# Patient Record
Sex: MALE | Race: WHITE | Employment: OTHER | URBAN - METROPOLITAN AREA
[De-identification: names, ages, dates, MRNs, and addresses within clinical notes are randomized per-mention and may not be internally consistent; named-entity substitution may affect disease eponyms.]

---

## 2017-01-09 ENCOUNTER — OFFICE VISIT (OUTPATIENT)
Dept: DERMATOLOGY | Age: 82
End: 2017-01-09

## 2017-01-09 DIAGNOSIS — D48.5 NEOPLASM OF UNCERTAIN BEHAVIOR OF SKIN: ICD-10-CM

## 2017-01-09 DIAGNOSIS — Z85.820 HISTORY OF MELANOMA: ICD-10-CM

## 2017-01-09 DIAGNOSIS — L24.9 IRRITANT DERMATITIS: ICD-10-CM

## 2017-01-09 DIAGNOSIS — Z85.828 HISTORY OF NONMELANOMA SKIN CANCER: ICD-10-CM

## 2017-01-09 DIAGNOSIS — R20.9 DISTURBANCE OF SKIN SENSATION: ICD-10-CM

## 2017-01-09 DIAGNOSIS — L82.1 SK (SEBORRHEIC KERATOSIS): ICD-10-CM

## 2017-01-09 DIAGNOSIS — L29.9 SCALP PRURITUS: Primary | ICD-10-CM

## 2017-01-09 PROCEDURE — 11101 PR BIOPSY, EACH ADDED LESION: CPT | Performed by: DERMATOLOGY

## 2017-01-09 PROCEDURE — 11100 PR BIOPSY OF SKIN LESION: CPT | Performed by: DERMATOLOGY

## 2017-01-09 PROCEDURE — 99214 OFFICE O/P EST MOD 30 MIN: CPT | Performed by: DERMATOLOGY

## 2017-01-09 RX ORDER — KETOCONAZOLE 20 MG/ML
SHAMPOO TOPICAL
Qty: 1 BOTTLE | Refills: 5 | Status: ON HOLD | OUTPATIENT
Start: 2017-01-09 | End: 2020-04-18 | Stop reason: CLARIF

## 2017-01-09 RX ORDER — TRIAMCINOLONE ACETONIDE 1 MG/G
CREAM TOPICAL
Qty: 30 G | Refills: 2 | Status: SHIPPED | OUTPATIENT
Start: 2017-01-09 | End: 2018-03-14 | Stop reason: SDUPTHER

## 2017-01-11 ENCOUNTER — TELEPHONE (OUTPATIENT)
Dept: DERMATOLOGY | Age: 82
End: 2017-01-11

## 2017-01-20 ENCOUNTER — PROCEDURE VISIT (OUTPATIENT)
Dept: DERMATOLOGY | Age: 82
End: 2017-01-20

## 2017-01-20 VITALS — DIASTOLIC BLOOD PRESSURE: 67 MMHG | SYSTOLIC BLOOD PRESSURE: 141 MMHG | HEART RATE: 58 BPM

## 2017-01-20 DIAGNOSIS — C44.529 SCC (SQUAMOUS CELL CARCINOMA), TRUNK: ICD-10-CM

## 2017-01-20 DIAGNOSIS — D04.4: Primary | ICD-10-CM

## 2017-01-20 PROCEDURE — 12032 INTMD RPR S/A/T/EXT 2.6-7.5: CPT | Performed by: DERMATOLOGY

## 2017-01-20 PROCEDURE — 17271 DSTR MAL LES S/N/H/F/G 0.6-1: CPT | Performed by: DERMATOLOGY

## 2017-01-20 PROCEDURE — 11603 EXC TR-EXT MAL+MARG 2.1-3 CM: CPT | Performed by: DERMATOLOGY

## 2017-01-25 ENCOUNTER — OFFICE VISIT (OUTPATIENT)
Dept: CARDIOLOGY CLINIC | Age: 82
End: 2017-01-25

## 2017-01-25 ENCOUNTER — TELEPHONE (OUTPATIENT)
Dept: DERMATOLOGY | Age: 82
End: 2017-01-25

## 2017-01-25 VITALS
OXYGEN SATURATION: 94 % | HEIGHT: 73 IN | WEIGHT: 205.12 LBS | HEART RATE: 62 BPM | BODY MASS INDEX: 27.18 KG/M2 | DIASTOLIC BLOOD PRESSURE: 72 MMHG | SYSTOLIC BLOOD PRESSURE: 126 MMHG

## 2017-01-25 DIAGNOSIS — R31.0 GROSS HEMATURIA: ICD-10-CM

## 2017-01-25 DIAGNOSIS — I25.10 ATHEROSCLEROSIS OF NATIVE CORONARY ARTERY OF NATIVE HEART WITHOUT ANGINA PECTORIS: ICD-10-CM

## 2017-01-25 DIAGNOSIS — I48.0 PAROXYSMAL ATRIAL FIBRILLATION (HCC): Primary | Chronic | ICD-10-CM

## 2017-01-25 DIAGNOSIS — I47.1 ATRIAL TACHYCARDIA (HCC): ICD-10-CM

## 2017-01-25 PROCEDURE — 93000 ELECTROCARDIOGRAM COMPLETE: CPT | Performed by: INTERNAL MEDICINE

## 2017-01-25 PROCEDURE — 99214 OFFICE O/P EST MOD 30 MIN: CPT | Performed by: INTERNAL MEDICINE

## 2017-02-03 ENCOUNTER — NURSE ONLY (OUTPATIENT)
Dept: DERMATOLOGY | Age: 82
End: 2017-02-03

## 2017-02-03 DIAGNOSIS — Z48.02 VISIT FOR SUTURE REMOVAL: Primary | ICD-10-CM

## 2017-05-10 ENCOUNTER — OFFICE VISIT (OUTPATIENT)
Dept: DERMATOLOGY | Age: 82
End: 2017-05-10

## 2017-05-10 DIAGNOSIS — Z85.820 HISTORY OF MELANOMA: ICD-10-CM

## 2017-05-10 DIAGNOSIS — L57.0 AK (ACTINIC KERATOSIS): Primary | ICD-10-CM

## 2017-05-10 DIAGNOSIS — C44.622 SCC (SQUAMOUS CELL CARCINOMA), SHOULDER, RIGHT: ICD-10-CM

## 2017-05-10 DIAGNOSIS — L82.1 SK (SEBORRHEIC KERATOSIS): ICD-10-CM

## 2017-05-10 PROCEDURE — 17003 DESTRUCT PREMALG LES 2-14: CPT | Performed by: DERMATOLOGY

## 2017-05-10 PROCEDURE — 17000 DESTRUCT PREMALG LESION: CPT | Performed by: DERMATOLOGY

## 2017-05-10 PROCEDURE — 99213 OFFICE O/P EST LOW 20 MIN: CPT | Performed by: DERMATOLOGY

## 2017-05-10 PROCEDURE — 17261 DSTRJ MAL LES T/A/L .6-1.0CM: CPT | Performed by: DERMATOLOGY

## 2017-05-16 ENCOUNTER — TELEPHONE (OUTPATIENT)
Dept: DERMATOLOGY | Age: 82
End: 2017-05-16

## 2017-08-16 ENCOUNTER — TELEPHONE (OUTPATIENT)
Dept: CARDIOLOGY CLINIC | Age: 82
End: 2017-08-16

## 2017-08-23 ENCOUNTER — OFFICE VISIT (OUTPATIENT)
Dept: CARDIOLOGY CLINIC | Age: 82
End: 2017-08-23

## 2017-08-23 VITALS
HEART RATE: 52 BPM | BODY MASS INDEX: 27.67 KG/M2 | WEIGHT: 209.75 LBS | SYSTOLIC BLOOD PRESSURE: 128 MMHG | DIASTOLIC BLOOD PRESSURE: 64 MMHG

## 2017-08-23 DIAGNOSIS — R55 SYNCOPE AND COLLAPSE: ICD-10-CM

## 2017-08-23 DIAGNOSIS — Z01.810 PREOPERATIVE CARDIOVASCULAR EXAMINATION: ICD-10-CM

## 2017-08-23 DIAGNOSIS — R31.9 HEMATURIA: ICD-10-CM

## 2017-08-23 DIAGNOSIS — I10 ESSENTIAL HYPERTENSION: ICD-10-CM

## 2017-08-23 DIAGNOSIS — I47.1 ATRIAL TACHYCARDIA (HCC): ICD-10-CM

## 2017-08-23 DIAGNOSIS — I48.0 PAROXYSMAL ATRIAL FIBRILLATION (HCC): Primary | Chronic | ICD-10-CM

## 2017-08-23 DIAGNOSIS — I25.10 ATHEROSCLEROSIS OF NATIVE CORONARY ARTERY OF NATIVE HEART WITHOUT ANGINA PECTORIS: ICD-10-CM

## 2017-08-23 PROCEDURE — 93000 ELECTROCARDIOGRAM COMPLETE: CPT | Performed by: INTERNAL MEDICINE

## 2017-08-23 PROCEDURE — 99214 OFFICE O/P EST MOD 30 MIN: CPT | Performed by: INTERNAL MEDICINE

## 2017-08-31 ENCOUNTER — HOSPITAL ENCOUNTER (OUTPATIENT)
Dept: NON INVASIVE DIAGNOSTICS | Age: 82
Discharge: HOME | End: 2017-08-31
Attending: INTERNAL MEDICINE | Admitting: INTERNAL MEDICINE

## 2017-08-31 DIAGNOSIS — I25.10 ATHEROSCLEROTIC HEART DISEASE OF NATIVE CORONARY ARTERY WITHOUT ANGINA PECTORIS: ICD-10-CM

## 2017-08-31 LAB
LV EF: 63 %
LVEF MODALITY: NORMAL

## 2017-08-31 RX ORDER — AMINOPHYLLINE DIHYDRATE 25 MG/ML
100 INJECTION, SOLUTION INTRAVENOUS ONCE
Status: COMPLETED | OUTPATIENT
Start: 2017-08-31 | End: 2017-08-31

## 2017-08-31 RX ADMIN — AMINOPHYLLINE DIHYDRATE 100 MG: 25 INJECTION, SOLUTION INTRAVENOUS at 10:30

## 2017-09-13 ENCOUNTER — OFFICE VISIT (OUTPATIENT)
Dept: DERMATOLOGY | Age: 82
End: 2017-09-13

## 2017-09-13 DIAGNOSIS — Z85.828 HISTORY OF SCC (SQUAMOUS CELL CARCINOMA) OF SKIN: ICD-10-CM

## 2017-09-13 DIAGNOSIS — L82.0 INFLAMED SEBORRHEIC KERATOSIS: Primary | ICD-10-CM

## 2017-09-13 DIAGNOSIS — Z85.820 HISTORY OF MELANOMA: ICD-10-CM

## 2017-09-13 DIAGNOSIS — L82.1 SK (SEBORRHEIC KERATOSIS): ICD-10-CM

## 2017-09-13 PROCEDURE — 99213 OFFICE O/P EST LOW 20 MIN: CPT | Performed by: DERMATOLOGY

## 2017-09-13 PROCEDURE — 17110 DESTRUCTION B9 LES UP TO 14: CPT | Performed by: DERMATOLOGY

## 2017-10-11 RX ORDER — VALACYCLOVIR HYDROCHLORIDE 500 MG/1
TABLET, FILM COATED ORAL
Qty: 24 TABLET | Refills: 0 | Status: SHIPPED | OUTPATIENT
Start: 2017-10-11 | End: 2018-03-26

## 2017-12-27 RX ORDER — SOTALOL HYDROCHLORIDE 80 MG/1
80 TABLET ORAL 2 TIMES DAILY
Qty: 180 TABLET | Refills: 3 | Status: SHIPPED | OUTPATIENT
Start: 2017-12-27 | End: 2019-01-16 | Stop reason: SDUPTHER

## 2018-03-14 ENCOUNTER — OFFICE VISIT (OUTPATIENT)
Dept: DERMATOLOGY | Age: 83
End: 2018-03-14

## 2018-03-14 DIAGNOSIS — L57.0 AK (ACTINIC KERATOSIS): Primary | ICD-10-CM

## 2018-03-14 DIAGNOSIS — L24.9 IRRITANT DERMATITIS: ICD-10-CM

## 2018-03-14 DIAGNOSIS — Z85.828 HISTORY OF NONMELANOMA SKIN CANCER: ICD-10-CM

## 2018-03-14 DIAGNOSIS — Z85.820 HISTORY OF MELANOMA: ICD-10-CM

## 2018-03-14 PROCEDURE — 17000 DESTRUCT PREMALG LESION: CPT | Performed by: DERMATOLOGY

## 2018-03-14 PROCEDURE — 17003 DESTRUCT PREMALG LES 2-14: CPT | Performed by: DERMATOLOGY

## 2018-03-14 PROCEDURE — 99213 OFFICE O/P EST LOW 20 MIN: CPT | Performed by: DERMATOLOGY

## 2018-03-14 RX ORDER — TRIAMCINOLONE ACETONIDE 1 MG/G
CREAM TOPICAL
Qty: 30 G | Refills: 2 | Status: SHIPPED | OUTPATIENT
Start: 2018-03-14 | End: 2019-03-13 | Stop reason: SDUPTHER

## 2018-03-14 NOTE — PROGRESS NOTES
[facility]  Princess Adi Mayorga  1935    [de-identified] y.o. male     Date of Visit: 3/14/2018    Chief Complaint: follow up for skin cancers    History of Present Illness:    1. He presents today for persistent scaly lesions on the chest and back. 2.  Follow-up for a history of irritant dermatitis of the lower extremitiesstill comes and goes. He reports improvement with use of triamcinolone 0.1% cream.    3.  Follow-up for history of 2 melanomasdenies any signs of recurrence. 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 Jayden Shultz (stage IB) on 12/10/2014. 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 Rayna Reynaga on 2/25/14 (stage IA). 4.  f/u for a hx of NMSC - denies any signs of recurrence. 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 SCC in situ of the left lateral neckED with curettage on 1/20/2017. SCC on the left upper backexcised on 1/20/2017. SCC in situ of the right forearmtreated with curettage on 3/4/2016. SCC in situ of the left upper backtreated with curettage on 8/27/2015. SCC of the central chest - excised 3/27/15. BCC of the left forearm - excised on 2/25/14. He had back surgery since our last visit - back pain markedly reduced. Review of Systems:  Skin: No new or changing moles. Past Medical History, Family History, Surgical History, Medications and Allergies reviewed.     Past Medical History:   Diagnosis Date    Arrhythmia     Arthritis     hands shoulders neck    Atrial fibrillation (HCC)     coumadin    Atrial tachycardia (HCC)     CAD (coronary artery disease)     Cancer (HCC)     skin    Diabetes mellitus ([address]     Diverticulitis     Enlarged prostate     concerning lesions. f/u in 6 months. 4. History of nonmelanoma skin cancers - clear today    Sun protective behaviors and self skin examinations were encouraged. Call for any new or concerning lesions. Return in about 6 months (around 9/14/2018).

## 2018-03-26 RX ORDER — VALACYCLOVIR HYDROCHLORIDE 500 MG/1
TABLET, FILM COATED ORAL
Qty: 24 TABLET | Refills: 0 | Status: SHIPPED | OUTPATIENT
Start: 2018-03-26 | End: 2018-09-11 | Stop reason: SDUPTHER

## 2018-09-11 RX ORDER — VALACYCLOVIR HYDROCHLORIDE 500 MG/1
TABLET, FILM COATED ORAL
Qty: 24 TABLET | Refills: 0 | Status: SHIPPED | OUTPATIENT
Start: 2018-09-11 | End: 2020-01-30

## 2018-09-19 ENCOUNTER — OFFICE VISIT (OUTPATIENT)
Dept: DERMATOLOGY | Age: 83
End: 2018-09-19

## 2018-09-19 DIAGNOSIS — Z85.820 HISTORY OF MELANOMA: ICD-10-CM

## 2018-09-19 DIAGNOSIS — L57.0 AK (ACTINIC KERATOSIS): ICD-10-CM

## 2018-09-19 DIAGNOSIS — L82.1 SK (SEBORRHEIC KERATOSIS): ICD-10-CM

## 2018-09-19 DIAGNOSIS — D48.5 NEOPLASM OF UNCERTAIN BEHAVIOR OF SKIN: Primary | ICD-10-CM

## 2018-09-19 PROCEDURE — 17003 DESTRUCT PREMALG LES 2-14: CPT | Performed by: DERMATOLOGY

## 2018-09-19 PROCEDURE — 11100 PR BIOPSY OF SKIN LESION: CPT | Performed by: DERMATOLOGY

## 2018-09-19 PROCEDURE — 17000 DESTRUCT PREMALG LESION: CPT | Performed by: DERMATOLOGY

## 2018-09-19 PROCEDURE — 99213 OFFICE O/P EST LOW 20 MIN: CPT | Performed by: DERMATOLOGY

## 2018-09-19 NOTE — PROGRESS NOTES
Date    ABLATION OF DYSRHYTHMIC FOCUS      AVNRT and Atrial tachycardia    CARPAL TUNNEL RELEASE      CATARACT REMOVAL Bilateral     CORONARY ANGIOPLASTY WITH STENT PLACEMENT  2005    CYSTOSCOPY  9/26/12    coagulation prostatic urethra    CYSTOSCOPY  10/8/15    TURP    FINGER SURGERY      X7    FINGER TRIGGER RELEASE      OTHER SURGICAL HISTORY Left [id number]    WIDE LOCAL EXCISION LEFT ARM MELANOMA, WIDE LOCAL EXCISION OF    SHOULDER SURGERY Bilateral     TURP  3-7-13       No Known Allergies  Outpatient Prescriptions Marked as Taking for the 9/19/18 encounter (Office Visit) with Clare Mix MD   Medication Sig Dispense Refill    valACYclovir (VALTREX) 500 MG tablet TAKE 1 TABLET BY MOUTH TWICE DAILY FOR 3 DAYS AT FIRST SYMPTOMS OF RECURRENCE ON THE BUTTOCK 24 tablet 0    triamcinolone (KENALOG) 0.1 % cream Apply to itchy areas on the arms and legs twice daily for up to 2 weeks or until improved. 30 g 2    sotalol (BETAPACE) 80 MG tablet Take 1 tablet by mouth 2 times daily 180 tablet 3    FA-Pyridoxine-Cyanocobalamin (FOLTX PO) Take by mouth      ferrous sulfate ([employer] IRON) 325 (65 FE) MG tablet Take 325 mg by mouth daily (with breakfast)      Ascorbic Acid (VITAMIN C) 500 MG CAPS Take by mouth daily      polyethylene glycol (GLYCOLAX) powder Take 17 g by mouth daily      insulin detemir (LEVEMIR FLEXTOUCH) 100 UNIT/ML injection pen Inject 12-14 Units into the skin nightly (Patient taking differently: Inject 24 Units into the skin nightly ) 5 Pen 3    amLODIPine (NORVASC) 5 MG tablet Take 1 tablet by mouth daily 90 tablet 3    acetaminophen 650 MG TABS Take 650 mg by mouth every 4 hours as needed 120 tablet 3    B-D UF III MINI PEN NEEDLES 31G X 5 MM MISC       pregabalin (LYRICA) 75 MG capsule Take 75 mg by mouth 2 times daily       linagliptin (TRADJENTA) 5 MG tablet Take 1 tablet by mouth daily.  90 tablet 1    omega-3 acid ethyl esters (LOVAZA) 1 G capsule Take 2 capsules by mouth 2 times daily. Take 1 tablet twice daily. 360 capsule 1    nateglinide (STARLIX) 120 MG tablet Take 120 mg by mouth 2 times daily (before meals).  Multiple Vitamins-Minerals (CENTRUM SILVER) TABS Take  by mouth 2 times daily.  vitamin D (ERGOCALCIFEROL) 29959 UNIT CAPS capsule Take 50,000 Units by mouth once a week Indications: Takes on Monday       Pantoprazole Sodium (PROTONIX PO) Take 40 mg by mouth every other day.  ALPHA LIPOIC ACID PO Take 600 mg by mouth daily.  simvastatin (ZOCOR) 40 MG tablet Take 40 mg by mouth nightly. Physical Examination       The following were examined and determined to be normal: Psych/Neuro, Scalp/hair, Head/face, Conjunctivae/eyelids, Gums/teeth/lips, Neck, Abdomen, Back, LUE, RLE, LLE and Nails/digits. The following were examined and determined to be abnormal: Breast/axilla/chest and RUE. Well-appearing. 1.  Right supraclavicular region with a 6 mm keratotic pink papule. 2.  Right forearm4, chest3: Few keratotic erythematous macules. 3.  Right thigh and left lower back with stuck on appearing verrucous gray-brown papules. 4.  Left forearm with a linear surgical scar. Left earlobe absent. Assessment and Plan     1. Possible SCC of the right shoulder -     Discussed likely diagnosis; patient agreeable to biopsy and curettage (verbal consent obtained). The area(s) to be biopsied were marked with a surgical pen. Alcohol was used to cleanse the site. Local anesthesia was acheived with 1% lidocaine with epinephrine. Shave biopsy was performed using a razor blade followed by sharp curettage in multiple directions. Hemostasis was achieved with aluminum chloride. The wound(s) were dressed with petrolatum and covered with a bandage. Wound care instructions were reviewed. 1 Specimen (s) sent to pathology. The specimen bottles were appropriately labeled. We also reviewed the risks of bleeding, scar, and infection. 2. AK (actinic keratosis)     2 cycles of liquid nitrogen applied to 7 AKs: Right forearm4, chest3. Patient was educated regarding the potential risks of blister formation, discomfort, hypopigmentation, and scar. Wound care was discussed. 3. SK (seborrheic keratosis)     Reassurance. 4. History of melanoma  X 2 (left earlobe and left forerarm) - no signs of recurrence. He was encouraged to perform monthly self skin exams and to call with any new or concerning lesions. Sun protective behaviors were encouraged. Follow up for full skin exam in 6 mos. Return in about 6 months (around 3/19/2019).

## 2018-09-24 LAB — DERMATOLOGY PATHOLOGY REPORT: NORMAL

## 2018-09-26 PROBLEM — Z01.810 PREOPERATIVE CARDIOVASCULAR EXAMINATION: Status: RESOLVED | Noted: 2017-08-23 | Resolved: 2018-09-26

## 2018-10-15 ENCOUNTER — TELEPHONE (OUTPATIENT)
Dept: CARDIOLOGY CLINIC | Age: 83
End: 2018-10-15

## 2018-11-07 ENCOUNTER — OFFICE VISIT (OUTPATIENT)
Dept: CARDIOLOGY CLINIC | Age: 83
End: 2018-11-07
Payer: OTHER GOVERNMENT

## 2018-11-07 VITALS
HEART RATE: 60 BPM | SYSTOLIC BLOOD PRESSURE: 120 MMHG | WEIGHT: 210.4 LBS | DIASTOLIC BLOOD PRESSURE: 60 MMHG | BODY MASS INDEX: 27.76 KG/M2

## 2018-11-07 DIAGNOSIS — I47.1 ATRIAL TACHYCARDIA (HCC): ICD-10-CM

## 2018-11-07 DIAGNOSIS — R55 VASODEPRESSOR SYNCOPE: ICD-10-CM

## 2018-11-07 DIAGNOSIS — I48.0 PAROXYSMAL ATRIAL FIBRILLATION (HCC): Primary | ICD-10-CM

## 2018-11-07 DIAGNOSIS — R31.9 HEMATURIA, UNSPECIFIED TYPE: ICD-10-CM

## 2018-11-07 DIAGNOSIS — I25.10 ATHEROSCLEROSIS OF NATIVE CORONARY ARTERY OF NATIVE HEART WITHOUT ANGINA PECTORIS: ICD-10-CM

## 2018-11-07 DIAGNOSIS — I10 ESSENTIAL HYPERTENSION: ICD-10-CM

## 2018-11-07 PROCEDURE — 99214 OFFICE O/P EST MOD 30 MIN: CPT | Performed by: INTERNAL MEDICINE

## 2018-11-07 NOTE — PROGRESS NOTES
thirst, fluid intake, or urination. No tremor. · Hematologic/Lymphatic: No abnormal bruising or bleeding, blood clots or swollen lymph nodes. · Allergic/Immunologic: No nasal congestion or hives. Physical Examination:    VITALS:    /60   Pulse 60   Wt 210 lb 6.4 oz (95.4 kg)   BMI 27.76 kg/m²   CONSTITUTIONAL: Cooperative, no apparent distress, patient is Overweight  NEUROLOGIC:  Awake and orientated to person, place and time. PSYCH: Calm affect. SKIN: Warm and dry. HEENT: Sclera non-icteric, atraumatic   NECK:  neck supple, no elevation of JVP, normal carotid pulses with no bruits and thyroid normal size  LUNGS:  No increased work of breathing and clear to auscultation, no crackles or wheezing  CARDIOVASCULAR:  Regular rate and rhythm regular with ectopic beats; no murmurs, gallops or rubs. Normal PMI  ABDOMEN:  Normal bowel sounds, non-distended and non-tender to palpation  EXT: No edema, no cyanosis. Risk Factors: The patient does not smoke. The patient does have known diagnosis of CAD. Patient is  on beta blocker and is  on statin. Patient is on antiplatelet therapy   The patient does not have known diagnosis of CHF. Patient is  on beta blocker and is  on AceI/ARB. The patient does have known atrial arrhythmias. Patient is not on anticoagulation due to hx of bleeding after two attempts with anticoagulation. Assessment:   1. Paroxysmal atrial fibrillation:                - Ambulatory monitoring . 16% AF burden per event monitor in Sept- Oct 2016.      - ECG today shows SR   - Now taking Sotalol 80 mg twice a day since OV in 2/2017. Good AF suppression on this dose. - He has had multiple episodes of bleeding and urinary retention and severe anemia requiring transfusion.               - Other options including left atrial appendage closure were discussed with patient by Dr. Kathy Cuba previously. - No AC at this time secondary to hematuria, anemia.   Has been of coumadin since 2015. 2.  Hematuria w/ AC   - No hematuria currently (off AC)    3. S/P RFCA of AVNRT and PAT in 2000   - No recurrent AT/PAT    4. CAD: s/p PCI and stent to PDA in 2005              - Stable. No symptoms.               - No asa or Plavix. - On statin      5. Vasodepressor syncope   - No recurrent syncope    6. HTN: Blood pressure 120/60, pulse 60, weight 210 lb 6.4 oz (95.4 kg). 7.  Preoperative cardiovascular exam   - will be having back surgery to remove mass from index finger   - Lexiscan from 8/2017 negative for ischemia      Plan:  1. He is at low cardiovascular risk for his non-cardiac surgery. 2.  Once surgery is complete, plan for a 30 day MCOT to assess AF burden  3. If no AF is seen, will keep off anticoagulation  4. Follow up in 6 months    QUALITY MEASURES  1. Tobacco Cessation Counseling: NA  2. Retake of BP if >140/90:   NA  3. Documentation to PCP/referring for new patient:  Sent to PCP at close of office visit  4. CAD patient on anti-platelet: No, history of hematuria  5. CAD patient on STATIN therapy:  Yes  6. Patient with aFib on anticoagulation:  NA - high risk for bleeding    I, Mendoza Egan RN, am scribing for and in the presence of Dr. Kleber Downs.  11/07/18 4:44 PM  MORRIS Wick MD

## 2018-11-27 ENCOUNTER — TELEPHONE (OUTPATIENT)
Dept: CARDIOLOGY CLINIC | Age: 83
End: 2018-11-27

## 2018-11-29 ENCOUNTER — NURSE ONLY (OUTPATIENT)
Dept: CARDIOLOGY CLINIC | Age: 83
End: 2018-11-29

## 2019-01-14 ENCOUNTER — TELEPHONE (OUTPATIENT)
Dept: CARDIOLOGY CLINIC | Age: 84
End: 2019-01-14

## 2019-01-15 RX ORDER — SOTALOL HYDROCHLORIDE 80 MG/1
80 TABLET ORAL 2 TIMES DAILY
Qty: 28 TABLET | Refills: 0 | Status: CANCELLED | OUTPATIENT
Start: 2019-01-15

## 2019-01-15 RX ORDER — SOTALOL HYDROCHLORIDE 80 MG/1
80 TABLET ORAL 2 TIMES DAILY
Qty: 180 TABLET | Refills: 2 | Status: CANCELLED | OUTPATIENT
Start: 2019-01-15

## 2019-01-17 PROCEDURE — 93228 REMOTE 30 DAY ECG REV/REPORT: CPT | Performed by: INTERNAL MEDICINE

## 2019-01-17 RX ORDER — SOTALOL HYDROCHLORIDE 80 MG/1
80 TABLET ORAL 2 TIMES DAILY
Qty: 28 TABLET | Refills: 0 | Status: SHIPPED | OUTPATIENT
Start: 2019-01-17 | End: 2019-01-29 | Stop reason: SDUPTHER

## 2019-01-18 DIAGNOSIS — I48.0 PAROXYSMAL ATRIAL FIBRILLATION (HCC): Chronic | ICD-10-CM

## 2019-01-29 RX ORDER — SOTALOL HYDROCHLORIDE 80 MG/1
80 TABLET ORAL 2 TIMES DAILY
Qty: 28 TABLET | Refills: 0 | Status: SHIPPED | OUTPATIENT
Start: 2019-01-29 | End: 2019-02-11 | Stop reason: SDUPTHER

## 2019-02-01 ENCOUNTER — TELEPHONE (OUTPATIENT)
Dept: CARDIOLOGY CLINIC | Age: 84
End: 2019-02-01

## 2019-02-11 ENCOUNTER — TELEPHONE (OUTPATIENT)
Dept: CARDIOLOGY CLINIC | Age: 84
End: 2019-02-11

## 2019-02-11 RX ORDER — SOTALOL HYDROCHLORIDE 80 MG/1
80 TABLET ORAL 2 TIMES DAILY
Qty: 180 TABLET | Refills: 3 | Status: SHIPPED | OUTPATIENT
Start: 2019-02-11 | End: 2020-01-21

## 2019-03-13 ENCOUNTER — OFFICE VISIT (OUTPATIENT)
Dept: DERMATOLOGY | Age: 84
End: 2019-03-13
Payer: OTHER GOVERNMENT

## 2019-03-13 DIAGNOSIS — L82.0 INFLAMED SEBORRHEIC KERATOSIS: Primary | ICD-10-CM

## 2019-03-13 DIAGNOSIS — L57.0 ACTINIC KERATOSIS: ICD-10-CM

## 2019-03-13 DIAGNOSIS — L82.1 SEBORRHEIC KERATOSIS: ICD-10-CM

## 2019-03-13 DIAGNOSIS — L24.9 IRRITANT DERMATITIS: ICD-10-CM

## 2019-03-13 DIAGNOSIS — Z85.820 HISTORY OF MELANOMA: ICD-10-CM

## 2019-03-13 PROCEDURE — 17000 DESTRUCT PREMALG LESION: CPT | Performed by: DERMATOLOGY

## 2019-03-13 PROCEDURE — 99213 OFFICE O/P EST LOW 20 MIN: CPT | Performed by: DERMATOLOGY

## 2019-03-13 PROCEDURE — 17110 DESTRUCTION B9 LES UP TO 14: CPT | Performed by: DERMATOLOGY

## 2019-03-13 PROCEDURE — 17003 DESTRUCT PREMALG LES 2-14: CPT | Performed by: DERMATOLOGY

## 2019-03-13 RX ORDER — TRIAMCINOLONE ACETONIDE 1 MG/G
CREAM TOPICAL
Qty: 80 G | Refills: 2 | Status: ON HOLD | OUTPATIENT
Start: 2019-03-13 | End: 2020-04-18 | Stop reason: CLARIF

## 2019-05-13 ENCOUNTER — HOSPITAL ENCOUNTER (OUTPATIENT)
Age: 84
Discharge: HOME | End: 2019-05-13
Payer: OTHER GOVERNMENT

## 2019-05-13 LAB
A/G RATIO: 1.4 (ref 1.1–2.2)
ALBUMIN SERPL-MCNC: 4.1 G/DL (ref 3.4–5)
ALP BLD-CCNC: 53 U/L (ref 40–129)
ALT SERPL-CCNC: 17 U/L (ref 10–40)
ANION GAP SERPL CALCULATED.3IONS-SCNC: 12 MMOL/L (ref 3–16)
AST SERPL-CCNC: 23 U/L (ref 15–37)
BILIRUB SERPL-MCNC: 0.5 MG/DL (ref 0–1)
BUN BLDV-MCNC: 20 MG/DL (ref 7–20)
CALCIUM SERPL-MCNC: 10.4 MG/DL (ref 8.3–10.6)
CHLORIDE BLD-SCNC: 107 MMOL/L (ref 99–110)
CHOLESTEROL, TOTAL: 152 MG/DL (ref 0–199)
CO2: 22 MMOL/L (ref 21–32)
CREAT SERPL-MCNC: 1.5 MG/DL (ref 0.8–1.3)
CREATININE URINE: 158.7 MG/DL (ref 39–259)
ESTIMATED AVERAGE GLUCOSE: 128.4 MG/DL
GFR AFRICAN AMERICAN: 54
GFR NON-AFRICAN AMERICAN: 45
GLOBULIN: 3 G/DL
GLUCOSE BLD-MCNC: 149 MG/DL (ref 70–99)
HBA1C MFR BLD: 6.1 %
HCT VFR BLD CALC: 43.5 % (ref 40.5–52.5)
HDLC SERPL-MCNC: 47 MG/DL (ref 40–60)
HEMOGLOBIN: 15.1 G/DL (ref 13.5–17.5)
LDL CHOLESTEROL CALCULATED: 71 MG/DL
MCH RBC QN AUTO: 32.1 PG (ref 26–34)
MCHC RBC AUTO-ENTMCNC: 34.7 G/DL (ref 31–36)
MCV RBC AUTO: 92.7 FL (ref 80–100)
MICROALBUMIN UR-MCNC: 1.9 MG/DL
MICROALBUMIN/CREAT UR-RTO: 12 MG/G (ref 0–30)
PDW BLD-RTO: 13.4 % (ref 12.4–15.4)
PLATELET # BLD: 205 K/UL (ref 135–450)
PMV BLD AUTO: 8.6 FL (ref 5–10.5)
POTASSIUM SERPL-SCNC: 4.4 MMOL/L (ref 3.5–5.1)
RBC # BLD: 4.69 M/UL (ref 4.2–5.9)
SODIUM BLD-SCNC: 141 MMOL/L (ref 136–145)
T4 FREE: 0.9 NG/DL (ref 0.9–1.8)
TOTAL PROTEIN: 7.1 G/DL (ref 6.4–8.2)
TRIGL SERPL-MCNC: 168 MG/DL (ref 0–150)
TSH SERPL DL<=0.05 MIU/L-ACNC: 2.48 UIU/ML (ref 0.27–4.2)
VITAMIN D 25-HYDROXY: 58 NG/ML
VLDLC SERPL CALC-MCNC: 34 MG/DL
WBC # BLD: 7.7 K/UL (ref 4–11)

## 2019-05-13 PROCEDURE — 82306 VITAMIN D 25 HYDROXY: CPT

## 2019-05-13 PROCEDURE — 36415 COLL VENOUS BLD VENIPUNCTURE: CPT

## 2019-05-13 PROCEDURE — 84439 ASSAY OF FREE THYROXINE: CPT

## 2019-05-13 PROCEDURE — 82043 UR ALBUMIN QUANTITATIVE: CPT

## 2019-05-13 PROCEDURE — 82570 ASSAY OF URINE CREATININE: CPT

## 2019-05-13 PROCEDURE — 84403 ASSAY OF TOTAL TESTOSTERONE: CPT

## 2019-05-13 PROCEDURE — 80053 COMPREHEN METABOLIC PANEL: CPT

## 2019-05-13 PROCEDURE — 85027 COMPLETE CBC AUTOMATED: CPT

## 2019-05-13 PROCEDURE — 80061 LIPID PANEL: CPT

## 2019-05-13 PROCEDURE — 84443 ASSAY THYROID STIM HORMONE: CPT

## 2019-05-13 PROCEDURE — 83036 HEMOGLOBIN GLYCOSYLATED A1C: CPT

## 2019-05-15 LAB — TESTOSTERONE TOTAL: 537 NG/DL (ref 220–1000)

## 2019-05-28 NOTE — PROGRESS NOTES
[facility], [place], operated by [facility]   Cardiac Followup    Referring Provider:  Audelia Senior MD     Chief Complaint   Patient presents with    Atrial Fibrillation         HPI:Troy Willson is 82 y.o. male who presents today for follow up of PAF. PMH significant for CAD s/p PCI, paroxysmal AF on sotalol, history of  AVNRT and atrial tachycardia ablation 2000, vasodepressor syncope (+Tilt table test), DM, HTN, CKD III, and HLD. He presented with recurrent hematuria and prostate tissue/w outlet obstruction and urinary retention and severe anemia requiring transfusion. He has undergone Cystoscopy and resection of prostate. Patient used to be on plavix and coumadin which is on hold due to bleeding. PCI to the PDA of dominant RCA in 2005 by Dr. Slime Haq. He hasn't had documented AF for a long time. 10/2015 he had a TURP for prostate problems and was unable to urinate with hematuria and had a repeat (2nd) TURP was done. His coumadin was stopped at that time and has remained off of it since. 30 day event monitor (9/28-10/28/16) showed 0.16% AF burden, longest episode 68 minutes. He had occasional conversion pauses, longest 5 seconds. Most recent monitor (12/13-1/11/19) showed SR with occasional pauses (longest 3.1 sec) and average HR of 61 (). No AF and no symptoms reported. He remains on sotalol 80 mg BID. Denies symptomatic recurrence of AF, in SB today. Denies complaints of palpitations, dizziness, CP, SOB, orthopnea, presyncope, or syncope. He denies any hematuria for quite some time. He is active walking, up to 11,000 steps per day. His only limitation is with his joints on his right side. Past Medical History:   has a past medical history of Arrhythmia, Arthritis, Atrial fibrillation ([address] Atrial tachycardia ([address] CAD (coronary artery disease), Cancer ([address] Diabetes mellitus ([address] Diverticulitis, Enlarged prostate, Hyperlipidemia, Hypertension, Pneumonia, and Vasodepressor syncope.     Surgical Parminder Gregory MD   amLODIPine (NORVASC) 5 MG tablet Take 1 tablet by mouth daily 11/17/15  Yes Maite Martin APRN - CNP   acetaminophen 650 MG TABS Take 650 mg by mouth every 4 hours as needed 10/15/15  Yes MD RAYNA Herzog UF III MINI PEN NEEDLES 31G X 5 MM MISC  11/8/13  Yes Historical Provider, MD   pregabalin (LYRICA) 75 MG capsule Take 75 mg by mouth 2 times daily    Yes Historical Provider, MD   omega-3 acid ethyl esters (LOVAZA) 1 G capsule Take 2 capsules by mouth 2 times daily. Take 1 tablet twice daily. 2/27/13  Yes Parminder Gregory MD   nateglinide (STARLIX) 120 MG tablet Take 120 mg by mouth 2 times daily (before meals). Yes Historical Provider, MD   Multiple Vitamins-Minerals (CENTRUM SILVER) TABS Take  by mouth 2 times daily. Yes Historical Provider, MD   vitamin D (ERGOCALCIFEROL) 61239 UNIT CAPS capsule Take 50,000 Units by mouth once a week Indications: Takes on Monday    Yes Historical Provider, MD   Pantoprazole Sodium (PROTONIX PO) Take 40 mg by mouth every other day. Yes Historical Provider, MD   ALPHA LIPOIC ACID PO Take 600 mg by mouth daily. 2/20/10  Yes Historical Provider, MD   simvastatin (ZOCOR) 40 MG tablet Take 40 mg by mouth nightly. Yes Historical Provider, MD      Allergies:  Patient has no known allergies.      DATA:  Labs reviewed  Lab Results   Component Value Date    ALT 17 05/13/2019    AST 23 05/13/2019    ALKPHOS 53 05/13/2019    BILITOT 0.5 05/13/2019     Lab Results   Component Value Date    CREATININE 1.5 (H) 05/13/2019    BUN 20 05/13/2019     05/13/2019    K 4.4 05/13/2019     05/13/2019    CO2 22 05/13/2019     Lab Results   Component Value Date    TSH 2.48 05/13/2019    T0FGHGL 5.1 05/17/2012     Lab Results   Component Value Date    WBC 7.7 05/13/2019    HGB 15.1 05/13/2019    HCT 43.5 05/13/2019    MCV 92.7 05/13/2019     05/13/2019     No components found for: CHLPL  Lab Results   Component Value Date    TRIG 168 (H) 05/13/2019    TRIG 130 11/10/2016    TRIG 253 (H) 12/22/2014     Lab Results   Component Value Date    HDL 47 05/13/2019    HDL 48 11/10/2016    HDL 53 12/22/2014     Lab Results   Component Value Date    LDLCALC 71 05/13/2019    LDLCALC 69 11/10/2016    LDLCALC 86 12/22/2014     Lab Results   Component Value Date    LABVLDL 34 05/13/2019    LABVLDL 26 11/10/2016    LABVLDL 51 12/22/2014     /62   Pulse 57   Wt 211 lb 3.2 oz (95.8 kg)   BMI 27.86 kg/m²     ECG 5/29/19 personally reviewed. SB, rate 57    Lexiscan 8/31/17  Summary    Normal myocardial perfusion study.    Normal LV function with ejection fraction of 63 %. Echo 9/11/12  CONCLUSIONS-   1.    Hyperdynamic left ventricular systolic function with an  estimated ejection fraction of 65%.   2.    Sclerosis of the aortic valve without evidence of aortic  stenosis or regurgitation.   3.    Mitral annular calcification with mildly elevated mitral valve  velocity consistent with very mild mitral stenosis.  Left atrial  size is normal.    Review of Systems:   · Constitutional: there has been no unanticipated weight loss. There's been no change in energy level, sleep pattern, or activity level. · Eyes: No visual changes or diplopia. No scleral icterus. · ENT: No Headaches, hearing loss or vertigo. No mouth sores or sore throat. · Cardiovascular: Reviewed in HPI  · Respiratory: No cough or wheezing, no sputum production. No hemoptysis. · Gastrointestinal: No abdominal pain, appetite loss, blood in stools. No change in bowel or bladder habits. No hematemesis. · Genitourinary: No dysuria, trouble voiding. · Musculoskeletal:  No gait disturbance, weakness or joint complaints. Back pain  · Integumentary: No rash or pruritis. · Neurological: No headache, diplopia, change in muscle strength, numbness or tingling. No change in gait, balance, coordination, mood, affect, memory, mentation, behavior.   · Psychiatric: No anxiety, no depression. · Endocrine: No malaise, fatigue or temperature intolerance. No excessive thirst, fluid intake, or urination. No tremor. · Hematologic/Lymphatic: No abnormal bruising or bleeding, blood clots or swollen lymph nodes. · Allergic/Immunologic: No nasal congestion or hives. Physical Examination:    VITALS:    /62   Pulse 57   Wt 211 lb 3.2 oz (95.8 kg)   BMI 27.86 kg/m²   CONSTITUTIONAL: Cooperative, no apparent distress, patient is Overweight  NEUROLOGIC:  Awake and orientated to person, place and time. PSYCH: Calm affect. SKIN: Warm and dry. HEENT: Sclera non-icteric, atraumatic   NECK:  neck supple, no elevation of JVP, normal carotid pulses with no bruits and thyroid normal size  LUNGS:  No increased work of breathing and clear to auscultation, no crackles or wheezing  CARDIOVASCULAR:  Regular rate and rhythm regular with ectopic beats; no murmurs, gallops or rubs. Normal PMI  ABDOMEN:  Normal bowel sounds, non-distended and non-tender to palpation  EXT: No edema, no cyanosis. Risk Factors: The patient does not smoke. The patient does have known diagnosis of CAD. Patient is  on beta blocker and is  on statin. Patient is on antiplatelet therapy   The patient does not have known diagnosis of CHF. Patient is  on beta blocker and is  on AceI/ARB. The patient does have known atrial arrhythmias. Patient is not on anticoagulation due to hx of bleeding after two attempts with anticoagulation. Assessment:   1. Paroxysmal atrial fibrillation:                - Ambulatory monitoring . 16% AF burden per event monitor in Sept- Oct 2016.      - ECG today shows SB   - taking Sotalol 80 mg BID since OV in 2/2017. Good AF suppression on this dose. - He has had multiple episodes of bleeding and urinary retention and severe anemia requiring transfusion.               - Other options including left atrial appendage closure were discussed with patient by Dr. Miller Yun previously. - No AC at this time secondary to hematuria, anemia. Has been of coumadin since 2015.   - No AF seen on monitor in 1/2019    2. Hematuria w/ AC   - No hematuria currently (off AC)    3. S/P RFCA of AVNRT and PAT in 2000   - No recurrent AT/PAT    4. CAD: s/p PCI and stent to PDA in 2005              - Stable. No symptoms.               - No asa or Plavix. - On statin. LDL 71 (5/2019)     5. Vasodepressor syncope   - No recurrent syncope    6. HTN: Blood pressure 130/62, pulse 57, weight 211 lb 3.2 oz (95.8 kg). Plan:  1. Remain off AC as no AF was seen on monitor and no symptomatic recurrence. 2.  Continue current medication regimen, including sotalol 80 mg BID  4. Follow up in 6 months    QUALITY MEASURES  1. Tobacco Cessation Counseling: NA  2. Retake of BP if >140/90:   NA  3. Documentation to PCP/referring for new patient:  Sent to PCP at close of office visit  4. CAD patient on anti-platelet: No, history of hematuria  5. CAD patient on STATIN therapy:  Yes  6. Patient with aFib on anticoagulation:  NA - high risk for bleeding    ICarlotta RN, am scribing for and in the presence of Dr. Lj Rose. 05/29/19 3:57 PM  Carlotta Sheth RN    I, Dr. Lj Rose, personally performed the services described in this documentation as scribed by Carlotta Sheth RN in my presence, and it is both accurate and complete.         Lj Rose MD

## 2019-05-29 ENCOUNTER — OFFICE VISIT (OUTPATIENT)
Dept: CARDIOLOGY CLINIC | Age: 84
End: 2019-05-29
Payer: OTHER GOVERNMENT

## 2019-05-29 VITALS
SYSTOLIC BLOOD PRESSURE: 130 MMHG | HEART RATE: 57 BPM | WEIGHT: 211.2 LBS | BODY MASS INDEX: 27.86 KG/M2 | DIASTOLIC BLOOD PRESSURE: 62 MMHG

## 2019-05-29 DIAGNOSIS — R55 VASODEPRESSOR SYNCOPE: ICD-10-CM

## 2019-05-29 DIAGNOSIS — I10 ESSENTIAL HYPERTENSION: ICD-10-CM

## 2019-05-29 DIAGNOSIS — I48.0 PAROXYSMAL ATRIAL FIBRILLATION (HCC): Primary | ICD-10-CM

## 2019-05-29 DIAGNOSIS — I25.10 ATHEROSCLEROSIS OF NATIVE CORONARY ARTERY OF NATIVE HEART WITHOUT ANGINA PECTORIS: ICD-10-CM

## 2019-05-29 PROCEDURE — 93000 ELECTROCARDIOGRAM COMPLETE: CPT | Performed by: INTERNAL MEDICINE

## 2019-05-29 PROCEDURE — 99214 OFFICE O/P EST MOD 30 MIN: CPT | Performed by: INTERNAL MEDICINE

## 2019-06-25 ENCOUNTER — HOSPITAL ENCOUNTER (OUTPATIENT)
Age: 84
Discharge: HOME | End: 2019-06-25
Payer: OTHER GOVERNMENT

## 2019-06-25 LAB
CREAT SERPL-MCNC: 1.5 MG/DL (ref 0.8–1.3)
GFR AFRICAN AMERICAN: 54
GFR NON-AFRICAN AMERICAN: 45

## 2019-06-25 PROCEDURE — 82565 ASSAY OF CREATININE: CPT

## 2019-06-25 PROCEDURE — 36415 COLL VENOUS BLD VENIPUNCTURE: CPT

## 2019-08-19 ENCOUNTER — TELEPHONE (OUTPATIENT)
Dept: CARDIOLOGY CLINIC | Age: 84
End: 2019-08-19

## 2019-09-25 ENCOUNTER — OFFICE VISIT (OUTPATIENT)
Dept: DERMATOLOGY | Age: 84
End: 2019-09-25
Payer: OTHER GOVERNMENT

## 2019-09-25 DIAGNOSIS — L57.0 AK (ACTINIC KERATOSIS): ICD-10-CM

## 2019-09-25 DIAGNOSIS — Z85.820 HISTORY OF MELANOMA: ICD-10-CM

## 2019-09-25 DIAGNOSIS — L82.1 SK (SEBORRHEIC KERATOSIS): Primary | ICD-10-CM

## 2019-09-25 DIAGNOSIS — D48.5 NEOPLASM OF UNCERTAIN BEHAVIOR OF SKIN: ICD-10-CM

## 2019-09-25 PROCEDURE — 11102 TANGNTL BX SKIN SINGLE LES: CPT | Performed by: DERMATOLOGY

## 2019-09-25 PROCEDURE — 17000 DESTRUCT PREMALG LESION: CPT | Performed by: DERMATOLOGY

## 2019-09-25 PROCEDURE — 17003 DESTRUCT PREMALG LES 2-14: CPT | Performed by: DERMATOLOGY

## 2019-09-25 PROCEDURE — 99213 OFFICE O/P EST LOW 20 MIN: CPT | Performed by: DERMATOLOGY

## 2019-09-25 NOTE — PROGRESS NOTES
[facility]  Kali Roche MD  [phone]      Link Elizabeth  1935    82 y.o. male     Date of Visit: 9/25/2019    Chief Complaint: Skin lesions    History of Present Illness:    1. He presents today for several rough growths on the upper portions of the chest and shoulders. 2.  He complains of persistent scaly lesions on the left cheek. 3.  He also complains of a persistent sometimes tender lesion on the left fourth finger. 4.  Has a history of 2 melanomas-denies any signs of recurrence. 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 Nia Mckeon (stage IB) on 12/10/2014.     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 Lucretia Leonard on 2/25/14 (stage IA). 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 SCC on the left upper back-excised on 1/20/2017. SCC in situ of the right forearm-treated with curettage on 3/4/2016. SCC in situ of the left upper back-treated with curettage on 8/27/2015. SCC of the central chest - excised 3/27/15. BCC of the left forearm - excised on 2/25/14.       Review of Systems:  Skin: No new or changing moles. Past Medical History, Family History, Surgical History, Medications and Allergies reviewed.     Past Medical History:   Diagnosis Date    Arrhythmia     Arthritis     hands shoulders neck    Atrial fibrillation (HCC)     coumadin    Atrial tachycardia (HCC)     CAD (coronary artery disease)     Cancer (HCC)     skin    Diabetes mellitus ([address]     Diverticulitis     Enlarged prostate     Hyperlipidemia     Hypertension     Pneumonia     ABOUT 5 YEARS AGO    Vasodepressor syncope      Past Surgical History: Procedure Laterality Date    ABLATION OF DYSRHYTHMIC FOCUS      AVNRT and Atrial tachycardia    CARPAL TUNNEL RELEASE      CATARACT REMOVAL Bilateral     CORONARY ANGIOPLASTY WITH STENT PLACEMENT  2005    CYSTOSCOPY  9/26/12    coagulation prostatic urethra    CYSTOSCOPY  10/8/15    TURP    FINGER SURGERY      X7    FINGER TRIGGER RELEASE      OTHER SURGICAL HISTORY Left [id number]    WIDE LOCAL EXCISION LEFT ARM MELANOMA, WIDE LOCAL EXCISION OF    SHOULDER SURGERY Bilateral     TURP  3-7-13       No Known Allergies  Outpatient Medications Marked as Taking for the 9/25/19 encounter (Office Visit) with Jeanie Lancaster MD   Medication Sig Dispense Refill    SITagliptin (JANUVIA) 25 MG tablet Take 25 mg by mouth daily      triamcinolone (KENALOG) 0.1 % cream Apply to itchy areas on the arms and legs twice daily for up to 2 weeks or until improved. 80 g 2    sotalol (BETAPACE) 80 MG tablet Take 1 tablet by mouth 2 times daily 180 tablet 3    valACYclovir (VALTREX) 500 MG tablet TAKE 1 TABLET BY MOUTH TWICE DAILY FOR 3 DAYS AT FIRST SYMPTOMS OF RECURRENCE ON THE BUTTOCK 24 tablet 0    ketoconazole (NIZORAL) 2 % shampoo Wash scalp daily 3 times per week.  1 Bottle 5    FA-Pyridoxine-Cyanocobalamin (FOLTX PO) Take by mouth      ferrous sulfate ([employer] IRON) 325 (65 FE) MG tablet Take 325 mg by mouth daily (with breakfast)      Ascorbic Acid (VITAMIN C) 500 MG CAPS Take by mouth daily      polyethylene glycol (GLYCOLAX) powder Take 17 g by mouth daily      insulin detemir (LEVEMIR FLEXTOUCH) 100 UNIT/ML injection pen Inject 12-14 Units into the skin nightly (Patient taking differently: Inject 24 Units into the skin nightly ) 5 Pen 3    amLODIPine (NORVASC) 5 MG tablet Take 1 tablet by mouth daily 90 tablet 3    acetaminophen 650 MG TABS Take 650 mg by mouth every 4 hours as needed 120 tablet 3    B-D UF III MINI PEN NEEDLES 31G X 5 MM MISC       pregabalin (LYRICA) 75 MG capsule Take 75 mg by mouth 2

## 2019-09-27 LAB — DERMATOLOGY PATHOLOGY REPORT: ABNORMAL

## 2019-10-25 ENCOUNTER — PROCEDURE VISIT (OUTPATIENT)
Dept: DERMATOLOGY | Age: 84
End: 2019-10-25
Payer: OTHER GOVERNMENT

## 2019-10-25 DIAGNOSIS — D04.62 SQUAMOUS CELL CARCINOMA IN SITU (SCCIS) OF SKIN OF FINGER OF LEFT HAND: Primary | ICD-10-CM

## 2019-10-25 PROCEDURE — 17270 DSTR MAL LES S/N/H/F/G .5 /<: CPT | Performed by: DERMATOLOGY

## 2019-11-27 ENCOUNTER — OFFICE VISIT (OUTPATIENT)
Dept: CARDIOLOGY CLINIC | Age: 84
End: 2019-11-27
Payer: OTHER GOVERNMENT

## 2019-11-27 VITALS
SYSTOLIC BLOOD PRESSURE: 122 MMHG | HEIGHT: 72 IN | BODY MASS INDEX: 27.22 KG/M2 | WEIGHT: 201 LBS | HEART RATE: 54 BPM | DIASTOLIC BLOOD PRESSURE: 64 MMHG

## 2019-11-27 DIAGNOSIS — R55 VASODEPRESSOR SYNCOPE: ICD-10-CM

## 2019-11-27 DIAGNOSIS — I47.1 ATRIAL TACHYCARDIA (HCC): ICD-10-CM

## 2019-11-27 DIAGNOSIS — I48.0 PAROXYSMAL ATRIAL FIBRILLATION (HCC): Primary | ICD-10-CM

## 2019-11-27 DIAGNOSIS — I25.10 CORONARY ARTERY DISEASE INVOLVING NATIVE CORONARY ARTERY OF NATIVE HEART WITHOUT ANGINA PECTORIS: ICD-10-CM

## 2019-11-27 DIAGNOSIS — I10 ESSENTIAL HYPERTENSION: ICD-10-CM

## 2019-11-27 PROCEDURE — 99214 OFFICE O/P EST MOD 30 MIN: CPT | Performed by: INTERNAL MEDICINE

## 2019-11-27 PROCEDURE — 93000 ELECTROCARDIOGRAM COMPLETE: CPT | Performed by: INTERNAL MEDICINE

## 2019-11-27 RX ORDER — MULTIVIT-MIN/IRON/FOLIC ACID/K 18-600-40
CAPSULE ORAL DAILY
COMMUNITY
End: 2020-04-18 | Stop reason: CLARIF

## 2019-12-04 DIAGNOSIS — I48.0 PAROXYSMAL ATRIAL FIBRILLATION (HCC): Primary | ICD-10-CM

## 2019-12-11 ENCOUNTER — TELEPHONE (OUTPATIENT)
Dept: CARDIOLOGY CLINIC | Age: 84
End: 2019-12-11

## 2020-01-21 RX ORDER — SOTALOL HYDROCHLORIDE 80 MG/1
TABLET ORAL
Qty: 180 TABLET | Refills: 3 | Status: SHIPPED | OUTPATIENT
Start: 2020-01-21 | End: 2021-02-03 | Stop reason: SDUPTHER

## 2020-01-21 NOTE — TELEPHONE ENCOUNTER
11/27/2019    Plan:  1. Remain off AC as no AF was seen on monitor and no symptomatic recurrence. 2.  Continue current medication regimen, including sotalol 80 mg BID  4.   Follow up in 6 months

## 2020-01-30 RX ORDER — VALACYCLOVIR HYDROCHLORIDE 500 MG/1
TABLET, FILM COATED ORAL
Qty: 24 TABLET | Refills: 0 | Status: ON HOLD | OUTPATIENT
Start: 2020-01-30 | End: 2020-04-18 | Stop reason: CLARIF

## 2020-03-04 ENCOUNTER — OFFICE VISIT (OUTPATIENT)
Dept: DERMATOLOGY | Age: 85
End: 2020-03-04
Payer: OTHER GOVERNMENT

## 2020-03-04 PROCEDURE — 17000 DESTRUCT PREMALG LESION: CPT | Performed by: DERMATOLOGY

## 2020-03-04 PROCEDURE — 11103 TANGNTL BX SKIN EA SEP/ADDL: CPT | Performed by: DERMATOLOGY

## 2020-03-04 PROCEDURE — 11102 TANGNTL BX SKIN SINGLE LES: CPT | Performed by: DERMATOLOGY

## 2020-03-04 PROCEDURE — 99213 OFFICE O/P EST LOW 20 MIN: CPT | Performed by: DERMATOLOGY

## 2020-03-04 PROCEDURE — 17003 DESTRUCT PREMALG LES 2-14: CPT | Performed by: DERMATOLOGY

## 2020-03-04 NOTE — PATIENT INSTRUCTIONS
Biopsy Wound Care Instructions    · Keep the bandage in place for 24 hours. · Cleanse the wound with mild soapy water daily   Gently dry the area.  Apply Vaseline or petroleum jelly to the wound using a cotton tipped applicator.  Cover with a clean bandage.  Repeat this process until the biopsy site is healed.  You may shower and bathe as usual.       ** Biopsy results generally take around 7 business days to come back. If you have not heard from us by then, please call the office at [phone] between 8AM and 4PM Monday through Friday.

## 2020-03-06 LAB — DERMATOLOGY PATHOLOGY REPORT: ABNORMAL

## 2020-04-06 ENCOUNTER — TELEPHONE (OUTPATIENT)
Dept: CARDIOLOGY CLINIC | Age: 85
End: 2020-04-06

## 2020-04-06 NOTE — TELEPHONE ENCOUNTER
Spoke with patient and let him know carotid doppler is scheduled for 4/8/20 at 9:00. Patient also wants labs ordered \"a full body work up. \"

## 2020-04-08 ENCOUNTER — HOSPITAL ENCOUNTER (OUTPATIENT)
Dept: VASCULAR LAB | Age: 85
Discharge: HOME | End: 2020-04-08
Payer: OTHER GOVERNMENT

## 2020-04-08 PROCEDURE — 93880 EXTRACRANIAL BILAT STUDY: CPT

## 2020-04-10 ENCOUNTER — TELEPHONE (OUTPATIENT)
Dept: CARDIOLOGY CLINIC | Age: 85
End: 2020-04-10

## 2020-04-10 NOTE — TELEPHONE ENCOUNTER
----- Message from ELISHA Fuentes CNP sent at 4/8/2020  2:55 PM EDT -----  Please mail the patient a 30-day [facility] links monitor. Diagnosis is lightheadedness and near syncope, bradycardia, sinus pauses, paroxysmal atrial fibrillation. He has an appointment to follow-up with Dr. Balbir Russ in June. Successfully enrolled patient for a 30 day medilynx to be sent to his house for syncope,afib per FW. Spoke with patient and he is aware.   Peyton Carter

## 2020-04-17 ENCOUNTER — TELEPHONE (OUTPATIENT)
Dept: CARDIOLOGY CLINIC | Age: 85
End: 2020-04-17

## 2020-04-18 ENCOUNTER — HOSPITAL ENCOUNTER (INPATIENT)
Age: 85
LOS: 3 days | Discharge: HOME | DRG: 244 | End: 2020-04-21
Attending: EMERGENCY MEDICINE | Admitting: INTERNAL MEDICINE
Payer: OTHER GOVERNMENT

## 2020-04-18 ENCOUNTER — TELEPHONE (OUTPATIENT)
Dept: NON INVASIVE DIAGNOSTICS | Age: 85
End: 2020-04-18

## 2020-04-18 PROBLEM — R00.1 BRADYCARDIA: Status: ACTIVE | Noted: 2020-04-18

## 2020-04-18 LAB
ABO/RH: NORMAL
ANION GAP SERPL CALCULATED.3IONS-SCNC: 13 MMOL/L (ref 3–16)
ANTIBODY SCREEN: NORMAL
APTT: 33.3 SEC (ref 24.2–36.2)
BASOPHILS ABSOLUTE: 0.1 K/UL (ref 0–0.2)
BASOPHILS RELATIVE PERCENT: 0.8 %
BUN BLDV-MCNC: 17 MG/DL (ref 7–20)
CALCIUM SERPL-MCNC: 10.4 MG/DL (ref 8.3–10.6)
CHLORIDE BLD-SCNC: 100 MMOL/L (ref 99–110)
CO2: 24 MMOL/L (ref 21–32)
CREAT SERPL-MCNC: 1.4 MG/DL (ref 0.8–1.3)
EOSINOPHILS ABSOLUTE: 0.2 K/UL (ref 0–0.6)
EOSINOPHILS RELATIVE PERCENT: 2 %
GFR AFRICAN AMERICAN: 59
GFR NON-AFRICAN AMERICAN: 48
GLUCOSE BLD-MCNC: 106 MG/DL (ref 70–99)
GLUCOSE BLD-MCNC: 124 MG/DL (ref 70–99)
GLUCOSE BLD-MCNC: 133 MG/DL (ref 70–99)
HCT VFR BLD CALC: 44.9 % (ref 40.5–52.5)
HEMOGLOBIN: 15.4 G/DL (ref 13.5–17.5)
INR BLD: 0.97 (ref 0.86–1.14)
LYMPHOCYTES ABSOLUTE: 1.2 K/UL (ref 1–5.1)
LYMPHOCYTES RELATIVE PERCENT: 16.4 %
MCH RBC QN AUTO: 32.1 PG (ref 26–34)
MCHC RBC AUTO-ENTMCNC: 34.3 G/DL (ref 31–36)
MCV RBC AUTO: 93.7 FL (ref 80–100)
MONOCYTES ABSOLUTE: 0.7 K/UL (ref 0–1.3)
MONOCYTES RELATIVE PERCENT: 9.4 %
NEUTROPHILS ABSOLUTE: 5.4 K/UL (ref 1.7–7.7)
NEUTROPHILS RELATIVE PERCENT: 71.4 %
PDW BLD-RTO: 13.3 % (ref 12.4–15.4)
PERFORMED ON: ABNORMAL
PERFORMED ON: ABNORMAL
PLATELET # BLD: 209 K/UL (ref 135–450)
PMV BLD AUTO: 8.5 FL (ref 5–10.5)
POTASSIUM REFLEX MAGNESIUM: 4.6 MMOL/L (ref 3.5–5.1)
PROTHROMBIN TIME: 11.3 SEC (ref 10–13.2)
RBC # BLD: 4.79 M/UL (ref 4.2–5.9)
SODIUM BLD-SCNC: 137 MMOL/L (ref 136–145)
TROPONIN: <0.01 NG/ML
WBC # BLD: 7.6 K/UL (ref 4–11)

## 2020-04-18 PROCEDURE — 86850 RBC ANTIBODY SCREEN: CPT

## 2020-04-18 PROCEDURE — 80048 BASIC METABOLIC PNL TOTAL CA: CPT

## 2020-04-18 PROCEDURE — 86901 BLOOD TYPING SEROLOGIC RH(D): CPT

## 2020-04-18 PROCEDURE — 86900 BLOOD TYPING SEROLOGIC ABO: CPT

## 2020-04-18 PROCEDURE — 93005 ELECTROCARDIOGRAM TRACING: CPT | Performed by: EMERGENCY MEDICINE

## 2020-04-18 PROCEDURE — 85610 PROTHROMBIN TIME: CPT

## 2020-04-18 PROCEDURE — 6370000000 HC RX 637 (ALT 250 FOR IP): Performed by: INTERNAL MEDICINE

## 2020-04-18 PROCEDURE — 85025 COMPLETE CBC W/AUTO DIFF WBC: CPT

## 2020-04-18 PROCEDURE — 85730 THROMBOPLASTIN TIME PARTIAL: CPT

## 2020-04-18 PROCEDURE — 6360000002 HC RX W HCPCS: Performed by: INTERNAL MEDICINE

## 2020-04-18 PROCEDURE — 2580000003 HC RX 258: Performed by: INTERNAL MEDICINE

## 2020-04-18 PROCEDURE — 1200000000 HC SEMI PRIVATE

## 2020-04-18 PROCEDURE — 84484 ASSAY OF TROPONIN QUANT: CPT

## 2020-04-18 PROCEDURE — 99284 EMERGENCY DEPT VISIT MOD MDM: CPT

## 2020-04-18 RX ORDER — INSULIN LISPRO 100 [IU]/ML
0-3 INJECTION, SOLUTION INTRAVENOUS; SUBCUTANEOUS NIGHTLY
Status: DISCONTINUED | OUTPATIENT
Start: 2020-04-18 | End: 2020-04-21 | Stop reason: HOSPADM

## 2020-04-18 RX ORDER — PREGABALIN 75 MG/1
75 CAPSULE ORAL 2 TIMES DAILY
Status: DISCONTINUED | OUTPATIENT
Start: 2020-04-18 | End: 2020-04-21 | Stop reason: HOSPADM

## 2020-04-18 RX ORDER — ACETAMINOPHEN 325 MG/1
650 TABLET ORAL EVERY 6 HOURS PRN
Status: DISCONTINUED | OUTPATIENT
Start: 2020-04-18 | End: 2020-04-21 | Stop reason: HOSPADM

## 2020-04-18 RX ORDER — ACETAMINOPHEN 650 MG/1
650 SUPPOSITORY RECTAL EVERY 6 HOURS PRN
Status: DISCONTINUED | OUTPATIENT
Start: 2020-04-18 | End: 2020-04-21 | Stop reason: HOSPADM

## 2020-04-18 RX ORDER — INSULIN LISPRO 100 [IU]/ML
0-6 INJECTION, SOLUTION INTRAVENOUS; SUBCUTANEOUS
Status: DISCONTINUED | OUTPATIENT
Start: 2020-04-18 | End: 2020-04-21 | Stop reason: HOSPADM

## 2020-04-18 RX ORDER — ERGOCALCIFEROL 1.25 MG/1
50000 CAPSULE ORAL WEEKLY
COMMUNITY
Start: 2020-04-13

## 2020-04-18 RX ORDER — INSULIN DETEMIR 100 [IU]/ML
24 INJECTION, SOLUTION SUBCUTANEOUS NIGHTLY
COMMUNITY
End: 2020-06-03 | Stop reason: SDUPTHER

## 2020-04-18 RX ORDER — DEXTROSE MONOHYDRATE 25 G/50ML
12.5 INJECTION, SOLUTION INTRAVENOUS PRN
Status: DISCONTINUED | OUTPATIENT
Start: 2020-04-18 | End: 2020-04-21 | Stop reason: HOSPADM

## 2020-04-18 RX ORDER — ATORVASTATIN CALCIUM 10 MG/1
10 TABLET, FILM COATED ORAL DAILY
Status: DISCONTINUED | OUTPATIENT
Start: 2020-04-19 | End: 2020-04-21 | Stop reason: HOSPADM

## 2020-04-18 RX ORDER — OMEGA-3-ACID ETHYL ESTERS 1 G/1
1 CAPSULE, LIQUID FILLED ORAL 2 TIMES DAILY
COMMUNITY
End: 2020-06-03 | Stop reason: SDUPTHER

## 2020-04-18 RX ORDER — SODIUM CHLORIDE 0.9 % (FLUSH) 0.9 %
10 SYRINGE (ML) INJECTION EVERY 12 HOURS SCHEDULED
Status: DISCONTINUED | OUTPATIENT
Start: 2020-04-18 | End: 2020-04-21 | Stop reason: HOSPADM

## 2020-04-18 RX ORDER — POLYETHYLENE GLYCOL 3350 17 G/17G
17 POWDER, FOR SOLUTION ORAL DAILY PRN
Status: DISCONTINUED | OUTPATIENT
Start: 2020-04-18 | End: 2020-04-21 | Stop reason: HOSPADM

## 2020-04-18 RX ORDER — SODIUM CHLORIDE 0.9 % (FLUSH) 0.9 %
10 SYRINGE (ML) INJECTION PRN
Status: DISCONTINUED | OUTPATIENT
Start: 2020-04-18 | End: 2020-04-21 | Stop reason: HOSPADM

## 2020-04-18 RX ORDER — DEXTROSE MONOHYDRATE 50 MG/ML
100 INJECTION, SOLUTION INTRAVENOUS PRN
Status: DISCONTINUED | OUTPATIENT
Start: 2020-04-18 | End: 2020-04-21 | Stop reason: HOSPADM

## 2020-04-18 RX ORDER — NICOTINE POLACRILEX 4 MG
15 LOZENGE BUCCAL PRN
Status: DISCONTINUED | OUTPATIENT
Start: 2020-04-18 | End: 2020-04-21 | Stop reason: HOSPADM

## 2020-04-18 RX ORDER — PROMETHAZINE HYDROCHLORIDE 12.5 MG/1
12.5 TABLET ORAL EVERY 6 HOURS PRN
Status: DISCONTINUED | OUTPATIENT
Start: 2020-04-18 | End: 2020-04-21 | Stop reason: HOSPADM

## 2020-04-18 RX ORDER — ONDANSETRON 2 MG/ML
4 INJECTION INTRAMUSCULAR; INTRAVENOUS EVERY 6 HOURS PRN
Status: DISCONTINUED | OUTPATIENT
Start: 2020-04-18 | End: 2020-04-20

## 2020-04-18 RX ORDER — AMLODIPINE BESYLATE 5 MG/1
5 TABLET ORAL DAILY
Status: DISCONTINUED | OUTPATIENT
Start: 2020-04-19 | End: 2020-04-21 | Stop reason: HOSPADM

## 2020-04-18 RX ORDER — SOTALOL HYDROCHLORIDE 80 MG/1
80 TABLET ORAL 2 TIMES DAILY
Status: DISCONTINUED | OUTPATIENT
Start: 2020-04-18 | End: 2020-04-19

## 2020-04-18 RX ADMIN — ENOXAPARIN SODIUM 40 MG: 40 INJECTION SUBCUTANEOUS at 20:29

## 2020-04-18 RX ADMIN — POLYETHYLENE GLYCOL 3350 17 G: 17 POWDER, FOR SOLUTION ORAL at 23:55

## 2020-04-18 RX ADMIN — Medication 10 ML: at 20:30

## 2020-04-18 RX ADMIN — PREGABALIN 75 MG: 75 CAPSULE ORAL at 20:29

## 2020-04-18 RX ADMIN — SOTALOL HYDROCHLORIDE 80 MG: 80 TABLET ORAL at 20:29

## 2020-04-18 RX ADMIN — INSULIN GLARGINE 24 UNITS: 100 INJECTION, SOLUTION SUBCUTANEOUS at 21:40

## 2020-04-18 ASSESSMENT — ENCOUNTER SYMPTOMS
GASTROINTESTINAL NEGATIVE: 1
ALLERGIC/IMMUNOLOGIC NEGATIVE: 1
SHORTNESS OF BREATH: 0
EYES NEGATIVE: 1

## 2020-04-18 ASSESSMENT — PAIN SCALES - GENERAL
PAINLEVEL_OUTOF10: 0
PAINLEVEL_OUTOF10: 0

## 2020-04-18 NOTE — ED PROVIDER NOTES
ED Attending Attestation Note     Date of evaluation: 4/18/2020    This patient was seen by the resident. I have seen and examined the patient, agree with the workup, evaluation, management and diagnosis. The care plan has been discussed. I have reviewed the ECG and concur with the resident's interpretation. My assessment reveals an overall well appearing elderly gentleman, pleasantly conversational, and in no acute distress. He was sent to the emergency department by his electrophysiologist for admission for placement of a pacemaker, as he was noted to have prolonged sinus pauses on a Holter monitor. These have been associated with feelings of chest heaviness and lightheadedness. His heart rate has been mildly bradycardic in the low 60s to high 50s in the emergency department, and he is asymptomatic at the moment.        Lindy Busby MD  04/18/20 [id number]

## 2020-04-18 NOTE — H&P
Hospital Medicine History & Physical      PCP: Charito Cisse MD    Date of Admission: 4/18/2020    Date of Service: Pt seen/examined on 04/18/20 and Admitted to Inpatient with expected LOS greater than two midnights due to medical therapy. Chief Complaint:  9.1 sec pause on cardiac monitor      History Of Present Illness:     82 y.o. 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 He had occasional conversion pauses, longest 5 seconds. Repeat monitor (12/13-1/11/19) showed SR with occasional pauses (longest 3.1 sec) and average HR of 61 ( bpm). No AF and no symptoms reported. He remains on sotalol 80 mg BID. --Presented to ED on recommendation of electrophysiologist for 9.1-second pause on cardiac monitoring device seen early morning 5 AM.    As per patient he was sleeping during this time did not feel any symptoms. Patient has had these \"spells\" he feels a sensation of warmth that progressed from his chest to his face associated with lightheadedness, started about a year ago initially once or twice a month but have progressively been worsening. 10 days back he was having as often as 20 times a day, he called the cardiology office and a venous duplex which showed resistive flow suggestive of distal obstruction of the left internal carotid artery. As 30 day medilynx monitor was sent to the patient on 04/10 which he has been using for last 3 days. Patient had a 9.1-second sinus arrest detected on the monitor, and recommended to come to the ED for evaluation and monitoring for PM placement on Monday. He has been exercising until 6 months back, which he stopped due to these presyncopal episodes, he was running 1 mile a day until 10 days back.   No longer smokes, quit 45 yrs ago, denies alcohol use. Denies fever chills cough congestion. No sick contacts. No contacts with anyone with COVID infection. Past Medical History:          Diagnosis Date    Arrhythmia     Arthritis     hands shoulders neck    Atrial fibrillation (HCC)     coumadin    Atrial tachycardia (HCC)     CAD (coronary artery disease)     Cancer (HCC)     skin    Diabetes mellitus ([facility] [address]     Diverticulitis     Enlarged prostate     Hyperlipidemia     Hypertension     Pneumonia     ABOUT 5 YEARS AGO    Vasodepressor syncope        Past Surgical History:          Procedure Laterality Date    ABLATION OF DYSRHYTHMIC FOCUS      AVNRT and Atrial tachycardia    CARPAL TUNNEL RELEASE      CATARACT REMOVAL Bilateral     CORONARY ANGIOPLASTY WITH STENT PLACEMENT  2005    CYSTOSCOPY  9/26/12    coagulation prostatic urethra    CYSTOSCOPY  10/8/15    TURP    FINGER SURGERY      X7    FINGER TRIGGER RELEASE      OTHER SURGICAL HISTORY Left [id number]    WIDE LOCAL EXCISION LEFT ARM MELANOMA, WIDE LOCAL EXCISION OF    SHOULDER SURGERY Bilateral     TURP  3-7-13       Medications Prior to Admission:      Prior to Admission medications    Medication Sig Start Date End Date Taking?  Authorizing Provider   vitamin D (ERGOCALCIFEROL) 1.25 MG (25141 UT) CAPS capsule Take 50,000 Units by mouth once a week Mondays 4/13/20  Yes Historical Provider, MD   insulin detemir (LEVEMIR FLEXTOUCH) 100 UNIT/ML injection pen Inject 24 Units into the skin nightly   Yes Historical Provider, MD   omega-3 acid ethyl esters (LOVAZA) 1 g capsule Take 1 g by mouth 2 times daily    Yes Historical Provider, MD   SITagliptin (JANUVIA) 50 MG tablet Take 50 mg by mouth daily   Yes Historical Provider, MD   sotalol (BETAPACE) 80 MG tablet TAKE 1 TABLET BY MOUTH TWICE A DAY 1/21/20  Yes Val Beckett MD   ferrous sulfate ([employer]S IRON) 325 (65 FE) MG tablet Take 325 mg by mouth daily (with breakfast)   Yes Historical round, and reactive to light. Extra ocular muscles intact. Conjunctivae/corneas clear. Neck: Supple, with full range of motion. No jugular venous distention. Trachea midline. Respiratory:  Normal respiratory effort. Clear to auscultation, bilaterally without Rales/Wheezes/Rhonchi. Cardiovascular:  Regular rate and rhythm with normal S1/S2 without murmurs, rubs or gallops. Abdomen: Soft, non-tender, non-distended with normal bowel sounds. Musculoskeletal:  No clubbing, cyanosis or edema bilaterally. Full range of motion without deformity. Skin: Skin color, texture, turgor normal.  No rashes or lesions. Neurologic:  Neurovascularly intact without any focal sensory/motor deficits. Cranial nerves: II-XII intact, grossly non-focal.  Psychiatric:  Alert and oriented, thought content appropriate, normal insight  Capillary Refill: Brisk,< 3 seconds   Peripheral Pulses: +2 palpable, equal bilaterally       Labs:     Recent Labs     04/18/20  1638   WBC 7.6   HGB 15.4   HCT 44.9        Recent Labs     04/18/20  1638      K 4.6      CO2 24   BUN 17   CREATININE 1.4*   CALCIUM 10.4     No results for input(s): AST, ALT, BILIDIR, BILITOT, ALKPHOS in the last 72 hours.   Recent Labs     04/18/20  1638   INR 0.97     Recent Labs     04/18/20  1638   TROPONINI <0.01       Urinalysis:      Lab Results   Component Value Date    NITRU Negative 11/10/2016    WBCUA 2 03/21/2016    BACTERIA Rare 10/04/2015    RBCUA 1 03/21/2016    BLOODU Negative 11/10/2016    SPECGRAV 1.014 11/10/2016    GLUCOSEU Negative 11/10/2016       Radiology:     CXR: I have reviewed the CXR with the following interpretation: N/A  EKG:  I have reviewed the EKG with the following interpretation: ordered    No orders to display       ASSESSMENT:    RICHARD/[address]rend[address] Problems    Diagnosis Date Noted    Bradycardia [R00.1] 04/18/2020     Bradycardia with sinus arrest 9.1 second noted on cardiac monitoring device  Presyncopal episodes  -Monitor on telemetry closely  -Electrophysiology has been consulted, planning on pacemaker placement on Monday  -Check INR  -Type and screen    Coronary artery disease s/p PCI and stent to PDA in 2005  -Not on aspirin or Plavix due to bleeding issues  - on Low intensity statin 40 mg pravastatin (will give 10 mg Lipitor while inpatient)    p-atrial fibrillation; not on anticoagulation due to history of multiple bleeding episodes leading to severe anemia requiring blood transfusions. He has been off Coumadin since 2015. History of AVNRT and p-atrial tachycardia s/p RFCA 2000  -Continue sotalol 80 mg twice daily    Type 2 diabetes insulin-dependent  Home medications include Levemir 25 units at night, nateglinide, sitagliptan  -Continue 24 units of long-acting insulin  -Low-dose sliding cell insulin  -Carb controlled diet  -Pregabalin 75 mg twice daily for neuropathy  -Hypoglycemia protocol      DVT Prophylaxis: Lovenox -- 30 mg dose due to hx of bleeding, place SCDs as well  Diet: DIET CARB CONTROL; Carb Control: 4 carb choices (60 gms)/meal  Code Status: Full Code    PT/OT Eval Status: N/A    Dispo - Admit as inpatient. I anticipate hospitalization spanning more than two midnights for investigation and treatment of the above medically necessary diagnoses. Bina Henderson MD   Hospitalist    Thank you Charito Cisse MD for the opportunity to be involved in this patient's care. If you have any questions or concerns please feel free to contact me at [phone].

## 2020-04-18 NOTE — ED PROVIDER NOTES
[id number]          EM RESIDENT NOTE       Date of evaluation: 4/18/2020    Chief Complaint     Bradycardia (pt has been wearing halter monitor, called today by monitoring doctor and told to come to ED for pacemaker)      History of Present Illness     Urszula Oliver is a 82 y.o. male with the past medical history as listed below who presents with bradycardia. Per patient and chart review, the patient has a [employer] ([facility]) monitoring device in which the patient was noted to have a 9.1 second pause prompting [address] EP contact patient to come to the ED for admission for pacemaker implant. The patient states over the past few weeks he has been having \"spells,\" in which he feels lightheaded and dizzy while walking and standing up but denies any episodes of syncope. He denies any chest pain or shortness of breath at this time. With the exception of above, the patient does not endorse any alleviating or aggravating factors, and does not note any further complaints. Review of Systems     Review of Systems   Constitutional: Negative. HENT: Negative. Eyes: Negative. Respiratory: Negative for shortness of breath. Cardiovascular: Negative for chest pain and leg swelling. Gastrointestinal: Negative. Endocrine: Negative. Genitourinary: Negative. Musculoskeletal: Negative. Skin: Negative. Allergic/Immunologic: Negative. Neurological: Positive for dizziness and light-headedness. Negative for tremors, syncope, weakness and headaches. Hematological: Negative. Psychiatric/Behavioral: Negative. Past Medical, Surgical, Family, and Social History     He has a past medical history of Arrhythmia, Arthritis, Atrial fibrillation ([address] Atrial tachycardia ([address] CAD (coronary artery disease), Cancer ([address] Diabetes mellitus ([address] Diverticulitis, Enlarged prostate, Hyperlipidemia, Hypertension, Pneumonia, and Vasodepressor syncope.   He has

## 2020-04-19 LAB
ANION GAP SERPL CALCULATED.3IONS-SCNC: 11 MMOL/L (ref 3–16)
BASOPHILS ABSOLUTE: 0.1 K/UL (ref 0–0.2)
BASOPHILS RELATIVE PERCENT: 0.7 %
BUN BLDV-MCNC: 19 MG/DL (ref 7–20)
CALCIUM SERPL-MCNC: 9.9 MG/DL (ref 8.3–10.6)
CHLORIDE BLD-SCNC: 104 MMOL/L (ref 99–110)
CO2: 25 MMOL/L (ref 21–32)
CREAT SERPL-MCNC: 1.3 MG/DL (ref 0.8–1.3)
EKG ATRIAL RATE: 59 BPM
EKG DIAGNOSIS: NORMAL
EKG P AXIS: 57 DEGREES
EKG P-R INTERVAL: 218 MS
EKG Q-T INTERVAL: 432 MS
EKG QRS DURATION: 82 MS
EKG QTC CALCULATION (BAZETT): 427 MS
EKG R AXIS: -10 DEGREES
EKG T AXIS: 14 DEGREES
EKG VENTRICULAR RATE: 59 BPM
EOSINOPHILS ABSOLUTE: 0.2 K/UL (ref 0–0.6)
EOSINOPHILS RELATIVE PERCENT: 2 %
GFR AFRICAN AMERICAN: >60
GFR NON-AFRICAN AMERICAN: 53
GLUCOSE BLD-MCNC: 130 MG/DL (ref 70–99)
GLUCOSE BLD-MCNC: 146 MG/DL (ref 70–99)
GLUCOSE BLD-MCNC: 160 MG/DL (ref 70–99)
GLUCOSE BLD-MCNC: 175 MG/DL (ref 70–99)
GLUCOSE BLD-MCNC: 87 MG/DL (ref 70–99)
GLUCOSE BLD-MCNC: 96 MG/DL (ref 70–99)
HCT VFR BLD CALC: 41.8 % (ref 40.5–52.5)
HEMOGLOBIN: 14.4 G/DL (ref 13.5–17.5)
LYMPHOCYTES ABSOLUTE: 1.1 K/UL (ref 1–5.1)
LYMPHOCYTES RELATIVE PERCENT: 12.5 %
MCH RBC QN AUTO: 31.7 PG (ref 26–34)
MCHC RBC AUTO-ENTMCNC: 34.5 G/DL (ref 31–36)
MCV RBC AUTO: 92 FL (ref 80–100)
MONOCYTES ABSOLUTE: 0.7 K/UL (ref 0–1.3)
MONOCYTES RELATIVE PERCENT: 7.7 %
NEUTROPHILS ABSOLUTE: 6.8 K/UL (ref 1.7–7.7)
NEUTROPHILS RELATIVE PERCENT: 77.1 %
PDW BLD-RTO: 13 % (ref 12.4–15.4)
PERFORMED ON: ABNORMAL
PERFORMED ON: NORMAL
PLATELET # BLD: 173 K/UL (ref 135–450)
PMV BLD AUTO: 8.1 FL (ref 5–10.5)
POTASSIUM REFLEX MAGNESIUM: 4 MMOL/L (ref 3.5–5.1)
RBC # BLD: 4.55 M/UL (ref 4.2–5.9)
SODIUM BLD-SCNC: 140 MMOL/L (ref 136–145)
WBC # BLD: 8.8 K/UL (ref 4–11)

## 2020-04-19 PROCEDURE — 99223 1ST HOSP IP/OBS HIGH 75: CPT | Performed by: INTERNAL MEDICINE

## 2020-04-19 PROCEDURE — 36415 COLL VENOUS BLD VENIPUNCTURE: CPT

## 2020-04-19 PROCEDURE — 2580000003 HC RX 258: Performed by: INTERNAL MEDICINE

## 2020-04-19 PROCEDURE — 6370000000 HC RX 637 (ALT 250 FOR IP): Performed by: INTERNAL MEDICINE

## 2020-04-19 PROCEDURE — 85025 COMPLETE CBC W/AUTO DIFF WBC: CPT

## 2020-04-19 PROCEDURE — 6360000002 HC RX W HCPCS: Performed by: INTERNAL MEDICINE

## 2020-04-19 PROCEDURE — 93005 ELECTROCARDIOGRAM TRACING: CPT | Performed by: INTERNAL MEDICINE

## 2020-04-19 PROCEDURE — 2060000000 HC ICU INTERMEDIATE R&B

## 2020-04-19 PROCEDURE — 80048 BASIC METABOLIC PNL TOTAL CA: CPT

## 2020-04-19 RX ORDER — UREA 10 %
3 LOTION (ML) TOPICAL ONCE
Status: COMPLETED | OUTPATIENT
Start: 2020-04-19 | End: 2020-04-19

## 2020-04-19 RX ADMIN — ATORVASTATIN CALCIUM 10 MG: 10 TABLET, FILM COATED ORAL at 09:51

## 2020-04-19 RX ADMIN — Medication 10 ML: at 09:52

## 2020-04-19 RX ADMIN — Medication 3 MG: at 22:40

## 2020-04-19 RX ADMIN — PREGABALIN 75 MG: 75 CAPSULE ORAL at 20:13

## 2020-04-19 RX ADMIN — INSULIN GLARGINE 24 UNITS: 100 INJECTION, SOLUTION SUBCUTANEOUS at 21:31

## 2020-04-19 RX ADMIN — AMLODIPINE BESYLATE 5 MG: 5 TABLET ORAL at 09:51

## 2020-04-19 RX ADMIN — INSULIN LISPRO 1 UNITS: 100 INJECTION, SOLUTION INTRAVENOUS; SUBCUTANEOUS at 21:32

## 2020-04-19 RX ADMIN — PREGABALIN 75 MG: 75 CAPSULE ORAL at 09:51

## 2020-04-19 RX ADMIN — ENOXAPARIN SODIUM 30 MG: 30 INJECTION SUBCUTANEOUS at 09:53

## 2020-04-19 RX ADMIN — Medication 10 ML: at 20:13

## 2020-04-19 ASSESSMENT — PAIN SCALES - GENERAL
PAINLEVEL_OUTOF10: 0

## 2020-04-19 NOTE — PLAN OF CARE
Problem: Falls - Risk of:  Goal: Will remain free from falls  Note: See kasper score. Patient ambulates with steady gait and is aware of limitations. Education patient on calling for additional assistance. Patient verbalized understanding. Bed in low position with brakes on. Call and personal belongings in reach. Will continue to monitor safety during rounds.

## 2020-04-19 NOTE — PLAN OF CARE
Problem: Falls - Risk of:  Goal: Will remain free from falls  Description: Will remain free from falls  Outcome: Ongoing  Note: Pt is aware of abilities and uses call light for needs. Bedside table, call light and needs within reach will continue to round on pt for safety/needs.

## 2020-04-19 NOTE — CONSULTS
prostatic urethra    CYSTOSCOPY  10/8/15    TURP    FINGER SURGERY      X7    FINGER TRIGGER RELEASE      OTHER SURGICAL HISTORY Left [id number]    WIDE LOCAL EXCISION LEFT ARM MELANOMA, WIDE LOCAL EXCISION OF    SHOULDER SURGERY Bilateral     TURP  3-7-13       No Known Allergies    Social History:  Reviewed. reports that he quit smoking about 46 years ago. He has never used smokeless tobacco. He reports current alcohol use of about 2.0 standard drinks of alcohol per week. He reports that he does not use drugs. Family History:  Reviewed. family history is not on file. No premature CAD. Review of System:  All other systems reviewed except for that noted above. Pertinent negatives and positives are:     Objective      · General: negative for fever, chills   · Ophthalmic ROS: negative for - eye pain or loss of vision  · ENT ROS: negative for - headaches, sore throat   · Respiratory: negative for - cough, sputum  · Cardiovascular: Reviewed in HPI  · Gastrointestinal: negative for - abdominal pain, diarrhea, N/V  · Hematology: negative for - bleeding, blood clots, bruising or jaundice  · Genito-Urinary:  negative for - Dysuria or incontinence  · Musculoskeletal: negative for - Joint swelling, muscle pain  · Neurological: negative for - confusion, dizziness, headaches   · Psychiatric: No anxiety, no depression.   · Dermatological: negative for - rash    Physical Examination:  Vitals:    20 0932   BP: 123/65   Pulse: 56   Resp: 18   Temp: 97.5 °F (36.4 °C)   SpO2: 98%        Intake/Output Summary (Last 24 hours) at 2020 1129  Last data filed at 2020 0933  Gross per 24 hour   Intake 480 ml   Output --   Net 480 ml     In: 480 [P.O.:480]  Out: -    Wt Readings from Last 3 Encounters:   20 202 lb (91.6 kg)   19 201 lb (91.2 kg)   19 211 lb 3.2 oz (95.8 kg)     Temp  Av.5 °F (36.4 °C)  Min: 96.8 °F (36 °C)  Max: 98.3 °F (36.8 °C)  Pulse  Av.1  Min: 52  Max: 65  BP 05/13/2019       Diagnostic and imaging results reviewed. ECG: Normal sinus rhythm  Echo: Preserved LV ejection fraction 2017    I independently reviewed the ECG and telemetry. Scheduled Meds:   amLODIPine  5 mg Oral Daily    insulin glargine  24 Units Subcutaneous Nightly    pregabalin  75 mg Oral BID    atorvastatin  10 mg Oral Daily    sotalol  80 mg Oral BID    sodium chloride flush  10 mL Intravenous 2 times per day    insulin lispro  0-6 Units Subcutaneous TID WC    insulin lispro  0-3 Units Subcutaneous Nightly    enoxaparin  30 mg Subcutaneous Daily     Continuous Infusions:   dextrose       PRN Meds:.sodium chloride flush, acetaminophen **OR** acetaminophen, polyethylene glycol, promethazine **OR** ondansetron, glucose, dextrose, glucagon (rDNA), dextrose     Assessment:   Patient Active Problem List    Diagnosis Date Noted    Bradycardia 04/18/2020    Essential hypertension 07/13/2016    Gross hematuria 10/09/2015    Urinary retention due to benign prostatic hyperplasia 10/09/2015    Hematuria 10/05/2015    Urinary retention 10/05/2015    Acute cystitis 10/05/2015    Anemia due to acute blood loss 10/05/2015    Vasodepressor syncope 11/06/2014    Malignant melanoma ([facility] [address] 03/30/2014    Basal cell carcinoma 03/30/2014    Headache 10/07/2013    Diabetes mellitus ([facility] [address]     DM (diabetes mellitus) ([address] 09/11/2012    BPH (benign prostatic hyperplasia) 09/11/2012    Hematuria 09/11/2012    CKD (chronic kidney disease), stage III ([address] 09/11/2012    Atrial tachycardia ([address] 06/29/2012    Coronary atherosclerosis of native coronary artery 06/27/2011    Paroxysmal atrial fibrillation ([facility] [address] 06/27/2011    Syncope and collapse 06/27/2011    Palpitations 06/27/2011      Active Hospital Problems    Diagnosis Date Noted    Bradycardia [R00.1] 04/18/2020         Recommendation (s):    1. Paroxysmal atrial fibrillation  2. Sick sinus syndrome  3. Presyncope  4.   Type 2 diabetes to contact us.       Fadia Pepe MD   Cardiac Electrophysiology  [facility]

## 2020-04-20 PROBLEM — Z95.0 PACEMAKER: Status: ACTIVE | Noted: 2020-04-20

## 2020-04-20 LAB
ANION GAP SERPL CALCULATED.3IONS-SCNC: 10 MMOL/L (ref 3–16)
BUN BLDV-MCNC: 19 MG/DL (ref 7–20)
CALCIUM SERPL-MCNC: 10.3 MG/DL (ref 8.3–10.6)
CHLORIDE BLD-SCNC: 102 MMOL/L (ref 99–110)
CO2: 25 MMOL/L (ref 21–32)
CREAT SERPL-MCNC: 1.4 MG/DL (ref 0.8–1.3)
EKG ATRIAL RATE: 375 BPM
EKG ATRIAL RATE: 68 BPM
EKG DIAGNOSIS: NORMAL
EKG DIAGNOSIS: NORMAL
EKG P AXIS: 57 DEGREES
EKG P-R INTERVAL: 242 MS
EKG Q-T INTERVAL: 414 MS
EKG Q-T INTERVAL: 420 MS
EKG QRS DURATION: 78 MS
EKG QRS DURATION: 86 MS
EKG QTC CALCULATION (BAZETT): 430 MS
EKG QTC CALCULATION (BAZETT): 446 MS
EKG R AXIS: -10 DEGREES
EKG R AXIS: -10 DEGREES
EKG T AXIS: 12 DEGREES
EKG T AXIS: 34 DEGREES
EKG VENTRICULAR RATE: 65 BPM
EKG VENTRICULAR RATE: 68 BPM
GFR AFRICAN AMERICAN: 59
GFR NON-AFRICAN AMERICAN: 48
GLUCOSE BLD-MCNC: 149 MG/DL (ref 70–99)
GLUCOSE BLD-MCNC: 160 MG/DL (ref 70–99)
GLUCOSE BLD-MCNC: 204 MG/DL (ref 70–99)
GLUCOSE BLD-MCNC: 80 MG/DL (ref 70–99)
GLUCOSE BLD-MCNC: 82 MG/DL (ref 70–99)
PERFORMED ON: ABNORMAL
PERFORMED ON: ABNORMAL
PERFORMED ON: NORMAL
PERFORMED ON: NORMAL
POTASSIUM REFLEX MAGNESIUM: 4.3 MMOL/L (ref 3.5–5.1)
SODIUM BLD-SCNC: 137 MMOL/L (ref 136–145)
TROPONIN: <0.01 NG/ML

## 2020-04-20 PROCEDURE — 99153 MOD SED SAME PHYS/QHP EA: CPT

## 2020-04-20 PROCEDURE — 99152 MOD SED SAME PHYS/QHP 5/>YRS: CPT

## 2020-04-20 PROCEDURE — 99024 POSTOP FOLLOW-UP VISIT: CPT | Performed by: INTERNAL MEDICINE

## 2020-04-20 PROCEDURE — 93010 ELECTROCARDIOGRAM REPORT: CPT | Performed by: INTERNAL MEDICINE

## 2020-04-20 PROCEDURE — 6360000002 HC RX W HCPCS: Performed by: INTERNAL MEDICINE

## 2020-04-20 PROCEDURE — 2580000003 HC RX 258: Performed by: INTERNAL MEDICINE

## 2020-04-20 PROCEDURE — 84484 ASSAY OF TROPONIN QUANT: CPT

## 2020-04-20 PROCEDURE — 93005 ELECTROCARDIOGRAM TRACING: CPT | Performed by: INTERNAL MEDICINE

## 2020-04-20 PROCEDURE — 36415 COLL VENOUS BLD VENIPUNCTURE: CPT

## 2020-04-20 PROCEDURE — 80048 BASIC METABOLIC PNL TOTAL CA: CPT

## 2020-04-20 PROCEDURE — C1785 PMKR, DUAL, RATE-RESP: HCPCS

## 2020-04-20 PROCEDURE — 99152 MOD SED SAME PHYS/QHP 5/>YRS: CPT | Performed by: INTERNAL MEDICINE

## 2020-04-20 PROCEDURE — 02H63JZ INSERTION OF PACEMAKER LEAD INTO RIGHT ATRIUM, PERCUTANEOUS APPROACH: ICD-10-PCS | Performed by: INTERNAL MEDICINE

## 2020-04-20 PROCEDURE — C1894 INTRO/SHEATH, NON-LASER: HCPCS

## 2020-04-20 PROCEDURE — C1898 LEAD, PMKR, OTHER THAN TRANS: HCPCS

## 2020-04-20 PROCEDURE — 6370000000 HC RX 637 (ALT 250 FOR IP): Performed by: INTERNAL MEDICINE

## 2020-04-20 PROCEDURE — 0JH606Z INSERTION OF PACEMAKER, DUAL CHAMBER INTO CHEST SUBCUTANEOUS TISSUE AND FASCIA, OPEN APPROACH: ICD-10-PCS | Performed by: INTERNAL MEDICINE

## 2020-04-20 PROCEDURE — 2060000000 HC ICU INTERMEDIATE R&B

## 2020-04-20 PROCEDURE — 33208 INSRT HEART PM ATRIAL & VENT: CPT

## 2020-04-20 PROCEDURE — 02HK3JZ INSERTION OF PACEMAKER LEAD INTO RIGHT VENTRICLE, PERCUTANEOUS APPROACH: ICD-10-PCS | Performed by: INTERNAL MEDICINE

## 2020-04-20 PROCEDURE — 33208 INSRT HEART PM ATRIAL & VENT: CPT | Performed by: INTERNAL MEDICINE

## 2020-04-20 RX ORDER — SODIUM CHLORIDE 0.9 % (FLUSH) 0.9 %
10 SYRINGE (ML) INJECTION PRN
Status: DISCONTINUED | OUTPATIENT
Start: 2020-04-20 | End: 2020-04-21 | Stop reason: HOSPADM

## 2020-04-20 RX ORDER — NITROGLYCERIN 0.4 MG/1
0.4 TABLET SUBLINGUAL EVERY 5 MIN PRN
Status: DISCONTINUED | OUTPATIENT
Start: 2020-04-20 | End: 2020-04-21 | Stop reason: HOSPADM

## 2020-04-20 RX ORDER — SOTALOL HYDROCHLORIDE 80 MG/1
80 TABLET ORAL 2 TIMES DAILY
Status: DISCONTINUED | OUTPATIENT
Start: 2020-04-20 | End: 2020-04-21 | Stop reason: HOSPADM

## 2020-04-20 RX ORDER — 0.9 % SODIUM CHLORIDE 0.9 %
500 INTRAVENOUS SOLUTION INTRAVENOUS ONCE
Status: COMPLETED | OUTPATIENT
Start: 2020-04-20 | End: 2020-04-20

## 2020-04-20 RX ORDER — ACETAMINOPHEN 325 MG/1
650 TABLET ORAL EVERY 4 HOURS PRN
Status: DISCONTINUED | OUTPATIENT
Start: 2020-04-20 | End: 2020-04-20 | Stop reason: SDUPTHER

## 2020-04-20 RX ORDER — SODIUM CHLORIDE 0.9 % (FLUSH) 0.9 %
10 SYRINGE (ML) INJECTION EVERY 12 HOURS SCHEDULED
Status: DISCONTINUED | OUTPATIENT
Start: 2020-04-20 | End: 2020-04-21 | Stop reason: HOSPADM

## 2020-04-20 RX ADMIN — SOTALOL HYDROCHLORIDE 80 MG: 80 TABLET ORAL at 20:25

## 2020-04-20 RX ADMIN — PREGABALIN 75 MG: 75 CAPSULE ORAL at 13:46

## 2020-04-20 RX ADMIN — ENOXAPARIN SODIUM 30 MG: 30 INJECTION SUBCUTANEOUS at 13:46

## 2020-04-20 RX ADMIN — INSULIN GLARGINE 24 UNITS: 100 INJECTION, SOLUTION SUBCUTANEOUS at 21:12

## 2020-04-20 RX ADMIN — ACETAMINOPHEN 650 MG: 325 TABLET ORAL at 23:25

## 2020-04-20 RX ADMIN — ATORVASTATIN CALCIUM 10 MG: 10 TABLET, FILM COATED ORAL at 13:46

## 2020-04-20 RX ADMIN — SODIUM CHLORIDE 500 ML: 9 INJECTION, SOLUTION INTRAVENOUS at 00:50

## 2020-04-20 RX ADMIN — INSULIN LISPRO 1 UNITS: 100 INJECTION, SOLUTION INTRAVENOUS; SUBCUTANEOUS at 21:12

## 2020-04-20 RX ADMIN — Medication 10 ML: at 20:22

## 2020-04-20 RX ADMIN — CEFAZOLIN 2 G: 1 INJECTION, POWDER, FOR SOLUTION INTRAMUSCULAR; INTRAVENOUS at 10:36

## 2020-04-20 RX ADMIN — PREGABALIN 75 MG: 75 CAPSULE ORAL at 20:25

## 2020-04-20 RX ADMIN — Medication 10 ML: at 13:47

## 2020-04-20 RX ADMIN — AMLODIPINE BESYLATE 5 MG: 5 TABLET ORAL at 13:46

## 2020-04-20 RX ADMIN — CEFAZOLIN SODIUM 2 G: 10 INJECTION, POWDER, FOR SOLUTION INTRAVENOUS at 18:42

## 2020-04-20 RX ADMIN — INSULIN LISPRO 1 UNITS: 100 INJECTION, SOLUTION INTRAVENOUS; SUBCUTANEOUS at 18:25

## 2020-04-20 ASSESSMENT — PAIN DESCRIPTION - PAIN TYPE: TYPE: ACUTE PAIN

## 2020-04-20 ASSESSMENT — PAIN DESCRIPTION - ONSET: ONSET: GRADUAL

## 2020-04-20 ASSESSMENT — PAIN DESCRIPTION - LOCATION: LOCATION: SHOULDER

## 2020-04-20 ASSESSMENT — PAIN SCALES - GENERAL
PAINLEVEL_OUTOF10: 0
PAINLEVEL_OUTOF10: 0
PAINLEVEL_OUTOF10: 3
PAINLEVEL_OUTOF10: 0
PAINLEVEL_OUTOF10: 0

## 2020-04-20 ASSESSMENT — PAIN DESCRIPTION - PROGRESSION: CLINICAL_PROGRESSION: GRADUALLY WORSENING

## 2020-04-20 ASSESSMENT — PAIN DESCRIPTION - DESCRIPTORS: DESCRIPTORS: THROBBING

## 2020-04-20 ASSESSMENT — PAIN DESCRIPTION - FREQUENCY: FREQUENCY: INTERMITTENT

## 2020-04-20 ASSESSMENT — PAIN - FUNCTIONAL ASSESSMENT: PAIN_FUNCTIONAL_ASSESSMENT: ACTIVITIES ARE NOT PREVENTED

## 2020-04-20 ASSESSMENT — PAIN DESCRIPTION - ORIENTATION: ORIENTATION: LEFT

## 2020-04-20 NOTE — PLAN OF CARE
Problem: Falls - Risk of:  Goal: Will remain free from falls  Description: Will remain free from falls  Note: He is a low fall risk. Gait steady when up. Advised to call for assistance post surgery for pacemaker. Call light in reach. Will monitor, and bed alarm on. Problem: Cardiac:  Goal: Ability to maintain an adequate cardiac output will improve  Description: Ability to maintain an adequate cardiac output will improve  Note: Pacemaker placed in cath lab today. Left arm in sling and swathe. He has been advised of post op instructions and verbalizes understanding. Incision well approximated, steri strips in place. Paced rhythm on the monitor. No pain. Will monitor.

## 2020-04-20 NOTE — PROCEDURES
[facility], [place], operated by [facility]     Electrophysiology Procedure Note       Date of Procedure: 4/20/2020  Patient's Name: Jaydon Goldsmith  YOB: 1935   Medical Record Number: [id number]  Procedure Performed by: Pravin Patel MD    Procedures performed:  · Selective venography of left upper extremity. · Insertion of MRI compatible right ventricular pacing lead under fluoroscopy. · Insertion of MRI compatible right atrial lead under fluoroscopy  · Insertion of a MRI compatible dual chamber Pacemaker. · Electronic analysis of lead and device. · Anesthesia: Local and Monitored Anesthesia Care  · An independent trained observer pushed medications at my direction. We monitored the patient's level of consciousness and vital signs/physiologic status throughout the procedure duration (see start and stop times below). · Level of sedation plan: Moderate sedation (conscious sedation) with intravenous Midazolam 4 mg and Fentanyl 150 Mcg   · Start time: 1138  · Stop time: 1230  · Mallampati airway assessment class: I  · ASA class: 1  · Estimated blood loss less than 20 cc    Indication of the procedure: Jaydon Goldsmith is a 82 y.o. male with symptomatic bradycardia. The patient is referred for pacemaker implantation due to non-reversible bradycardia secondary to sick sinus syndrome with symptoms of presyncope. Details of procedure: The patient was brought to the electrophysiology laboratory in stable condition. The patient was in a fasting and non-sedated state. The risks, benefits and alternatives of the procedure were discussed with the patient. The risks including, but not limited to, the risks of vascular injury, bleeding, infection, device malfunction, lead dislodgement, radiation exposure, injury to cardiac and surrounding structures (including pneumothorax), stroke, myocardial infarction and death were discussed in detail. The patient opted to proceed with the device implantation.  Written

## 2020-04-20 NOTE — PRE SEDATION
Admission medications    Medication Sig Start Date End Date Taking? Authorizing Provider   vitamin D (ERGOCALCIFEROL) 1.25 MG (56954 UT) CAPS capsule Take 50,000 Units by mouth once a week Mondays 4/13/20  Yes Historical Provider, MD   insulin detemir (LEVEMIR FLEXTOUCH) 100 UNIT/ML injection pen Inject 24 Units into the skin nightly   Yes Historical Provider, MD   omega-3 acid ethyl esters (LOVAZA) 1 g capsule Take 1 g by mouth 2 times daily    Yes Historical Provider, MD   SITagliptin (JANUVIA) 50 MG tablet Take 50 mg by mouth daily   Yes Historical Provider, MD   sotalol (BETAPACE) 80 MG tablet TAKE 1 TABLET BY MOUTH TWICE A DAY 1/21/20  Yes Albino Hood MD   ferrous sulfate (CVS IRON) 325 (65 FE) MG tablet Take 325 mg by mouth daily (with breakfast)   Yes Historical Provider, MD   Ascorbic Acid (VITAMIN C) 500 MG CAPS Take by mouth nightly    Yes Historical Provider, MD   amLODIPine (NORVASC) 5 MG tablet Take 1 tablet by mouth daily 11/17/15  Yes ELISHA Baum - CNP   acetaminophen 650 MG TABS Take 650 mg by mouth every 4 hours as needed 10/15/15  Yes Sanjuana Lockwood MD   pregabalin (LYRICA) 75 MG capsule Take 75 mg by mouth 2 times daily    Yes Historical Provider, MD   nateglinide (STARLIX) 120 MG tablet Take 120 mg by mouth 2 times daily (before meals).      Yes Historical Provider, MD   Multiple Vitamins-Minerals (CENTRUM SILVER) TABS Take 1 tablet by mouth daily    Yes Historical Provider, MD   Pantoprazole Sodium (PROTONIX PO) Take 40 mg by mouth every 3 days    Yes Historical Provider, MD   ALPHA LIPOIC ACID PO Take 100 mg by mouth daily  2/20/10  Yes Historical Provider, MD   simvastatin (ZOCOR) 40 MG tablet Take 40 mg by mouth daily    Yes Historical Provider, MD WAY UF III MINI PEN NEEDLES 31G X 5 MM MISC  11/8/13   Historical Provider, MD     Coumadin Use Last 7 Days:  no  Antiplatelet drug therapy use last 7 days: no  Other anticoagulant use last 7 days: no  Additional Medication Information: Sotalol, currently on hold      Pre-Sedation Documentation and Exam:   I have personally completed a history, physical exam & review of systems for this patient (see notes). Vital signs have been reviewed (see flow sheet for vitals).     Mallampati Airway Assessment:  Mallampati Class I - (soft palate, fauces, uvula & anterior/posterior tonsillar pillars are visible)    Prior History of Anesthesia Complications:   none    ASA Classification:  Class 2 - A normal healthy patient with mild systemic disease    Sedation/ Anesthesia Plan:   intravenous sedation    Medications Planned:   midazolam (Versed) intravenously and fentanyl intravenously    Patient is an appropriate candidate for plan of sedation: yes    Electronically signed by Nathen Kaye MD on 4/20/2020 at 11:51 AM

## 2020-04-20 NOTE — PLAN OF CARE
Problem: Falls - Risk of:  Goal: Will remain free from falls  Note: Patient ambulates with steady gait and is aware of limitations. Patient currently is having some ekg changes. Education patient that bed alarm have been activated and patient must call prior to getting up. . Patient verbalized understanding. Bed in low position with brakes on. Call light and personal belongings in reach. Will continue to monitor safety during rounds.

## 2020-04-21 ENCOUNTER — APPOINTMENT (OUTPATIENT)
Dept: GENERAL RADIOLOGY | Age: 85
DRG: 244 | End: 2020-04-21
Payer: OTHER GOVERNMENT

## 2020-04-21 VITALS
WEIGHT: 202 LBS | RESPIRATION RATE: 16 BRPM | SYSTOLIC BLOOD PRESSURE: 146 MMHG | BODY MASS INDEX: 27.36 KG/M2 | HEIGHT: 72 IN | DIASTOLIC BLOOD PRESSURE: 77 MMHG | HEART RATE: 60 BPM | TEMPERATURE: 97.5 F | OXYGEN SATURATION: 93 %

## 2020-04-21 LAB
ANION GAP SERPL CALCULATED.3IONS-SCNC: 9 MMOL/L (ref 3–16)
BUN BLDV-MCNC: 19 MG/DL (ref 7–20)
CALCIUM SERPL-MCNC: 10.1 MG/DL (ref 8.3–10.6)
CHLORIDE BLD-SCNC: 104 MMOL/L (ref 99–110)
CO2: 26 MMOL/L (ref 21–32)
CREAT SERPL-MCNC: 1.4 MG/DL (ref 0.8–1.3)
GFR AFRICAN AMERICAN: 59
GFR NON-AFRICAN AMERICAN: 48
GLUCOSE BLD-MCNC: 106 MG/DL (ref 70–99)
GLUCOSE BLD-MCNC: 127 MG/DL (ref 70–99)
GLUCOSE BLD-MCNC: 78 MG/DL (ref 70–99)
PERFORMED ON: ABNORMAL
PERFORMED ON: NORMAL
POTASSIUM SERPL-SCNC: 4.8 MMOL/L (ref 3.5–5.1)
SODIUM BLD-SCNC: 139 MMOL/L (ref 136–145)

## 2020-04-21 PROCEDURE — 6370000000 HC RX 637 (ALT 250 FOR IP): Performed by: INTERNAL MEDICINE

## 2020-04-21 PROCEDURE — 99232 SBSQ HOSP IP/OBS MODERATE 35: CPT | Performed by: NURSE PRACTITIONER

## 2020-04-21 PROCEDURE — 6360000002 HC RX W HCPCS: Performed by: INTERNAL MEDICINE

## 2020-04-21 PROCEDURE — 2580000003 HC RX 258: Performed by: INTERNAL MEDICINE

## 2020-04-21 PROCEDURE — 80048 BASIC METABOLIC PNL TOTAL CA: CPT

## 2020-04-21 PROCEDURE — 71046 X-RAY EXAM CHEST 2 VIEWS: CPT

## 2020-04-21 PROCEDURE — 36415 COLL VENOUS BLD VENIPUNCTURE: CPT

## 2020-04-21 RX ORDER — UREA 10 %
3 LOTION (ML) TOPICAL NIGHTLY PRN
Status: DISCONTINUED | OUTPATIENT
Start: 2020-04-21 | End: 2020-04-21 | Stop reason: HOSPADM

## 2020-04-21 RX ADMIN — ENOXAPARIN SODIUM 30 MG: 30 INJECTION SUBCUTANEOUS at 09:32

## 2020-04-21 RX ADMIN — PREGABALIN 75 MG: 75 CAPSULE ORAL at 09:32

## 2020-04-21 RX ADMIN — Medication 3 MG: at 00:40

## 2020-04-21 RX ADMIN — CEFAZOLIN SODIUM 2 G: 10 INJECTION, POWDER, FOR SOLUTION INTRAVENOUS at 03:24

## 2020-04-21 RX ADMIN — Medication 10 ML: at 03:24

## 2020-04-21 RX ADMIN — ATORVASTATIN CALCIUM 10 MG: 10 TABLET, FILM COATED ORAL at 09:32

## 2020-04-21 RX ADMIN — Medication 10 ML: at 09:33

## 2020-04-21 RX ADMIN — SOTALOL HYDROCHLORIDE 80 MG: 80 TABLET ORAL at 09:32

## 2020-04-21 RX ADMIN — AMLODIPINE BESYLATE 5 MG: 5 TABLET ORAL at 09:32

## 2020-04-21 ASSESSMENT — PAIN SCALES - GENERAL
PAINLEVEL_OUTOF10: 0
PAINLEVEL_OUTOF10: 0

## 2020-04-21 NOTE — PLAN OF CARE
Problem: Falls - Risk of:  Goal: Will remain free from falls  Description: Will remain free from falls  4/21/2020 0457 by Marino Shabazz RN  Outcome: Ongoing  Note: Pt up with standby and tolerated well. To BR x2 tolerated well. Instructed to call for needs. Sling and swath on L arm. Will continue to monitor safety.

## 2020-04-21 NOTE — DISCHARGE SUMMARY
!62   !    ! +---------------+----+----+-----+----+ ! Vertebral      !131 ! !52   !    ! +---------------+----+----+-----+----+ ! Prox Subclavian! 119 ! !58   !    ! +---------------+----+----+-----+----+   - Additional Measurements:ICAPSV/CCAPSV 1.04. ICAEDV/CCAEDV 1.36. Last Labs on Discharge:     Recent Results (from the past 24 hour(s))   POCT Glucose    Collection Time: 04/20/20  4:54 PM   Result Value Ref Range    POC Glucose 160 (H) 70 - 99 mg/dl    Performed on ACCU-CHEK    POCT Glucose    Collection Time: 04/20/20  9:00 PM   Result Value Ref Range    POC Glucose 204 (H) 70 - 99 mg/dl    Performed on ACCU-CHEK    POCT Glucose    Collection Time: 04/21/20  7:06 AM   Result Value Ref Range    POC Glucose 78 70 - 99 mg/dl    Performed on ACCU-CHEK    Basic metabolic panel    Collection Time: 04/21/20 10:45 AM   Result Value Ref Range    Sodium 139 136 - 145 mmol/L    Potassium 4.8 3.5 - 5.1 mmol/L    Chloride 104 99 - 110 mmol/L    CO2 26 21 - 32 mmol/L    Anion Gap 9 3 - 16    Glucose 127 (H) 70 - 99 mg/dL    BUN 19 7 - 20 mg/dL    CREATININE 1.4 (H) 0.8 - 1.3 mg/dL    GFR Non- 48 (A) >60    GFR  59 (A) >60    Calcium 10.1 8.3 - 10.6 mg/dL   POCT Glucose    Collection Time: 04/21/20 11:13 AM   Result Value Ref Range    POC Glucose 106 (H) 70 - 99 mg/dl    Performed on ACCU-CHEK          Follow up: with Lashawn Matias MD    Note that over 30 minutes was spent in preparing discharge papers, discussing discharge with patient, medication review, etc.    Thank you Lashawn Matias MD for the opportunity to be involved in this patient's care. If you have any questions or concerns please feel free to contact me at [phone].     Electronically signed by Rajat Mcgregor MD on 4/21/2020 at 1:47 PM

## 2020-04-21 NOTE — PROGRESS NOTES
Patient had a 6.1 pause on telemetry. Patient stated he felt a \" little light headed\". VSS. [facility]. Stat EKG ordered and labs. . Will continue to monitor.
Patient had a 7.0 pause on telemetry and was c/o jaw discomfort. Dr Connie Sullivan notified and came up to evaluate patient . Fluid bolus ordered and infusing now. Secure message  Dr Connie Sullivan with stat labs results. Patient in bed with call light and bed alarm on. Will continue to monitor.
misalignment, asymmetry, or crepitation  Skin: Normal skin color, texture, turgor. No rashes or lesions noted. Psychiatric: Alert and oriented x4, good insight and judgment    Labs:   Recent Labs     04/18/20  1638 04/19/20  0511   WBC 7.6 8.8   HGB 15.4 14.4   HCT 44.9 41.8    173     Recent Labs     04/18/20  1638 04/19/20  0511    140   K 4.6 4.0    104   CO2 24 25   BUN 17 19   CREATININE 1.4* 1.3   CALCIUM 10.4 9.9     No results for input(s): AST, ALT, BILIDIR, BILITOT, ALKPHOS in the last 72 hours.   Recent Labs     04/18/20  1638   INR 0.97     Recent Labs     04/18/20  1638   TROPONINI <0.01       Urinalysis:      Lab Results   Component Value Date    NITRU Negative 11/10/2016    WBCUA 2 03/21/2016    BACTERIA Rare 10/04/2015    RBCUA 1 03/21/2016    BLOODU Negative 11/10/2016    SPECGRAV 1.014 11/10/2016    GLUCOSEU Negative 11/10/2016       Radiology:  No orders to display         Assessment/Plan:    Active Hospital Problems    Diagnosis Date Noted    Bradycardia [R00.1] 04/18/2020       Plan:    # Bradycardia, 9.1s pause  -Follow tele  -EP consulted, for PM placement tomorrow  -INR, T/s for pre-op    # CAD s/p PCI, stent to PDA 2005  -Not on ASA or plavix due to prior bleed  -On low intensity statin    # pAF, not on anticoagulation due to bleeding issues  -continue sotalol 80mg BID    # T2DM  -Continue lantus 24u QHS  -Low dose mealtime and correctional    DVT Prophylaxis: Lovenox  Diet: DIET CARB CONTROL; Carb Control: 4 carb choices (60 gms)/meal  Code Status: Full Code    PT/OT Eval Status: n/a    Dispo - Elise Miller MD      # Medical Decision Making Complexity: high    Problem   Established problem, stable (1): cardiac pause   Established problem, stable (1): pAF   Established problem, stable (1): CAD   Established problem, stable (1): T2DM    Risk:  High:   Life-threatening Illness/Injury: 9.1s cardiac pause
collapse     Palpitations     Atrial tachycardia (HCC)     DM (diabetes mellitus) (HCC)     BPH (benign prostatic hyperplasia)     Hematuria     CKD (chronic kidney disease), stage III (HCC)     Diabetes mellitus ([address]     Headache     Malignant melanoma (HCC)     Basal cell carcinoma     Vasodepressor syncope     Hematuria     Urinary retention     Acute cystitis     Anemia due to acute blood loss     Gross hematuria     Urinary retention due to benign prostatic hyperplasia     Essential hypertension     Bradycardia     Pacemaker     Sick sinus syndrome ([facility] [address]        Assessment & Plan:      1. Sinus pauses  2. Bradycardia  3. Near syncope  4. Paroxysmal atrial fibrillation    83 y/o man with a h/o DM, HLD, HTN, CAD, vasodepressor syncope, pAF on sotalol who had been c/o syncope and near syncope outpatient, carotids showed a distal occlusion, 30-day monitor showed evidence of symptomatic sinus pauses up to 9.1 seconds in length. S/p dual-chamber MDT PPM (Dr. Flo Ayala, 4/20/2020)). [id number] 5. TSH 2.48 (5/13/2019). SSS  - S/p dual-chamber MDT PPM  -Pacer check shows 53% AP, 0.5% , no arrhythmias, other parameters stable.   -Reviewed wound care and activity instructions with patient  -Chest x-ray showed stable lead placement  -Follow-up in the office in 1 week for a wound and device check  -Appointments made  -Okay for patient to be DC'd per EP    pAF  -In NSR  - [address] [place] [address] has been on hold due to his chronic hematuria  - On sotalol 80 mg twice daily - - will continue  - If PPM reveals high AF burden may need to revisit [address] [place] [address] outpatient    Carmina Ramirez [address]

## 2020-04-22 ENCOUNTER — NURSE ONLY (OUTPATIENT)
Dept: CARDIOLOGY CLINIC | Age: 85
End: 2020-04-22

## 2020-04-22 ENCOUNTER — TELEPHONE (OUTPATIENT)
Dept: DERMATOLOGY | Age: 85
End: 2020-04-22

## 2020-04-22 NOTE — TELEPHONE ENCOUNTER
Pt calling wanting to know if he can still be able to have Curettage  of  the chest  done with  pls return patient call back @ [phone] he is scheduled in September

## 2020-04-27 ENCOUNTER — NURSE ONLY (OUTPATIENT)
Dept: CARDIOLOGY CLINIC | Age: 85
End: 2020-04-27
Payer: OTHER GOVERNMENT

## 2020-04-27 PROCEDURE — 93280 PM DEVICE PROGR EVAL DUAL: CPT | Performed by: INTERNAL MEDICINE

## 2020-04-29 ENCOUNTER — TELEPHONE (OUTPATIENT)
Dept: CARDIOLOGY CLINIC | Age: 85
End: 2020-04-29

## 2020-05-15 PROCEDURE — 93228 REMOTE 30 DAY ECG REV/REPORT: CPT | Performed by: INTERNAL MEDICINE

## 2020-05-18 NOTE — PROGRESS NOTES
[facility]   Electrophysiology  Office Visit  Date: 5/27/2020    Chief Complaint   Patient presents with    Atrial Fibrillation    Bradycardia    Loss of Consciousness       Cardiac HX: Claudette Hull is a 82 y.o. man with a h/o DM, HLD, HTN, CAD, vasodepressor syncope, pAF on sotalol who had been c/o syncope and near syncope outpatient, carotids showed a distal occlusion, 30-day monitor showed evidence of symptomatic sinus pauses up to 9.1 seconds in length. S/p dual-chamber MDT PPM (Dr. Suresh Chandra, 4/20/2020)). Interval History/HPI: Patient is here for f/u after dual-chamber permanent pacemaker was placed for symptomatic sinus pauses up to 9.1 seconds in length. Overall patient feels well since the pacemaker was placed. He has not had any episodes of dizziness or lightheadedness. Left chest incision is healed well. Device check today shows 10 episodes of atrial fibrillation with the longest lasting 5 hours and 34 minutes in length for a total burden of 1.7%. Patient has not been on oral anticoagulation since his prostate surgery several years ago which time he had hematuria requiring blood transfusions. He has not been interested in being on oral anticoagulation since then. In his work-up for syncope, carotid vascular study showed a left distal occlusion and he is due to see a vascular surgeon in the next week or so. He denies any chest pain, shortness of breath, PND, orthopnea or lower extremity edema.     Home medications:   Current Outpatient Medications on File Prior to Visit   Medication Sig Dispense Refill    Loratadine (CLARITIN PO) Take by mouth Indications: Couple times a week      vitamin D (ERGOCALCIFEROL) 1.25 MG (98465 UT) CAPS capsule Take 50,000 Units by mouth once a week Mondays      insulin detemir (LEVEMIR FLEXTOUCH) 100 UNIT/ML injection pen Inject 24 Units into the skin nightly      omega-3 acid ethyl esters (LOVAZA) 1 g capsule Take 1 g by mouth 2 times daily       estimated at 60%, successful percutaneous transluminal stent angioplasty of 2 lesions to the posterior descending of the RCA proximal lesion and mid lesion are both reduced to 0 post stent PCI. Problem List:   Patient Active Problem List    Diagnosis Date Noted    Pacemaker 04/20/2020    Sick sinus syndrome [facility] [address]     Bradycardia 04/18/2020    Essential hypertension 07/13/2016    Gross hematuria 10/09/2015    Urinary retention due to benign prostatic hyperplasia 10/09/2015    Hematuria 10/05/2015    Urinary retention 10/05/2015    Acute cystitis 10/05/2015    Anemia due to acute blood loss 10/05/2015    Vasodepressor syncope 11/06/2014    Malignant melanoma ([facility] [facility] 03/30/2014    Basal cell carcinoma 03/30/2014    Headache 10/07/2013    Diabetes mellitus ([facility]     DM (diabetes mellitus) ([facility] [address] 09/11/2012    BPH (benign prostatic hyperplasia) 09/11/2012    Hematuria 09/11/2012    CKD (chronic kidney disease), stage III (HCC) 09/11/2012    Atrial tachycardia ([address] 06/29/2012    Coronary atherosclerosis of native coronary artery 06/27/2011    Paroxysmal atrial fibrillation ([facility] 06/27/2011    Syncope and collapse 06/27/2011    Palpitations 06/27/2011        Assessment:   1. Paroxysmal atrial fibrillation (HCC)    2. Sinus pause    3. Syncope, unspecified syncope type    4. Pacemaker         83 y/o man with a h/o DM, HLD, HTN, CAD, vasodepressor syncope, pAF on sotalol who had been c/o syncope and near syncope outpatient, carotids showed a distal occlusion, 30-day monitor showed evidence of symptomatic sinus pauses up to 9.1 seconds in length. S/p dual-chamber MDT PPM (Dr. Leslye Tovar, 4/20/2020)). [id number] 5. TSH 2.48 (5/13/2019).      SSS/syncope  - S/p dual-chamber MDT PPM  - Pacer check shows 79.6% AP, 1.3% , 10 AT/AF episodes with the longest lasting 5-1/2 hours in length, total AF burden 1.7%, other parameters stable.   - Reviewed wound care and activity instructions with patient     pAF  - In NSR  - 1.7% AF burden,   - No OAC d/t patients request as he bled frequently on warfarin along with hematuria after prostate surgery where he required a blood transfusion  - On sotalol 80 mg twice daily -  - will continue  - Discussed [facility] today in detail, patient remains hesitant to start, agreed to discuss with insurance and his wife and will revisit with Dr. Sotero Duffy at next appmt. - ECG ordered and results personally reviewed     EF of 14%  No systolic HF  Statin for CAD  Anticoagulation for AF and heart failure  No Tobacco use. All questions and concerns were addressed to the patient/family. Alternatives to my treatment were discussed. The note was completed using EMR. Every effort was made to ensure accuracy; however, inadvertent computerized transcription errors may be present. Patient received education regarding their diagnosis, treatment and medications while in the office today.       Deo Salguero [address] [facility]

## 2020-05-27 ENCOUNTER — OFFICE VISIT (OUTPATIENT)
Dept: CARDIOLOGY CLINIC | Age: 85
End: 2020-05-27
Payer: OTHER GOVERNMENT

## 2020-05-27 ENCOUNTER — NURSE ONLY (OUTPATIENT)
Dept: CARDIOLOGY CLINIC | Age: 85
End: 2020-05-27
Payer: OTHER GOVERNMENT

## 2020-05-27 VITALS
BODY MASS INDEX: 26.95 KG/M2 | HEART RATE: 63 BPM | HEIGHT: 72 IN | TEMPERATURE: 98.2 F | WEIGHT: 199 LBS | DIASTOLIC BLOOD PRESSURE: 70 MMHG | SYSTOLIC BLOOD PRESSURE: 126 MMHG

## 2020-05-27 PROCEDURE — 93280 PM DEVICE PROGR EVAL DUAL: CPT | Performed by: INTERNAL MEDICINE

## 2020-05-27 PROCEDURE — 99214 OFFICE O/P EST MOD 30 MIN: CPT | Performed by: NURSE PRACTITIONER

## 2020-05-28 ENCOUNTER — TELEPHONE (OUTPATIENT)
Dept: CARDIOLOGY CLINIC | Age: 85
End: 2020-05-28

## 2020-05-28 NOTE — PROGRESS NOTES
Device interrogation by company representative. See interrogation for further details. Pt to see Micaela Cevallos NP, today. Pacer check shows 79.6% AP, 1.3% , 10 AT/AF episodes with the longest lasting 5-1/2 hours in length, total AF burden 1.7%, other parameters stable.  -No OAC d/t patients request as he bled frequently on warfarin along with hematuria after prostate surgery where he required a blood transfusion.  - On sotalol 80 mg twice daily -  - will continue.  - Discussed [address] today in detail, patient remains hesitant to start, agreed to discuss with insurance and his wife and will revisit with Dr. Holder Friday at next appt. Follow up as scheduled in office/carelink. See PACEART report under Cardiology tab.

## 2020-05-28 NOTE — TELEPHONE ENCOUNTER
Patient called to speak to [facility] about Xarelto and Eliquis or other options. He can be reached at [phone].

## 2020-05-28 NOTE — TELEPHONE ENCOUNTER
Patient needs to come in next Wednesday to have his blood pressure checked on each arm and compared with the manual done on each arm. Can he be called? He would like to do this on Wednesday as he will be over this way.

## 2020-06-03 ENCOUNTER — NURSE ONLY (OUTPATIENT)
Dept: CARDIOLOGY CLINIC | Age: 85
End: 2020-06-03

## 2020-06-03 ENCOUNTER — TELEPHONE (OUTPATIENT)
Dept: CARDIOLOGY | Age: 85
End: 2020-06-03

## 2020-06-03 ENCOUNTER — OFFICE VISIT (OUTPATIENT)
Dept: INTERNAL MEDICINE CLINIC | Age: 85
End: 2020-06-03
Payer: OTHER GOVERNMENT

## 2020-06-03 VITALS
HEIGHT: 72 IN | BODY MASS INDEX: 27.09 KG/M2 | TEMPERATURE: 98.2 F | WEIGHT: 200 LBS | DIASTOLIC BLOOD PRESSURE: 70 MMHG | SYSTOLIC BLOOD PRESSURE: 132 MMHG

## 2020-06-03 VITALS — DIASTOLIC BLOOD PRESSURE: 60 MMHG | SYSTOLIC BLOOD PRESSURE: 118 MMHG | HEART RATE: 64 BPM

## 2020-06-03 PROCEDURE — 99203 OFFICE O/P NEW LOW 30 MIN: CPT | Performed by: INTERNAL MEDICINE

## 2020-06-03 RX ORDER — INSULIN DETEMIR 100 [IU]/ML
24 INJECTION, SOLUTION SUBCUTANEOUS NIGHTLY
Qty: 10 PEN | Refills: 3 | Status: SHIPPED | OUTPATIENT
Start: 2020-06-03 | End: 2021-06-02 | Stop reason: SDUPTHER

## 2020-06-03 RX ORDER — NATEGLINIDE 120 MG/1
120 TABLET ORAL
Qty: 180 TABLET | Refills: 3 | Status: SHIPPED | OUTPATIENT
Start: 2020-06-03 | End: 2021-06-02 | Stop reason: SDUPTHER

## 2020-06-03 RX ORDER — SIMVASTATIN 40 MG
40 TABLET ORAL DAILY
Qty: 180 TABLET | Refills: 3 | Status: SHIPPED | OUTPATIENT
Start: 2020-06-03 | End: 2021-06-02 | Stop reason: SDUPTHER

## 2020-06-03 RX ORDER — PREGABALIN 75 MG/1
75 CAPSULE ORAL 2 TIMES DAILY
Qty: 180 CAPSULE | Refills: 0 | Status: SHIPPED | OUTPATIENT
Start: 2020-07-17 | End: 2020-09-28

## 2020-06-03 RX ORDER — OMEGA-3-ACID ETHYL ESTERS 1 G/1
1 CAPSULE, LIQUID FILLED ORAL 2 TIMES DAILY
Qty: 180 CAPSULE | Refills: 3 | Status: SHIPPED | OUTPATIENT
Start: 2020-06-03 | End: 2021-06-02 | Stop reason: SDUPTHER

## 2020-06-03 RX ORDER — PEN NEEDLE, DIABETIC 31 GX5/16"
NEEDLE, DISPOSABLE MISCELLANEOUS
Qty: 200 EACH | Refills: 2 | Status: SHIPPED | OUTPATIENT
Start: 2020-06-03 | End: 2021-06-02 | Stop reason: SDUPTHER

## 2020-06-03 RX ORDER — AMLODIPINE BESYLATE 5 MG/1
5 TABLET ORAL DAILY
Qty: 90 TABLET | Refills: 3 | Status: SHIPPED | OUTPATIENT
Start: 2020-06-03 | End: 2021-06-02 | Stop reason: SDUPTHER

## 2020-06-03 ASSESSMENT — PATIENT HEALTH QUESTIONNAIRE - PHQ9
1. LITTLE INTEREST OR PLEASURE IN DOING THINGS: 0
SUM OF ALL RESPONSES TO PHQ QUESTIONS 1-9: 0
SUM OF ALL RESPONSES TO PHQ9 QUESTIONS 1 & 2: 0
SUM OF ALL RESPONSES TO PHQ QUESTIONS 1-9: 0
2. FEELING DOWN, DEPRESSED OR HOPELESS: 0

## 2020-06-03 NOTE — PROGRESS NOTES
6/3/2020     Zana Black (:  1935) is a 82 y.o. male, here for evaluation of the following medical concerns:    Chief Complaint   Patient presents with    Established New Doctor        HPI    Type 2 diabetes -has been well controlled. He takes Januvia, Starlix, and 24 units of insulin nightly. He denies hypoglycemia episodes. He has diabetic neuropathy in both feet for which he takes Lyrica. A fib - had a recent PPM placement due to long sinus pauses. Has been doing well since then. Site has been healing well. HTN - stable. Adherent to medication. CKD - sees a nephrologist. Has been stable. Iron deficiency anemia - taking iron supplement    Review of Systems   Cardiovascular: Negative for palpitations and leg swelling. Prior to Visit Medications    Medication Sig Taking? Authorizing Provider   nateglinide (STARLIX) 120 MG tablet Take 1 tablet by mouth 2 times daily (before meals) Yes Marilu Cassidy MD   simvastatin (ZOCOR) 40 MG tablet Take 1 tablet by mouth daily Yes Marilu Cassidy MD   omega-3 acid ethyl esters (LOVAZA) 1 g capsule Take 1 capsule by mouth 2 times daily Yes Marilu Cassidy MD   SITagliptin (JANUVIA) 50 MG tablet Take 1 tablet by mouth daily Yes Marilu Cassidy MD   insulin detemir (LEVEMIR FLEXTOUCH) 100 UNIT/ML injection pen Inject 24 Units into the skin nightly Yes Marilu Cassidy MD   amLODIPine (NORVASC) 5 MG tablet Take 1 tablet by mouth daily Yes Marilu Cassidy MD   pregabalin (LYRICA) 75 MG capsule Take 1 capsule by mouth 2 times daily for 90 days.  Yes Marilu Cassidy MD   apixaban (ELIQUIS) 5 MG TABS tablet Take 1 tablet by mouth 2 times daily Yes ELISHA Richmond - CNP   Loratadine (CLARITIN PO) Take by mouth Indications: Couple times a week Yes Historical Provider, MD   vitamin D (ERGOCALCIFEROL) 1.25 MG (38770 UT) CAPS capsule Take 50,000 Units by mouth once a week  Yes Historical Provider, MD   sotalol (BETAPACE) 80 MG tablet TAKE 1 TABLET BY MOUTH TWICE A Lucia Biggs MD   ferrous sulfate ([employer] IRON) 325 (65 FE) MG tablet Take 325 mg by mouth daily (with breakfast) Indications: twice a week, Tuesday and Thursday  Yes Historical Provider, MD   Ascorbic Acid (VITAMIN C) 500 MG CAPS Take by mouth nightly Indications: Three times a week  Monday, Wednesday, Friday  Yes Historical Provider, MD   Multiple Vitamins-Minerals (CENTRUM SILVER) TABS Take 1 tablet by mouth daily  Yes Historical Provider, MD   ALPHA LIPOIC ACID PO Take 100 mg by mouth daily  Yes Historical Provider, MD   Insulin Pen Needle (B-D UF III MINI PEN NEEDLES) 31G X 5 MM MISC Use as directed  Klarissa Villarreal MD        Past Medical History:   Diagnosis Date    Arrhythmia     Arthritis     hands shoulders neck    Atrial fibrillation (HCC)     coumadin    Atrial tachycardia ([address]     CAD (coronary artery disease)     Cancer ([address]     skin    Diabetes mellitus ([facility] [facility]     Diverticulitis     Enlarged prostate     Hyperlipidemia     Hypertension     Pacemaker 2020    Pneumonia     ABOUT 5 YEARS AGO    Vasodepressor syncope        Past Surgical History:   Procedure Laterality Date    ABLATION OF DYSRHYTHMIC FOCUS      AVNRT and Atrial tachycardia    CARPAL TUNNEL RELEASE      CATARACT REMOVAL Bilateral     CORONARY ANGIOPLASTY WITH STENT PLACEMENT      CYSTOSCOPY  12    coagulation prostatic urethra    CYSTOSCOPY  10/8/15    TURP    FINGER SURGERY      X7    FINGER TRIGGER RELEASE      OTHER SURGICAL HISTORY Left [id number]    WIDE LOCAL EXCISION LEFT ARM MELANOMA, WIDE LOCAL EXCISION OF    SHOULDER SURGERY Bilateral     SPINAL FUSION      TURP  3-7-13       Social History     Tobacco Use    Smoking status: Former Smoker     Last attempt to quit: 1974     Years since quittin.4    Smokeless tobacco: Never Used    Tobacco comment: QUIT SMOKING    Substance Use Topics    Alcohol use:  Yes     Alcohol/week: 2.0 standard drinks     Types: 2 Glasses of wine per week     Comment: rare         Family History   Problem Relation Age of Onset    Heart Disease Neg Hx     High Blood Pressure Neg Hx     High Cholesterol Neg Hx        Vitals:    06/03/20 1012   BP: 132/70   Site: Left Upper Arm   Temp: 98.2 °F (36.8 °C)   Weight: 200 lb (90.7 kg)   Height: 6' (1.829 m)     Estimated body mass index is 27.12 kg/m² as calculated from the following:    Height as of this encounter: 6' (1.829 m). Weight as of this encounter: 200 lb (90.7 kg). Physical Exam  Vitals signs reviewed. Constitutional:       General: He is not in acute distress. Appearance: He is well-developed. HENT:      Head: Normocephalic and atraumatic. Cardiovascular:      Rate and Rhythm: Normal rate and regular rhythm. Heart sounds: Normal heart sounds. Pulmonary:      Effort: Pulmonary effort is normal. No respiratory distress. Breath sounds: Normal breath sounds. Musculoskeletal:      Right lower leg: No edema. Left lower leg: No edema. Skin:     General: Skin is warm and dry. Neurological:      Mental Status: He is alert. Psychiatric:         Behavior: Behavior normal.         Thought Content: Thought content normal.         Judgment: Judgment normal.         ASSESSMENT/PLAN:  1. Type 2 diabetes mellitus with diabetic polyneuropathy, with long-term current use of insulin (HCC)  - controlled  - continue current medication  - Comprehensive Metabolic Panel; Future  - CBC Auto Differential; Future  - Lipid Panel; Future  - Hemoglobin A1C; Future  - pregabalin (LYRICA) 75 MG capsule; Take 1 capsule by mouth 2 times daily for 90 days. Dispense: 180 capsule; Refill: 0    2. CKD (chronic kidney disease), stage III ([address]  - stable  - sees nephrologist  - avoid nephrotoxins  - Comprehensive Metabolic Panel; Future  - CBC Auto Differential; Future  - Lipid Panel; Future  - PHOSPHORUS; Future  - Urinalysis with Microscopic; Future    3.  Anemia due to acute blood loss  -

## 2020-06-05 ENCOUNTER — OFFICE VISIT (OUTPATIENT)
Dept: DERMATOLOGY | Age: 85
End: 2020-06-05
Payer: OTHER GOVERNMENT

## 2020-06-05 PROCEDURE — 17003 DESTRUCT PREMALG LES 2-14: CPT | Performed by: DERMATOLOGY

## 2020-06-05 PROCEDURE — 17000 DESTRUCT PREMALG LESION: CPT | Performed by: DERMATOLOGY

## 2020-06-05 PROCEDURE — 17261 DSTRJ MAL LES T/A/L .6-1.0CM: CPT | Performed by: DERMATOLOGY

## 2020-06-05 NOTE — PROGRESS NOTES
[facility]  Jose Rodriguez  1935    82 y.o. 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    CENTRAL CHEST-   Bowen's Disease (intraepidermal squamous cell carcinoma)   There are atypical keratinocytes extending throughout the   entire thickness of the epidermis.  There is underlying   chronic inflammation. RESULTS SIGNATURE   Sandrita Schulz MD   Electronic Signature: 06 MAR 2020 11:08 AM       Review of Systems:  Gen: Feels well, good sense of health. Past Medical History, Family History, Surgical History, Medications and Allergies reviewed.     Past Medical History:   Diagnosis Date    Arrhythmia     Arthritis     hands shoulders neck    Atrial fibrillation (HCC)     coumadin    Atrial tachycardia (HCC)     CAD (coronary artery disease)     Cancer (HCC)     skin    Diabetes mellitus ([address]     Diverticulitis     Enlarged prostate     Hyperlipidemia     Hypertension     Pacemaker 04/20/2020    Pneumonia     ABOUT 5 YEARS AGO    Vasodepressor syncope      Past Surgical History:   Procedure Laterality Date    ABLATION OF DYSRHYTHMIC FOCUS      AVNRT and Atrial tachycardia    CARPAL TUNNEL RELEASE      CATARACT REMOVAL Bilateral     CORONARY ANGIOPLASTY WITH STENT PLACEMENT  2005    CYSTOSCOPY  9/26/12    coagulation prostatic urethra    CYSTOSCOPY  10/8/15    TURP    FINGER SURGERY      X7    FINGER TRIGGER RELEASE      OTHER SURGICAL HISTORY Left [id number]    WIDE LOCAL EXCISION LEFT ARM MELANOMA, WIDE LOCAL EXCISION OF    SHOULDER SURGERY Bilateral     SPINAL FUSION      TURP  3-7-13       No Known Allergies  Outpatient Medications Marked as Taking for the 6/5/20 encounter (Office Visit) with [place] MD Carole   Medication Sig Dispense Refill    nateglinide (STARLIX) 120 MG tablet Take 1 tablet by mouth curettage was performed in 3 different directions. Hemostasis was obtained with aluminum chloride. The wound was dressed with petrolatum and a bandage. Patient was educated regarding risks including bleeding, discomfort, and risk of recurrence. 2. Actinic keratosis     2 cycles of liquid nitrogen applied to 1 AK on the left ring finger and 1 on the left upper arm. Patient was educated regarding the potential risks of blister formation, discomfort, hypopigmentation, and scar. Wound care was discussed. Return in about 4 months (around 10/5/2020).

## 2020-06-09 ENCOUNTER — OFFICE VISIT (OUTPATIENT)
Dept: VASCULAR SURGERY | Age: 85
End: 2020-06-09
Payer: OTHER GOVERNMENT

## 2020-06-09 VITALS
HEIGHT: 72 IN | DIASTOLIC BLOOD PRESSURE: 72 MMHG | SYSTOLIC BLOOD PRESSURE: 138 MMHG | WEIGHT: 204 LBS | BODY MASS INDEX: 27.63 KG/M2 | TEMPERATURE: 97.5 F

## 2020-06-09 PROCEDURE — 99203 OFFICE O/P NEW LOW 30 MIN: CPT | Performed by: SURGERY

## 2020-06-09 NOTE — PROGRESS NOTES
Alcohol/week: 2.0 standard drinks     Types: 2 Glasses of wine per week     Comment: rare     Drug use: No    Sexual activity: Yes     Partners: Female     Comment:     Lifestyle    Physical activity     Days per week: None     Minutes per session: None    Stress: None   Relationships    Social connections     Talks on phone: None     Gets together: None     Attends [employer] service: None     Active member of club or organization: None     Attends meetings of clubs or organizations: None     Relationship status: None    Intimate partner violence     Fear of current or ex partner: None     Emotionally abused: None     Physically abused: None     Forced sexual activity: None   Other Topics Concern    None   Social History Narrative    None       Family Histroy:      Family History   Problem Relation Age of Onset    Heart Disease Neg Hx     High Blood Pressure Neg Hx     High Cholesterol Neg Hx        Review of Systems:  A 14 point review of systems was completed. Pertinent positives identified in the HPI, all other review of symptoms negative. Physical Exam   Vital Signs: Temp 97.5 °F (36.4 °C)   Ht 6' (1.829 m)   Wt 204 lb (92.5 kg)   BMI 27.67 kg/m²        Admission Weight: 204 lb (92.5 kg)     General appearance: alert, appears stated age, cooperative and no distress  Head: Normocephalic, without obvious abnormality, atraumatic  Neck: no adenopathy, no carotid bruit, no JVD, supple, symmetrical, trachea midline and thyroid: not enlarged, symmetric, no tenderness/mass/nodules  Lungs: clear to auscultation bilaterally  Heart: regular rate and rhythm  Abdomen: soft, non-tender.  Bowel sounds normal. No masses,  no organomegaly  Extremities: extremities normal, atraumatic, no cyanosis or edema    Pulses:   L brachial 2 R brachial 2   L radial 2 R radial 2   L femoral 2 R femoral 2   L popliteal 2 R popliteal 22   L posterior tibial 2 R posterior tibial 2   L dorsalis pedis 2 R dorsalis pedis 2     Neurologic: Grossly normal     MEDICAL DECISION MAKING/TESTING       Right Impression   The right internal carotid artery appears to have a <50% diameter reducing   stenosis based on velocity criteria. The right vertebral artery demonstrates normal antegrade flow. The right subclavian artery is visualized and demonstrates multiphasic flow. .   Left Impression   The left internal carotid artery appears to have a <50% diameter reducing   stenosis based on velocity criteria, resistive flow seen in left internal   carotid suggests a distal obstruction. The left vertebral artery demonstrates normal antegrade flow. The left subclavian artery is visualized and demonstrates multiphasic flow. The left internal carotid artery appears to have a <50% diameter reducing   stenosis based on velocity criteria,resistive flow seen in left internal   carotid suggests a distal obstruction. The left vertebral artery demonstrates normal antegrade flow. The left subclavian artery is visualized and demonstrates multiphasic flow.          Labs:    BMP:   Lab Results   Component Value Date     04/21/2020    K 4.8 04/21/2020    K 4.3 04/20/2020     04/21/2020    CO2 26 04/21/2020    PHOS 3.3 11/10/2016    BUN 19 04/21/2020    CREATININE 1.4 04/21/2020      No components found for: GLU  Lab Results   Component Value Date    MG 2.40 03/21/2016      CBC:   Lab Results   Component Value Date    WBC 8.8 04/19/2020    HGB 14.4 04/19/2020    HCT 41.8 04/19/2020    MCV 92.0 04/19/2020     04/19/2020      Coagulation:   Lab Results   Component Value Date    INR 0.97 04/18/2020    APTT 33.3 04/18/2020     Cardiac markers:   Lab Results   Component Value Date    CKMB 1.13 02/20/2010    CKTOTAL 386 02/20/2010    TROPONINI <0.01 04/20/2020     Lab Results   Component Value Date    LABA1C 6.1 05/13/2019    No results found for: ALB    Lab Results   Component Value Date    BILITOT 0.5 05/13/2019    BILIDIR <0.2

## 2020-06-10 ENCOUNTER — HOSPITAL ENCOUNTER (OUTPATIENT)
Age: 85
Discharge: HOME | End: 2020-06-10
Payer: OTHER GOVERNMENT

## 2020-06-10 LAB
A/G RATIO: 1.7 (ref 1.1–2.2)
ALBUMIN SERPL-MCNC: 4.3 G/DL (ref 3.4–5)
ALP BLD-CCNC: 54 U/L (ref 40–129)
ALT SERPL-CCNC: 20 U/L (ref 10–40)
ANION GAP SERPL CALCULATED.3IONS-SCNC: 12 MMOL/L (ref 3–16)
AST SERPL-CCNC: 24 U/L (ref 15–37)
BASOPHILS ABSOLUTE: 0.1 K/UL (ref 0–0.2)
BASOPHILS RELATIVE PERCENT: 0.8 %
BILIRUB SERPL-MCNC: 0.5 MG/DL (ref 0–1)
BILIRUBIN URINE: NEGATIVE
BLOOD, URINE: NEGATIVE
BUN BLDV-MCNC: 11 MG/DL (ref 7–20)
CALCIUM SERPL-MCNC: 10.3 MG/DL (ref 8.3–10.6)
CHLORIDE BLD-SCNC: 104 MMOL/L (ref 99–110)
CHOLESTEROL, TOTAL: 147 MG/DL (ref 0–199)
CLARITY: CLEAR
CO2: 25 MMOL/L (ref 21–32)
COLOR: YELLOW
CREAT SERPL-MCNC: 1.4 MG/DL (ref 0.8–1.3)
EOSINOPHILS ABSOLUTE: 0.2 K/UL (ref 0–0.6)
EOSINOPHILS RELATIVE PERCENT: 2.3 %
EPITHELIAL CELLS, UA: 1 /HPF (ref 0–5)
ESTIMATED AVERAGE GLUCOSE: 131.2 MG/DL
FERRITIN: 124.4 NG/ML (ref 30–400)
GFR AFRICAN AMERICAN: 59
GFR NON-AFRICAN AMERICAN: 48
GLOBULIN: 2.6 G/DL
GLUCOSE BLD-MCNC: 134 MG/DL (ref 70–99)
GLUCOSE URINE: NEGATIVE MG/DL
HBA1C MFR BLD: 6.2 %
HCT VFR BLD CALC: 46.1 % (ref 40.5–52.5)
HDLC SERPL-MCNC: 46 MG/DL (ref 40–60)
HEMOGLOBIN: 15.2 G/DL (ref 13.5–17.5)
HYALINE CASTS: 1 /LPF (ref 0–8)
IRON SATURATION: 24 % (ref 20–50)
IRON: 74 UG/DL (ref 59–158)
KETONES, URINE: NEGATIVE MG/DL
LDL CHOLESTEROL CALCULATED: 69 MG/DL
LEUKOCYTE ESTERASE, URINE: NEGATIVE
LYMPHOCYTES ABSOLUTE: 1.2 K/UL (ref 1–5.1)
LYMPHOCYTES RELATIVE PERCENT: 14.7 %
MCH RBC QN AUTO: 30.8 PG (ref 26–34)
MCHC RBC AUTO-ENTMCNC: 33 G/DL (ref 31–36)
MCV RBC AUTO: 93.3 FL (ref 80–100)
MICROSCOPIC EXAMINATION: NORMAL
MONOCYTES ABSOLUTE: 0.7 K/UL (ref 0–1.3)
MONOCYTES RELATIVE PERCENT: 8.5 %
NEUTROPHILS ABSOLUTE: 5.9 K/UL (ref 1.7–7.7)
NEUTROPHILS RELATIVE PERCENT: 73.7 %
NITRITE, URINE: NEGATIVE
PDW BLD-RTO: 13 % (ref 12.4–15.4)
PH UA: 6 (ref 5–8)
PHOSPHORUS: 3.4 MG/DL (ref 2.5–4.9)
PLATELET # BLD: 195 K/UL (ref 135–450)
PMV BLD AUTO: 8.7 FL (ref 5–10.5)
POTASSIUM SERPL-SCNC: 4.3 MMOL/L (ref 3.5–5.1)
PROTEIN UA: NEGATIVE MG/DL
RBC # BLD: 4.94 M/UL (ref 4.2–5.9)
RBC UA: 1 /HPF (ref 0–4)
SODIUM BLD-SCNC: 141 MMOL/L (ref 136–145)
SPECIFIC GRAVITY UA: 1.01 (ref 1–1.03)
TOTAL IRON BINDING CAPACITY: 307 UG/DL (ref 260–445)
TOTAL PROTEIN: 6.9 G/DL (ref 6.4–8.2)
TRIGL SERPL-MCNC: 159 MG/DL (ref 0–150)
URINE TYPE: NORMAL
UROBILINOGEN, URINE: 0.2 E.U./DL
VLDLC SERPL CALC-MCNC: 32 MG/DL
WBC # BLD: 7.9 K/UL (ref 4–11)
WBC UA: 2 /HPF (ref 0–5)

## 2020-06-10 PROCEDURE — 80061 LIPID PANEL: CPT

## 2020-06-10 PROCEDURE — 80053 COMPREHEN METABOLIC PANEL: CPT

## 2020-06-10 PROCEDURE — 82728 ASSAY OF FERRITIN: CPT

## 2020-06-10 PROCEDURE — 81001 URINALYSIS AUTO W/SCOPE: CPT

## 2020-06-10 PROCEDURE — 85025 COMPLETE CBC W/AUTO DIFF WBC: CPT

## 2020-06-10 PROCEDURE — 84100 ASSAY OF PHOSPHORUS: CPT

## 2020-06-10 PROCEDURE — 83550 IRON BINDING TEST: CPT

## 2020-06-10 PROCEDURE — 83036 HEMOGLOBIN GLYCOSYLATED A1C: CPT

## 2020-06-10 PROCEDURE — 83540 ASSAY OF IRON: CPT

## 2020-06-24 ENCOUNTER — HOSPITAL ENCOUNTER (OUTPATIENT)
Dept: CT IMAGING | Age: 85
Discharge: HOME | End: 2020-06-24
Payer: OTHER GOVERNMENT

## 2020-06-24 PROCEDURE — 70498 CT ANGIOGRAPHY NECK: CPT

## 2020-06-24 PROCEDURE — 70496 CT ANGIOGRAPHY HEAD: CPT

## 2020-06-24 PROCEDURE — 6360000004 HC RX CONTRAST MEDICATION: Performed by: SURGERY

## 2020-06-24 RX ADMIN — IOPAMIDOL 75 ML: 755 INJECTION, SOLUTION INTRAVENOUS at 14:13

## 2020-07-06 ENCOUNTER — TELEPHONE (OUTPATIENT)
Dept: VASCULAR SURGERY | Age: 85
End: 2020-07-06

## 2020-07-06 NOTE — TELEPHONE ENCOUNTER
Discussed results of CTA head and neck with patient which was unremarkable with left ICA stenosis of 30%. Will plan to continue surveillance with repeat carotid duplex in 1 year.       Electronically signed by ELISHA Davis CNP on 7/6/2020 at 2:27 PM

## 2020-07-27 ENCOUNTER — OFFICE VISIT (OUTPATIENT)
Dept: DERMATOLOGY | Age: 85
End: 2020-07-27
Payer: OTHER GOVERNMENT

## 2020-07-27 ENCOUNTER — TELEPHONE (OUTPATIENT)
Dept: DERMATOLOGY | Age: 85
End: 2020-07-27

## 2020-07-27 VITALS — TEMPERATURE: 98.1 F

## 2020-07-27 PROCEDURE — 99213 OFFICE O/P EST LOW 20 MIN: CPT | Performed by: DERMATOLOGY

## 2020-07-27 PROCEDURE — 17110 DESTRUCTION B9 LES UP TO 14: CPT | Performed by: DERMATOLOGY

## 2020-07-27 PROCEDURE — 17000 DESTRUCT PREMALG LESION: CPT | Performed by: DERMATOLOGY

## 2020-07-27 PROCEDURE — 17003 DESTRUCT PREMALG LES 2-14: CPT | Performed by: DERMATOLOGY

## 2020-07-27 NOTE — PROGRESS NOTES
[facility] Dermatology  Juan A Rai MD  [phone]      Rajat Mcguire  1935    82 y.o. male     Date of Visit: 7/27/2020    Chief Complaint: skin lesions    History of Present Illness:    1. He complains of a tender lesion on the right ear. He also has few asymptomatic lesions on the face. 2.  He complains of recurrently irritated and tender lesions on the left shoulder. 3.  He has a history of 2 melanomas-denies any signs of recurrence. 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 Rimma Ramos (stage IB) on 12/10/2014.     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 Milena Richard on 2/25/14 (stage IA). 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 Small squamous cell carcinoma the right superior shoulder-treated with curettage on 5/10/2017. 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 SCC on the left upper back-excised on 1/20/2017. SCC in situ of the right forearm-treated with curettage on 3/4/2016. SCC in situ of the left upper back-treated with curettage on 8/27/2015. SCC of the central chest - excised 3/27/15. BCC of the left forearm - excised on 2/25/14.     Review of Systems:  Gen: Feels well, good sense of health. Skin: No new or changing moles. Past Medical History, Family History, Surgical History, Medications and Allergies reviewed.     Past Medical History:   Diagnosis Date    Arrhythmia     Arthritis     hands shoulders neck    Atrial fibrillation (HCC)     coumadin    Atrial tachycardia (HCC)     CAD (coronary artery disease)     Cancer (HCC)     skin    Diabetes mellitus ([address]     Diverticulitis     Enlarged prostate     Hyperlipidemia     Hypertension     Pacemaker 04/20/2020    Pneumonia     ABOUT 5 YEARS AGO    Vasodepressor syncope      Past Surgical History:   Procedure Laterality Date    ABLATION OF DYSRHYTHMIC FOCUS      AVNRT and Atrial tachycardia    CARPAL TUNNEL RELEASE      CATARACT REMOVAL Bilateral     CORONARY ANGIOPLASTY WITH STENT PLACEMENT  2005    CYSTOSCOPY  9/26/12    coagulation prostatic urethra    CYSTOSCOPY  10/8/15    TURP    FINGER SURGERY      X7    FINGER TRIGGER RELEASE      OTHER SURGICAL HISTORY Left [id number]    WIDE LOCAL EXCISION LEFT ARM MELANOMA, WIDE LOCAL EXCISION OF    SHOULDER SURGERY Bilateral     SPINAL FUSION      TURP  3-7-13       No Known Allergies  Outpatient Medications Marked as Taking for the 7/27/20 encounter (Office Visit) with Tonya Ruiz MD   Medication Sig Dispense Refill    nateglinide (STARLIX) 120 MG tablet Take 1 tablet by mouth 2 times daily (before meals) 180 tablet 3    simvastatin (ZOCOR) 40 MG tablet Take 1 tablet by mouth daily 180 tablet 3    omega-3 acid ethyl esters (LOVAZA) 1 g capsule Take 1 capsule by mouth 2 times daily 180 capsule 3    SITagliptin (JANUVIA) 50 MG tablet Take 1 tablet by mouth daily 90 tablet 3    insulin detemir (LEVEMIR FLEXTOUCH) 100 UNIT/ML injection pen Inject 24 Units into the skin nightly 10 pen 3    amLODIPine (NORVASC) 5 MG tablet Take 1 tablet by mouth daily 90 tablet 3    pregabalin (LYRICA) 75 MG capsule Take 1 capsule by mouth 2 times daily for 90 days.  180 capsule 0    Insulin Pen Needle (B-D UF III MINI PEN NEEDLES) 31G X 5 MM MISC Use as directed 200 each 2    apixaban (ELIQUIS) 5 MG TABS tablet Take 1 tablet by mouth 2 times daily 60 tablet 5    Loratadine (CLARITIN PO) Take by mouth Indications: Couple times a week      vitamin D (ERGOCALCIFEROL) 1.25 MG (69182 UT) CAPS capsule Take 50,000 Units by mouth once a week Mondays      sotalol (BETAPACE) 80 MG tablet TAKE 1 TABLET BY MOUTH TWICE A  tablet 3    ferrous sulfate (CVS IRON) 325 (65 FE) MG tablet Take 325 mg by mouth daily (with breakfast) Indications: twice a week, Tuesday and Thursday       Ascorbic Acid (VITAMIN C) 500 MG CAPS Take by mouth nightly Indications: Three times a week  Monday, Wednesday, Friday       Multiple Vitamins-Minerals (CENTRUM SILVER) TABS Take 1 tablet by mouth daily       ALPHA LIPOIC ACID PO Take 100 mg by mouth daily            Physical Examination       The following were examined and determined to be normal: Psych/Neuro, Scalp/hair, Conjunctivae/eyelids, Gums/teeth/lips, Neck, Breast/axilla/chest, Abdomen, Back, RUE, RLE and LLE. The following were examined and determined to be abnormal: Head/face and LUE. Well appearing. 1.  R superior helix - 1, right lateral cheek - 1, left lateral cheek - 1: Few keratotic pink papules and patches. 2.  Left shoulder with 3 stuck on appearing verrucous and crusted erythematous brown papules. 3.  Left forearm - linear surgical scar; left earlobe partially gone and skin below left ear with linear surgical scar. No discoloration or nodularity appreciated. Assessment and Plan     1. Actinic keratoses - few    2 cycles of liquid nitrogen applied to 3 AKs; R superior helix - 1, right lateral cheek - 1, left lateral cheek - 1. Patient was educated regarding the potential risks of blister formation, discomfort, hypopigmentation, and scar. Wound care was discussed. 2. Inflamed seborrheic keratosis - 3    2 cycles of liquid nitrogen applied to 3 ISKs on the left shoulder. Patient was educated regarding the potential risks of blister formation, discomfort, hypopigmentation, and scar. Wound care was discussed. 3.  History of melanoma x 2 - no signs of local recurrence. He was encouraged to perform monthly self skin exams and to call with any new or concerning lesions.       Sun protective behaviors were

## 2020-08-04 NOTE — PROGRESS NOTES
Diverticulitis, Enlarged prostate, Hyperlipidemia, Hypertension, Pacemaker, Pneumonia, and Vasodepressor syncope. Surgical History:   has a past surgical history that includes shoulder surgery (Bilateral); Finger surgery; Coronary angioplasty with stent (2005); Cataract removal (Bilateral); ablation of dysrhythmic focus; Cystoscopy (9/26/12); TURP (3-7-13); other surgical history (Left, [id number]); Carpal tunnel release; Finger trigger release; Cystocopy (10/8/15); and Spinal fusion. Social History:   reports that he quit smoking about 46 years ago. He has never used smokeless tobacco. He reports current alcohol use of about 2.0 standard drinks of alcohol per week. He reports that he does not use drugs. Family History:  family history is not on file. Home Medications:  Prior to Admission medications    Medication Sig Start Date End Date Taking? Authorizing Provider   nateglinide (STARLIX) 120 MG tablet Take 1 tablet by mouth 2 times daily (before meals) 6/3/20  Yes Mell Diop MD   simvastatin (ZOCOR) 40 MG tablet Take 1 tablet by mouth daily 6/3/20  Yes Mell Diop MD   omega-3 acid ethyl esters (LOVAZA) 1 g capsule Take 1 capsule by mouth 2 times daily 6/3/20  Yes Mell Diop MD   SITagliptin (JANUVIA) 50 MG tablet Take 1 tablet by mouth daily 6/3/20  Yes Mell Diop MD   insulin detemir (LEVEMIR FLEXTOUCH) 100 UNIT/ML injection pen Inject 24 Units into the skin nightly 6/3/20  Yes Mell Diop MD   amLODIPine (NORVASC) 5 MG tablet Take 1 tablet by mouth daily 6/3/20  Yes Mell Diop MD   pregabalin (LYRICA) 75 MG capsule Take 1 capsule by mouth 2 times daily for 90 days.  7/17/20 10/15/20 Yes Mell Diop MD   Insulin Pen Needle (B-D UF III MINI PEN NEEDLES) 31G X 5 MM MISC Use as directed 6/3/20  Yes Mell Diop MD   apixaban (ELIQUIS) 5 MG TABS tablet Take 1 tablet by mouth 2 times daily 5/28/20  Yes Sallie Marcos, APRN - CNP   Loratadine (CLARITIN PO) Take by mouth Indications: Couple times a week   Yes Historical Provider, MD   vitamin D (ERGOCALCIFEROL) 1.25 MG (07813 UT) CAPS capsule Take 50,000 Units by mouth once a week Mondays 4/13/20  Yes Historical Provider, MD   sotalol (BETAPACE) 80 MG tablet TAKE 1 TABLET BY MOUTH TWICE A DAY 1/21/20  Yes Magno Bass MD   ferrous sulfate (CVS IRON) 325 (65 FE) MG tablet Take 325 mg by mouth daily (with breakfast) Indications: twice a week, Tuesday and Thursday    Yes Historical Provider, MD   Ascorbic Acid (VITAMIN C) 500 MG CAPS Take by mouth nightly Indications: Three times a week  Monday, Wednesday, Friday    Yes Historical Provider, MD   Multiple Vitamins-Minerals (CENTRUM SILVER) TABS Take 1 tablet by mouth daily    Yes Historical Provider, MD   ALPHA LIPOIC ACID PO Take 100 mg by mouth daily  2/20/10  Yes Historical Provider, MD      Allergies:  Patient has no known allergies.      DATA:  Labs reviewed  Lab Results   Component Value Date    ALT 20 06/10/2020    AST 24 06/10/2020    ALKPHOS 54 06/10/2020    BILITOT 0.5 06/10/2020     Lab Results   Component Value Date    CREATININE 1.4 (H) 06/10/2020    BUN 11 06/10/2020     06/10/2020    K 4.3 06/10/2020     06/10/2020    CO2 25 06/10/2020     Lab Results   Component Value Date    TSH 2.48 05/13/2019    I2MLHVR 5.1 05/17/2012     Lab Results   Component Value Date    WBC 7.9 06/10/2020    HGB 15.2 06/10/2020    HCT 46.1 06/10/2020    MCV 93.3 06/10/2020     06/10/2020     No components found for: CHLPL  Lab Results   Component Value Date    TRIG 159 (H) 06/10/2020    TRIG 168 (H) 05/13/2019    TRIG 130 11/10/2016     Lab Results   Component Value Date    HDL 46 06/10/2020    HDL 47 05/13/2019    HDL 48 11/10/2016     Lab Results   Component Value Date    LDLCALC 69 06/10/2020    LDLCALC 71 05/13/2019    LDLCALC 69 11/10/2016     Lab Results   Component Value Date    LABVLDL 32 06/10/2020    LABVLDL 34 05/13/2019    LABVLDL 26 11/10/2016     /60   Pulse 69   Temp 97.5 27.42 kg/m²   CONSTITUTIONAL: Cooperative, no apparent distress, patient is Overweight  NEUROLOGIC:  Awake and orientated to person, place and time. PSYCH: Calm affect. SKIN: Warm and dry. HEENT: Sclera non-icteric, atraumatic   NECK:  neck supple, no elevation of JVP, normal carotid pulses with no bruits and thyroid normal size  LUNGS:  No increased work of breathing and clear to auscultation, no crackles or wheezing  CARDIOVASCULAR:  Regular rate and rhythm regular with ectopic beats; no murmurs, gallops or rubs. Normal PMI  ABDOMEN:  Normal bowel sounds, non-distended and non-tender to palpation  EXT: No edema, no cyanosis. Risk Factors: The patient does not smoke. The patient does have known diagnosis of CAD. Patient is  on beta blocker and is  on statin. Patient is on antiplatelet therapy   The patient does not have known diagnosis of CHF. Patient is  on beta blocker and is  on AceI/ARB. The patient does have known atrial arrhythmias. Patient is not on anticoagulation due to hx of bleeding after two attempts with anticoagulation. Assessment:   1. Paroxysmal atrial fibrillation:                - Ambulatory monitoring 14% AF burden per event monitor 4/16/20      - taking Sotalol 80 mg BID since OV in 2/2017. Good AF suppression on this dose. - He has had multiple episodes of bleeding and urinary retention and severe anemia requiring transfusion.     -  0.1% AFB on interrogation today. - Eliquis 5 mg BID. Notify office of any bleeding. 2.  Hematuria w/ AC   - No hematuria currently   3. S/P RFCA of AVNRT and PAT in 2000  4. CAD: s/p PCI and stent to PDA in 2005              - Stable. No symptoms.               - No asa or Plavix. - On statin. LDL 71 (5/2019)  5. S/P DDD       S/P Medtronic MRI compatible dual chamber PPM (4/20/20, [place]). Device functioning as programmed today. 6.  HTN: 110/60 today    Plan:  1.   Recommend melatonin 3 mg for sleep aid. Avoid diphenhydramine OTC. 2.  Continue current medication regimen, including sotalol 80 mg BID  3 . Continue activity as tolerated. 4.  Follow up in 6 months  5. Continue every 3 month remote device checks. Elroy Karimi RN, am scribing for and in the presence of Dr. Ricky Schwartz. 08/05/20   4:35 PM  Yakelin Garcia RN    I, Dr. Ricky Schwartz, personally performed the services described in this documentation as scribed by Yakelin Garcia RN in my presence, and it is both accurate and complete.       Ricky Schwartz MD

## 2020-08-05 ENCOUNTER — OFFICE VISIT (OUTPATIENT)
Dept: CARDIOLOGY CLINIC | Age: 85
End: 2020-08-05
Payer: OTHER GOVERNMENT

## 2020-08-05 ENCOUNTER — NURSE ONLY (OUTPATIENT)
Dept: CARDIOLOGY CLINIC | Age: 85
End: 2020-08-05
Payer: OTHER GOVERNMENT

## 2020-08-05 VITALS
BODY MASS INDEX: 27.42 KG/M2 | TEMPERATURE: 97.5 F | WEIGHT: 202.2 LBS | HEART RATE: 69 BPM | SYSTOLIC BLOOD PRESSURE: 110 MMHG | DIASTOLIC BLOOD PRESSURE: 60 MMHG

## 2020-08-05 PROCEDURE — 93280 PM DEVICE PROGR EVAL DUAL: CPT | Performed by: INTERNAL MEDICINE

## 2020-08-05 PROCEDURE — 99214 OFFICE O/P EST MOD 30 MIN: CPT | Performed by: INTERNAL MEDICINE

## 2020-08-05 NOTE — PROGRESS NOTES
Device interrogation by company representative. Patient to see Dr. Nidia Meadows today. The patient is here today for a follow up. Device check on 8/5/20 shows 75% AP, 0.4% , 2 episodes of AF (0.1%) noted with the longest lasting 2.5 hours, other parameters stable with 12 years of battery life left. Follow up in 3 months via Beta Dash. See PACEART report under Cardiology tab.

## 2020-08-14 ENCOUNTER — TELEPHONE (OUTPATIENT)
Dept: CARDIOLOGY CLINIC | Age: 85
End: 2020-08-14

## 2020-08-14 NOTE — TELEPHONE ENCOUNTER
Radha Pepper, pt said his device is hooked up \"through his cell phone. \" Are you able to get any readings from his device? Pt states he has felt \"on edge\" since starting his Eliquis.   Nohemy Reid

## 2020-08-14 NOTE — TELEPHONE ENCOUNTER
Patient called stating his pulse has been in the 80's for the past few weeks in the morning and today it was 87.  Patient has not been sleeping very well and would like a call on home or cell

## 2020-08-17 NOTE — TELEPHONE ENCOUNTER
We received remote transmission from patient's monitor at home. Transmission shows normal sensing and pacing function. EP physician will review. See interrogation under cardiology tab in the [address] [place] [address] [address] field for more details. Last interrogation 8/5. No new arrhythmias. Dual pacer functioning as programmed. Pacing (% of Time Since 05-Aug-2020)   0.7% (MVP On)  AP 72.9%. Cardiac compass stable. See PACEART report under Cardiology tab.

## 2020-09-14 ENCOUNTER — TELEPHONE (OUTPATIENT)
Dept: CARDIOLOGY CLINIC | Age: 85
End: 2020-09-14

## 2020-09-14 NOTE — TELEPHONE ENCOUNTER
Lauren Helms calling she faxed over a Pre Op checklist to [facility] at [phone] on 09/09/2020 and it hasn't been sent back yet. They also need to know about the Hold on the Eliquis?  Pls call to advise Thank you

## 2020-09-15 NOTE — TELEPHONE ENCOUNTER
We did not receive this either. Spoke with Fredi Omalleys from PAT at [facility]. Pt is planned to have an eyebrow lift and lower eyelid repositioning per Dr. Darlene Johnston on 9/23. They do not need cardiac clearance, but they do need written permission for the pt to be off Eliquis for 2 days. They are also faxing over a pacemaker checklist to be filled out. Aimee Cavazos, could you please address holding the Eliquis as Jason Justice is out of the office until 9/21?

## 2020-09-16 ENCOUNTER — OFFICE VISIT (OUTPATIENT)
Dept: DERMATOLOGY | Age: 85
End: 2020-09-16
Payer: OTHER GOVERNMENT

## 2020-09-16 VITALS — TEMPERATURE: 98 F

## 2020-09-16 PROCEDURE — 11102 TANGNTL BX SKIN SINGLE LES: CPT | Performed by: DERMATOLOGY

## 2020-09-16 PROCEDURE — 17000 DESTRUCT PREMALG LESION: CPT | Performed by: DERMATOLOGY

## 2020-09-16 PROCEDURE — 17003 DESTRUCT PREMALG LES 2-14: CPT | Performed by: DERMATOLOGY

## 2020-09-16 PROCEDURE — 99213 OFFICE O/P EST LOW 20 MIN: CPT | Performed by: DERMATOLOGY

## 2020-09-16 NOTE — PROGRESS NOTES
[facility] Dermatology  Jose Alejandro Joel MD  [phone]      Hermelindo Venegas  1935    82 y.o. male     Date of Visit: 9/16/2020    Chief Complaint: skin lesions    History of Present Illness:    1. He presents today to follow-up for history of actinic keratoses-complains of a couple of new lesions on the right forearm. 2.  He also complains of an asymptomatic lesion on the right lower back. 3.  Unknown duration of a persistent pink lesion on the left upper arm. 4.  He has a history of 2 melanomas-denies any signs of recurrence. 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 Kade Leavitt (stage IB) on 12/10/2014.     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 Mendel Pica on 2/25/14 (stage IA). 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 Small squamous cell carcinoma the right superior shoulder-treated with curettage on 5/10/2017. 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 SCC on the left upper back-excised on 1/20/2017. SCC in situ of the right forearm-treated with curettage on 3/4/2016. SCC in situ of the left upper back-treated with curettage on 8/27/2015. SCC of the central chest - excised 3/27/15. BCC of the left forearm - excised on 2/25/14.     Has a pacemaker. Review of Systems:  Skin: No new or changing moles. Past Medical History, Family History, Surgical History, Medications and Allergies reviewed.     Past Medical History:   Diagnosis Date    Arrhythmia     Arthritis     hands shoulders neck    Atrial fibrillation (HCC)     coumadin    Atrial tachycardia (HCC)     CAD (coronary artery 50,000 Units by mouth once a week Mondays      sotalol (BETAPACE) 80 MG tablet TAKE 1 TABLET BY MOUTH TWICE A  tablet 3    ferrous sulfate (CVS IRON) 325 (65 FE) MG tablet Take 325 mg by mouth daily (with breakfast) Indications: twice a week, Tuesday and Thursday       Ascorbic Acid (VITAMIN C) 500 MG CAPS Take by mouth nightly Indications: Three times a week  Monday, Wednesday, Friday       Multiple Vitamins-Minerals (CENTRUM SILVER) TABS Take 1 tablet by mouth daily       ALPHA LIPOIC ACID PO Take 100 mg by mouth daily          Physical Examination       The following were examined and determined to be normal: Psych/Neuro, Scalp/hair, Conjunctivae/eyelids, Gums/teeth/lips, Neck, Breast/axilla/chest, Abdomen, Back, RLE, LLE and Nails/digits. The following were examined and determined to be abnormal: Head/face, RUE and LUE. Well-appearing. 1.  Left nasal tip and right mid extensor forearm with 2 keratotic erythematous macules. 2.  Back with several scattered stuck on appearing verrucous brown papules and plaques. 3.  Left lateral upper arm with a 6 mm telangiectatic pink papule. 4.  Left forearm and left ear - clear. Assessment and Plan     1. AK (actinic keratosis) - 2    2 cycles of liquid nitrogen applied to 2 AKs: 1 on the left nasal tip and 1 on the right forearm. Patient was educated regarding the potential risks of blister formation, discomfort, hypopigmentation, and scar. Wound care was discussed. 2. SK (seborrheic keratosis)     Reassurance. 3. Neoplasm of uncertain behavior of skin, left upper arm - r/o BCC    Discussed possible diagnosis; patient agreeable to biopsy (verbal consent obtained). The area(s) to be biopsied were marked with a surgical pen. Alcohol was used to cleanse the site. Local anesthesia was acheived with 1% lidocaine with epinephrine. Shave biopsy was performed using a razor blade.   Hemostasis was achieved with aluminum chloride. The wound(s) were dressed with petrolatum and covered with a bandage. Wound care instructions were reviewed. 1 Specimen (s) sent to pathology. The specimen bottles were appropriately labeled. We also reviewed the risks of bleeding, scar, and infection. 4. History of melanoma x2 - no signs of recurrence. Sun protective behaviors and self skin examinations were encouraged. Call for any new or concerning lesions. Return in about 6 months (around 3/16/2021).

## 2020-09-18 LAB — DERMATOLOGY PATHOLOGY REPORT: ABNORMAL

## 2020-09-29 ENCOUNTER — TELEPHONE (OUTPATIENT)
Dept: CARDIOLOGY CLINIC | Age: 85
End: 2020-09-29

## 2020-09-29 RX ORDER — PREGABALIN 75 MG/1
CAPSULE ORAL
Qty: 180 CAPSULE | Refills: 0 | Status: SHIPPED | OUTPATIENT
Start: 2020-09-29 | End: 2021-01-20

## 2020-09-29 NOTE — TELEPHONE ENCOUNTER
Dual pacer functioning as normal.   No  arrhythmias recorded since last interrogation 9/23/20. Pacing (% of Time Since 23-Sep-2020)   0.2% (MVP On)  AP 73.5%. Last AF on 9/15 x 5.5 hrs. (oac, betapace). See PACEART report under Cardiology tab.

## 2020-09-29 NOTE — TELEPHONE ENCOUNTER
Spoke with pt. He had facial surgery (eyebrow lift) on 9/23 and has been in considerable amount of pain, taking hydrocodone. He took a dose before bed on 9/24. Around 0600 on 9/25, he woke up, went to the bathroom, and after voiding felt that he was \"dimming out\" and then fell to the ground, hitting his head and elbow. Denies obvious injury. Denies any other symptoms prior to fall such as dizziness, palpitations, SOB, or CP. He thinks he was \"out\" for about 1.5 hrs. Once he woke up, he guesses it took him about 1/2 hr to get up off the floor. By the time he got back to bed, it was around 0800. He felt fine after the event and denies any further issues/symptoms.   He will be sending a remote transmission from his PPM.

## 2020-10-01 ENCOUNTER — NURSE ONLY (OUTPATIENT)
Dept: CARDIOLOGY CLINIC | Age: 85
End: 2020-10-01
Payer: OTHER GOVERNMENT

## 2020-10-01 PROCEDURE — 93294 REM INTERROG EVL PM/LDLS PM: CPT | Performed by: INTERNAL MEDICINE

## 2020-10-01 PROCEDURE — 93296 REM INTERROG EVL PM/IDS: CPT | Performed by: INTERNAL MEDICINE

## 2020-10-01 NOTE — PROGRESS NOTES
We received remote transmission from patient's monitor at home. Transmission shows normal sensing and pacing function. EP physician will review. See interrogation under cardiology tab in the [address] [facility] [address] field for more details. Hx pAF. No new arrhythmias since last carelink 9/29. Last AF on 9/15/20 x 5.5 hrs. (oac, betapace). Follow up in 3 months via carelink.

## 2020-10-02 ENCOUNTER — PROCEDURE VISIT (OUTPATIENT)
Dept: DERMATOLOGY | Age: 85
End: 2020-10-02
Payer: OTHER GOVERNMENT

## 2020-10-02 VITALS — TEMPERATURE: 98.1 F

## 2020-10-02 PROCEDURE — 17261 DSTRJ MAL LES T/A/L .6-1.0CM: CPT | Performed by: DERMATOLOGY

## 2020-10-02 NOTE — PROGRESS NOTES
[facility]  Laly Moore MD  [phone]      Ronit Trent  1935    82 y.o. male     Date of Visit: 10/2/2020     Chief Complaint: SCC    History of Present Illness:    Here today for treatment of an SCC in situ on the left upper arm. RESULTS   DIAGNOSIS   DIAGNOSIS:   Left upper arm-   Bowen's Disease (intraepidermal squamous cell carcinoma)   There are atypical keratinocytes extending throughout the   entire thickness of the epidermis.  There is underlying   chronic inflammation. Jese Leonard MD   Electronic Signature: 18 SEP 2020 09:12 AM     Review of Systems:  Gen: Feels well, good sense of health. Skin: No new or changing moles, no history of keloids or hypertrophic scars. Heme: No abnormal bruising or bleeding. Past Medical History, Family History, Surgical History, Medications and Allergies reviewed.     Past Medical History:   Diagnosis Date    Arrhythmia     Arthritis     hands shoulders neck    Atrial fibrillation (HCC)     coumadin    Atrial tachycardia (HCC)     CAD (coronary artery disease)     Cancer (HCC)     skin    Diabetes mellitus ([address]     Diverticulitis     Enlarged prostate     Hyperlipidemia     Hypertension     Pacemaker 04/20/2020    Pneumonia     ABOUT 5 YEARS AGO    Vasodepressor syncope      Past Surgical History:   Procedure Laterality Date    ABLATION OF DYSRHYTHMIC FOCUS      AVNRT and Atrial tachycardia    CARPAL TUNNEL RELEASE      CATARACT REMOVAL Bilateral     CORONARY ANGIOPLASTY WITH STENT PLACEMENT  2005    CYSTOSCOPY  9/26/12    coagulation prostatic urethra    CYSTOSCOPY  10/8/15    TURP    FINGER SURGERY      X7    FINGER TRIGGER RELEASE      OTHER SURGICAL HISTORY Left [id number]    WIDE LOCAL EXCISION LEFT ARM MELANOMA, WIDE LOCAL EXCISION OF    SHOULDER SURGERY Bilateral     SPINAL FUSION      TURP  3-7-13       No Known Allergies  Outpatient Medications Marked as Taking for the 10/2/20 encounter (Procedure visit) with Swetha Rao MD   Medication Sig Dispense Refill    pregabalin (LYRICA) 75 MG capsule TAKE 1 CAPSULE TWICE DAILY 180 capsule 0    nateglinide (STARLIX) 120 MG tablet Take 1 tablet by mouth 2 times daily (before meals) 180 tablet 3    simvastatin (ZOCOR) 40 MG tablet Take 1 tablet by mouth daily 180 tablet 3    omega-3 acid ethyl esters (LOVAZA) 1 g capsule Take 1 capsule by mouth 2 times daily 180 capsule 3    SITagliptin (JANUVIA) 50 MG tablet Take 1 tablet by mouth daily 90 tablet 3    insulin detemir (LEVEMIR FLEXTOUCH) 100 UNIT/ML injection pen Inject 24 Units into the skin nightly 10 pen 3    amLODIPine (NORVASC) 5 MG tablet Take 1 tablet by mouth daily 90 tablet 3    Insulin Pen Needle (B-D UF III MINI PEN NEEDLES) 31G X 5 MM MISC Use as directed 200 each 2    apixaban (ELIQUIS) 5 MG TABS tablet Take 1 tablet by mouth 2 times daily 60 tablet 5    Loratadine (CLARITIN PO) Take by mouth Indications: Couple times a week      vitamin D (ERGOCALCIFEROL) 1.25 MG (29493 UT) CAPS capsule Take 50,000 Units by mouth once a week Mondays      sotalol (BETAPACE) 80 MG tablet TAKE 1 TABLET BY MOUTH TWICE A  tablet 3    ferrous sulfate ([employer]S IRON) 325 (65 FE) MG tablet Take 325 mg by mouth daily (with breakfast) Indications: twice a week, Tuesday and Thursday       Ascorbic Acid (VITAMIN C) 500 MG CAPS Take by mouth nightly Indications: Three times a week  Monday, Wednesday, Friday       Multiple Vitamins-Minerals (CENTRUM SILVER) TABS Take 1 tablet by mouth daily       ALPHA LIPOIC ACID PO Take 100 mg by mouth daily            Physical Examination       Well appearing. 1.  Left upper arm - 8 mm oval shaped pink macule. Assessment and Plan     1. 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  Local anesthesia was acheived with 1%

## 2020-11-06 ENCOUNTER — TELEPHONE (OUTPATIENT)
Dept: DERMATOLOGY | Age: 85
End: 2020-11-06

## 2020-11-06 NOTE — TELEPHONE ENCOUNTER
Dr Maximo Greco patient  Pt c/b #[phone]  Pt stated  He has a spot on his face that is sore and bleeding. Pt wanted to send pictures in for dr Maximo Greco to take a look to see if he needs an appt or what other options he suggest. I offered pt the email he can send pics to but pt says it's usually a nuber he's given to send pics through text. Pt will like to speak with nurse.   Please c/b to discuss

## 2020-11-09 NOTE — TELEPHONE ENCOUNTER
Called and left message for patient to call back office. Informed patient that there is no number that we have patient's send photos to. He can send a photo through [address] [place] [address] [address] or can just be scheduled for an upcoming cancellation.

## 2020-11-09 NOTE — TELEPHONE ENCOUNTER
Pt lm  Pt c/b [phone]  Pt states:   - received message to call office  Please call to discuss thanks

## 2020-11-16 ENCOUNTER — OFFICE VISIT (OUTPATIENT)
Dept: DERMATOLOGY | Age: 85
End: 2020-11-16
Payer: OTHER GOVERNMENT

## 2020-11-16 VITALS — TEMPERATURE: 97.3 F

## 2020-11-16 PROCEDURE — 11102 TANGNTL BX SKIN SINGLE LES: CPT | Performed by: DERMATOLOGY

## 2020-11-16 PROCEDURE — 17000 DESTRUCT PREMALG LESION: CPT | Performed by: DERMATOLOGY

## 2020-11-16 NOTE — PATIENT INSTRUCTIONS

## 2020-11-16 NOTE — PROGRESS NOTES
[facility]  Marley Pascal MD  [phone]      Danielle Vargas  1935    82 y.o. male     Date of Visit: 11/16/2020    Chief Complaint: skin lesions    History of Present Illness:    1. He presents today for a recurrently crusted and bleeding lesion on the right lateral cheek. Prior biopsy in March 5739 revealed a lichenoid actinic keratosis. 2.  He also has a persistent scaly lesion on the left lateral cheek. 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 Wesley Silva (stage IB) on 12/10/2014.     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 Gayle Rueda on 2/25/14 (stage IA).    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 Small squamous cell carcinoma the right superior shoulder-treated with curettage on 5/10/2017. 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 SCC on the left upper back-excised on 1/20/2017. SCC in situ of the right forearm-treated with curettage on 3/4/2016. SCC in situ of the left upper back-treated with curettage on 8/27/2015. SCC of the central chest - excised 3/27/15. BCC of the left forearm - excised on 2/25/14.       Has a pacemaker. Review of Systems:  Skin: No new or changing moles. Past Medical History, Family History, Surgical History, Medications and Allergies reviewed.     Past Medical History:   Diagnosis Date    Arrhythmia     Arthritis     hands shoulders neck    Atrial fibrillation (HCC)     coumadin    Atrial tachycardia (HCC)     CAD (coronary artery disease)     Cancer ([facility]     skin    Diabetes mellitus ([facility]     Diverticulitis     Enlarged prostate     Hyperlipidemia     Hypertension     Pacemaker 04/20/2020    Pneumonia     ABOUT 5 YEARS AGO    Vasodepressor syncope      Past Surgical History:   Procedure Laterality Date    ABLATION OF DYSRHYTHMIC FOCUS      AVNRT and Atrial tachycardia    CARPAL TUNNEL RELEASE      CATARACT REMOVAL Bilateral     CORONARY ANGIOPLASTY WITH STENT PLACEMENT  2005    CYSTOSCOPY  9/26/12    coagulation prostatic urethra    CYSTOSCOPY  10/8/15    TURP    FINGER SURGERY      X7    FINGER TRIGGER RELEASE      OTHER SURGICAL HISTORY Left [id number]    WIDE LOCAL EXCISION LEFT ARM MELANOMA, WIDE LOCAL EXCISION OF    SHOULDER SURGERY Bilateral     SPINAL FUSION      TURP  3-7-13       No Known Allergies  Outpatient Medications Marked as Taking for the 11/16/20 encounter (Office Visit) with Thom Geller MD   Medication Sig Dispense Refill    nateglinide (STARLIX) 120 MG tablet Take 1 tablet by mouth 2 times daily (before meals) 180 tablet 3    simvastatin (ZOCOR) 40 MG tablet Take 1 tablet by mouth daily 180 tablet 3    omega-3 acid ethyl esters (LOVAZA) 1 g capsule Take 1 capsule by mouth 2 times daily 180 capsule 3    SITagliptin (JANUVIA) 50 MG tablet Take 1 tablet by mouth daily 90 tablet 3    insulin detemir (LEVEMIR FLEXTOUCH) 100 UNIT/ML injection pen Inject 24 Units into the skin nightly 10 pen 3    amLODIPine (NORVASC) 5 MG tablet Take 1 tablet by mouth daily 90 tablet 3    Insulin Pen Needle (B-D UF III MINI PEN NEEDLES) 31G X 5 MM MISC Use as directed 200 each 2    apixaban (ELIQUIS) 5 MG TABS tablet Take 1 tablet by mouth 2 times daily 60 tablet 5    Loratadine (CLARITIN PO) Take by mouth Indications: Couple times a week      vitamin D (ERGOCALCIFEROL) 1.25 MG (05114 UT) CAPS capsule Take 50,000 Units by mouth once a week Mondays      sotalol (BETAPACE) 80 MG tablet TAKE 1 TABLET BY MOUTH TWICE A  tablet 3    ferrous sulfate (CVS IRON) 325 (65 FE) MG tablet Take 325 mg by mouth daily (with breakfast) Indications: twice a week, Tuesday and Thursday       Ascorbic Acid (VITAMIN C) 500 MG CAPS Take by mouth nightly Indications: Three times a week  Monday, Wednesday, Friday       Multiple Vitamins-Minerals (CENTRUM SILVER) TABS Take 1 tablet by mouth daily       ALPHA LIPOIC ACID PO Take 100 mg by mouth daily          Physical Examination       Well-appearing. 1.  Right lateral cheek with an ill-defined crusted erythematous patch. 2.  Left lateral cheek with a hyperkeratotic pink papule. Assessment and Plan     1. Neoplasm of uncertain behavior of skin, right lateral cheek-rule out [address]    Discussed possible diagnosis; patient agreeable to biopsy (verbal consent obtained). The area(s) to be biopsied were marked with a surgical pen. Alcohol was used to cleanse the site. Local anesthesia was acheived with 1% lidocaine with epinephrine. Shave biopsy was performed using a razor blade. Hemostasis was achieved with aluminum chloride. The wound(s) were dressed with petrolatum and covered with a bandage. Wound care instructions were reviewed. 1 Specimen (s) sent to pathology. The specimen bottles were appropriately labeled. We also reviewed the risks of bleeding, scar, and infection. 2. Actinic keratosis -     2 cycles of liquid nitrogen applied to 1 AK on the left lateral cheek. Patient was educated regarding the potential risks of blister formation, discomfort, hypopigmentation, and scar. Wound care was discussed.           --Sarah Wall MD

## 2020-11-18 LAB — DERMATOLOGY PATHOLOGY REPORT: NORMAL

## 2020-11-19 ENCOUNTER — TELEPHONE (OUTPATIENT)
Dept: CARDIOLOGY CLINIC | Age: 85
End: 2020-11-19

## 2020-11-19 RX ORDER — FLUOROURACIL 50 MG/G
CREAM TOPICAL
Qty: 40 G | Refills: 0 | Status: SHIPPED | OUTPATIENT
Start: 2020-11-19 | End: 2020-11-19 | Stop reason: SDUPTHER

## 2020-11-19 RX ORDER — FLUOROURACIL 50 MG/G
CREAM TOPICAL
Qty: 40 G | Refills: 0 | Status: SHIPPED | OUTPATIENT
Start: 2020-11-19

## 2020-11-19 NOTE — TELEPHONE ENCOUNTER
Patient needs refill, 30-day supply:     apixaban (ELIQUIS) 5 MG TABS tablet  Take 1 tablet by mouth 2 times daily, Disp-60 tablet, R-5  Normal Last Dose: Not Recorded  Refills:5 ordered     Pharmacy:[facility]/pharmacy [address] [facility], [address] [facility] Noa Perry [phone]    Last visit: 8/5/20  Next visit: 2/2/20  Last labs: 6/10/20  Last filled: 5/28/20

## 2020-12-02 ENCOUNTER — OFFICE VISIT (OUTPATIENT)
Dept: INTERNAL MEDICINE CLINIC | Age: 85
End: 2020-12-02
Payer: OTHER GOVERNMENT

## 2020-12-02 VITALS
HEIGHT: 72 IN | BODY MASS INDEX: 27.9 KG/M2 | WEIGHT: 206 LBS | TEMPERATURE: 97.1 F | SYSTOLIC BLOOD PRESSURE: 124 MMHG | DIASTOLIC BLOOD PRESSURE: 68 MMHG

## 2020-12-02 PROCEDURE — 90471 IMMUNIZATION ADMIN: CPT | Performed by: INTERNAL MEDICINE

## 2020-12-02 PROCEDURE — 90732 PPSV23 VACC 2 YRS+ SUBQ/IM: CPT | Performed by: INTERNAL MEDICINE

## 2020-12-02 PROCEDURE — 99214 OFFICE O/P EST MOD 30 MIN: CPT | Performed by: INTERNAL MEDICINE

## 2020-12-02 NOTE — PROGRESS NOTES
2020     Juan Reyes (:  1935) is a 82 y.o. male, here for evaluation of the following medical concerns:    Chief Complaint   Patient presents with    Diabetes        HPI    Diabetes - stable. Adherent to medications. No hypoglycemia. Hypertension - No chest pain or shortness of breath. Adherent to medication    A fib, bradycardia s/p PPM - has done well since pacemaker placement. Following up with cardiologist. No lightheadedness, palpitations. Review of Systems    Prior to Visit Medications    Medication Sig Taking? Authorizing Provider   fluorouracil (EFUDEX) 5 % cream Apply twice daily to affected area on the right cheek for 2 weeks.  Yes Amadeo Bell MD   apixaban (ELIQUIS) 5 MG TABS tablet Take 1 tablet by mouth 2 times daily Yes ELISHA Klein - CNP   pregabalin (LYRICA) 75 MG capsule TAKE 1 CAPSULE TWICE DAILY Yes Annelise Lyle MD   nateglinide (STARLIX) 120 MG tablet Take 1 tablet by mouth 2 times daily (before meals) Yes Annelise Lyle MD   simvastatin (ZOCOR) 40 MG tablet Take 1 tablet by mouth daily Yes Annelise Lyle MD   omega-3 acid ethyl esters (LOVAZA) 1 g capsule Take 1 capsule by mouth 2 times daily Yes Annelise Lyle MD   SITagliptin (JANUVIA) 50 MG tablet Take 1 tablet by mouth daily Yes Annelise Lyle MD   insulin detemir (LEVEMIR FLEXTOUCH) 100 UNIT/ML injection pen Inject 24 Units into the skin nightly Yes Annelise yLle MD   amLODIPine (NORVASC) 5 MG tablet Take 1 tablet by mouth daily Yes Annelise Lyle MD   Insulin Pen Needle (B-D UF III MINI PEN NEEDLES) 31G X 5 MM MISC Use as directed Yes Annelise Lyle MD   Loratadine (CLARITIN PO) Take by mouth Indications: Couple times a week Yes Historical Provider, MD   vitamin D (ERGOCALCIFEROL) 1.25 MG (02805 UT) CAPS capsule Take 50,000 Units by mouth once a week  Yes Historical Provider, MD   sotalol (BETAPACE) 80 MG tablet TAKE 1 TABLET BY MOUTH TWICE A DAY Yes Jonathon Combs MD   ferrous sulfate (CVS IRON) Cholesterol Neg Hx        Vitals:    12/02/20 1042   BP: 124/68   Site: Right Upper Arm   Temp: 97.1 °F (36.2 °C)   Weight: 206 lb (93.4 kg)   Height: 6' (1.829 m)     Estimated body mass index is 27.94 kg/m² as calculated from the following:    Height as of this encounter: 6' (1.829 m). Weight as of this encounter: 206 lb (93.4 kg). Physical Exam  Vitals signs reviewed. Constitutional:       General: He is not in acute distress. Appearance: Normal appearance. He is well-developed. HENT:      Head: Normocephalic and atraumatic. Cardiovascular:      Rate and Rhythm: Normal rate and regular rhythm. Heart sounds: Normal heart sounds. Pulmonary:      Effort: Pulmonary effort is normal. No respiratory distress. Breath sounds: Normal breath sounds. Musculoskeletal:      Right lower leg: No edema. Left lower leg: No edema. Skin:     General: Skin is warm and dry. Neurological:      General: No focal deficit present. Mental Status: He is alert and oriented to person, place, and time. Psychiatric:         Behavior: Behavior normal.         Thought Content: Thought content normal.         Judgment: Judgment normal.         ASSESSMENT/PLAN:  1. Type 2 diabetes mellitus with diabetic polyneuropathy, with long-term current use of insulin (HCC)  - stable  - continue current medications  - reassess A1C  - Comprehensive Metabolic Panel; Future  - Hemoglobin A1C; Future  - Lipid Panel; Future  - CBC Auto Differential; Future    2. Essential hypertension  - stable, at goal  - continue current medications    3. Stage 3 chronic kidney disease, unspecified whether stage 3a or 3b CKD  - stable  - reassess renal function  - avoid nephrotoxins    4. Paroxysmal atrial fibrillation (HCC)  - s/p PPM for bradycardia and sinus pauses  - followed by cardiologist, continue current management    5.  Need for pneumococcal vaccination  - PNEUMOVAX 23 subcutaneous/IM (Pneumococcal polysaccharide vaccine 23-valent >= 3yo)      Return in about 6 months (around 6/2/2021).

## 2020-12-10 PROBLEM — R00.1 BRADYCARDIA: Status: RESOLVED | Noted: 2020-04-18 | Resolved: 2020-12-10

## 2021-01-05 ENCOUNTER — NURSE ONLY (OUTPATIENT)
Dept: CARDIOLOGY CLINIC | Age: 86
End: 2021-01-05
Payer: OTHER GOVERNMENT

## 2021-01-05 DIAGNOSIS — Z95.0 PACEMAKER: ICD-10-CM

## 2021-01-05 DIAGNOSIS — I49.5 SICK SINUS SYNDROME (HCC): ICD-10-CM

## 2021-01-05 PROCEDURE — 93294 REM INTERROG EVL PM/LDLS PM: CPT | Performed by: INTERNAL MEDICINE

## 2021-01-05 PROCEDURE — 93296 REM INTERROG EVL PM/IDS: CPT | Performed by: INTERNAL MEDICINE

## 2021-01-05 NOTE — PROGRESS NOTES
We received remote transmission from patient's monitor at home. Transmission shows normal sensing and pacing function. EP physician will review. See interrogation under cardiology tab in the [address] [facility] [address] field for more details. Hx pAF (oac, betapace). Time in AT/AF 0.2 hr/day (0.8%). last recorded AF 12/18/20. Follow up in 3 months via carelink.

## 2021-01-14 RX ORDER — TRIAMCINOLONE ACETONIDE 1 MG/G
CREAM TOPICAL
Qty: 80 G | Refills: 2 | Status: SHIPPED | OUTPATIENT
Start: 2021-01-14

## 2021-01-18 RX ORDER — VALACYCLOVIR HYDROCHLORIDE 500 MG/1
TABLET, FILM COATED ORAL
Qty: 24 TABLET | Refills: 0 | Status: SHIPPED | OUTPATIENT
Start: 2021-01-18 | End: 2021-03-17 | Stop reason: ALTCHOICE

## 2021-01-20 ENCOUNTER — IMMUNIZATION (OUTPATIENT)
Dept: PRIMARY CARE CLINIC | Age: 86
End: 2021-01-20
Payer: OTHER GOVERNMENT

## 2021-01-20 DIAGNOSIS — E11.42 TYPE 2 DIABETES MELLITUS WITH DIABETIC POLYNEUROPATHY, WITH LONG-TERM CURRENT USE OF INSULIN (HCC): ICD-10-CM

## 2021-01-20 DIAGNOSIS — Z79.4 TYPE 2 DIABETES MELLITUS WITH DIABETIC POLYNEUROPATHY, WITH LONG-TERM CURRENT USE OF INSULIN (HCC): ICD-10-CM

## 2021-01-20 PROCEDURE — 91300 COVID-19, PFIZER VACCINE 30MCG/0.3ML DOSE: CPT | Performed by: FAMILY MEDICINE

## 2021-01-20 PROCEDURE — 0001A PR IMM ADMN SARSCOV2 30MCG/0.3ML DIL RECON 1ST DOSE: CPT | Performed by: FAMILY MEDICINE

## 2021-01-20 RX ORDER — PREGABALIN 75 MG/1
CAPSULE ORAL
Qty: 180 CAPSULE | Refills: 0 | Status: SHIPPED | OUTPATIENT
Start: 2021-01-20 | End: 2021-04-26 | Stop reason: SDUPTHER

## 2021-02-03 RX ORDER — SOTALOL HYDROCHLORIDE 80 MG/1
TABLET ORAL
Qty: 180 TABLET | Refills: 3 | Status: SHIPPED | OUTPATIENT
Start: 2021-02-03 | End: 2022-01-27 | Stop reason: SDUPTHER

## 2021-02-03 NOTE — TELEPHONE ENCOUNTER
[facility] Medication Refills:            sotalol (BETAPACE) 80 MG tablet   TAKE 1 TABLET BY MOUTH TWICE A DAY, Disp-180 tablet, R-3    Pharmacy:[facility]/pharmacy [address] [place] [address] [phone] Mar Bhakta [phone]    Last Office Visit: 08/05/20    Next Office Visit: 03/17/21    Last Refill: 01/21/20    Last Labs: 12/02/20

## 2021-02-10 ENCOUNTER — IMMUNIZATION (OUTPATIENT)
Dept: PRIMARY CARE CLINIC | Age: 86
End: 2021-02-10
Payer: OTHER GOVERNMENT

## 2021-02-10 PROCEDURE — 91300 COVID-19, PFIZER VACCINE 30MCG/0.3ML DOSE: CPT | Performed by: FAMILY MEDICINE

## 2021-02-10 PROCEDURE — 0002A COVID-19, PFIZER VACCINE 30MCG/0.3ML DOSE: CPT | Performed by: FAMILY MEDICINE

## 2021-03-03 ENCOUNTER — TELEPHONE (OUTPATIENT)
Dept: DERMATOLOGY | Age: 86
End: 2021-03-03

## 2021-03-03 NOTE — TELEPHONE ENCOUNTER
Pt calling states he has a place on his ear that is swollen and sore looks like something is growing in it have some concern pls return call back @ [phone] to discuss he has a appt on 3/31 was wanting something sooner I placed him on the cancellation wait list

## 2021-03-05 ENCOUNTER — OFFICE VISIT (OUTPATIENT)
Dept: DERMATOLOGY | Age: 86
End: 2021-03-05
Payer: OTHER GOVERNMENT

## 2021-03-05 VITALS — TEMPERATURE: 98.1 F

## 2021-03-05 DIAGNOSIS — L82.1 SK (SEBORRHEIC KERATOSIS): ICD-10-CM

## 2021-03-05 DIAGNOSIS — Z85.820 HISTORY OF MELANOMA: ICD-10-CM

## 2021-03-05 DIAGNOSIS — Z85.828 HISTORY OF NONMELANOMA SKIN CANCER: ICD-10-CM

## 2021-03-05 DIAGNOSIS — L57.0 AK (ACTINIC KERATOSIS): Primary | ICD-10-CM

## 2021-03-05 PROCEDURE — 17003 DESTRUCT PREMALG LES 2-14: CPT | Performed by: DERMATOLOGY

## 2021-03-05 PROCEDURE — 17000 DESTRUCT PREMALG LESION: CPT | Performed by: DERMATOLOGY

## 2021-03-05 PROCEDURE — 99213 OFFICE O/P EST LOW 20 MIN: CPT | Performed by: DERMATOLOGY

## 2021-03-11 NOTE — PROGRESS NOTES
[address]   Electrophysiology  Office Visit  Date: 3/17/2021    Chief Complaint   Patient presents with    Bradycardia    Atrial Fibrillation    Loss of Consciousness       Cardiac HX: Charlotte Richards is a 82 y.o. 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 Jimmy Ha, 4/20/2020)). Interval History/HPI: Is here for follow-up for paroxysmal atrial fibrillation, symptomatic sinus pauses and pacemaker management. Patient with paroxysmal atrial fibrillation seen on his device. He had been on oral anticoagulation in the past however had hematuria requiring a blood transfusion and was taken off of his warfarin. He of his device after implantation is shown episodes of paroxysmal atrial fibrillation with the longest one lasting 10 hours and 25 minutes on February 1. Since his last remote session on February 1 he has had one episode this lasted 5 hours and 14 minutes. He has been asymptomatic when he is out of rhythm. We had discussed oral anticoagulation post procedure and patient wanted to review with Dr. Minda Adams and currently now is on Eliquis 5 mg twice a day with one brief episode of hematuria. Otherwise device is functioning normally. He denies chest pain, shortness of breath, PND, orthopnea or lower extremity edema.     Home medications:   Current Outpatient Medications on File Prior to Visit   Medication Sig Dispense Refill    carboxymethylcellulose (REFRESH PLUS) 0.5 % SOLN ophthalmic solution Apply 2 drops to eye 3 times daily      sotalol (BETAPACE) 80 MG tablet TAKE 1 TABLET BY MOUTH TWICE A  tablet 3    pregabalin (LYRICA) 75 MG capsule TAKE 1 CAPSULE TWICE DAILY 180 capsule 0    triamcinolone (KENALOG) 0.1 % cream Apply to itchy areas on the arms and legs twice daily for up to 2 weeks or until improved. 80 g 2    fluorouracil (EFUDEX) 5 % cream Apply twice daily to affected area on the right cheek for 2 weeks. 40 g 0    apixaban (ELIQUIS) 5 MG TABS tablet Take 1 tablet by mouth 2 times daily 60 tablet 5    nateglinide (STARLIX) 120 MG tablet Take 1 tablet by mouth 2 times daily (before meals) 180 tablet 3    simvastatin (ZOCOR) 40 MG tablet Take 1 tablet by mouth daily 180 tablet 3    omega-3 acid ethyl esters (LOVAZA) 1 g capsule Take 1 capsule by mouth 2 times daily 180 capsule 3    SITagliptin (JANUVIA) 50 MG tablet Take 1 tablet by mouth daily 90 tablet 3    insulin detemir (LEVEMIR FLEXTOUCH) 100 UNIT/ML injection pen Inject 24 Units into the skin nightly 10 pen 3    amLODIPine (NORVASC) 5 MG tablet Take 1 tablet by mouth daily 90 tablet 3    Insulin Pen Needle (B-D UF III MINI PEN NEEDLES) 31G X 5 MM MISC Use as directed 200 each 2    Loratadine (CLARITIN PO) Take by mouth Indications: Couple times a week      vitamin D (ERGOCALCIFEROL) 1.25 MG (42056 UT) CAPS capsule Take 50,000 Units by mouth once a week Mondays      Ascorbic Acid (VITAMIN C) 500 MG CAPS Take by mouth nightly Indications: Three times a week  Monday, Wednesday, Friday       Multiple Vitamins-Minerals (CENTRUM SILVER) TABS Take 1 tablet by mouth daily       ALPHA LIPOIC ACID PO Take 100 mg by mouth daily        No current facility-administered medications on file prior to visit.         Past Medical History:   Diagnosis Date    Arrhythmia     Arthritis     hands shoulders neck    Atrial fibrillation (HCC)     coumadin    Atrial tachycardia (HCC)     CAD (coronary artery disease)     Cancer (HCC)     skin    Diabetes mellitus ([facility] [address]     Diverticulitis     Enlarged prostate     Hyperlipidemia     Hypertension     Pacemaker 04/20/2020    Pneumonia     ABOUT 5 YEARS AGO    Vasodepressor syncope Past Surgical History:   Procedure Laterality Date    ABLATION OF DYSRHYTHMIC FOCUS      AVNRT and Atrial tachycardia    CARPAL TUNNEL RELEASE      CATARACT REMOVAL Bilateral     CORONARY ANGIOPLASTY WITH STENT PLACEMENT  2005    CYSTOSCOPY  9/26/12    coagulation prostatic urethra    CYSTOSCOPY  10/8/15    TURP    FINGER SURGERY      X7    FINGER TRIGGER RELEASE      OTHER SURGICAL HISTORY Left [id number]    WIDE LOCAL EXCISION LEFT ARM MELANOMA, WIDE LOCAL EXCISION OF    SHOULDER SURGERY Bilateral     SPINAL FUSION      TURP  3-7-13       No Known Allergies    Social History:  Reviewed. reports that he quit smoking about 47 years ago. He has never used smokeless tobacco. He reports current alcohol use of about 2.0 standard drinks of alcohol per week. He reports that he does not use drugs. Family History:  Reviewed. family history is not on file. Review of System:    · Constitutional: No fevers, chills. · Eyes: No visual changes or diplopia. No scleral icterus. · ENT: No Headaches. No mouth sores or sore throat. · Cardiovascular: No for chest pain, No for dyspnea on exertion, no for palpitations or No for loss of consciousness. No cough, hemoptysis, No for pleuritic pain, or phlebitis. · Respiratory: No for cough or wheezing. No hematemesis. · Gastrointestinal: No abdominal pain, blood in stools. · Genitourinary: No dysuria, or hematuria. · Musculoskeletal: No gait disturbance,    · Integumentary: No rash or pruritis. · Neurological: No headache, change in muscle strength, numbness or tingling. · Psychiatric: No anxiety, or depression. · Endocrine: No temperature intolerance. No excessive thirst, fluid intake, or urination. · Hem/Lymph: No abnormal bruising or bleeding, blood clots or swollen lymph nodes. · Allergic/Immunologic: No nasal congestion or hives.     Physical Examination:  Vitals:    03/17/21 1509   BP: 110/66   Pulse: 63   Temp: 97.1 °F (36.2 °C)         Wt Readings from Last 3 Encounters:   03/17/21 210 lb 6.4 oz (95.4 kg)   12/02/20 206 lb (93.4 kg)   08/05/20 202 lb 3.2 oz (91.7 kg)       · Constitutional: Oriented. No distress. · Head: Normocephalic and atraumatic. · Mouth/Throat: Oropharynx is clear and moist.   · Eyes: Conjunctivae clear without jaunduice. PERRL. · Neck: Neck supple. No rigidity. No JVD present. · Cardiovascular: Normal rate, regular rhythm, S1&S2. · Pulmonary/Chest: Bilateral respiratory sounds. No wheezes, No rhonchi. · Abdominal: Soft. Bowel sounds present. No distension, No tenderness. · Musculoskeletal: No tenderness. No edema    · Lymphadenopathy: Has no cervical adenopathy. · Neurological: Alert and oriented. Cranial nerve appears intact, No Gross deficit   · Skin: Skin is warm and dry. No rash noted. · Psychiatric: Has a normal mood, affect and behavior     Labs:  Reviewed. No results for input(s): NA, K, CL, CO2, PHOS, BUN, CREATININE in the last 72 hours. Invalid input(s): CA,  TSH  No results for input(s): WBC, HGB, HCT, MCV, PLT in the last 72 hours.   Lab Results   Component Value Date    CKTOTAL 386 02/20/2010    CKMB 1.13 02/20/2010    TROPONINI <0.01 04/20/2020     Lab Results   Component Value Date    BNP 20 09/11/2012    BNP 22 02/19/2010     Lab Results   Component Value Date    PROTIME 11.3 04/18/2020    PROTIME 12.4 10/12/2015    PROTIME 13.1 10/11/2015    PROTIME 23.7 06/23/2015    PROTIME 40.5 04/21/2015    PROTIME 35.2 02/23/2015    INR 0.97 04/18/2020    INR 1.14 10/12/2015    INR 1.20 10/11/2015     Lab Results   Component Value Date    CHOL 147 06/10/2020    HDL 46 06/10/2020    HDL 42 05/17/2012    TRIG 159 06/10/2020       ECG: Personally reviewed: A paced, V sensed, HR 63, , QRS 86, QTc 421    ECHO: 9/11/2012  CONCLUSIONS-   1.    Hyperdynamic left ventricular systolic function with an  estimated ejection fraction of 65%.   2.    Sclerosis of the aortic valve without evidence of aortic  stenosis or regurgitation.   3.    Mitral annular calcification with mildly elevated mitral valve  velocity consistent with very mild mitral stenosis.  Left atrial  size is normal    Stress Test: 8/31/2017  Summary    Normal myocardial perfusion study.    Normal LV function with ejection fraction of 63 %. Cardiac Angiography: 2005 Dr. Radu Chahal  Severe single-vessel coronary artery disease to posterior descending branch of dominant RCA. Preserved LV function with normal EF estimated at 60%, successful percutaneous transluminal stent angioplasty of 2 lesions to the posterior descending of the RCA proximal lesion and mid lesion are both reduced to 0 post stent PCI. Problem List:   Patient Active Problem List    Diagnosis Date Noted    Pacemaker 04/20/2020    Sick sinus syndrome [facility])     Essential hypertension 07/13/2016    Gross hematuria 10/09/2015    Urinary retention due to benign prostatic hyperplasia 10/09/2015    Hematuria 10/05/2015    Vasodepressor syncope 11/06/2014    Malignant melanoma ([facility] [address] 03/30/2014    Basal cell carcinoma 03/30/2014    Headache 10/07/2013    DM (diabetes mellitus) ([facility] [address] 09/11/2012    BPH (benign prostatic hyperplasia) 09/11/2012    CKD (chronic kidney disease), stage III 09/11/2012    Atrial tachycardia ([address] 06/29/2012    Coronary atherosclerosis of native coronary artery 06/27/2011    Paroxysmal atrial fibrillation ([address] 06/27/2011    Palpitations 06/27/2011        Assessment:   1. SSS (sick sinus syndrome) ([address]    2. Sinus pause    3. Pacemaker    4.  Paroxysmal atrial fibrillation [facility])         Cardiac HX: Enzo Moy is a 82 y.o. 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 Thomas Bryan, 4/20/2020). [id number] 5. TSH 2.48 (5/13/2019).      SSS/syncope  - No further episodes of syncope  - S/p dual-chamber MDT PPM  - Pacer check shows 71.4% AP, 1.4% , 13 AT/AF episodes with the longest lasting 10 hours, 25 minutes in length, total AF burden 0.8%, other parameters stable. - Follow-up in 6 months     pAF  - In NSR - 0.8 percent AF burden per device  - On Eliquis 5 mg twice daily  -one brief episode of hematuria -continue, patient is to call if he develops ongoing hematuria  - On sotalol 80 mg twice daily -  - will continue  - ECG ordered and results personally reviewed     EF of 21%  No systolic HF  Statin for CAD  Anticoagulation for AF and heart failure  No Tobacco use. All questions and concerns were addressed to the patient/family. Alternatives to my treatment were discussed. The note was completed using EMR. Every effort was made to ensure accuracy; however, inadvertent computerized transcription errors may be present. Patient received education regarding their diagnosis, treatment and medications while in the office today.       Олег Valadez [address]

## 2021-03-17 ENCOUNTER — OFFICE VISIT (OUTPATIENT)
Dept: CARDIOLOGY CLINIC | Age: 86
End: 2021-03-17
Payer: OTHER GOVERNMENT

## 2021-03-17 ENCOUNTER — NURSE ONLY (OUTPATIENT)
Dept: CARDIOLOGY CLINIC | Age: 86
End: 2021-03-17
Payer: OTHER GOVERNMENT

## 2021-03-17 VITALS
WEIGHT: 210.4 LBS | BODY MASS INDEX: 28.5 KG/M2 | HEIGHT: 72 IN | HEART RATE: 63 BPM | DIASTOLIC BLOOD PRESSURE: 66 MMHG | SYSTOLIC BLOOD PRESSURE: 110 MMHG | TEMPERATURE: 97.1 F

## 2021-03-17 DIAGNOSIS — I49.5 SSS (SICK SINUS SYNDROME) (HCC): Primary | ICD-10-CM

## 2021-03-17 DIAGNOSIS — Z95.0 PACEMAKER: ICD-10-CM

## 2021-03-17 DIAGNOSIS — I48.0 PAROXYSMAL ATRIAL FIBRILLATION (HCC): ICD-10-CM

## 2021-03-17 DIAGNOSIS — I45.5 SINUS PAUSE: ICD-10-CM

## 2021-03-17 DIAGNOSIS — I49.5 SICK SINUS SYNDROME (HCC): ICD-10-CM

## 2021-03-17 DIAGNOSIS — R00.2 PALPITATIONS: ICD-10-CM

## 2021-03-17 DIAGNOSIS — I48.0 PAROXYSMAL ATRIAL FIBRILLATION (HCC): Chronic | ICD-10-CM

## 2021-03-17 DIAGNOSIS — I47.1 ATRIAL TACHYCARDIA (HCC): ICD-10-CM

## 2021-03-17 PROCEDURE — 93280 PM DEVICE PROGR EVAL DUAL: CPT | Performed by: INTERNAL MEDICINE

## 2021-03-17 PROCEDURE — 99214 OFFICE O/P EST MOD 30 MIN: CPT | Performed by: NURSE PRACTITIONER

## 2021-03-17 RX ORDER — CARBOXYMETHYLCELLULOSE SODIUM 5 MG/ML
2 SOLUTION/ DROPS OPHTHALMIC 3 TIMES DAILY
COMMUNITY
Start: 2020-10-12

## 2021-03-17 NOTE — PROGRESS NOTES
Device check and ov w/NPFW. Normal device function. All testing appear wnl.  12.7 years to JACOB. Presenting ApVs @ 75 bpm  Underlying AsVs @ 40 bpm  Pacing (% of Time Since 23-Sep-2020)   1.4 % (MVP On)  AP 71.4 %  Time in AT/AF 0.2 hr/day (0.8%)  Since last remote on 1/5/21:  2 AT/AF episode longest x 10 hrs 25 mins (oac, sotalol)  No changes  Follow up in 3 months via carelink.

## 2021-03-24 ENCOUNTER — HOSPITAL ENCOUNTER (OUTPATIENT)
Age: 86
Discharge: HOME | End: 2021-03-24

## 2021-03-24 DIAGNOSIS — Z79.4 TYPE 2 DIABETES MELLITUS WITH DIABETIC POLYNEUROPATHY, WITH LONG-TERM CURRENT USE OF INSULIN (HCC): ICD-10-CM

## 2021-03-24 DIAGNOSIS — E11.42 TYPE 2 DIABETES MELLITUS WITH DIABETIC POLYNEUROPATHY, WITH LONG-TERM CURRENT USE OF INSULIN (HCC): ICD-10-CM

## 2021-03-24 LAB
A/G RATIO: 1.7 (ref 1.1–2.2)
ALBUMIN SERPL-MCNC: 4.3 G/DL (ref 3.4–5)
ALP BLD-CCNC: 54 U/L (ref 40–129)
ALT SERPL-CCNC: 19 U/L (ref 10–40)
ANION GAP SERPL CALCULATED.3IONS-SCNC: 10 MMOL/L (ref 3–16)
AST SERPL-CCNC: 26 U/L (ref 15–37)
BASOPHILS ABSOLUTE: 0.1 K/UL (ref 0–0.2)
BASOPHILS RELATIVE PERCENT: 0.6 %
BILIRUB SERPL-MCNC: 0.5 MG/DL (ref 0–1)
BUN BLDV-MCNC: 19 MG/DL (ref 7–20)
CALCIUM SERPL-MCNC: 10 MG/DL (ref 8.3–10.6)
CHLORIDE BLD-SCNC: 106 MMOL/L (ref 99–110)
CHOLESTEROL, TOTAL: 141 MG/DL (ref 0–199)
CO2: 24 MMOL/L (ref 21–32)
CREAT SERPL-MCNC: 1.4 MG/DL (ref 0.8–1.3)
EOSINOPHILS ABSOLUTE: 0.2 K/UL (ref 0–0.6)
EOSINOPHILS RELATIVE PERCENT: 2.1 %
GFR AFRICAN AMERICAN: 59
GFR NON-AFRICAN AMERICAN: 48
GLOBULIN: 2.5 G/DL
GLUCOSE BLD-MCNC: 123 MG/DL (ref 70–99)
HCT VFR BLD CALC: 44.9 % (ref 40.5–52.5)
HDLC SERPL-MCNC: 49 MG/DL (ref 40–60)
HEMOGLOBIN: 15.4 G/DL (ref 13.5–17.5)
LDL CHOLESTEROL CALCULATED: 64 MG/DL
LYMPHOCYTES ABSOLUTE: 1.1 K/UL (ref 1–5.1)
LYMPHOCYTES RELATIVE PERCENT: 13.2 %
MCH RBC QN AUTO: 31.7 PG (ref 26–34)
MCHC RBC AUTO-ENTMCNC: 34.3 G/DL (ref 31–36)
MCV RBC AUTO: 92.4 FL (ref 80–100)
MONOCYTES ABSOLUTE: 0.7 K/UL (ref 0–1.3)
MONOCYTES RELATIVE PERCENT: 8.6 %
NEUTROPHILS ABSOLUTE: 6.3 K/UL (ref 1.7–7.7)
NEUTROPHILS RELATIVE PERCENT: 75.5 %
PDW BLD-RTO: 13 % (ref 12.4–15.4)
PLATELET # BLD: 183 K/UL (ref 135–450)
PMV BLD AUTO: 8.4 FL (ref 5–10.5)
POTASSIUM SERPL-SCNC: 4.4 MMOL/L (ref 3.5–5.1)
RBC # BLD: 4.86 M/UL (ref 4.2–5.9)
SODIUM BLD-SCNC: 140 MMOL/L (ref 136–145)
TOTAL PROTEIN: 6.8 G/DL (ref 6.4–8.2)
TRIGL SERPL-MCNC: 142 MG/DL (ref 0–150)
VLDLC SERPL CALC-MCNC: 28 MG/DL
WBC # BLD: 8.3 K/UL (ref 4–11)

## 2021-03-24 PROCEDURE — 80053 COMPREHEN METABOLIC PANEL: CPT

## 2021-03-24 PROCEDURE — 80061 LIPID PANEL: CPT

## 2021-03-24 PROCEDURE — 36415 COLL VENOUS BLD VENIPUNCTURE: CPT

## 2021-03-24 PROCEDURE — 83036 HEMOGLOBIN GLYCOSYLATED A1C: CPT

## 2021-03-24 PROCEDURE — 85025 COMPLETE CBC W/AUTO DIFF WBC: CPT

## 2021-03-25 LAB
ESTIMATED AVERAGE GLUCOSE: 137 MG/DL
HBA1C MFR BLD: 6.4 %

## 2021-04-26 DIAGNOSIS — Z79.4 TYPE 2 DIABETES MELLITUS WITH DIABETIC POLYNEUROPATHY, WITH LONG-TERM CURRENT USE OF INSULIN (HCC): ICD-10-CM

## 2021-04-26 DIAGNOSIS — E11.42 TYPE 2 DIABETES MELLITUS WITH DIABETIC POLYNEUROPATHY, WITH LONG-TERM CURRENT USE OF INSULIN (HCC): ICD-10-CM

## 2021-04-26 RX ORDER — PREGABALIN 75 MG/1
CAPSULE ORAL
Qty: 180 CAPSULE | Refills: 0 | Status: SHIPPED | OUTPATIENT
Start: 2021-04-26 | End: 2021-06-02 | Stop reason: SDUPTHER

## 2021-04-26 NOTE — TELEPHONE ENCOUNTER
Patient's wife called in requesting new prescription of the medication below be sent to the pharmacy below:    pregabalin (LYRICA) 75 MG capsule      Kris Sanders, [address] [facility] [phone] - f [phone]    Pls advise.

## 2021-05-24 NOTE — TELEPHONE ENCOUNTER
Patient needs refill, 30-day supply:    apixaban (ELIQUIS) 5 MG TABS tablet  Take 1 tablet by mouth 2 times daily, Disp-60 tablet,R-5  Normal Last Dose: Not Recorded  Refills:5 ordered     Pharmacy:[facility] [address] [facility], [address] [phone]    Last visit: 3/17/21  Next visit: 9/22/21  Last labs: 3/24/21   Last filled: 11/19/20

## 2021-06-02 ENCOUNTER — OFFICE VISIT (OUTPATIENT)
Dept: INTERNAL MEDICINE CLINIC | Age: 86
End: 2021-06-02
Payer: OTHER GOVERNMENT

## 2021-06-02 VITALS
DIASTOLIC BLOOD PRESSURE: 64 MMHG | OXYGEN SATURATION: 96 % | TEMPERATURE: 98.2 F | HEART RATE: 60 BPM | WEIGHT: 207 LBS | BODY MASS INDEX: 28.07 KG/M2 | SYSTOLIC BLOOD PRESSURE: 108 MMHG

## 2021-06-02 DIAGNOSIS — Z79.4 TYPE 2 DIABETES MELLITUS WITH DIABETIC POLYNEUROPATHY, WITH LONG-TERM CURRENT USE OF INSULIN (HCC): Primary | ICD-10-CM

## 2021-06-02 DIAGNOSIS — E11.42 TYPE 2 DIABETES MELLITUS WITH DIABETIC POLYNEUROPATHY, WITH LONG-TERM CURRENT USE OF INSULIN (HCC): Primary | ICD-10-CM

## 2021-06-02 DIAGNOSIS — M25.551 RIGHT HIP PAIN: ICD-10-CM

## 2021-06-02 DIAGNOSIS — E34.9 TESTOSTERONE DEFICIENCY: ICD-10-CM

## 2021-06-02 PROCEDURE — 99214 OFFICE O/P EST MOD 30 MIN: CPT | Performed by: INTERNAL MEDICINE

## 2021-06-02 RX ORDER — AMLODIPINE BESYLATE 5 MG/1
5 TABLET ORAL DAILY
Qty: 90 TABLET | Refills: 3 | Status: SHIPPED | OUTPATIENT
Start: 2021-06-02 | End: 2022-06-01 | Stop reason: SDUPTHER

## 2021-06-02 RX ORDER — PEN NEEDLE, DIABETIC 31 GX5/16"
NEEDLE, DISPOSABLE MISCELLANEOUS
Qty: 200 EACH | Refills: 2 | Status: SHIPPED | OUTPATIENT
Start: 2021-06-02 | End: 2022-06-01 | Stop reason: SDUPTHER

## 2021-06-02 RX ORDER — OMEGA-3-ACID ETHYL ESTERS 1 G/1
1 CAPSULE, LIQUID FILLED ORAL 2 TIMES DAILY
Qty: 180 CAPSULE | Refills: 3 | Status: SHIPPED | OUTPATIENT
Start: 2021-06-02 | End: 2022-06-01 | Stop reason: SDUPTHER

## 2021-06-02 RX ORDER — LIDOCAINE 50 MG/G
1 PATCH TOPICAL DAILY
Qty: 30 PATCH | Refills: 0 | Status: SHIPPED | OUTPATIENT
Start: 2021-06-02 | End: 2021-07-02

## 2021-06-02 RX ORDER — SIMVASTATIN 40 MG
40 TABLET ORAL DAILY
Qty: 180 TABLET | Refills: 3 | Status: SHIPPED | OUTPATIENT
Start: 2021-06-02 | End: 2022-04-11

## 2021-06-02 RX ORDER — BLOOD-GLUCOSE METER
1 KIT MISCELLANEOUS DAILY
Qty: 1 KIT | Refills: 0 | Status: SHIPPED | OUTPATIENT
Start: 2021-06-02

## 2021-06-02 RX ORDER — PREGABALIN 75 MG/1
CAPSULE ORAL
Qty: 180 CAPSULE | Refills: 0 | Status: SHIPPED | OUTPATIENT
Start: 2021-06-02 | End: 2021-09-29

## 2021-06-02 RX ORDER — INSULIN DETEMIR 100 [IU]/ML
24 INJECTION, SOLUTION SUBCUTANEOUS NIGHTLY
Qty: 10 PEN | Refills: 3 | Status: SHIPPED | OUTPATIENT
Start: 2021-06-02 | End: 2022-06-01 | Stop reason: SDUPTHER

## 2021-06-02 RX ORDER — NATEGLINIDE 120 MG/1
120 TABLET ORAL
Qty: 180 TABLET | Refills: 3 | Status: SHIPPED | OUTPATIENT
Start: 2021-06-02 | End: 2022-06-01 | Stop reason: SDUPTHER

## 2021-06-02 SDOH — ECONOMIC STABILITY: FOOD INSECURITY: WITHIN THE PAST 12 MONTHS, YOU WORRIED THAT YOUR FOOD WOULD RUN OUT BEFORE YOU GOT MONEY TO BUY MORE.: NEVER TRUE

## 2021-06-02 SDOH — ECONOMIC STABILITY: FOOD INSECURITY: WITHIN THE PAST 12 MONTHS, THE FOOD YOU BOUGHT JUST DIDN'T LAST AND YOU DIDN'T HAVE MONEY TO GET MORE.: NEVER TRUE

## 2021-06-02 ASSESSMENT — PATIENT HEALTH QUESTIONNAIRE - PHQ9
SUM OF ALL RESPONSES TO PHQ QUESTIONS 1-9: 0
2. FEELING DOWN, DEPRESSED OR HOPELESS: 0
SUM OF ALL RESPONSES TO PHQ QUESTIONS 1-9: 0
1. LITTLE INTEREST OR PLEASURE IN DOING THINGS: 0
SUM OF ALL RESPONSES TO PHQ QUESTIONS 1-9: 0
SUM OF ALL RESPONSES TO PHQ9 QUESTIONS 1 & 2: 0

## 2021-06-02 ASSESSMENT — SOCIAL DETERMINANTS OF HEALTH (SDOH): HOW HARD IS IT FOR YOU TO PAY FOR THE VERY BASICS LIKE FOOD, HOUSING, MEDICAL CARE, AND HEATING?: NOT HARD AT ALL

## 2021-06-02 NOTE — PROGRESS NOTES
Yifan Carvajal (:  1935) is a 82 y.o. male,Established patient, here for evaluation of the following chief complaint(s):  Diabetes (follow up) and Medication Management (new glucometer, testosterone and pain patch)         ASSESSMENT/PLAN:  1. Type 2 diabetes mellitus with diabetic polyneuropathy, with long-term current use of insulin ([facility])  - stable  - continue current medication  -     pregabalin (LYRICA) 75 MG capsule; TAKE 1 CAPSULE TWICE DAILY, Disp-180 capsule, R-0Print  -     Insulin Pen Needle (B-D UF III MINI PEN NEEDLES) 31G X 5 MM MISC; Disp-200 each, R-2, PrintUse as directed  -     glucose monitoring kit (FREESTYLE) monitoring kit; DAILY Starting Wed 2021, Disp-1 kit, R-0, Print  -     Comprehensive Metabolic Panel; Future  -     Lipid Panel; Future  -     Hemoglobin A1C; Future  -     CBC Auto Differential; Future  2. Testosterone deficiency  - check levels  -     Testosterone, free, total; Future  3. Right hip pain  - improved with steroid shot  -     lidocaine (LIDODERM) 5 %; Place 1 patch onto the skin daily 12 hours on, 12 hours off., Disp-30 patch, R-0Normal      Return in about 6 months (around 2021) for DM follow up. Subjective   SUBJECTIVE/OBJECTIVE:  HPI    Type 2 diabetes - sugars have been running higher - 150s -160s. May be the meter, it has been awhile since he had a new one. Nothing changed around the time that the sugars increased. He had a steroid shot in the hip a couple of weeks ago but the high sugars started prior to that and didn't change significantly after the injection. Testosterone - had been low, was on compounded cream until a couple of years ago when his doctor left. Thinks he may need to go back on the supplement. Right hip pain - saw an orthopedist at [facility], he was told that he has arthritis in the hip. He had a steroid injection and found it to be very helpful. He would like to try a topical pain medication as well.     Review of Systems       Objective   Physical Exam  Vitals reviewed. Constitutional:       General: He is not in acute distress. Appearance: Normal appearance. He is well-developed. HENT:      Head: Normocephalic and atraumatic. Cardiovascular:      Rate and Rhythm: Normal rate and regular rhythm. Heart sounds: Normal heart sounds. Pulmonary:      Effort: Pulmonary effort is normal. No respiratory distress. Breath sounds: Normal breath sounds. Skin:     General: Skin is warm and dry. Neurological:      Mental Status: He is alert. Psychiatric:         Behavior: Behavior normal.         Thought Content: Thought content normal.         Judgment: Judgment normal.                  An electronic signature was used to authenticate this note.     --Sara Becker MD

## 2021-06-07 RX ORDER — GLUCOSAMINE HCL/CHONDROITIN SU 500-400 MG
CAPSULE ORAL
Qty: 100 STRIP | Refills: 3 | Status: SHIPPED | OUTPATIENT
Start: 2021-06-07 | End: 2022-05-23

## 2021-06-15 ENCOUNTER — NURSE ONLY (OUTPATIENT)
Dept: CARDIOLOGY CLINIC | Age: 86
End: 2021-06-15
Payer: OTHER GOVERNMENT

## 2021-06-15 ENCOUNTER — TELEPHONE (OUTPATIENT)
Dept: INTERNAL MEDICINE CLINIC | Age: 86
End: 2021-06-15

## 2021-06-15 DIAGNOSIS — Z95.0 PACEMAKER: ICD-10-CM

## 2021-06-15 DIAGNOSIS — I49.5 SICK SINUS SYNDROME (HCC): ICD-10-CM

## 2021-06-15 NOTE — TELEPHONE ENCOUNTER
Patient states that [facility] can't complete the order for the following medication until the PCP calls back.       lidocaine (LIDODERM) 5 %           Kris Sanders, [address] [facility] [phone] [facility] [phone]    Please call to advise

## 2021-06-15 NOTE — PROGRESS NOTES
Remote transmission received for patient's dual chamber PACEMAKER. Transmission shows normal sensing and pacing function. EP physician will review. See interrogation under the cardiology tab in the [address] [place] [address] [address] field for more details. Will continue to monitor remotely. Hx PAF w/ controlled V rates (oac, betapace)  Time in AT/AF <0.1 hr/day (0.3%). longest PAF 3 hrs.

## 2021-06-28 PROCEDURE — 93294 REM INTERROG EVL PM/LDLS PM: CPT | Performed by: INTERNAL MEDICINE

## 2021-06-28 PROCEDURE — 93296 REM INTERROG EVL PM/IDS: CPT | Performed by: INTERNAL MEDICINE

## 2021-06-29 ENCOUNTER — TELEPHONE (OUTPATIENT)
Dept: DERMATOLOGY | Age: 86
End: 2021-06-29

## 2021-07-01 ENCOUNTER — OFFICE VISIT (OUTPATIENT)
Dept: DERMATOLOGY | Age: 86
End: 2021-07-01
Payer: OTHER GOVERNMENT

## 2021-07-01 VITALS — TEMPERATURE: 97.9 F

## 2021-07-01 DIAGNOSIS — L57.0 AK (ACTINIC KERATOSIS): ICD-10-CM

## 2021-07-01 DIAGNOSIS — Z85.820 HISTORY OF MELANOMA: ICD-10-CM

## 2021-07-01 DIAGNOSIS — D48.5 NEOPLASM OF UNCERTAIN BEHAVIOR OF SKIN: Primary | ICD-10-CM

## 2021-07-01 DIAGNOSIS — L82.1 SK (SEBORRHEIC KERATOSIS): ICD-10-CM

## 2021-07-01 PROCEDURE — 69100 BIOPSY OF EXTERNAL EAR: CPT | Performed by: DERMATOLOGY

## 2021-07-01 PROCEDURE — 99212 OFFICE O/P EST SF 10 MIN: CPT | Performed by: DERMATOLOGY

## 2021-07-01 PROCEDURE — 11102 TANGNTL BX SKIN SINGLE LES: CPT | Performed by: DERMATOLOGY

## 2021-07-01 PROCEDURE — 17000 DESTRUCT PREMALG LESION: CPT | Performed by: DERMATOLOGY

## 2021-07-01 NOTE — PATIENT INSTRUCTIONS
Biopsy Wound Care Instructions    · Keep the bandage in place for 24 hours. · Cleanse the wound with mild soapy water daily   Gently dry the area.  Apply Vaseline or petroleum jelly to the wound using a cotton tipped applicator.  Cover with a clean bandage.  Repeat this process until the biopsy site is healed.  If you had stitches placed, continue treating the site until the stitches are removed. Remember to make an appointment to return to have your stitches removed by our staff.  You may shower and bathe as usual.       ** Biopsy results generally take around 7 business days to come back. If you have not heard from us by then, please call the office at [phone]. *Please note that biopsy results are released to both the patient and physician at the same time in [address] Please allow time for your physician to review the results. One of our staff members will reach out to you with the results and plan.

## 2021-07-01 NOTE — PROGRESS NOTES
[facility] Dermatology  Padmini Enriquez MD  [phone]      Pepper Devoid  1935    82 y.o. male     Date of Visit: 7/1/2021    Chief Complaint: skin lesions    History of Present Illness:    1. He complains of a persistent tender lesion on the right ear - has not cleared cryotherapy. He also complains of a persistent lesion on the occipital scalp. 2.  He complains of a growth on the left ear and left lower neck. 3.  Unknown duration of an asymptomatic scaly lesion on the crown of the scalp. 4.  He has a history of 2 melanomasdenies any signs of recurrence. 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 Lachelle Marr (stage IB) on 12/10/2014.     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 Brenda Jama on 2/25/14 (stage IA). Dermatologic history:  SCC in situ of the left upper armtreated with curettage on 10/2/2020. 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 SCC in situ of the left lateral neckED with curettage on 1/20/2017. SCC on the left upper backexcised on 1/20/2017. SCC in situ of the right forearmtreated with curettage on 3/4/2016. SCC in situ of the left upper backtreated with curettage on 8/27/2015. SCC of the central chest - excised 3/27/15. BCC of the left forearm - excised on 2/25/14.      Has a pacemaker.     Review of Systems:  Gen: Feels well, good sense of health. Skin: No new or changing moles. Past Medical History, Family History, Surgical History, Medications and Allergies reviewed.     Past Medical History:   Diagnosis Date    Arrhythmia     Arthritis     hands shoulders neck    Atrial fibrillation (HCC)     coumadin    Atrial tachycardia (HCC)     CAD (coronary artery disease)     Cancer (HCC)     skin    Diabetes mellitus ([address]     Diverticulitis     Enlarged prostate     Hyperlipidemia     Hypertension     Pacemaker 04/20/2020    Pneumonia     ABOUT 5 YEARS AGO    Vasodepressor syncope      Past Surgical History:   Procedure Laterality Date    ABLATION OF DYSRHYTHMIC FOCUS      AVNRT and Atrial tachycardia    CARPAL TUNNEL RELEASE      CATARACT REMOVAL Bilateral     CORONARY ANGIOPLASTY WITH STENT PLACEMENT  2005    CYSTOSCOPY  9/26/12    coagulation prostatic urethra    CYSTOSCOPY  10/8/15    TURP    FINGER SURGERY      X7    FINGER TRIGGER RELEASE      OTHER SURGICAL HISTORY Left [id number]    WIDE LOCAL EXCISION LEFT ARM MELANOMA, WIDE LOCAL EXCISION OF    SHOULDER SURGERY Bilateral     SPINAL FUSION      TURP  3-7-13       No Known Allergies  Outpatient Medications Marked as Taking for the 7/1/21 encounter (Office Visit) with Jacqueline Wooten MD   Medication Sig Dispense Refill    blood glucose monitor strips Test once a day for symptoms of irregular blood glucose. 100 strip 3    amLODIPine (NORVASC) 5 MG tablet Take 1 tablet by mouth daily 90 tablet 3    insulin detemir (LEVEMIR FLEXTOUCH) 100 UNIT/ML injection pen Inject 24 Units into the skin nightly 10 pen 3    simvastatin (ZOCOR) 40 MG tablet Take 1 tablet by mouth daily 180 tablet 3    nateglinide (STARLIX) 120 MG tablet Take 1 tablet by mouth 2 times daily (before meals) 180 tablet 3    pregabalin (LYRICA) 75 MG capsule TAKE 1 CAPSULE TWICE DAILY 180 capsule 0    omega-3 acid ethyl esters (LOVAZA) 1 g capsule Take 1 capsule by mouth 2 times daily 180 capsule 3    SITagliptin (JANUVIA) 50 MG tablet Take 1 tablet by mouth daily 90 tablet 3    lidocaine (LIDODERM) 5 % Place 1 patch onto the skin daily 12 hours on, 12 hours off.  30 patch 0    Insulin Pen Needle (B-D UF III MINI PEN NEEDLES) 31G X 5 MM MISC Use as directed 200 each 2    glucose monitoring kit (FREESTYLE) monitoring kit 1 kit by Does not apply route daily 1 kit 0    apixaban (ELIQUIS) 5 MG TABS tablet Take 1 tablet by mouth 2 times daily 60 tablet 5    carboxymethylcellulose (REFRESH PLUS) 0.5 % SOLN ophthalmic solution Apply 2 drops to eye 3 times daily      sotalol (BETAPACE) 80 MG tablet TAKE 1 TABLET BY MOUTH TWICE A  tablet 3    triamcinolone (KENALOG) 0.1 % cream Apply to itchy areas on the arms and legs twice daily for up to 2 weeks or until improved. 80 g 2    fluorouracil (EFUDEX) 5 % cream Apply twice daily to affected area on the right cheek for 2 weeks. 40 g 0    Loratadine (CLARITIN PO) Take by mouth Indications: Couple times a week      vitamin D (ERGOCALCIFEROL) 1.25 MG (22780 UT) CAPS capsule Take 50,000 Units by mouth once a week Mondays      Ascorbic Acid (VITAMIN C) 500 MG CAPS Take by mouth nightly Indications: Three times a week  Monday, Wednesday, Friday       Multiple Vitamins-Minerals (CENTRUM SILVER) TABS Take 1 tablet by mouth daily       ALPHA LIPOIC ACID PO Take 100 mg by mouth daily            Physical Examination       The following were examined and determined to be normal: Psych/Neuro, Conjunctivae/eyelids, Gums/teeth/lips, Neck, Breast/axilla/chest, Abdomen, Back, RUE, LUE, RLE, LLE and Nails/digits. The following were examined and determined to be abnormal: Scalp/hair and Head/face. Well-appearing. 1.  A.  Right superior helix with a 3 mm tender pink papule. B. Central occipital scalp with an oval-shaped crusted and scaly thin erythematous plaque. 2.  Left lower neck with a stuck on appearing verrucous brown papule. Left antihelix with a verrucous gray-brown plaque. 3.  Crown of the scalp with a keratotic erythematous macule. 4.  Left forearm with a well-healed linear surgical scar.   Left earlobe absent. Assessment and Plan     1. Neoplasm of uncertain behavior of skin,   A. Right superior helixrule out SCC  B. Occipital scalpBowen's disease versus inflamed seborrheic keratosis    Discussed possible diagnosis; patient agreeable to biopsies (verbal consent obtained). The area(s) to be biopsied were marked with a surgical pen. Alcohol was used to cleanse the site. Local anesthesia was acheived with 1% lidocaine with epinephrine. Shave biopsy was performed x 2 using a razor blade. Hemostasis was achieved with aluminum chloride. The wound(s) were dressed with petrolatum and covered with a bandage. Wound care instructions were reviewed. 2 Specimen (s) sent to pathology. The specimen bottles were appropriately labeled. We also reviewed the risks of bleeding, scar, and infection. 2. SK (seborrheic keratosis)     Reassurance. 3. AK (actinic keratosis)     2 cycles of liquid nitrogen applied to 1 AK on the crown of the scalp. Patient was educated regarding the potential risks of blister formation and discomfort. Wound care was discussed. 4. History of melanoma x 2 - no signs of recurrence. Sun protective behaviors, including use of at least SPF 30 sunscreen, and self skin examinations were encouraged. Call for any new or concerning lesions. Return in about 6 months (around 1/1/2022).     --Mina Vasquez MD

## 2021-07-02 ENCOUNTER — HOSPITAL ENCOUNTER (OUTPATIENT)
Age: 86
Discharge: HOME | End: 2021-07-02
Payer: OTHER GOVERNMENT

## 2021-07-02 DIAGNOSIS — Z79.4 TYPE 2 DIABETES MELLITUS WITH DIABETIC POLYNEUROPATHY, WITH LONG-TERM CURRENT USE OF INSULIN (HCC): ICD-10-CM

## 2021-07-02 DIAGNOSIS — E34.9 TESTOSTERONE DEFICIENCY: ICD-10-CM

## 2021-07-02 DIAGNOSIS — E11.42 TYPE 2 DIABETES MELLITUS WITH DIABETIC POLYNEUROPATHY, WITH LONG-TERM CURRENT USE OF INSULIN (HCC): ICD-10-CM

## 2021-07-02 LAB
A/G RATIO: 1.8 (ref 1.1–2.2)
ALBUMIN SERPL-MCNC: 4.2 G/DL (ref 3.4–5)
ALP BLD-CCNC: 62 U/L (ref 40–129)
ALT SERPL-CCNC: 18 U/L (ref 10–40)
ANION GAP SERPL CALCULATED.3IONS-SCNC: 10 MMOL/L (ref 3–16)
AST SERPL-CCNC: 30 U/L (ref 15–37)
BASOPHILS ABSOLUTE: 0.1 K/UL (ref 0–0.2)
BASOPHILS RELATIVE PERCENT: 0.7 %
BILIRUB SERPL-MCNC: 0.3 MG/DL (ref 0–1)
BILIRUBIN URINE: NEGATIVE
BLOOD, URINE: NEGATIVE
BUN BLDV-MCNC: 17 MG/DL (ref 7–20)
CALCIUM SERPL-MCNC: 9.8 MG/DL (ref 8.3–10.6)
CHLORIDE BLD-SCNC: 107 MMOL/L (ref 99–110)
CHOLESTEROL, TOTAL: 143 MG/DL (ref 0–199)
CLARITY: CLEAR
CO2: 23 MMOL/L (ref 21–32)
COLOR: YELLOW
CREAT SERPL-MCNC: 1.1 MG/DL (ref 0.8–1.3)
CREATININE URINE: 115.5 MG/DL (ref 39–259)
EOSINOPHILS ABSOLUTE: 0.1 K/UL (ref 0–0.6)
EOSINOPHILS RELATIVE PERCENT: 2 %
ESTIMATED AVERAGE GLUCOSE: 137 MG/DL
GFR AFRICAN AMERICAN: >60
GFR NON-AFRICAN AMERICAN: >60
GLOBULIN: 2.3 G/DL
GLUCOSE BLD-MCNC: 120 MG/DL (ref 70–99)
GLUCOSE URINE: NEGATIVE MG/DL
HBA1C MFR BLD: 6.4 %
HCT VFR BLD CALC: 43.7 % (ref 40.5–52.5)
HDLC SERPL-MCNC: 48 MG/DL (ref 40–60)
HEMOGLOBIN: 14.9 G/DL (ref 13.5–17.5)
KETONES, URINE: ABNORMAL MG/DL
LDL CHOLESTEROL CALCULATED: 48 MG/DL
LEUKOCYTE ESTERASE, URINE: NEGATIVE
LYMPHOCYTES ABSOLUTE: 0.9 K/UL (ref 1–5.1)
LYMPHOCYTES RELATIVE PERCENT: 11.5 %
MAGNESIUM: 2 MG/DL (ref 1.8–2.4)
MCH RBC QN AUTO: 31.5 PG (ref 26–34)
MCHC RBC AUTO-ENTMCNC: 34.1 G/DL (ref 31–36)
MCV RBC AUTO: 92.4 FL (ref 80–100)
MICROSCOPIC EXAMINATION: ABNORMAL
MONOCYTES ABSOLUTE: 0.7 K/UL (ref 0–1.3)
MONOCYTES RELATIVE PERCENT: 8.6 %
NEUTROPHILS ABSOLUTE: 5.9 K/UL (ref 1.7–7.7)
NEUTROPHILS RELATIVE PERCENT: 77.2 %
NITRITE, URINE: NEGATIVE
PDW BLD-RTO: 13.5 % (ref 12.4–15.4)
PH UA: 6.5 (ref 5–8)
PLATELET # BLD: 197 K/UL (ref 135–450)
PMV BLD AUTO: 8.5 FL (ref 5–10.5)
POTASSIUM SERPL-SCNC: 4.2 MMOL/L (ref 3.5–5.1)
PROTEIN PROTEIN: 12 MG/DL
PROTEIN UA: NEGATIVE MG/DL
RBC # BLD: 4.73 M/UL (ref 4.2–5.9)
SODIUM BLD-SCNC: 140 MMOL/L (ref 136–145)
SPECIFIC GRAVITY UA: 1.02 (ref 1–1.03)
TOTAL PROTEIN: 6.5 G/DL (ref 6.4–8.2)
TRIGL SERPL-MCNC: 233 MG/DL (ref 0–150)
URINE TYPE: ABNORMAL
UROBILINOGEN, URINE: 1 E.U./DL
VITAMIN D 25-HYDROXY: 72.2 NG/ML
VLDLC SERPL CALC-MCNC: 47 MG/DL
WBC # BLD: 7.6 K/UL (ref 4–11)

## 2021-07-02 PROCEDURE — 80061 LIPID PANEL: CPT

## 2021-07-02 PROCEDURE — 84270 ASSAY OF SEX HORMONE GLOBUL: CPT

## 2021-07-02 PROCEDURE — 85025 COMPLETE CBC W/AUTO DIFF WBC: CPT

## 2021-07-02 PROCEDURE — 84403 ASSAY OF TOTAL TESTOSTERONE: CPT

## 2021-07-02 PROCEDURE — 82570 ASSAY OF URINE CREATININE: CPT

## 2021-07-02 PROCEDURE — 83036 HEMOGLOBIN GLYCOSYLATED A1C: CPT

## 2021-07-02 PROCEDURE — 83735 ASSAY OF MAGNESIUM: CPT

## 2021-07-02 PROCEDURE — 84156 ASSAY OF PROTEIN URINE: CPT

## 2021-07-02 PROCEDURE — 81003 URINALYSIS AUTO W/O SCOPE: CPT

## 2021-07-02 PROCEDURE — 82306 VITAMIN D 25 HYDROXY: CPT

## 2021-07-02 PROCEDURE — 80053 COMPREHEN METABOLIC PANEL: CPT

## 2021-07-06 LAB — DERMATOLOGY PATHOLOGY REPORT: ABNORMAL

## 2021-07-07 DIAGNOSIS — C44.42 SQUAMOUS CELL CARCINOMA OF SCALP: Primary | ICD-10-CM

## 2021-07-08 LAB
SEX HORMONE BINDING GLOBULIN: 51 NMOL/L (ref 11–80)
TESTOSTERONE FREE-NONMALE: 81.3 PG/ML (ref 47–244)
TESTOSTERONE TOTAL: 514 NG/DL (ref 220–1000)

## 2021-07-09 ENCOUNTER — TELEPHONE (OUTPATIENT)
Dept: DERMATOLOGY | Age: 86
End: 2021-07-09

## 2021-07-09 ENCOUNTER — TELEPHONE (OUTPATIENT)
Dept: INTERNAL MEDICINE CLINIC | Age: 86
End: 2021-07-09

## 2021-07-09 ENCOUNTER — OFFICE VISIT (OUTPATIENT)
Dept: DERMATOLOGY | Age: 86
End: 2021-07-09
Payer: OTHER GOVERNMENT

## 2021-07-09 VITALS — TEMPERATURE: 98.1 F

## 2021-07-09 DIAGNOSIS — L72.3 INFLAMED EPIDERMOID CYST OF SKIN: Primary | ICD-10-CM

## 2021-07-09 PROCEDURE — 10060 I&D ABSCESS SIMPLE/SINGLE: CPT | Performed by: DERMATOLOGY

## 2021-07-09 NOTE — PROGRESS NOTES
[facility] Dermatology  Tay Fischer MD  [phone]      Patricia Russ  1935    82 y.o. male     Date of Visit: 7/9/2021    Chief Complaint: back lesion. History of Present Illness:    Here today for a 1 week history of an enlarging painful lesion on the back. On Eliquis. Has pacemaker. Review of Systems:  Gen: Feels well, good sense of health. Past Medical History, Family History, Surgical History, Medications and Allergies reviewed. Past Medical History:   Diagnosis Date    Arrhythmia     Arthritis     hands shoulders neck    Atrial fibrillation (HCC)     coumadin    Atrial tachycardia (HCC)     CAD (coronary artery disease)     Cancer (HCC)     skin    Diabetes mellitus ([address]     Diverticulitis     Enlarged prostate     Hyperlipidemia     Hypertension     Pacemaker 04/20/2020    Pneumonia     ABOUT 5 YEARS AGO    Vasodepressor syncope      Past Surgical History:   Procedure Laterality Date    ABLATION OF DYSRHYTHMIC FOCUS      AVNRT and Atrial tachycardia    CARPAL TUNNEL RELEASE      CATARACT REMOVAL Bilateral     CORONARY ANGIOPLASTY WITH STENT PLACEMENT  2005    CYSTOSCOPY  9/26/12    coagulation prostatic urethra    CYSTOSCOPY  10/8/15    TURP    FINGER SURGERY      X7    FINGER TRIGGER RELEASE      OTHER SURGICAL HISTORY Left [id number]    WIDE LOCAL EXCISION LEFT ARM MELANOMA, WIDE LOCAL EXCISION OF    SHOULDER SURGERY Bilateral     SPINAL FUSION      TURP  3-7-13       No Known Allergies  Outpatient Medications Marked as Taking for the 7/9/21 encounter (Office Visit) with Carla Ibarra MD   Medication Sig Dispense Refill    blood glucose monitor strips Test once a day for symptoms of irregular blood glucose.  100 strip 3    amLODIPine (NORVASC) 5 MG tablet Take 1 tablet by mouth daily 90 tablet 3    insulin detemir (LEVEMIR FLEXTOUCH) 100 UNIT/ML injection pen Inject 24 Units into the skin nightly 10 pen 3    simvastatin (ZOCOR) 40 MG tablet Take 1 tablet by mouth daily 180 tablet 3    nateglinide (STARLIX) 120 MG tablet Take 1 tablet by mouth 2 times daily (before meals) 180 tablet 3    pregabalin (LYRICA) 75 MG capsule TAKE 1 CAPSULE TWICE DAILY 180 capsule 0    omega-3 acid ethyl esters (LOVAZA) 1 g capsule Take 1 capsule by mouth 2 times daily 180 capsule 3    SITagliptin (JANUVIA) 50 MG tablet Take 1 tablet by mouth daily 90 tablet 3    Insulin Pen Needle (B-D UF III MINI PEN NEEDLES) 31G X 5 MM MISC Use as directed 200 each 2    glucose monitoring kit (FREESTYLE) monitoring kit 1 kit by Does not apply route daily 1 kit 0    apixaban (ELIQUIS) 5 MG TABS tablet Take 1 tablet by mouth 2 times daily 60 tablet 5    carboxymethylcellulose (REFRESH PLUS) 0.5 % SOLN ophthalmic solution Apply 2 drops to eye 3 times daily      sotalol (BETAPACE) 80 MG tablet TAKE 1 TABLET BY MOUTH TWICE A  tablet 3    triamcinolone (KENALOG) 0.1 % cream Apply to itchy areas on the arms and legs twice daily for up to 2 weeks or until improved. 80 g 2    fluorouracil (EFUDEX) 5 % cream Apply twice daily to affected area on the right cheek for 2 weeks. 40 g 0    Loratadine (CLARITIN PO) Take by mouth Indications: Couple times a week      vitamin D (ERGOCALCIFEROL) 1.25 MG (27580 UT) CAPS capsule Take 50,000 Units by mouth once a week Mondays      Ascorbic Acid (VITAMIN C) 500 MG CAPS Take by mouth nightly Indications: Three times a week  Monday, Wednesday, Friday       Multiple Vitamins-Minerals (CENTRUM SILVER) TABS Take 1 tablet by mouth daily       ALPHA LIPOIC ACID PO Take 100 mg by mouth daily            Physical Examination       Well appearing. 1.  Right upper back - 3 cm erythematous round nodule. Assessment and Plan     1. Inflamed epidermoid cyst of skin of the right upper back -     The decision was made to perform I&D. Discussed time to healing.   Educated that this procedure is unlikely to completely remove the cyst.    The skin overlying the cyst was cleansed with alcohol. An incision was performed using a #11 blade. A mixture of pus, keratin and cyst was produced and drained/removed from the lesion. Blood loss was minimal and there were no complications.           --Rao Santiago MD

## 2021-07-09 NOTE — TELEPHONE ENCOUNTER
Dr Ama Oneill patient  Pt c/b #[phone]  Pt stated  A spot has come up on his back that is inflamed, painful, has a burning feeling ring around it and is also growing. Pt wanted to see about coming in to have dr Ama Oneill take a look at it.   Please c/b to discuss

## 2021-07-09 NOTE — TELEPHONE ENCOUNTER
Patient called back I read him the notes that Dr. Sanjana Sanchez had in regarding his labs. No need to call back.      Thanks

## 2021-07-12 ENCOUNTER — TELEPHONE (OUTPATIENT)
Dept: CARDIOLOGY CLINIC | Age: 86
End: 2021-07-12

## 2021-07-12 NOTE — TELEPHONE ENCOUNTER
Called and spoke with patient. Patient was advised by vascular surgery to have a repeat carotid study this year. He wanted to know if it was necessary. Review of his carotid study from last year showed less than 50% bilateral stenosis. Advised patient that he should have this done per vascular recommendations.   He states he will call and arrange the test.

## 2021-07-12 NOTE — TELEPHONE ENCOUNTER
Patient would like to speak with benjamin browne directly regarding a need for testing patient has received a request for a carotid test patient wants to know if its necessary please advise

## 2021-07-15 ENCOUNTER — PROCEDURE VISIT (OUTPATIENT)
Dept: SURGERY | Age: 86
End: 2021-07-15
Payer: OTHER GOVERNMENT

## 2021-07-15 VITALS — TEMPERATURE: 97.6 F | DIASTOLIC BLOOD PRESSURE: 73 MMHG | SYSTOLIC BLOOD PRESSURE: 146 MMHG

## 2021-07-15 DIAGNOSIS — C44.42 SQUAMOUS CELL CARCINOMA OF OCCIPITAL REGION OF SCALP: Primary | ICD-10-CM

## 2021-07-15 PROCEDURE — 17311 MOHS 1 STAGE H/N/HF/G: CPT | Performed by: DERMATOLOGY

## 2021-07-15 PROCEDURE — 12032 INTMD RPR S/A/T/EXT 2.6-7.5: CPT | Performed by: DERMATOLOGY

## 2021-07-15 NOTE — PROGRESS NOTES
PRE-PROCEDURE SCREENING    Pacemaker/ICD: Yes  Difficulty with numbing in the past: No  Local Anesthesia Reaction/passing out: No  Latex or adhesive allergy:  No  Bleeding/Clotting Disorders: No  Anticoagulant Therapy: Yes, eliquis   Joint prosthesis: No  Artificial Heart Valve: No  Stroke or Seizures: No  Organ Transplant or Lymphoma: No  Immunosuppression: No  Respiratory Problems: No

## 2021-07-15 NOTE — PATIENT INSTRUCTIONS
[facility] Office Hours:    Monday-Thursday  7:30 AM-4:30 PM    Friday  9:00 AM-1:00 PM      POST-OPERATIVE CARE FOR STICHES  Bandage change after 48 hours    CARING FOR YOUR SURGICAL SITE  The bandage should remain on and completely dry for 48 hours. Do NOT get the bandage wet. 1. After the first 48 hours, gently remove the remaining part of the bandage. It can be helpful to moisten the bandage edges in the shower. Steri strips may still be on the wound. It is ok, they will fall off slowly with the daily bandage changes. 2. Gently clean the wound daily with mild soap and water. Try to clean off crust and debris. 3. Dry (pat) the area with a clean Q-tip or gauze. 4. Apply a layer of Vaseline/ Aquaphor (or Bacitracin if your doctor recommends) to the wound area only. 5. Cut a piece of Telfa (or any non-stick dressing) to fit just over the wound and secure it with paper tape. If the wound is small you may use a Band- Aid. Keep area covered for a total of 3 week(s). If the dressing comes off or if you have questions, or concerns about the dressing, please call the office for instructions! POST OPERATIVE INSTRUCTIONS    1. Activity: Do not lift anything heavier than a gallon of milk for 1 week. Also, avoid strenuous activity such as running, power walking or contact sports. 2. Eating and drinking: Do not drink alcohol for 48 hours after your procedure. Alcohol increases the chances of bleeding. 3. Medicines   -If you have discomfort, take Acetaminophen (Tylenol or Extra Strength Tylenol). Follow the instructions and warning on the bottle. -If your doctor has prescribed you an Aspirin daily, please keep taking it. Do not take extra Aspirin or medicines containing Aspirin (such as [place] and Excedrin) for 48 hours after your procedure. Bleeding: If bleeding occurs, DO NOT remove the bandage. Put firm pressure on the area with gauze for 20 minutes without peeking.  If the bleeding continues, apply pressure for another 20 minutes. If the bleeding does not stop after you apply pressure, call us right away. If you can not call, go to the nearest emergency room or urgent care facility. What to expect:  You may have these symptoms. They are normal and should get better with time:  1. Swelling. Swelling usually increases for the first 48 hours after your procedure and then begins to improve. Some soreness and redness around your wound. If we worked close to your eyes (forehead, nose, temple, or upper cheeks) your eyes may become swollen and/ or black and blue. 2. Bruising, which could last 1 week or more. 3. Pink and bumpy appearance to the scar. This may happen a few weeks after your procedure. After 4 weeks, you may gently massage the area each day with facial moisturizer or petroleum jelly (Vaseline or Aquaphor). This will help to smooth the skin and improve the appearance of the scar. The color of your scar will fade over time, but it may be pink for several months after the procedure. The scar may take 6 months to 1 year to reach its final color and appearance. 4. \"Spitting\" suture. Occasionally, an inside suture (stitch) does not completely dissolve. When this happens, (generally 4-8 weeks after surgery), it causes a bump or \"pimple\" to form on the scar. This is easily removed and is not at all serious. It does not mean the skin cancer has returned. Contact us if it happens, but do not be alarmed. Vitamin E oil is NOT necessary. A good moisturizer is just as effective. Sunscreen IS necessary. Use at least and SPF 30 sunscreen daily- even in winter    Call us at [phone] right away if you have any of the following symptoms:  -Bleeding that you can not stop (see highlighted area above). -Pain that lasts longer than 48 hours.  -Your wound becomes more painful, red or hot.   -Bruising and swelling that does not begin to improve within the 48 hours or gets worse suddenly.

## 2021-07-15 NOTE — PROGRESS NOTES
MOHS PROCEDURE NOTE    PHYSICIAN:  Vanesa Vicente. Veda Roche MD    ASSISTANT: Aura Curry RN and Enrico Hill RN    REFERRING PROVIDER:   Henrique Frey MD    PREOPERATIVE DIAGNOSIS: Squamous Cell Carcinoma in Situ     SPECIFIC MOHS INDICATIONS:  location    AUC SCORIN/9    POSTOPERATIVE DIAGNOSIS: SAME    LOCATION: Occipital scalp    OPERATIVE PROCEDURE:  MOHS MICROGRAPHIC SURGERY    RECONSTRUCTION OF DEFECT: Intermediate layered closure    PREOPERATIVE SIZE: 14x8 MM    DEFECT SIZE: 17x14 MM    LENGTH OF REPAIRED WOUND/SIZE OF FLAP/SIZE OF GRAFT:  35 MM    ANESTHESIA: 8 mL 1% lidocaine with epinephrine 1:100,000 buffered. EBL:  MINIMAL    DURATION OF PROCEDURE:  1 HOUR    POSTOPERATIVE OBSERVATION: 0.75 HOUR    SPECIMENS:  SEE MOHS MAP    COMPLICATIONS:  NONE    DESCRIPTION OF PROCEDURE:  The patient was given a mirror, as appropriate, and the biopsy site was identified, marked with a surgical marking pen, and verified by the patient. Options for treatment were discussed and the patient was informed that Mohs surgery was the selected treatment based on its lower recurrence rate, given the features listed above, as compared to other treatment modalities such as excision, radiation, or curettage, and agreed with this treatment plan. Risks and benefits including bruising, swelling, bleeding, infection, nerve injury, recurrence, and scarring were discussed with the patient prior to the procedure and a written consent detailing these and other risks was reviewed with the patient and signed. There was a time out for person and procedure verification. The surgical site was prepped with an antiseptic solution. Application of an antiseptic solution was repeated before each surgical stage. Stage I:  The clinically-apparent tumor was carefully defined and debulked, determining the edge of the surgical excision.     A thin layer of tumor-laden tissue was excised with a narrow margin of normal-appearing skin, using There were no complications. The patient left the Unit in good medical condition. FOLLOW-UP:  The patient will return for suture removal in 21 days.

## 2021-07-16 ENCOUNTER — TELEPHONE (OUTPATIENT)
Dept: SURGERY | Age: 86
End: 2021-07-16

## 2021-07-16 NOTE — TELEPHONE ENCOUNTER
The patient was in the office on 7/15/2021 for Mohs located on the Occipital scalp with ILC repair. The patient tolerated the procedure well and left the office in good condition. Pain level on post-operative day 1:  none     Any bleeding episode that required pressure to be held, bandage change or a call to the office or MD?  no     Any other issues?:  no    A post-operative telephone call was placed at 7/16/2021 11:07am in order to check on the patient's recovery process. The patient reported doing well and had no complaints other than those listed above, if any. All of the patient's questions were answered.

## 2021-07-26 ENCOUNTER — TELEPHONE (OUTPATIENT)
Dept: VASCULAR SURGERY | Age: 86
End: 2021-07-26

## 2021-07-26 ENCOUNTER — HOSPITAL ENCOUNTER (OUTPATIENT)
Dept: VASCULAR LAB | Age: 86
Discharge: HOME | End: 2021-07-26
Payer: OTHER GOVERNMENT

## 2021-07-26 DIAGNOSIS — I65.23 ATHEROSCLEROSIS OF BOTH CAROTID ARTERIES: ICD-10-CM

## 2021-07-26 DIAGNOSIS — I65.23 ATHEROSCLEROSIS OF BOTH CAROTID ARTERIES: Primary | ICD-10-CM

## 2021-07-26 PROCEDURE — 93880 EXTRACRANIAL BILAT STUDY: CPT

## 2021-07-26 NOTE — TELEPHONE ENCOUNTER
Discussed results of carotid duplex with patient which shows stable carotid artery disease with no significant progression in carotid disease with less than 50% stenosis of bilateral ICAs. Plan to continue surveillance with repeat carotid duplex in 1 year.     Electronically signed by ELISHA Archer CNP on 7/26/2021 at 1:34 PM

## 2021-08-04 ENCOUNTER — NURSE ONLY (OUTPATIENT)
Dept: SURGERY | Age: 86
End: 2021-08-04

## 2021-08-04 DIAGNOSIS — Z48.02 VISIT FOR SUTURE REMOVAL: Primary | ICD-10-CM

## 2021-08-04 NOTE — PROGRESS NOTES
S:  The patient is here for suture removal s/p Mohs surgery on the occipital scalp and Intermediate layered closure repair, 3 week(s) ago. The site appears well-healed without signs of infection (redness, pain or discharge). The sutures were removed. Daily Wound care and activity instructions given. The patient was scheduled for follow-up prn for scar/wound check. The patient was scheduled for f/u with General Dermatology per their instructions.

## 2021-09-14 ENCOUNTER — NURSE ONLY (OUTPATIENT)
Dept: CARDIOLOGY CLINIC | Age: 86
End: 2021-09-14

## 2021-09-14 DIAGNOSIS — Z95.0 PACEMAKER: ICD-10-CM

## 2021-09-14 DIAGNOSIS — I49.5 SICK SINUS SYNDROME (HCC): ICD-10-CM

## 2021-09-14 PROCEDURE — 93296 REM INTERROG EVL PM/IDS: CPT | Performed by: INTERNAL MEDICINE

## 2021-09-14 PROCEDURE — 93294 REM INTERROG EVL PM/LDLS PM: CPT | Performed by: INTERNAL MEDICINE

## 2021-09-14 NOTE — LETTER
utilizing the no news is good news approach. As always, please feel free to contact your nurse with any questions or concerns. Thank you.     Shailesh 83

## 2021-09-22 ENCOUNTER — OFFICE VISIT (OUTPATIENT)
Dept: DERMATOLOGY | Age: 86
End: 2021-09-22
Payer: OTHER GOVERNMENT

## 2021-09-22 VITALS — TEMPERATURE: 98.1 F

## 2021-09-22 DIAGNOSIS — L82.1 SK (SEBORRHEIC KERATOSIS): ICD-10-CM

## 2021-09-22 DIAGNOSIS — L57.0 AK (ACTINIC KERATOSIS): Primary | ICD-10-CM

## 2021-09-22 DIAGNOSIS — L72.0 EPIDERMOID CYST: ICD-10-CM

## 2021-09-22 DIAGNOSIS — Z85.820 HISTORY OF MELANOMA: ICD-10-CM

## 2021-09-22 PROCEDURE — 99213 OFFICE O/P EST LOW 20 MIN: CPT | Performed by: DERMATOLOGY

## 2021-09-22 PROCEDURE — 17000 DESTRUCT PREMALG LESION: CPT | Performed by: DERMATOLOGY

## 2021-09-22 PROCEDURE — 17003 DESTRUCT PREMALG LES 2-14: CPT | Performed by: DERMATOLOGY

## 2021-09-22 NOTE — PROGRESS NOTES
[facility]  Jennifer Mccray MD  [phone]      Sveta Baptise  1935    82 y.o. male     Date of Visit: 9/22/2021    Chief Complaint: skin lesions    History of Present Illness:    1. He complains of several persistent scaly lesions on the right ear and face. 2.  He also complains of a nodule on the upper back that is not painful but just firm to the touch. 3.  Also complains of several growths on the trunk and also on the left ear. 4.  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 Letty Avery (stage IB) on 12/10/2014.     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 Vitaly Jimenez on 2/25/14 (stage IA). He denies any signs of recurrence. Derm Hx:    SCC in situ on the occipital scalptreated with Mohs by Dr. Madi Shultz on 7/15/2021. SCC in situ of the left upper armtreated with curettage on 10/2/2020. 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 SCC in situ of the left lateral neckED with curettage on 1/20/2017. SCC on the left upper backexcised on 1/20/2017. SCC in situ of the right forearmtreated with curettage on 3/4/2016. SCC in situ of the left upper backtreated with curettage on 8/27/2015. SCC of the central chest - excised 3/27/15. BCC of the left forearm - excised on 2/25/14.      Has a pacemaker. Review of Systems:  Gen: Feels well, good sense of health. Skin: No new or changing moles.     Past Medical History, Family History, Surgical History, Medications and Allergies (FREESTYLE) monitoring kit 1 kit by Does not apply route daily 1 kit 0    apixaban (ELIQUIS) 5 MG TABS tablet Take 1 tablet by mouth 2 times daily 60 tablet 5    carboxymethylcellulose (REFRESH PLUS) 0.5 % SOLN ophthalmic solution Apply 2 drops to eye 3 times daily      sotalol (BETAPACE) 80 MG tablet TAKE 1 TABLET BY MOUTH TWICE A  tablet 3    triamcinolone (KENALOG) 0.1 % cream Apply to itchy areas on the arms and legs twice daily for up to 2 weeks or until improved. 80 g 2    fluorouracil (EFUDEX) 5 % cream Apply twice daily to affected area on the right cheek for 2 weeks. 40 g 0    Loratadine (CLARITIN PO) Take by mouth Indications: Couple times a week      vitamin D (ERGOCALCIFEROL) 1.25 MG (20348 UT) CAPS capsule Take 50,000 Units by mouth once a week Mondays      Ascorbic Acid (VITAMIN C) 500 MG CAPS Take by mouth nightly Indications: Three times a week  Monday, Wednesday, Friday       Multiple Vitamins-Minerals (CENTRUM SILVER) TABS Take 1 tablet by mouth daily       ALPHA LIPOIC ACID PO Take 100 mg by mouth daily          Physical Examination       The following were examined and determined to be normal: Psych/Neuro, Scalp/hair, Conjunctivae/eyelids, Gums/teeth/lips, Neck, Breast/axilla/chest, Abdomen, Back, RUE, LUE, RLE, LLE and Nails/digits. The following were examined and determined to be abnormal: Head/face. Well-appearing. 1.  Left nasal dorsum2, amaya of the right ear1, right superior helix1: Few keratotic erythematous macules and papules. 2.  Left upper back with a small round smooth whitish nodule with overlying punctum. 3.  Trunk with multiple stuck on appearing verrucous brown papules and plaques. Left mid helix with a stuck on appearing verrucous pink-brown papule. 4.  Left forearm with a well-healed linear surgical scar. Left earlobe absent. There is no nodularity or discoloration. Assessment and Plan     1.  AK (actinic keratosis) -     2 cycles of liquid nitrogen applied to 4 AKs: Left nasal dorsum2, amaya of the right ear1, right superior helix1. Patient was educated regarding the potential risks of blister formation and discomfort. Wound care was discussed. 2. Epidermoid cyst - not inflamed or symptomatic    Observe. Discussed considering excision if it enlarges or becomes painful. 3. SK (seborrheic keratosis)     Reassurance. 4. History of melanoma x 2 - no signs of recurrence. Sun protective behaviors, including use of at least SPF 30 sunscreen, and self skin examinations were encouraged. Call for any new or concerning lesions. Return in about 6 months (around 3/22/2022).     --Jonna Richards MD

## 2021-09-22 NOTE — PROGRESS NOTES
[facility]   Electrophysiology  Office Visit  Date: 9/29/2021    Chief Complaint   Patient presents with    Bradycardia    Tachycardia       Cardiac HX: Betsy Garcia is a 82 y.o. 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 Danelle Kelsey, 4/20/2020)). Interval History/HPI: Patient is here for follow-up for paroxysmal AF, sinus bradycardia, symptomatic sinus pauses and PPM management. Patient with a longstanding history of AVNRT and atrial tach. He did undergo a catheter ablation for his AVNRT and atrial tach in 2000. He was then noted to have paroxysmal atrial fibrillation was placed on sotalol. He developed syncope and near syncope. His carotid Doppler showed a distal occlusion. He did wear a 30-day monitor in 2020 that showed evidence of symptomatic sinus pauses. He underwent a dual-chamber Medtronic permanent pacemaker placement in Apr 2020. He has been on oral anticoagulation in the past however he developed hematuria requiring a blood transfusion was taken off his warfarin. He then was noted to have paroxysmal atrial fibrillation on his device with episodes lasting up to 10 h in length. He did discuss oral anticoagulation with Dr. David Ospina and patient was placed on Eliquis 5 mg twice daily. He has had no issues with hematuria or dark tarry stools since starting the Eliquis. Patient presents in normal sinus rhythm. He appears to be atrially pacing on his device. Review of his device shows that he atrially paces 65.2% of the time and ventricularly paces 1.9% of the time. He has had 16 AT/AF episodes with the longest on Jul 29 lasting 10 h and 56 min in length. His AF burden is 0.8%.   Nida settings were turned on today. Patient is asymptomatic in atrial fibrillation. He denies any heart racing, palpitations or irregular heartbeats. He has not had a sleep study in the past.  He does have a history of snoring. He denies any chest pain, shortness of breath, PND, orthopnea or lower extremity edema. He is very hard of hearing. Home medications:   Current Outpatient Medications on File Prior to Visit   Medication Sig Dispense Refill    pregabalin (LYRICA) 75 MG capsule TAKE 1 CAPSULE TWICE DAILY 180 capsule 0    blood glucose monitor strips Test once a day for symptoms of irregular blood glucose. 100 strip 3    amLODIPine (NORVASC) 5 MG tablet Take 1 tablet by mouth daily 90 tablet 3    insulin detemir (LEVEMIR FLEXTOUCH) 100 UNIT/ML injection pen Inject 24 Units into the skin nightly 10 pen 3    simvastatin (ZOCOR) 40 MG tablet Take 1 tablet by mouth daily 180 tablet 3    nateglinide (STARLIX) 120 MG tablet Take 1 tablet by mouth 2 times daily (before meals) 180 tablet 3    omega-3 acid ethyl esters (LOVAZA) 1 g capsule Take 1 capsule by mouth 2 times daily 180 capsule 3    SITagliptin (JANUVIA) 50 MG tablet Take 1 tablet by mouth daily 90 tablet 3    Insulin Pen Needle (B-D UF III MINI PEN NEEDLES) 31G X 5 MM MISC Use as directed 200 each 2    glucose monitoring kit (FREESTYLE) monitoring kit 1 kit by Does not apply route daily 1 kit 0    apixaban (ELIQUIS) 5 MG TABS tablet Take 1 tablet by mouth 2 times daily 60 tablet 5    carboxymethylcellulose (REFRESH PLUS) 0.5 % SOLN ophthalmic solution Apply 2 drops to eye 3 times daily      sotalol (BETAPACE) 80 MG tablet TAKE 1 TABLET BY MOUTH TWICE A  tablet 3    triamcinolone (KENALOG) 0.1 % cream Apply to itchy areas on the arms and legs twice daily for up to 2 weeks or until improved. 80 g 2    fluorouracil (EFUDEX) 5 % cream Apply twice daily to affected area on the right cheek for 2 weeks.  40 g 0    Loratadine (CLARITIN PO) Take by mouth Indications: Couple times a week      vitamin D (ERGOCALCIFEROL) 1.25 MG (11865 UT) CAPS capsule Take 50,000 Units by mouth once a week Mondays      Ascorbic Acid (VITAMIN C) 500 MG CAPS Take by mouth nightly Indications: Three times a week  Monday, Wednesday, Friday       Multiple Vitamins-Minerals (CENTRUM SILVER) TABS Take 1 tablet by mouth daily       ALPHA LIPOIC ACID PO Take 100 mg by mouth daily        No current facility-administered medications on file prior to visit. Past Medical History:   Diagnosis Date    Arrhythmia     Arthritis     hands shoulders neck    Atrial fibrillation (HCC)     coumadin    Atrial tachycardia (HCC)     CAD (coronary artery disease)     Cancer (HCC)     skin    Diabetes mellitus ([facility]     Diverticulitis     Enlarged prostate     Hyperlipidemia     Hypertension     Pacemaker 04/20/2020    Pneumonia     ABOUT 5 YEARS AGO    Vasodepressor syncope         Past Surgical History:   Procedure Laterality Date    ABLATION OF DYSRHYTHMIC FOCUS      AVNRT and Atrial tachycardia    CARPAL TUNNEL RELEASE      CATARACT REMOVAL Bilateral     CORONARY ANGIOPLASTY WITH STENT PLACEMENT  2005    CYSTOSCOPY  9/26/12    coagulation prostatic urethra    CYSTOSCOPY  10/8/15    TURP    FINGER SURGERY      X7    FINGER TRIGGER RELEASE      OTHER SURGICAL HISTORY Left [id number]    WIDE LOCAL EXCISION LEFT ARM MELANOMA, WIDE LOCAL EXCISION OF    SHOULDER SURGERY Bilateral     SPINAL FUSION      TURP  3-7-13       No Known Allergies    Social History:  Reviewed. reports that he quit smoking about 47 years ago. He has never used smokeless tobacco. He reports current alcohol use of about 2.0 standard drinks of alcohol per week. He reports that he does not use drugs. Family History:  Reviewed. family history is not on file. Review of System:    · Constitutional: No fevers, chills. · Eyes: No visual changes or diplopia. No scleral icterus.   · ENT: No MCV, PLT in the last 72 hours. Lab Results   Component Value Date    CKTOTAL 386 02/20/2010    CKMB 1.13 02/20/2010    TROPONINI <0.01 04/20/2020     Lab Results   Component Value Date    BNP 20 09/11/2012    BNP 22 02/19/2010     Lab Results   Component Value Date    PROTIME 11.3 04/18/2020    PROTIME 12.4 10/12/2015    PROTIME 13.1 10/11/2015    PROTIME 23.7 06/23/2015    PROTIME 40.5 04/21/2015    PROTIME 35.2 02/23/2015    INR 0.97 04/18/2020    INR 1.14 10/12/2015    INR 1.20 10/11/2015     Lab Results   Component Value Date    CHOL 143 07/02/2021    HDL 48 07/02/2021    HDL 42 05/17/2012    TRIG 233 07/02/2021       ECG: Personally reviewed: A paced, V sensed, HR 61, , QRS 88, QTc 416    ECHO: 9/11/2012  CONCLUSIONS-   1.    Hyperdynamic left ventricular systolic function with an  estimated ejection fraction of 65%.   2.    Sclerosis of the aortic valve without evidence of aortic  stenosis or regurgitation.   3.    Mitral annular calcification with mildly elevated mitral valve  velocity consistent with very mild mitral stenosis.  Left atrial  size is normal    Stress Test: 8/31/2017  Summary    Normal myocardial perfusion study.    Normal LV function with ejection fraction of 63 %. Cardiac Angiography: 2005 Dr. Choi Edu  Severe single-vessel coronary artery disease to posterior descending branch of dominant RCA. Preserved LV function with normal EF estimated at 60%, successful percutaneous transluminal stent angioplasty of 2 lesions to the posterior descending of the RCA proximal lesion and mid lesion are both reduced to 0 post stent PCI.     Problem List:   Patient Active Problem List    Diagnosis Date Noted    Visit for suture removal 08/04/2021    Pacemaker 04/20/2020    Sick sinus syndrome ([facility]     Essential hypertension 07/13/2016    Gross hematuria 10/09/2015    Urinary retention due to benign prostatic hyperplasia 10/09/2015    Hematuria 10/05/2015    Vasodepressor syncope 11/06/2014  Malignant melanoma ([facility] 03/30/2014    Basal cell carcinoma 03/30/2014    Headache 10/07/2013    DM [facility]diabetes mellitus) [facility] 09/11/2012    BPH (benign prostatic hyperplasia) 09/11/2012    CKD (chronic kidney disease), stage III ([facility] 09/11/2012    Atrial tachycardia ([facility] 06/29/2012    Coronary atherosclerosis of native coronary artery 06/27/2011    Paroxysmal atrial fibrillation ([facility] 06/27/2011    Palpitations 06/27/2011        Assessment:   1. Paroxysmal atrial fibrillation (HCC)    2. SSS (sick sinus syndrome) ([facility]    3. Syncope and collapse    4. Pacemaker    5. Essential hypertension         Cardiac HX: Riri Guzmán is a 82 y.o. 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 Rober Loera, 4/20/2020). [id number] 5. TSH 2.48 (5/13/2019).      SSS/syncope  - No further episodes of syncope  - S/p dual-chamber MDT PPM  - Pacer check shows 65.2% AP, 1.9% , 16 AT/AF episodes with the longest lasting 10 hours, 56 minutes in length, total AF burden 0.8%, other parameters stable. - Follow-up in 6 months - patient would like to see Dr. Rober Loera at next visit     pAF  - In NSR - 0.8 percent AF burden per device  - On Eliquis 5 mg twice daily - no s/s bleeding - continue  - On sotalol 80 mg twice daily -  - will continue  - Sleep study - patient not interested at this time  - ECG ordered and results personally reviewed     HTN  - BP well controlled in the office today  - On amlodipine 5 mg daily,    EF of 50%  No systolic HF  Statin for CAD  Anticoagulation for AF and heart failure  No Tobacco use. All questions and concerns were addressed to the patient/family.  Alternatives to my treatment were discussed. The note was completed using EMR. Every effort was made to ensure accuracy; however, inadvertent computerized transcription errors may be present. Patient received education regarding their diagnosis, treatment and medications while in the office today.       Codi Thompson0 [facility]

## 2021-09-27 DIAGNOSIS — E11.42 TYPE 2 DIABETES MELLITUS WITH DIABETIC POLYNEUROPATHY, WITH LONG-TERM CURRENT USE OF INSULIN (HCC): ICD-10-CM

## 2021-09-27 DIAGNOSIS — Z79.4 TYPE 2 DIABETES MELLITUS WITH DIABETIC POLYNEUROPATHY, WITH LONG-TERM CURRENT USE OF INSULIN (HCC): ICD-10-CM

## 2021-09-29 ENCOUNTER — OFFICE VISIT (OUTPATIENT)
Dept: CARDIOLOGY CLINIC | Age: 86
End: 2021-09-29
Payer: OTHER GOVERNMENT

## 2021-09-29 ENCOUNTER — NURSE ONLY (OUTPATIENT)
Dept: CARDIOLOGY CLINIC | Age: 86
End: 2021-09-29
Payer: OTHER GOVERNMENT

## 2021-09-29 VITALS
WEIGHT: 210.8 LBS | HEIGHT: 72 IN | BODY MASS INDEX: 28.55 KG/M2 | SYSTOLIC BLOOD PRESSURE: 126 MMHG | HEART RATE: 61 BPM | DIASTOLIC BLOOD PRESSURE: 72 MMHG

## 2021-09-29 DIAGNOSIS — I47.1 ATRIAL TACHYCARDIA (HCC): ICD-10-CM

## 2021-09-29 DIAGNOSIS — R00.2 PALPITATIONS: ICD-10-CM

## 2021-09-29 DIAGNOSIS — I10 ESSENTIAL HYPERTENSION: ICD-10-CM

## 2021-09-29 DIAGNOSIS — Z95.0 PACEMAKER: ICD-10-CM

## 2021-09-29 DIAGNOSIS — I48.0 PAROXYSMAL ATRIAL FIBRILLATION (HCC): Primary | ICD-10-CM

## 2021-09-29 DIAGNOSIS — I48.0 PAROXYSMAL ATRIAL FIBRILLATION (HCC): Chronic | ICD-10-CM

## 2021-09-29 DIAGNOSIS — R55 SYNCOPE AND COLLAPSE: ICD-10-CM

## 2021-09-29 DIAGNOSIS — I49.5 SSS (SICK SINUS SYNDROME) (HCC): ICD-10-CM

## 2021-09-29 DIAGNOSIS — I49.5 SICK SINUS SYNDROME (HCC): ICD-10-CM

## 2021-09-29 PROCEDURE — 93280 PM DEVICE PROGR EVAL DUAL: CPT | Performed by: INTERNAL MEDICINE

## 2021-09-29 PROCEDURE — 99214 OFFICE O/P EST MOD 30 MIN: CPT | Performed by: NURSE PRACTITIONER

## 2021-09-29 RX ORDER — PREGABALIN 75 MG/1
CAPSULE ORAL
Qty: 180 CAPSULE | Refills: 0 | Status: SHIPPED | OUTPATIENT
Start: 2021-09-29 | End: 2021-12-01 | Stop reason: SDUPTHER

## 2021-11-02 ENCOUNTER — TELEPHONE (OUTPATIENT)
Dept: INTERNAL MEDICINE CLINIC | Age: 86
End: 2021-11-02

## 2021-11-02 DIAGNOSIS — H92.03 ACUTE PAIN OF BOTH EARS: Primary | ICD-10-CM

## 2021-11-02 DIAGNOSIS — H93.8X3 CLOGGED EAR, BILATERAL: ICD-10-CM

## 2021-11-02 NOTE — TELEPHONE ENCOUNTER
Spoke to pt he stated 10 days ago he was on an air plane and his ears got painfully clogged and still feels that way. The pain goes from ear to jaw.

## 2021-11-04 NOTE — PROGRESS NOTES
Corrinne Sorenson   1935, 82 y.o. male   [id number]       Referring Provider: Reuben Valadez DO  Referral Type: In an order in [address] [place] [address]    Reason for Visit: Evaluation of the cause of disorders of hearing, tinnitus, or balance. ADULT AUDIOLOGIC EVALUATION      Corrinne Sorenson is a 82 y.o. male seen today, 11/10/2021 , for an initial audiologic evaluation. Patient was seen by Reuben Valadez DO following today's evaluation. Patient was accompanied by spouse. AUDIOLOGIC AND OTHER PERTINENT MEDICAL HISTORY:      Corrinne Sorenson noted aural fullness, history of falls, history of occupational noise exposure, history of head trauma and history of ear surgery. Patient reports sudden onset of aural fullness, left worse than right, three weeks ago after a flight. Patient has known hearing loss and wears hearing aids with limited benefit. Patient has a history of noise exposure, [employer], without the use of hearing protection. Patient has fallen within the past year not related to dizziness. Medical history is reportedly significant for removal of melanoma from the left ear, concussion, and throat concerns. No other ear related contraindication was reported. Corrinne Sorenson denied otalgia, tinnitus, dizziness and family history of hearing loss. Date: 11/10/2021     IMPRESSIONS:      Abnormal middle ear pressure and compliance, bilaterally. Abnormal hearing sensitivity, left worse than right, which can affect every day listening needs. Word understanding was excellent in the right ear and poor in the left ear presented at elevated sensation levels.  Follow medical recommendations of Reuben Valadez DO.     ASSESSMENT AND FINDINGS:     Otoscopy revealed: Clear ear canals bilaterally    RIGHT EAR:  Hearing Sensitivity:Mild sloping to a severe sensorineural hearing loss from 250-8000 Hz  Speech Recognition Threshold: 45 dB HL  Word Recognition:Good (88%), based on NU-6 25-word list at 95 dBHL with 65 dBHL of masking using recorded speech stimuli. Tympanometry: Negative peak pressure with normal compliance, Type C tympanogram, consistent with ETD/history of otitis media. LEFT EAR:  Hearing Sensitivity:Mild sloping to a profound mixed hearing loss from 250-8000 Hz  Speech Recognition Threshold: 40 dB HL  Word Recognition: Poor (60%), based on NU-6 25-word list at 95 dBHL with 65 dBHL of masking using recorded speech stimuli. Tympanometry: Negative peak pressure with low compliance, Type C tympanogram, consistent with ETD/history of otitis media. **Binaural word recognition was 80% presented at 85 dBHL using recorded speech stimuli. NOTE: An asymmetry of  >10 dBHL [id number] and 8000 Hz is present with left ear worse than right ear. Reliability: Good   Transducer: Inserts    See scanned audiogram dated 11/10/2021  for results. PATIENT EDUCATION:       The following items were discussed with the patient:   - Good Communication Strategies  - Hearing Loss and Hearing Aids  - Noise-Induced Hearing Loss and use of Hearing Protection Devices (HPDs)     Educational information was shared in the After Visit Summary. RECOMMENDATIONS:                                                                                                                                                                                                                                                            The following items are recommended based on patient report and results from today's appointment:   - Continue medical follow-up with Rojelio Mejia DO.   - Retest hearing as medically indicated and/or sooner if a change in hearing is noted. - Schedule a Hearing Aid Evaluation (HAE) appointment to discuss hearing aid options or new patient Hearing Aid Check after medical clearance is given.   - Utilize \"Good Communication Strategies\" as discussed to assist in speech understanding with communication partners. - Use hearing protection devices (HPDs), such as protective ear muffs and ear plugs, when exposed to dangerous sound levels. Florentino Goldsmith  Audiologist    Chart CC'd to:  Rojelio Mejia DO      Degree of   Hearing Sensitivity dB Range   Within Normal Limits (WNL) 0 - 20   Mild 20 - 40   Moderate 40 - 55   Moderately-Severe 55 - 70   Severe 70 - 90   Profound 90 +

## 2021-11-10 ENCOUNTER — PROCEDURE VISIT (OUTPATIENT)
Dept: AUDIOLOGY | Age: 86
End: 2021-11-10
Payer: OTHER GOVERNMENT

## 2021-11-10 ENCOUNTER — OFFICE VISIT (OUTPATIENT)
Dept: ENT CLINIC | Age: 86
End: 2021-11-10
Payer: OTHER GOVERNMENT

## 2021-11-10 VITALS
WEIGHT: 210 LBS | HEIGHT: 72 IN | BODY MASS INDEX: 28.44 KG/M2 | SYSTOLIC BLOOD PRESSURE: 126 MMHG | DIASTOLIC BLOOD PRESSURE: 72 MMHG | HEART RATE: 64 BPM

## 2021-11-10 DIAGNOSIS — H69.83 DYSFUNCTION OF BOTH EUSTACHIAN TUBES: ICD-10-CM

## 2021-11-10 DIAGNOSIS — H90.A21 SENSORINEURAL HEARING LOSS (SNHL) OF RIGHT EAR WITH RESTRICTED HEARING OF LEFT EAR: Primary | ICD-10-CM

## 2021-11-10 DIAGNOSIS — H93.8X3 EAR PRESSURE, BILATERAL: ICD-10-CM

## 2021-11-10 DIAGNOSIS — H91.90 HEARING LOSS, UNSPECIFIED HEARING LOSS TYPE, UNSPECIFIED LATERALITY: Primary | ICD-10-CM

## 2021-11-10 DIAGNOSIS — K21.9 LARYNGOPHARYNGEAL REFLUX (LPR): ICD-10-CM

## 2021-11-10 DIAGNOSIS — H74.8X2 HEMATOTYMPANUM OF LEFT EAR: ICD-10-CM

## 2021-11-10 DIAGNOSIS — H90.A32 MIXED CONDUCTIVE AND SENSORINEURAL HEARING LOSS OF LEFT EAR WITH RESTRICTED HEARING OF RIGHT EAR: ICD-10-CM

## 2021-11-10 DIAGNOSIS — H91.92 HEARING LOSS OF LEFT EAR, UNSPECIFIED HEARING LOSS TYPE: Primary | ICD-10-CM

## 2021-11-10 PROCEDURE — 92567 TYMPANOMETRY: CPT | Performed by: AUDIOLOGIST

## 2021-11-10 PROCEDURE — 99204 OFFICE O/P NEW MOD 45 MIN: CPT | Performed by: OTOLARYNGOLOGY

## 2021-11-10 PROCEDURE — 92557 COMPREHENSIVE HEARING TEST: CPT | Performed by: AUDIOLOGIST

## 2021-11-10 RX ORDER — PANTOPRAZOLE SODIUM 40 MG/1
40 TABLET, DELAYED RELEASE ORAL
Qty: 30 TABLET | Refills: 1 | Status: SHIPPED | OUTPATIENT
Start: 2021-11-10 | End: 2022-01-04

## 2021-11-10 ASSESSMENT — ENCOUNTER SYMPTOMS
NAUSEA: 0
SINUS PRESSURE: 0
EYE ITCHING: 0
BLOOD IN STOOL: 0
TROUBLE SWALLOWING: 0
EYE DISCHARGE: 0
FACIAL SWELLING: 0
CHOKING: 0
VOICE CHANGE: 0
VOMITING: 0
SINUS PAIN: 0
PHOTOPHOBIA: 0
DIARRHEA: 0
STRIDOR: 0
WHEEZING: 0
SORE THROAT: 0
COUGH: 1
CONSTIPATION: 0
BACK PAIN: 0
RHINORRHEA: 0
COLOR CHANGE: 0
SHORTNESS OF BREATH: 0

## 2021-11-10 NOTE — PATIENT INSTRUCTIONS
Good Communication Strategies    Communication can be challenging for anyone, but can be especially difficult for those with some degree of hearing loss. While we may not be able to control every factor that may lead to difficulty with communication, there are Good Communication Strategies that we can all use in our day-to-day lives. Communication takes both parties working together for it to be successful. Tips as a Listener:   1. Control your environment. It is important to limit the amount of background noise in the room when possible. You should also consider having a good light source in the room to best see the other person. 2. Ask for clarification. Instead of saying \"What?\", you can use parts of what you heard to make a new question. For example, if you heard the word \"Thursday\" but not the rest of the week, you may ask \"What was that about Thursday? \" or \"What did you want to do Thursday? \". This shows the person talking that you are listening and will help them better explain what they are saying. 3. Be an advocate for yourself. If you are hearing but not understanding, tell the other person \"I can hear you, but I need you to slow down when you speak. \"  Or if someone is facing the other direction, say \"I cannot hear you when you are not looking at me when we talk. \"       Tips as a Talker:   - Sit or stand 3 to 6 feet away to maximize audibility         -- It is unrealistic to believe someone else will fully hear your message if you are speaking from across the room or in a different room in the house   - Stay at eye level to help with visual cues   - Make sure you have the persons attention before speaking   - Use facial expressions and gestures to accentuate your message   - Raise your voice slightly (do not scream)   - Speak slowly and distinctly   - Use short, simple sentences   - Rephrase your words if the person is having a hard time understanding you    - To avoid distortion, dont speak directly into a persons ear      Some additional items that may be helpful:   - Remain patient - this is important for both parties   - Write down items that still cannot be heard/understood. You may write with pen/paper or consider typing/texting on a cell phone or smart device. - If background noise is unavoidable, try to keep yourself in a good position in the room. By sitting at a rosario on the side of the restaurant (preferably a corner), it will be easier to communicate than if you were sitting at a table in the middle with background noise surrounding you. Keep yourself positioned away from music speakers or heavy foot traffic. Hearing Loss: Care Instructions  Your Care Instructions      Hearing loss is a sudden or slow decrease in how well you hear. It can range from mild to profound. Permanent hearing loss can occur with aging, and it can happen when you are exposed long-term to loud noise. Examples include listening to loud music, riding motorcycles, or being around other loud machines. Hearing loss can affect your work and home life. It can make you feel lonely or depressed. You may feel that you have lost your independence. But hearing aids and other devices can help you hear better and feel connected to others. Follow-up care is a key part of your treatment and safety. Be sure to make and go to all appointments, and call your doctor if you are having problems. It's also a good idea to know your test results and keep a list of the medicines you take. How can you care for yourself at home? · Avoid loud noises whenever possible. This helps keep your hearing from getting worse. Always wear hearing protection around loud noises. · If appropriate, wear hearing aid(s) as directed. It is recommended that hearing aids are worn during all waking hours to keep your brain active and give it access to the sounds it is missing.       · If you are beginning your process with hearing aid(s), what you can do to prevent it      Exposure to loud sounds, in an occupational setting or recreational, can cause permanent hearing loss. Sound is measured in decibels (dB). Noise-induced hearing loss is the ONLY type of preventable hearing loss. Hearing loss related to noise exposure can occur at any age. There are small sensory cells, called inner and outer hair cells, within the inner ear (cochlea). These cells process the loudness (intensity) and pitch (frequency) of sound and send the signal to the brain via our auditory nerve (vestibulocochlear nerve, cranial nerve VIII). When these cells are damaged, they can result in permanent hearing loss and/or tinnitus. The hair cells responsible for high frequency sounds, like birds chirping, are most likely to be damaged due to loud sounds. The high frequency sounds are also very important for our clarity and understanding of speech. OCCUPATIONAL NOISE EXPOSURE RECREATIONAL NOISE EXPOSURE   Some jobs may have exposure to loud sounds in the workplace. These jobs may include but are not limited to:  [employer]   Construction   Welding   Landscaping   Hairdressing/hairstyling   Musicians  [employer]    ... And more! Many activities outside of work may cause permanent hearing loss. These activities may include but are not limited to:  Lawnmowers, leaf blowers  [employer] (such as pigs squealing)   Chainsaws and other power tools  [employer] musical instruments and/or singing   Listening to music too loudly - at concerts, through stereo, through ear buds or headphones   Attending sporting events   Attending fireworks shows or using fireworks at home  [employer] of firearms   . .. And more! REDUCE OR PROTECT YOUR EARS FROM NOISE EXPOSURE    To do your best to avoid noise-induced hearing loss, here are some tips:   Limit exposure to loud sounds. 85 dB (decibels) is safe for 8 hours. As sounds are louder, the length of time the sound is safe lessens. These numbers are cumulative across a 24-hour period. (NIOSH and CDC, 2002)  o 85 dB is safe for 8 hours  o 88 dB is safe for 4 hours  o 91 dB is safe for 2 hours  o 94 dB is safe for 1 hour  o 97 dB is safe for 30 minutes  o 100 dB is safe for 15 minutes  o 103 dB is safe for 7.5 minutes  o 106 dB is safe for 3.75 minutes  o 109 dB is safe for LESS THAN 2 minutes  o 112 dB is safe for LESS THAN 1 minute  o 115 dB is safe for ~ 30 seconds  o 130 dB can cause IMMEDIATE hearing loss   If you are unsure if a sound is too loud, consider checking the sound level with a \"sound level meter\". There are apps on smart devices that can measure the loudness of the sound. They are not as accurate as expensive equipment used by scientists, but it will give you a guesstimate of how loud the sound is, and if it may be damaging to your hearing.  If you cannot avoid loud sounds, here are ways to reduce your exposure:  o 1. Wear hearing protection  - Ear plugs and protective ear muffs can be used to reduce the intensity of the sound. The higher the NRR (noise reduction rating), the better reduction of the intensity of the sound   o 2. Turn the volume down  - When listening to music, turn the volume down, especially when wearing ear buds or headphones. A good rule of thumb is to not go beyond the middle setting on your device. If you can't hear someone talking to you from arm's length away, your music may be at a level that it can cause damage. If someone else can hear your music from 3 feet away, it may also be at a level that it can cause damage. o 3. Walk away from the sound  - If you do not have the ability to wear hearing protection or turn down the volume of the sound, you should do your best to move away from the source of the sound. - Sound decreases in intensity as we move further from the source.  The sound will decrease by 6 dB for every doubling of distance from the sound source. Exposure to these sounds may cause permanent damage to your hearing.   If you suspect your hearing has changed, it is recommended that you have your hearing tested by your audiologist.

## 2021-11-10 NOTE — PROGRESS NOTES
[facility]  [address], [address] [facility], [address]  P: [phone]  F: [phone]       Patient     Nadia Mitchell  1935    ChiefComplaint     Chief Complaint   Patient presents with    New Patient     Patient is here today because a couple of weeks ago got off an airplane and his ears have been clogged every since, worst in the left than the right    Oral Swelling     Also he has a lot of mucus buildup in his throat and it makes it very hard for him to clear his throat and sometimes there is discomfort       History of Present Illness     Nadia Mitchell is a pleasant 82 y.o. male who presents as a new patient for left greater than right muffled hearing. This is been going for about 3 weeks. It occurred after descent on an airplane. He was able to equalize the pressure of the ears, but it did not resolve the issue. The right one has slightly improved, however the left is persistently muffled and has some pressure sensation. Denies otalgia or otorrhea. Denies spinning sensation. Denies tinnitus. Patient does have a known history of hearing loss and has tried hearing aids in the past, but does not wear them consistently. Denies a history of ear infections or ear surgeries. Denies a family history of ear problems. Additionally, the patient has been experiencing globus sensation and chronic throat clearing. Typically worse when he wakes up in the morning. Seems to occasionally go away throughout the day. He also has been experiencing slight worsening of a cough. He does have a history of acid reflux. Currently is not taking any medication for it. Denies change in voice or dysphagia. Drinks about 10 alcoholic beverages a week. Former 10 to 15 pack year smoker, quit in 1970s.     Past Medical History     Past Medical History:   Diagnosis Date    Arrhythmia     Arthritis     hands shoulders neck    Atrial fibrillation (HCC)     coumadin    Atrial hard at all   Food Insecurity: No Food Insecurity    Worried About [address] [place] [address] in the Last Year: Never true    Kate of Food in the Last Year: Never true   Transportation Needs:     Lack of Transportation (Medical): Not on file    Lack of Transportation (Non-Medical): Not on file   Physical Activity:     Days of Exercise per Week: Not on file    Minutes of Exercise per Session: Not on file   Stress:     Feeling of Stress : Not on file   Social Connections:     Frequency of Communication with Friends and Family: Not on file    Frequency of Social Gatherings with Friends and Family: Not on file    Attends Congregational Services: Not on file    Active Member of [address] [place] [address] or Organizations: Not on file    Attends Club or Organization Meetings: Not on file    Marital Status: Not on file   Intimate Partner Violence:     Fear of Current or Ex-Partner: Not on file    Emotionally Abused: Not on file    Physically Abused: Not on file    Sexually Abused: Not on file   Housing Stability:     Unable to Pay for Housing in the Last Year: Not on file    Number of [place] in the Last Year: Not on file    Unstable Housing in the Last Year: Not on file       Allergies     No Known Allergies    Medications     Current Outpatient Medications   Medication Sig Dispense Refill    pantoprazole (PROTONIX) 40 MG tablet Take 1 tablet by mouth Daily with supper 30 tablet 1    pregabalin (LYRICA) 75 MG capsule TAKE 1 CAPSULE TWICE DAILY 180 capsule 0    blood glucose monitor strips Test once a day for symptoms of irregular blood glucose.  100 strip 3    amLODIPine (NORVASC) 5 MG tablet Take 1 tablet by mouth daily 90 tablet 3    insulin detemir (LEVEMIR FLEXTOUCH) 100 UNIT/ML injection pen Inject 24 Units into the skin nightly 10 pen 3    simvastatin (ZOCOR) 40 MG tablet Take 1 tablet by mouth daily 180 tablet 3    nateglinide (STARLIX) 120 MG tablet Take 1 tablet by mouth 2 times daily (before meals) 180 tablet 3  omega-3 acid ethyl esters (LOVAZA) 1 g capsule Take 1 capsule by mouth 2 times daily 180 capsule 3    SITagliptin (JANUVIA) 50 MG tablet Take 1 tablet by mouth daily 90 tablet 3    Insulin Pen Needle (B-D UF III MINI PEN NEEDLES) 31G X 5 MM MISC Use as directed 200 each 2    glucose monitoring kit (FREESTYLE) monitoring kit 1 kit by Does not apply route daily 1 kit 0    apixaban (ELIQUIS) 5 MG TABS tablet Take 1 tablet by mouth 2 times daily 60 tablet 5    carboxymethylcellulose (REFRESH PLUS) 0.5 % SOLN ophthalmic solution Apply 2 drops to eye 3 times daily      sotalol (BETAPACE) 80 MG tablet TAKE 1 TABLET BY MOUTH TWICE A  tablet 3    triamcinolone (KENALOG) 0.1 % cream Apply to itchy areas on the arms and legs twice daily for up to 2 weeks or until improved. 80 g 2    fluorouracil (EFUDEX) 5 % cream Apply twice daily to affected area on the right cheek for 2 weeks. 40 g 0    Loratadine (CLARITIN PO) Take by mouth Indications: Couple times a week      vitamin D (ERGOCALCIFEROL) 1.25 MG (87976 UT) CAPS capsule Take 50,000 Units by mouth once a week Mondays      Ascorbic Acid (VITAMIN C) 500 MG CAPS Take by mouth nightly Indications: Three times a week  Monday, Wednesday, Friday       Multiple Vitamins-Minerals (CENTRUM SILVER) TABS Take 1 tablet by mouth daily       ALPHA LIPOIC ACID PO Take 100 mg by mouth daily        No current facility-administered medications for this visit. Review of Systems     Review of Systems   Constitutional: Negative for activity change, appetite change, chills, diaphoresis, fatigue, fever and unexpected weight change. HENT: Positive for hearing loss. Negative for congestion, dental problem, drooling, ear discharge, ear pain, facial swelling, mouth sores, nosebleeds, postnasal drip, rhinorrhea, sinus pressure, sinus pain, sneezing, sore throat, tinnitus, trouble swallowing and voice change.     Eyes: Negative for photophobia, discharge, itching and visual Mouth: Mucous membranes are not pale, not dry and not cyanotic. No lacerations or oral lesions. Dentition: Normal dentition. No dental caries or dental abscesses. Pharynx: Uvula midline. No oropharyngeal exudate, posterior oropharyngeal erythema or uvula swelling. Tonsils: No tonsillar abscesses. Comments: Laryngeal mirror exam reveals no obvious concerning findings. Eyes:      General: Lids are normal.         Right eye: No discharge. Left eye: No discharge. Extraocular Movements:      Right eye: Normal extraocular motion and no nystagmus. Left eye: Normal extraocular motion and no nystagmus. Conjunctiva/sclera:      Right eye: No chemosis or exudate. Left eye: No chemosis or exudate. Neck:      Thyroid: No thyroid mass or thyromegaly. Vascular: Normal carotid pulses. Trachea: No tracheal tenderness or tracheal deviation. Cardiovascular:      Rate and Rhythm: Normal rate and regular rhythm. Pulmonary:      Effort: No tachypnea, bradypnea or respiratory distress. Breath sounds: No stridor. Musculoskeletal:      Right shoulder: Normal range of motion. Cervical back: Neck supple. Lymphadenopathy:      Head:      Right side of head: No submental, submandibular, tonsillar, preauricular, posterior auricular or occipital adenopathy. Left side of head: No submental, submandibular, tonsillar, preauricular, posterior auricular or occipital adenopathy. Cervical: No cervical adenopathy. Right cervical: No superficial, deep or posterior cervical adenopathy. Left cervical: No superficial, deep or posterior cervical adenopathy. Skin:     General: Skin is warm and dry. Findings: No bruising, erythema, laceration, lesion or rash. Neurological:      Mental Status: He is alert and oriented to person, place, and time. Cranial Nerves: No cranial nerve deficit.    Psychiatric:         Speech: Speech normal.         Behavior: Behavior normal.           Procedure     Otomicroscopy    An operating microscope was utilized to visualize the external auditory canals using a 4mm speculum. The external auditory canals are clear. The tympanic membrane is intact. Right middle ear structures appear normal.  No retracted tympanic membrane or fluid. Left tympanic membrane with bluish discoloration in the middle ear space. Assessment and Plan     1. Hearing loss of left ear, unspecified hearing loss type  Audiogram reveals bilateral mild to severe downward sloping mixed hearing loss with 5 to 10 dB air-bone gap across multiple frequencies. Negative tympanograms bilaterally. 88 word recognition scores on the right, 60 on left. Based on symptomatology, suspect patient has acute middle ear effusions with some hemotympanum on the left. This fits with his negative tympanograms and the scattered air-bone gap across the audiogram.  We will begin Flonase 2 sprays each nostril once a day. Additionally, will obtain a CT of the temporal bone given the presence of the bluish discoloration of the left middle ear. Like to make sure there is no evidence of glomus tumor. I will see them back in 3 weeks to assess symptom improvement. Will consider PE tube placement. Avoiding steroids due to diabetes. 2. Hematotympanum of left ear    - CT IAC POSTERIOR FOSSA W CONTRAST; Future    3. Dysfunction of both eustachian tubes      4. Laryngopharyngeal reflux (LPR)  Patient symptoms of globus sensation and cough most likely consistent with LPR. Laryngeal mirror exam reveals no obvious abnormalities. Discussed reflux measures. We will see him back in 3 weeks and assess improvement of symptoms. We will likely perform laryngoscopy at that time. - pantoprazole (PROTONIX) 40 MG tablet; Take 1 tablet by mouth Daily with supper  Dispense: 30 tablet; Refill: 1      Return in about 3 weeks (around 12/1/2021).       Portions of this note were dictated using Dragon.  There may be linguistic errors secondary to the use of this program.

## 2021-11-17 ENCOUNTER — HOSPITAL ENCOUNTER (OUTPATIENT)
Dept: CT IMAGING | Age: 86
Discharge: HOME | End: 2021-11-17
Payer: OTHER GOVERNMENT

## 2021-11-17 DIAGNOSIS — H74.8X2 HEMATOTYMPANUM OF LEFT EAR: ICD-10-CM

## 2021-11-17 LAB
GFR AFRICAN AMERICAN: 54
GFR NON-AFRICAN AMERICAN: 45
PERFORMED ON: ABNORMAL
POC CREATININE: 1.5 MG/DL (ref 0.8–1.3)
POC SAMPLE TYPE: ABNORMAL

## 2021-11-17 PROCEDURE — 70481 CT ORBIT/EAR/FOSSA W/DYE: CPT

## 2021-11-17 PROCEDURE — 6360000004 HC RX CONTRAST MEDICATION: Performed by: OTOLARYNGOLOGY

## 2021-11-17 PROCEDURE — 82565 ASSAY OF CREATININE: CPT

## 2021-11-17 RX ADMIN — IOPAMIDOL 80 ML: 755 INJECTION, SOLUTION INTRAVENOUS at 08:49

## 2021-11-18 ENCOUNTER — TELEPHONE (OUTPATIENT)
Dept: CARDIOLOGY CLINIC | Age: 86
End: 2021-11-18

## 2021-11-18 ENCOUNTER — TELEPHONE (OUTPATIENT)
Dept: ENT CLINIC | Age: 86
End: 2021-11-18

## 2021-11-18 NOTE — TELEPHONE ENCOUNTER
----- Message from Mera Martell DO sent at 11/18/2021 10:41 AM EST -----  There is no evidence of a blood vessel tumor in the middle ear. There is evidence of fluid in both the left ear and the honeycomb bone connected to the left ear called the Mastoid bone. We can either wait for the fluid to resolve with flonase (the recommendation is to wait 3 months), or we can discuss placing an ear tube. It really depends on how well you can tolerate the current ear pressure symptoms.

## 2021-11-18 NOTE — TELEPHONE ENCOUNTER
Patient needs refill:    apixaban (ELIQUIS) 5 MG TABS tablet  Take 1 tablet by mouth 2 times daily, Disp-60 tablet, R-5  Normal Last Dose: Not Recorded  Refills:5 ordered     Pharmacy:[facility]/pharmacy [address] [facility], [address] [phone]    Last visit: 9/29/21  Next visit: 4/7/21  Last labs: 7/2/21  Last filled: 5/24/21

## 2021-11-18 NOTE — TELEPHONE ENCOUNTER
Spoke with patient regarding patients CT results    Patient along with his wife wanted a few days to think about what they want to do, so they are going to try the flonase and keep the 12-1 appointment to see how his ear feels

## 2021-12-01 ENCOUNTER — OFFICE VISIT (OUTPATIENT)
Dept: INTERNAL MEDICINE CLINIC | Age: 86
End: 2021-12-01
Payer: OTHER GOVERNMENT

## 2021-12-01 ENCOUNTER — OFFICE VISIT (OUTPATIENT)
Dept: ENT CLINIC | Age: 86
End: 2021-12-01
Payer: OTHER GOVERNMENT

## 2021-12-01 VITALS
DIASTOLIC BLOOD PRESSURE: 74 MMHG | SYSTOLIC BLOOD PRESSURE: 128 MMHG | WEIGHT: 207 LBS | HEART RATE: 79 BPM | BODY MASS INDEX: 28.04 KG/M2 | HEIGHT: 72 IN

## 2021-12-01 VITALS
DIASTOLIC BLOOD PRESSURE: 70 MMHG | SYSTOLIC BLOOD PRESSURE: 128 MMHG | WEIGHT: 207 LBS | BODY MASS INDEX: 28.04 KG/M2 | HEIGHT: 72 IN

## 2021-12-01 DIAGNOSIS — H65.92 FLUID LEVEL BEHIND TYMPANIC MEMBRANE OF LEFT EAR: ICD-10-CM

## 2021-12-01 DIAGNOSIS — N18.30 STAGE 3 CHRONIC KIDNEY DISEASE, UNSPECIFIED WHETHER STAGE 3A OR 3B CKD (HCC): ICD-10-CM

## 2021-12-01 DIAGNOSIS — H93.8X3 CLOGGED EAR, BILATERAL: ICD-10-CM

## 2021-12-01 DIAGNOSIS — H69.83 DYSFUNCTION OF BOTH EUSTACHIAN TUBES: Primary | ICD-10-CM

## 2021-12-01 DIAGNOSIS — K21.9 GASTROESOPHAGEAL REFLUX DISEASE WITHOUT ESOPHAGITIS: ICD-10-CM

## 2021-12-01 DIAGNOSIS — Z79.4 TYPE 2 DIABETES MELLITUS WITH DIABETIC POLYNEUROPATHY, WITH LONG-TERM CURRENT USE OF INSULIN (HCC): Primary | ICD-10-CM

## 2021-12-01 DIAGNOSIS — E11.42 TYPE 2 DIABETES MELLITUS WITH DIABETIC POLYNEUROPATHY, WITH LONG-TERM CURRENT USE OF INSULIN (HCC): Primary | ICD-10-CM

## 2021-12-01 DIAGNOSIS — H74.8X2 HEMATOTYMPANUM OF LEFT EAR: ICD-10-CM

## 2021-12-01 DIAGNOSIS — I48.0 PAROXYSMAL ATRIAL FIBRILLATION (HCC): Chronic | ICD-10-CM

## 2021-12-01 DIAGNOSIS — H90.A32 MIXED CONDUCTIVE AND SENSORINEURAL HEARING LOSS OF LEFT EAR WITH RESTRICTED HEARING OF RIGHT EAR: ICD-10-CM

## 2021-12-01 DIAGNOSIS — I10 ESSENTIAL HYPERTENSION: ICD-10-CM

## 2021-12-01 PROCEDURE — 99213 OFFICE O/P EST LOW 20 MIN: CPT | Performed by: OTOLARYNGOLOGY

## 2021-12-01 PROCEDURE — 99214 OFFICE O/P EST MOD 30 MIN: CPT | Performed by: INTERNAL MEDICINE

## 2021-12-01 PROCEDURE — 92504 EAR MICROSCOPY EXAMINATION: CPT | Performed by: OTOLARYNGOLOGY

## 2021-12-01 RX ORDER — FLUTICASONE PROPIONATE 50 MCG
2 SPRAY, SUSPENSION (ML) NASAL 2 TIMES DAILY
COMMUNITY

## 2021-12-01 RX ORDER — PREGABALIN 75 MG/1
CAPSULE ORAL
Qty: 180 CAPSULE | Refills: 1 | Status: SHIPPED | OUTPATIENT
Start: 2021-12-01 | End: 2022-06-01 | Stop reason: SDUPTHER

## 2021-12-01 ASSESSMENT — ENCOUNTER SYMPTOMS
VOICE CHANGE: 0
RHINORRHEA: 0
PHOTOPHOBIA: 0
SINUS PAIN: 0
CHOKING: 0
SINUS PRESSURE: 0
EYE PAIN: 0
NAUSEA: 0
COLOR CHANGE: 0
STRIDOR: 0
TROUBLE SWALLOWING: 0
EYE ITCHING: 0
SHORTNESS OF BREATH: 0
DIARRHEA: 0
SORE THROAT: 0
EYE REDNESS: 0
FACIAL SWELLING: 0
COUGH: 0

## 2021-12-01 NOTE — PROGRESS NOTES
[facility]  [address] [facility], [address] [facility], [address]  P: [phone]  F: [phone]       Patient     Mary Lou Schulz  1935    ChiefComplaint     Chief Complaint   Patient presents with    Follow-up     Patient is here today for his 3 week follow up for hearing loss, he tells me that it works better with only one hearing aid and the right is better then the left       History of Present Illness     Mary Lou Schulz is a pleasant 82 y.o. male here for 3-week follow-up for left middle ear effusion, eustachian tube dysfunction, hearing loss. CT of the temporal bone reveals no evidence of middle ear vascular tumor. There is some mild scattered bilateral mastoid effusion. The patient continues to experience some muffled hearing on the left side. The right ear feels nearly back to normal.  He has tried wearing his hearing aids recently and has noticed some improvement with the right ear, but not with the left. He has been using Flonase daily.     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    CARPAL TUNNEL RELEASE      CATARACT REMOVAL Bilateral     CORONARY ANGIOPLASTY WITH STENT PLACEMENT  2005    CYSTOSCOPY  9/26/12    coagulation prostatic urethra    CYSTOSCOPY  10/8/15    TURP    FINGER SURGERY      X7    FINGER TRIGGER RELEASE      OTHER SURGICAL HISTORY Left [id number]    WIDE LOCAL EXCISION LEFT ARM MELANOMA, WIDE LOCAL EXCISION OF    SHOULDER SURGERY Bilateral     SPINAL FUSION      TURP  3-7-13       Family History     Family History   Problem Relation Age of Onset    Heart Disease Neg Hx     High Blood Pressure Neg Hx     High Cholesterol Neg Hx        Social History     Social History     Socioeconomic History    Marital status:      Spouse name: Not on file    Number of children: Not on file    Years of education: Not on file    Highest education level: Not on file   Occupational History    Not on file   Tobacco Use    Smoking status: Former Smoker     Quit date: 1974     Years since quittin.9    Smokeless tobacco: Never Used    Tobacco comment: QUIT SMOKING    Vaping Use    Vaping Use: Never used   Substance and Sexual Activity    Alcohol use: Yes     Alcohol/week: 2.0 standard drinks     Types: 2 Glasses of wine per week     Comment: rare     Drug use: No    Sexual activity: Yes     Partners: Female     Comment:     Other Topics Concern    Not on file   Social History Narrative    Not on file     Social Determinants of Health     Financial Resource Strain: Low Risk     Difficulty of Paying Living Expenses: Not hard at all   Food Insecurity: No Food Insecurity    Worried About [address] in the Last Year: Never true    [facility] in the Last Year: Never true   Transportation Needs:     Lack of Transportation (Medical): Not on file    Lack of Transportation (Non-Medical):  Not on file   Physical Activity:     Days of Exercise per Week: Not on file    Minutes of Exercise per Session: Not on file   Stress:     Feeling of Stress : Not on file   Social Connections:     Frequency of Communication with Friends and Family: Not on file    Frequency of Social Gatherings with Friends and Family: Not on file    Attends Nondenominational Services: Not on file    Active Member of Clubs or Organizations: Not on file    Attends Club or Organization Meetings: Not on file  Marital Status: Not on file   Intimate Partner Violence:     Fear of Current or Ex-Partner: Not on file    Emotionally Abused: Not on file    Physically Abused: Not on file    Sexually Abused: Not on file   Housing Stability:     Unable to Pay for Housing in the Last Year: Not on file    Number of [place] in the Last Year: Not on file    Unstable Housing in the Last Year: Not on file       Allergies     No Known Allergies    Medications     Current Outpatient Medications   Medication Sig Dispense Refill    pregabalin (LYRICA) 75 MG capsule Take 1 capsule twice daily 180 capsule 1    fluticasone (FLONASE) 50 MCG/ACT nasal spray 2 sprays by Each Nostril route 2 times daily      apixaban (ELIQUIS) 5 MG TABS tablet Take 1 tablet by mouth 2 times daily 60 tablet 5    pantoprazole (PROTONIX) 40 MG tablet Take 1 tablet by mouth Daily with supper 30 tablet 1    blood glucose monitor strips Test once a day for symptoms of irregular blood glucose.  100 strip 3    amLODIPine (NORVASC) 5 MG tablet Take 1 tablet by mouth daily 90 tablet 3    insulin detemir (LEVEMIR FLEXTOUCH) 100 UNIT/ML injection pen Inject 24 Units into the skin nightly 10 pen 3    simvastatin (ZOCOR) 40 MG tablet Take 1 tablet by mouth daily 180 tablet 3    nateglinide (STARLIX) 120 MG tablet Take 1 tablet by mouth 2 times daily (before meals) 180 tablet 3    omega-3 acid ethyl esters (LOVAZA) 1 g capsule Take 1 capsule by mouth 2 times daily 180 capsule 3    SITagliptin (JANUVIA) 50 MG tablet Take 1 tablet by mouth daily 90 tablet 3    Insulin Pen Needle (B-D UF III MINI PEN NEEDLES) 31G X 5 MM MISC Use as directed 200 each 2    glucose monitoring kit (FREESTYLE) monitoring kit 1 kit by Does not apply route daily 1 kit 0    carboxymethylcellulose (REFRESH PLUS) 0.5 % SOLN ophthalmic solution Apply 2 drops to eye 3 times daily      sotalol (BETAPACE) 80 MG tablet TAKE 1 TABLET BY MOUTH TWICE A  tablet 3    triamcinolone is not perforated. Left Ear: Ear canal and external ear normal. No drainage. A middle ear effusion is present. Tympanic membrane is not perforated. Nose: No septal deviation, mucosal edema or rhinorrhea. Mouth/Throat:      Dentition: Normal dentition. Pharynx: Uvula midline. No oropharyngeal exudate. Eyes:      General: No scleral icterus. Right eye: No discharge. Left eye: No discharge. Pupils: Pupils are equal, round, and reactive to light. Neck:      Thyroid: No thyromegaly. Trachea: Phonation normal. No tracheal deviation. Pulmonary:      Effort: Pulmonary effort is normal. No respiratory distress. Breath sounds: No stridor. Musculoskeletal:      Cervical back: Neck supple. Lymphadenopathy:      Cervical: No cervical adenopathy. Skin:     General: Skin is warm and dry. Neurological:      Mental Status: He is alert and oriented to person, place, and time. Cranial Nerves: No cranial nerve deficit. Psychiatric:         Behavior: Behavior normal.           Procedure     Otomicroscopy    An operating microscope was utilized to visualize the external auditory canals using a 4mm speculum. The external auditory canals are clear. The tympanic membrane is intact. Ossicles appear intact. Left middle ear with some serous effusion        Assessment and Plan     1. Dysfunction of both eustachian tubes  Interval resolution of left hemotympanum. CT of the temporal bone images and report reviewed. CT reveals no evidence of vascular tumor within the left middle ear space. There is some scant mastoid effusions bilaterally. On exam today, the patient has a persistent left-sided middle ear effusion with resolution of the hemotympanum. He is currently 6 weeks out from his flight. At this time, I recommend we wait another 6 weeks to see if there is any resolution of the effusion.   If not, we will discuss placement of PE tube, likely in the operating room given the angulation of his ear canal.    2. 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 Fluid level behind tympanic membrane of left ear      4. Hematotympanum of left ear        Return in about 6 weeks (around 1/12/2022). Portions of this note were dictated using Dragon.  There may be linguistic errors secondary to the use of this program.

## 2021-12-01 NOTE — PROGRESS NOTES
undergo any procedure. A. Fib -denies palpitations, chest pain or pressure. He has continued to take the Eliquis. Follows up with a cardiologist regularly. Review of Systems       Objective   Physical Exam  Vitals reviewed. Constitutional:       General: He is not in acute distress. Appearance: Normal appearance. He is well-developed. HENT:      Head: Normocephalic and atraumatic. Cardiovascular:      Rate and Rhythm: Normal rate and regular rhythm. Heart sounds: Normal heart sounds. Pulmonary:      Effort: Pulmonary effort is normal. No respiratory distress. Breath sounds: Normal breath sounds. Skin:     General: Skin is warm and dry. Neurological:      Mental Status: He is alert. Psychiatric:         Behavior: Behavior normal.         Thought Content: Thought content normal.         Judgment: Judgment normal.                  An electronic signature was used to authenticate this note.     --Mariah Vicente MD

## 2021-12-02 PROBLEM — Z48.02 VISIT FOR SUTURE REMOVAL: Status: RESOLVED | Noted: 2021-08-04 | Resolved: 2021-12-02

## 2021-12-14 ENCOUNTER — NURSE ONLY (OUTPATIENT)
Dept: CARDIOLOGY CLINIC | Age: 86
End: 2021-12-14
Payer: OTHER GOVERNMENT

## 2021-12-14 DIAGNOSIS — I49.5 SICK SINUS SYNDROME (HCC): ICD-10-CM

## 2021-12-14 DIAGNOSIS — Z95.0 PACEMAKER: ICD-10-CM

## 2021-12-14 PROCEDURE — 93296 REM INTERROG EVL PM/IDS: CPT | Performed by: INTERNAL MEDICINE

## 2021-12-14 PROCEDURE — 93294 REM INTERROG EVL PM/LDLS PM: CPT | Performed by: INTERNAL MEDICINE

## 2021-12-15 NOTE — PROGRESS NOTES
We received remote transmission from patient's dual chamber pacemaker monitor at home. Transmission shows normal sensing and pacing function. Known AT/AF noted, 1.9% burden, 3 of 14 Pace-Terminated episodes (Eliquis, sotalol). EP physician will review. See interrogation under cardiology tab in the [address] [place] [address] [address] field for more details. Will continue to monitor remotely.

## 2022-01-03 DIAGNOSIS — K21.9 LARYNGOPHARYNGEAL REFLUX (LPR): ICD-10-CM

## 2022-01-04 DIAGNOSIS — K21.9 LARYNGOPHARYNGEAL REFLUX (LPR): ICD-10-CM

## 2022-01-04 RX ORDER — PANTOPRAZOLE SODIUM 40 MG/1
40 TABLET, DELAYED RELEASE ORAL
Qty: 30 TABLET | Refills: 1 | Status: SHIPPED | OUTPATIENT
Start: 2022-01-04 | End: 2022-01-05

## 2022-01-05 RX ORDER — PANTOPRAZOLE SODIUM 40 MG/1
40 TABLET, DELAYED RELEASE ORAL
Qty: 90 TABLET | Refills: 0 | Status: SHIPPED | OUTPATIENT
Start: 2022-01-05 | End: 2022-01-12 | Stop reason: SDUPTHER

## 2022-01-12 ENCOUNTER — OFFICE VISIT (OUTPATIENT)
Dept: ENT CLINIC | Age: 87
End: 2022-01-12
Payer: OTHER GOVERNMENT

## 2022-01-12 VITALS
HEART RATE: 82 BPM | DIASTOLIC BLOOD PRESSURE: 79 MMHG | BODY MASS INDEX: 28.04 KG/M2 | HEIGHT: 72 IN | SYSTOLIC BLOOD PRESSURE: 133 MMHG | WEIGHT: 207 LBS | TEMPERATURE: 97.2 F

## 2022-01-12 DIAGNOSIS — H69.83 DYSFUNCTION OF BOTH EUSTACHIAN TUBES: Primary | ICD-10-CM

## 2022-01-12 DIAGNOSIS — H65.92 FLUID LEVEL BEHIND TYMPANIC MEMBRANE OF LEFT EAR: ICD-10-CM

## 2022-01-12 DIAGNOSIS — K21.9 LARYNGOPHARYNGEAL REFLUX (LPR): ICD-10-CM

## 2022-01-12 PROCEDURE — 99213 OFFICE O/P EST LOW 20 MIN: CPT | Performed by: OTOLARYNGOLOGY

## 2022-01-12 PROCEDURE — 31575 DIAGNOSTIC LARYNGOSCOPY: CPT | Performed by: OTOLARYNGOLOGY

## 2022-01-12 RX ORDER — PANTOPRAZOLE SODIUM 40 MG/1
40 TABLET, DELAYED RELEASE ORAL
Qty: 90 TABLET | Refills: 0 | Status: SHIPPED | OUTPATIENT
Start: 2022-01-12 | End: 2022-02-08

## 2022-01-12 ASSESSMENT — ENCOUNTER SYMPTOMS
CHOKING: 0
TROUBLE SWALLOWING: 0
EYE PAIN: 0
EYE ITCHING: 0
STRIDOR: 0
COLOR CHANGE: 0
SORE THROAT: 0
PHOTOPHOBIA: 0
SHORTNESS OF BREATH: 0
SINUS PAIN: 0
RHINORRHEA: 0
DIARRHEA: 0
FACIAL SWELLING: 0
EYE REDNESS: 0
NAUSEA: 0
VOICE CHANGE: 0
COUGH: 0
SINUS PRESSURE: 0

## 2022-01-12 NOTE — PROGRESS NOTES
[facility]  [address], [address] [facility], [address]  P: [phone]  F: [phone]       Patient     Antony Martinez  1935    ChiefComplaint     Chief Complaint   Patient presents with    Follow-up     Patient is here today for his 6 week follow up, he thinks that his hearing has improved       History of Present Illness     Antony Martinez is a pleasant 82 y.o. male here for follow-up for LPR and serous otitis media. He states that the right ear is 95% resolved. The left ear is about 90% resolved. His hearing has significantly improved. He no longer hears any sloshing noise in the ear. No pressure or pain in the ears. No tinnitus or dizziness. He has been taking the pantoprazole. He continues to experience a globus sensation, particularly when he lays back. This prevents him from laying on his back. He has not had any side effects from the Protonix. Denies change in voice or trouble swallowing.     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    CARPAL TUNNEL RELEASE      CATARACT REMOVAL Bilateral     CORONARY ANGIOPLASTY WITH STENT PLACEMENT  2005    CYSTOSCOPY  9/26/12    coagulation prostatic urethra    CYSTOSCOPY  10/8/15    TURP    FINGER SURGERY      X7    FINGER TRIGGER RELEASE      OTHER SURGICAL HISTORY Left [id number]    WIDE LOCAL EXCISION LEFT ARM MELANOMA, WIDE LOCAL EXCISION OF    SHOULDER SURGERY Bilateral     SPINAL FUSION      TURP  3-7-13       Family History     Family History   Problem Relation Age of Onset    Heart Disease Neg Hx     High Blood Pressure Neg Hx     High Cholesterol Neg Hx        Social History     Social History     Socioeconomic History    Marital status:      Spouse name: Not on file    Number of children: Not on file    Years of education: Not on file    Highest education level: Not on file   Occupational History    Not on file   Tobacco Use    Smoking status: Former Smoker     Quit date: 1974     Years since quittin.0    Smokeless tobacco: Never Used    Tobacco comment: QUIT SMOKING    Vaping Use    Vaping Use: Never used   Substance and Sexual Activity    Alcohol use: Yes     Alcohol/week: 2.0 standard drinks     Types: 2 Glasses of wine per week     Comment: rare     Drug use: No    Sexual activity: Yes     Partners: Female     Comment:     Other Topics Concern    Not on file   Social History Narrative    Not on file     Social Determinants of Health     Financial Resource Strain: Low Risk     Difficulty of Paying Living Expenses: Not hard at all   Food Insecurity: No Food Insecurity    Worried About [employer] [employer] in the Last Year: Never true    [address]  in the Last Year: Never true   Transportation Needs:     Lack of Transportation (Medical): Not on file    Lack of Transportation (Non-Medical):  Not on file   Physical Activity:     Days of Exercise per Week: Not on file    Minutes of Exercise per Session: Not on file   Stress:     Feeling of Stress : Not on file   Social Connections:     Frequency of Communication with Friends and Family: Not on file    Frequency of Social Gatherings with Friends and Family: Not on file    Attends Taoism Services: Not on file    Active Member of Clubs or Organizations: Not on file    Attends Club or Organization Meetings: Not on file    Marital Status: Not on file Intimate Partner Violence:     Fear of Current or Ex-Partner: Not on file    Emotionally Abused: Not on file    Physically Abused: Not on file    Sexually Abused: Not on file   Housing Stability:     Unable to Pay for Housing in the Last Year: Not on file    Number of [place] in the Last Year: Not on file    Unstable Housing in the Last Year: Not on file       Allergies     No Known Allergies    Medications     Current Outpatient Medications   Medication Sig Dispense Refill    pantoprazole (PROTONIX) 40 MG tablet Take 1 tablet by mouth Daily with supper 90 tablet 0    pregabalin (LYRICA) 75 MG capsule Take 1 capsule twice daily 180 capsule 1    fluticasone (FLONASE) 50 MCG/ACT nasal spray 2 sprays by Each Nostril route 2 times daily      apixaban (ELIQUIS) 5 MG TABS tablet Take 1 tablet by mouth 2 times daily 60 tablet 5    blood glucose monitor strips Test once a day for symptoms of irregular blood glucose.  100 strip 3    amLODIPine (NORVASC) 5 MG tablet Take 1 tablet by mouth daily 90 tablet 3    insulin detemir (LEVEMIR FLEXTOUCH) 100 UNIT/ML injection pen Inject 24 Units into the skin nightly 10 pen 3    simvastatin (ZOCOR) 40 MG tablet Take 1 tablet by mouth daily 180 tablet 3    nateglinide (STARLIX) 120 MG tablet Take 1 tablet by mouth 2 times daily (before meals) 180 tablet 3    omega-3 acid ethyl esters (LOVAZA) 1 g capsule Take 1 capsule by mouth 2 times daily 180 capsule 3    SITagliptin (JANUVIA) 50 MG tablet Take 1 tablet by mouth daily 90 tablet 3    Insulin Pen Needle (B-D UF III MINI PEN NEEDLES) 31G X 5 MM MISC Use as directed 200 each 2    glucose monitoring kit (FREESTYLE) monitoring kit 1 kit by Does not apply route daily 1 kit 0    carboxymethylcellulose (REFRESH PLUS) 0.5 % SOLN ophthalmic solution Apply 2 drops to eye 3 times daily      sotalol (BETAPACE) 80 MG tablet TAKE 1 TABLET BY MOUTH TWICE A  tablet 3    triamcinolone (KENALOG) 0.1 % cream Apply to itchy areas on the arms and legs twice daily for up to 2 weeks or until improved. 80 g 2    fluorouracil (EFUDEX) 5 % cream Apply twice daily to affected area on the right cheek for 2 weeks. 40 g 0    Loratadine (CLARITIN PO) Take by mouth Indications: Couple times a week      vitamin D (ERGOCALCIFEROL) 1.25 MG (09965 UT) CAPS capsule Take 50,000 Units by mouth once a week Mondays      Ascorbic Acid (VITAMIN C) 500 MG CAPS Take by mouth nightly Indications: Three times a week  Monday, Wednesday, Friday       Multiple Vitamins-Minerals (CENTRUM SILVER) TABS Take 1 tablet by mouth daily       ALPHA LIPOIC ACID PO Take 100 mg by mouth daily        No current facility-administered medications for this visit. Review of Systems     Review of Systems   Constitutional: Negative for chills, fatigue and fever. HENT: Negative for congestion, ear discharge, ear pain, facial swelling, hearing loss, nosebleeds, postnasal drip, rhinorrhea, sinus pressure, sinus pain, sneezing, sore throat, tinnitus, trouble swallowing and voice change. Eyes: Negative for photophobia, pain, redness, itching and visual disturbance. Respiratory: Negative for cough, choking, shortness of breath and stridor. Gastrointestinal: Negative for diarrhea and nausea. Musculoskeletal: Negative for neck pain and neck stiffness. Skin: Negative for color change and rash. Neurological: Negative for dizziness, facial asymmetry and light-headedness. Hematological: Negative for adenopathy. Psychiatric/Behavioral: Negative for agitation and confusion. PhysicalExam     Vitals:    01/12/22 1255   BP: 133/79   Pulse: 82   Temp: 97.2 °F (36.2 °C)       Physical Exam  Constitutional:       Appearance: He is well-developed. HENT:      Head: Normocephalic and atraumatic. Jaw: No trismus. Right Ear: Tympanic membrane, ear canal and external ear normal. No drainage. No middle ear effusion. Tympanic membrane is not perforated. Left Ear: Tympanic membrane, ear canal and external ear normal. No drainage. No middle ear effusion. Tympanic membrane is not perforated. Nose: No septal deviation, mucosal edema or rhinorrhea. Mouth/Throat:      Dentition: Normal dentition. Pharynx: Uvula midline. No oropharyngeal exudate. Eyes:      General: No scleral icterus. Right eye: No discharge. Left eye: No discharge. Pupils: Pupils are equal, round, and reactive to light. Neck:      Thyroid: No thyromegaly. Trachea: Phonation normal. No tracheal deviation. Pulmonary:      Effort: Pulmonary effort is normal. No respiratory distress. Breath sounds: No stridor. Musculoskeletal:      Cervical back: Neck supple. Lymphadenopathy:      Cervical: No cervical adenopathy. Skin:     General: Skin is warm and dry. Neurological:      Mental Status: He is alert and oriented to person, place, and time. Cranial Nerves: No cranial nerve deficit. Psychiatric:         Behavior: Behavior normal.           Procedure     Otomicroscopy    An operating microscope was utilized to visualize the external auditory canals using a 4mm speculum. The external auditory canals are clear. The tympanic membrane is intact. Ossicles appear intact. No fluid visualized in the middle ear. Flexible Laryngoscopy    Pre op: LPR, globus. Procedure : Flexible Laryngoscopy  Surgeon: Xin Blunt DO  Anesthesia: Afrin with 4% lidocaine  Indication: Laryngeal mirror examination was not tolerated due to gag reflex  Description:  The scope was passed along the floor of the right naris to the level of the larynx. There was no evidence of concerning masses or lesions of the base of tongue, vallecula, epiglottis, aryepiglottic folds, arytenoids, false vocal folds, true vocal folds, or pyriform sinuses. True vocal folds exhibited symmetric motion bilaterally without evidence of paralysis or paresis. The scope was removed.  The patient tolerated the procedure without difficulty. There were no complications. Pertinent findings: Moderate arytenoid erythema, moderate postcricoid edema         Assessment and Plan     1. Dysfunction of both eustachian tubes  Interval resolution of left-sided middle ear effusion. Patient ears feel about 90% improved. No need for PE tube placement at this time. Patient is pleased with progress. 2. Fluid level behind tympanic membrane of left ear      3. Laryngopharyngeal reflux (LPR)  Patient experiences consistent symptoms of globus sensation. He does think that there has been some gradual improvement using the Protonix. I recommend to continue the medication. Additionally will add Gaviscon after meals. Follow-up in 1 month. If symptoms persist, will consider SLP referral.  - pantoprazole (PROTONIX) 40 MG tablet; Take 1 tablet by mouth Daily with supper  Dispense: 90 tablet; Refill: 0      Return in about 4 weeks (around 2/9/2022). Portions of this note were dictated using Dragon.  There may be linguistic errors secondary to the use of this program.

## 2022-01-27 ENCOUNTER — TELEPHONE (OUTPATIENT)
Dept: CARDIOLOGY CLINIC | Age: 87
End: 2022-01-27

## 2022-01-27 RX ORDER — SOTALOL HYDROCHLORIDE 80 MG/1
TABLET ORAL
Qty: 180 TABLET | Refills: 3 | Status: SHIPPED | OUTPATIENT
Start: 2022-01-27 | End: 2022-02-08

## 2022-01-27 NOTE — TELEPHONE ENCOUNTER
Yessy Aguayo    Strength:80 mg     Directions:One tablet by mouth twice a day     Last visit :09/29/2021    Next Visit :04/07/2022    Last fill:02/03/2021    Labs: 07/02/2021 Creatinine/ Protein/ Magnesium/ Urinalysis/ Vit D/ Testosterone/ CMP/ Liid/ Hemoglobin/ CBC

## 2022-02-01 DIAGNOSIS — I49.5 SICK SINUS SYNDROME (HCC): ICD-10-CM

## 2022-02-01 DIAGNOSIS — I48.0 PAROXYSMAL ATRIAL FIBRILLATION (HCC): Primary | ICD-10-CM

## 2022-02-01 DIAGNOSIS — R00.2 PALPITATIONS: ICD-10-CM

## 2022-02-01 DIAGNOSIS — I47.1 ATRIAL TACHYCARDIA (HCC): ICD-10-CM

## 2022-02-01 RX ORDER — SOTALOL HYDROCHLORIDE 80 MG/1
80 TABLET ORAL 2 TIMES DAILY
Qty: 180 TABLET | Refills: 3 | Status: SHIPPED | OUTPATIENT
Start: 2022-02-01 | End: 2022-02-08 | Stop reason: SDUPTHER

## 2022-02-01 NOTE — PROGRESS NOTES
sotalol (BETAPACE) 80 MG tablet [[id number]]     Order Details  Dose, Route, Frequency: As Directed   Dispense Quantity: 180 tablet Refills: 3          Sig: TAKE 1 TABLET BY MOUTH TWICE A DAY           RX Re sent to pt preferred Rx, [facility]/pharmacy [address] [facility], [place] [address]

## 2022-02-01 NOTE — TELEPHONE ENCOUNTER
Medication sent to the wrong pharmacy, please resend to     [address], [address] [facility] [phone]   [address] [facility] [address]

## 2022-02-08 ENCOUNTER — TELEPHONE (OUTPATIENT)
Dept: CARDIOLOGY CLINIC | Age: 87
End: 2022-02-08

## 2022-02-08 DIAGNOSIS — K21.9 LARYNGOPHARYNGEAL REFLUX (LPR): ICD-10-CM

## 2022-02-08 DIAGNOSIS — I48.0 PAROXYSMAL ATRIAL FIBRILLATION (HCC): ICD-10-CM

## 2022-02-08 DIAGNOSIS — R00.2 PALPITATIONS: ICD-10-CM

## 2022-02-08 DIAGNOSIS — I49.5 SICK SINUS SYNDROME (HCC): ICD-10-CM

## 2022-02-08 DIAGNOSIS — I47.1 ATRIAL TACHYCARDIA (HCC): ICD-10-CM

## 2022-02-08 RX ORDER — PANTOPRAZOLE SODIUM 40 MG/1
40 TABLET, DELAYED RELEASE ORAL
Qty: 90 TABLET | Refills: 0 | Status: SHIPPED | OUTPATIENT
Start: 2022-02-08 | End: 2022-06-01 | Stop reason: SDUPTHER

## 2022-02-08 RX ORDER — SOTALOL HYDROCHLORIDE 80 MG/1
80 TABLET ORAL 2 TIMES DAILY
Qty: 180 TABLET | Refills: 3 | Status: SHIPPED | OUTPATIENT
Start: 2022-02-08

## 2022-02-08 NOTE — TELEPHONE ENCOUNTER
Last appt 9-29-21  Next appt  4-7-22      This medication was sent to the wrong pharmacy in January should go to [facility]     sotalol (BETAPACE) 80 MG tablet  Take 1 tablet by mouth 2 times daily, Disp-180 tablet, R-3  Normal Last Dose: Not Recorded  Refills:3 ordered     Pharmacy:[facility]/pharmacy [facility] [address] [facility], [address] [facility] [place] -  [phone]

## 2022-02-16 ENCOUNTER — OFFICE VISIT (OUTPATIENT)
Dept: ENT CLINIC | Age: 87
End: 2022-02-16
Payer: OTHER GOVERNMENT

## 2022-02-16 VITALS
WEIGHT: 207 LBS | BODY MASS INDEX: 28.04 KG/M2 | DIASTOLIC BLOOD PRESSURE: 73 MMHG | HEART RATE: 76 BPM | TEMPERATURE: 97.5 F | SYSTOLIC BLOOD PRESSURE: 122 MMHG | HEIGHT: 72 IN

## 2022-02-16 DIAGNOSIS — H65.92 FLUID LEVEL BEHIND TYMPANIC MEMBRANE OF LEFT EAR: ICD-10-CM

## 2022-02-16 DIAGNOSIS — H69.83 DYSFUNCTION OF BOTH EUSTACHIAN TUBES: ICD-10-CM

## 2022-02-16 DIAGNOSIS — K21.9 LARYNGOPHARYNGEAL REFLUX (LPR): Primary | ICD-10-CM

## 2022-02-16 DIAGNOSIS — R49.0 DYSPHONIA: ICD-10-CM

## 2022-02-16 PROCEDURE — 99213 OFFICE O/P EST LOW 20 MIN: CPT | Performed by: OTOLARYNGOLOGY

## 2022-02-16 ASSESSMENT — ENCOUNTER SYMPTOMS
SINUS PRESSURE: 0
COUGH: 0
SINUS PAIN: 0
PHOTOPHOBIA: 0
COLOR CHANGE: 0
STRIDOR: 0
VOICE CHANGE: 1
EYE REDNESS: 0
NAUSEA: 0
SHORTNESS OF BREATH: 0
FACIAL SWELLING: 0
SORE THROAT: 0
CHOKING: 0
EYE PAIN: 0
EYE ITCHING: 0
RHINORRHEA: 0
DIARRHEA: 0
TROUBLE SWALLOWING: 0

## 2022-02-16 NOTE — PROGRESS NOTES
[facility]  [address] [address] [facility], [address] [place] [address], [address] [place] [address] Ave  P: [phone]  F: [phone]       Patient     Antony Martinez  1935    ChiefComplaint     Chief Complaint   Patient presents with    Follow-up     Patient is here today for his 1 month follow up, he is clearing his throat all the time    Hearing Problem     his left ear is 99% back to the way it was       History of Present Illness     Antony Martinez is a pleasant 82 y.o. male here for 1 month follow-up for LPR, otitis media. Patient is still experiencing persistent globus sensation and significant chronic throat clearing. When he lays on his back, he feels like there is a large ball of mucus in his throat. It is very irritating for him. He has been taking pantoprazole daily with minimal improvement. He has used Gaviscon intermittently. He does endorse drinking 5 to 6 glasses of wine a week, snacking on chocolate, and making fruit smoothies daily for breakfast that do include pineapple. He does have some increased rough gravelly voice.     His ear feels essentially back to normal.    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    CARPAL TUNNEL RELEASE      CATARACT REMOVAL Bilateral     CORONARY ANGIOPLASTY WITH STENT PLACEMENT  2005    CYSTOSCOPY  9/26/12    coagulation prostatic urethra    CYSTOSCOPY  10/8/15    TURP    FINGER SURGERY      X7    FINGER TRIGGER RELEASE      OTHER SURGICAL HISTORY Left [id number]    WIDE LOCAL EXCISION LEFT ARM MELANOMA, WIDE LOCAL EXCISION OF    SHOULDER SURGERY Bilateral     SPINAL FUSION      TURP  3-7-13       Family History     Family History   Problem Relation Age of Onset    Heart Disease Neg Hx     High Blood Pressure Neg Hx     High Cholesterol Neg Hx        Social History     Social History     Socioeconomic History    Marital status:      Spouse name: Not on file    Number of children: Not on file    Years of education: Not on file    Highest education level: Not on file   Occupational History    Not on file   Tobacco Use    Smoking status: Former Smoker     Quit date: 1974     Years since quittin.1    Smokeless tobacco: Never Used    Tobacco comment: QUIT SMOKING    Vaping Use    Vaping Use: Never used   Substance and Sexual Activity    Alcohol use: Yes     Alcohol/week: 2.0 standard drinks     Types: 2 Glasses of wine per week     Comment: rare     Drug use: No    Sexual activity: Yes     Partners: Female     Comment:     Other Topics Concern    Not on file   Social History Narrative    Not on file     Social Determinants of Health     Financial Resource Strain: Low Risk     Difficulty of Paying Living Expenses: Not hard at all   Food Insecurity: No Food Insecurity    Worried About [address] [facility] in the Last Year: Never true    [address] [facility] in the Last Year: Never true   Transportation Needs:     Lack of Transportation (Medical): Not on file    Lack of Transportation (Non-Medical):  Not on file   Physical Activity:     Days of Exercise per Week: Not on file    Minutes of Exercise per Session: Not on file   Stress:     Feeling of Stress : Not on file   Social Connections:     Frequency of Communication with Friends and Family: Not on file    Frequency of Social Gatherings with Friends and Family: Not on file    Attends Evangelical Services: Not on file   Theodora Kruse Active Member of Clubs or Organizations: Not on file    Attends Club or Organization Meetings: Not on file    Marital Status: Not on file   Intimate Partner Violence:     Fear of Current or Ex-Partner: Not on file    Emotionally Abused: Not on file    Physically Abused: Not on file    Sexually Abused: Not on file   Housing Stability:     Unable to Pay for Housing in the Last Year: Not on file    Number of [place] in the Last Year: Not on file    Unstable Housing in the Last Year: Not on file       Allergies     No Known Allergies    Medications     Current Outpatient Medications   Medication Sig Dispense Refill    pantoprazole (PROTONIX) 40 MG tablet TAKE 1 TABLET BY MOUTH DAILY WITH SUPPER 90 tablet 0    sotalol (BETAPACE) 80 MG tablet Take 1 tablet by mouth 2 times daily 180 tablet 3    pregabalin (LYRICA) 75 MG capsule Take 1 capsule twice daily 180 capsule 1    fluticasone (FLONASE) 50 MCG/ACT nasal spray 2 sprays by Each Nostril route 2 times daily      apixaban (ELIQUIS) 5 MG TABS tablet Take 1 tablet by mouth 2 times daily 60 tablet 5    blood glucose monitor strips Test once a day for symptoms of irregular blood glucose.  100 strip 3    amLODIPine (NORVASC) 5 MG tablet Take 1 tablet by mouth daily 90 tablet 3    insulin detemir (LEVEMIR FLEXTOUCH) 100 UNIT/ML injection pen Inject 24 Units into the skin nightly 10 pen 3    simvastatin (ZOCOR) 40 MG tablet Take 1 tablet by mouth daily 180 tablet 3    nateglinide (STARLIX) 120 MG tablet Take 1 tablet by mouth 2 times daily (before meals) 180 tablet 3    omega-3 acid ethyl esters (LOVAZA) 1 g capsule Take 1 capsule by mouth 2 times daily 180 capsule 3    SITagliptin (JANUVIA) 50 MG tablet Take 1 tablet by mouth daily 90 tablet 3    Insulin Pen Needle (B-D UF III MINI PEN NEEDLES) 31G X 5 MM MISC Use as directed 200 each 2    glucose monitoring kit (FREESTYLE) monitoring kit 1 kit by Does not apply route daily 1 kit 0    carboxymethylcellulose (REFRESH PLUS) 0.5 % SOLN ophthalmic solution Apply 2 drops to eye 3 times daily      triamcinolone (KENALOG) 0.1 % cream Apply to itchy areas on the arms and legs twice daily for up to 2 weeks or until improved. 80 g 2    fluorouracil (EFUDEX) 5 % cream Apply twice daily to affected area on the right cheek for 2 weeks. 40 g 0    Loratadine (CLARITIN PO) Take by mouth Indications: Couple times a week      vitamin D (ERGOCALCIFEROL) 1.25 MG (69172 UT) CAPS capsule Take 50,000 Units by mouth once a week Mondays      Ascorbic Acid (VITAMIN C) 500 MG CAPS Take by mouth nightly Indications: Three times a week  Monday, Wednesday, Friday       Multiple Vitamins-Minerals (CENTRUM SILVER) TABS Take 1 tablet by mouth daily       ALPHA LIPOIC ACID PO Take 100 mg by mouth daily        No current facility-administered medications for this visit. Review of Systems     Review of Systems   Constitutional: Negative for chills, fatigue and fever. HENT: Positive for voice change. Negative for congestion, ear discharge, ear pain, facial swelling, hearing loss, nosebleeds, postnasal drip, rhinorrhea, sinus pressure, sinus pain, sneezing, sore throat, tinnitus and trouble swallowing. Eyes: Negative for photophobia, pain, redness, itching and visual disturbance. Respiratory: Negative for cough, choking, shortness of breath and stridor. Gastrointestinal: Negative for diarrhea and nausea. Musculoskeletal: Negative for neck pain and neck stiffness. Skin: Negative for color change and rash. Neurological: Negative for dizziness, facial asymmetry and light-headedness. Hematological: Negative for adenopathy. Psychiatric/Behavioral: Negative for agitation and confusion. PhysicalExam     Vitals:    02/16/22 1257   BP: 122/73   Pulse: 76   Temp: 97.5 °F (36.4 °C)       Physical Exam  Constitutional:       Appearance: He is well-developed.    HENT:      Head: Normocephalic and atraumatic. Jaw: No trismus. Right Ear: Tympanic membrane, ear canal and external ear normal. No drainage. No middle ear effusion. Tympanic membrane is not perforated. Left Ear: Tympanic membrane, ear canal and external ear normal. No drainage. No middle ear effusion. Tympanic membrane is not perforated. Nose: No septal deviation, mucosal edema or rhinorrhea. Mouth/Throat:      Dentition: Normal dentition. Pharynx: Uvula midline. No oropharyngeal exudate. Eyes:      General: No scleral icterus. Right eye: No discharge. Left eye: No discharge. Pupils: Pupils are equal, round, and reactive to light. Neck:      Thyroid: No thyromegaly. Trachea: Phonation normal. No tracheal deviation. Pulmonary:      Effort: Pulmonary effort is normal. No respiratory distress. Breath sounds: No stridor. Musculoskeletal:      Cervical back: Neck supple. Lymphadenopathy:      Cervical: No cervical adenopathy. Skin:     General: Skin is warm and dry. Neurological:      Mental Status: He is alert and oriented to person, place, and time. Cranial Nerves: No cranial nerve deficit. Psychiatric:         Behavior: Behavior normal.           Procedure           Assessment and Plan     1. Laryngopharyngeal reflux (LPR)  The patient is experiencing persistent globus sensation and throat clearing that is particularly bothersome. Discussed further lifestyle modifications including decreasing chocolate, wine, pineapple intake. Encouraged him to take Gaviscon after each meal.  Continue pantoprazole. Will refer to SLP for further evaluation. 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 Follow-up with me in 2 months. - [address], SLP - Speech Therapy - [place]    2. Fluid level behind tympanic membrane of left ear      3. Dysfunction of both eustachian tubes      4.  Dysphonia        Return in about 2 months (around 4/16/2022). Portions of this note were dictated using Dragon.  There may be linguistic errors secondary to the use of this program.

## 2022-02-21 ENCOUNTER — PROCEDURE VISIT (OUTPATIENT)
Dept: SPEECH THERAPY | Age: 87
End: 2022-02-21
Payer: OTHER GOVERNMENT

## 2022-02-21 DIAGNOSIS — J38.3 VOCAL CORD BOWING: ICD-10-CM

## 2022-02-21 DIAGNOSIS — R49.0 DYSPHONIA: ICD-10-CM

## 2022-02-21 DIAGNOSIS — R49.0 MUSCLE TENSION DYSPHONIA: ICD-10-CM

## 2022-02-21 DIAGNOSIS — R05.3 CHRONIC COUGH: ICD-10-CM

## 2022-02-21 PROCEDURE — 92507 TX SP LANG VOICE COMM INDIV: CPT | Performed by: SPEECH-LANGUAGE PATHOLOGIST

## 2022-02-21 PROCEDURE — 92524 BEHAVRAL QUALIT ANALYS VOICE: CPT | Performed by: SPEECH-LANGUAGE PATHOLOGIST

## 2022-02-21 PROCEDURE — 31579 LARYNGOSCOPY TELESCOPIC: CPT | Performed by: SPEECH-LANGUAGE PATHOLOGIST

## 2022-02-21 NOTE — PROGRESS NOTES
[address] ENT  Videostroboscopic Examination of the Larynx    BACKGROUND HISTORY:  Pt referred by ENT for assessment of chronic throat clearing and globus sensation; has trialed reflux management w/o improvement. Hx of Barretts esophagus. Not currently taking reflux medication but using Gaviscon intermittently after meals. Reported sensation of thick pharyngeal sputum that requires throat clear. Feels cough is worse when he lays down at night or even when attempting to sleep reclined. Unable to identify any additional triggers that make cough better/worse. Does feel occasional PND; not currently using any nasal sprays w/ exception of intermittent saline spray. Denied dysphagia or dyspnea. Endorsed dysphonia characterized by hoarseness that started \"a few years ago\". More concerned regarding throat clearing; feels it greatly impacts QOL. [place] is [place] and has a difficult time w/ word recognition. Surgical/Medical History: See the above. Hydration: <64oz of water; 4-6oz of wine nightly  Smoking History: NA  Caffeine Intake: 1-2 cups of coffee    PER ENT NOTE, Dr. Addie Beyer, 2/16/22: The patient is experiencing persistent globus sensation and throat clearing that is particularly bothersome. Discussed further lifestyle modifications including decreasing chocolate, wine, pineapple intake. Encouraged him to take Gaviscon after each meal.  Continue pantoprazole. Will refer to SLP for further evaluation. 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 Perceptual Quality: Pt presented with mild dysphonia characterized by lowered modal pitch and roughness. Flexible Stroboscopy Laryngoscopy  Procedure : Flexible Stroboscopy Laryngoscopy  Performed by: ILAN Ivey  Anesthesia: Afrin with 4% lidocaine  Description:  The scope was passed along the floor of the right naris to the level of the larynx.  There was no evidence of concerning masses or lesions of adhere to reflux management protocol during daily life activities w/ 80% acc given mod cues  Pt will perform SOVT techniques during various voicing tasks w/ 80% acc given mod cues  Pt will perform resistive/abdominal breathing exercises at rest and during phonation w/ 80% acc given mod cues  Pt will complete laryngeal/general relaxation stretches/exercises w/ 80% acc given mod cues    SLP recommended: Yes  Barriers to treatment: None  Potential benefits from rehab include: Laryngeal rehabilitation for reduction of throat clearing  Frequency: 4 sessions over 6-8/wk  Prognosis is: Good    RECOMMENDATIONS:   1. Dr. Renny Levin was present and reviewed recorded evaluation to assist in diagnosis and provide assessment and plan for treatment. 2. Follow good vocal hygiene behaviors and precautions, including increasing oral hydration. 3. Follow dietary precautions and behavioral lifestyle changes regarding laryngopharyngeal reflux, including taking PPI as prescribed by physician. 4. Voice therapy to focus on re-strengthening and balancing the laryngeal musculature, to promote to open oral front focus, to promote using adequate diaphragmatic breath support to sustain conversational speech. 5. Pt is a good candidate for further medical evaluation/intervention at the discretion of the treating physician. 6. Pt to follow up with ENT/Dr. Renny Levin.       CPT Code Units Billed Time Billed Today Date of POC Start Re-Certification Date Referring Provider   21781, 63455 2 Unit Time in: 1400   Time out: 1440  Total time: 40 min 2/21/2022 60 days Dr. Renny Levin       Thank you,    Riya Leonard) [place], [place], Collene Pitch; [id number]  Voice Specialized Speech-Language Pathologist

## 2022-03-08 ENCOUNTER — PROCEDURE VISIT (OUTPATIENT)
Dept: SPEECH THERAPY | Age: 87
End: 2022-03-08
Payer: OTHER GOVERNMENT

## 2022-03-08 DIAGNOSIS — K21.9 LARYNGOPHARYNGEAL REFLUX (LPR): ICD-10-CM

## 2022-03-08 DIAGNOSIS — R49.0 DYSPHONIA: ICD-10-CM

## 2022-03-08 DIAGNOSIS — R05.3 CHRONIC COUGH: ICD-10-CM

## 2022-03-08 PROCEDURE — 92507 TX SP LANG VOICE COMM INDIV: CPT | Performed by: SPEECH-LANGUAGE PATHOLOGIST

## 2022-03-08 NOTE — PROGRESS NOTES
[facility]) ENT  Voice Therapy      Pt Seen for Therapy - Session # 2     Diagnosis/Indication:   Primary: Dysphonia  Secondary: Chronic Cough  Tertiary: LPR    Background History:   Pt referred by ENT for assessment of chronic throat clearing and globus sensation; has trialed reflux management w/o improvement. Hx of Barretts esophagus. Not currently taking reflux medication but using Gaviscon intermittently after meals. Reported sensation of thick pharyngeal sputum that requires throat clear. Feels cough is worse when he lays down at night or even when attempting to sleep reclined. Unable to identify any additional triggers that make cough better/worse. Does feel occasional PND; not currently using any nasal sprays w/ exception of intermittent saline spray. Denied dysphagia or dyspnea. Endorsed dysphonia characterized by hoarseness that started \"a few years ago\". More concerned regarding throat clearing; feels it greatly impacts QOL. Pt is [place] and has a difficult time w/ word recognition. Previous SLP Evaluations: 2/21/22  VLS revealed copious, frothy secretions t/o pharynx that did clear w/ sequential swallows; remained present in valleculae. Noted to have bilateral bowing of TVFs resulting in spindle shaped closure; functional mucosal wave and periodicity. Persistent supraglottic compression present (LM>AP) including R AE fold compression during respiration resulting in intermittent false fold phonation. SUBJECTIVE:   Pt endorsed 20% improvement in symptoms at this time; feels decreased mucus which has assisted. Focusing on not \"hacking\" as much when needing to clear throat. OBJECTIVE:   Voice Therapy    Goal: Session 2 Session 3 Session 4   Pt will adhere to vocal hygiene protocol during daily life activities w/ 80% acc   Pt adhered to vocal hygiene protocol w/ 65% acc    Pt endorsed intermittent completion of exercise since last session; unsure of goal of technique.  Educated to reduction of tension and irritation; expressed understanding. Pt will adhere to reflux management protocol during daily life activities w/ 80% acc   Pt adhered to reflux management protocol w/ 65% acc    Using Gaviscon after meals but feels cough is worse when reclining or laying down at night. Encouraged to start using prior to bed for additional barrier; expressed understanding. Pt will perform SOVT techniques during various voicing tasks w/ 80% acc    Goal not targeted this session. Pt will perform cough suppression techniques during daily life activities w/ 80% acc Pt performed cough suppression techniques w/ 70% acc    Educated to reasoning behind techniques, specifically reduction of cycle of irritation and use of techniques to stop tickle/urge to clear. Pt reported deep breathing does occasionally assist to reduce irritation, therefor feels resistive breathing techniques may be effective. ASSESSMENT:      82 y.o. male w/ hx of chronic cough; reported mild improvement in symptoms at this time. Feels decreased mucus and focusing on reducing \"hacking\" during throat clearing. Implementing reflux management. Introduced to cough suppression and resistive breathing techniques for ongoing reduction in cycle of irritation and chronic throat clearing; pt able to perform all techniques w/o difficulties and expressed understanding to reasoning behind techniques. Encouraged to begin using during daily life activities. Overall, pt is progressing towards goals and prognosis remains good w/ adherence to all recommendations. Ongoing tx warranted to ensure appropriate implementation of techniques. RECOMMENDATIONS:      1.  Follow HEP and RTC for ongoing VOT  2.   RTC in 3 weeks    CPT Code Units Billed Time Billed Today Date of POC Start Re-Certification Date Referring Provider   [id number] 1 Unit Time in: 1400  Time out: 1430  Total time: 30 min 2/21/22 60 days Dr. Renny Levin       Thank you,    Juan [facility]) Nik Scruggs [place], [id number] [facility]; [id number]  Voice Specialized Speech-Language Pathologist

## 2022-03-15 ENCOUNTER — NURSE ONLY (OUTPATIENT)
Dept: CARDIOLOGY CLINIC | Age: 87
End: 2022-03-15
Payer: OTHER GOVERNMENT

## 2022-03-15 DIAGNOSIS — I49.5 SICK SINUS SYNDROME (HCC): ICD-10-CM

## 2022-03-15 DIAGNOSIS — Z95.0 PACEMAKER: ICD-10-CM

## 2022-03-15 DIAGNOSIS — I48.0 PAROXYSMAL ATRIAL FIBRILLATION (HCC): Chronic | ICD-10-CM

## 2022-03-15 PROCEDURE — 93294 REM INTERROG EVL PM/LDLS PM: CPT | Performed by: INTERNAL MEDICINE

## 2022-03-15 PROCEDURE — 93296 REM INTERROG EVL PM/IDS: CPT | Performed by: INTERNAL MEDICINE

## 2022-03-24 NOTE — PROGRESS NOTES
[facility]   Cardiac Electrophysiology Consultation   Date: 3/24/2022  Reason for Consultation:   Consult Requesting Physician: No att. providers found     Chief Complaint: No chief complaint on file. HPI: Peggy Crane is a 82 y.o. 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 Interval History: Today, he is here for 6 mo f/u. He is doing reasonably well with occasional palpitations. He does have 5% ventricular pacing. He also have recurrent episodes of atrial fibrillation. He is on sotalol. His EKG shows normal sinus rhythm. His QTC is within normal range. Device interrogation today shows normally functioning PPM with stable sensing and pacing thresholds. Battery life 11.5 years  P wave 1.8mV               R wave 10.1 mV  Underlying Khanh, occasional 2:1 block at 38 bpm.   AP 73.7%.  5.9%. AT/AF burden 1.2%  PVCs 8/hr  Since 9.29.2021:  -22 treated AF episodes. 4 of 22 successfully pace terminated. Recent 3.20.2022 x 6 hrs.   -10 Monitored AT/AF. Recent 3.23.2022    Assessment:  1. Syncope / SSS, s/p dual chamber PPM 4/2020  2. PAF, on sotalol and Eliquis  3. HTN, stable on amlodipine  4. CAD  5. HLD, on statin  6. DM    Plan:  1. Continue his current medications  2. Check transthoracic echocardiogram to evaluate his LV ejection fraction.       Past Medical History:   Diagnosis Date    Arrhythmia     Arthritis     hands shoulders neck    Atrial fibrillation (HCC)     coumadin    Atrial tachycardia (HCC)     CAD (coronary artery disease)     Cancer (HCC)     skin    Diabetes mellitus ([facility] [address]     Diverticulitis     Enlarged prostate     Hyperlipidemia     Hypertension     Pacemaker 04/20/2020    Pneumonia     ABOUT 5 YEARS AGO    Vasodepressor syncope         Past Surgical History:   Procedure Laterality Date    ABLATION OF DYSRHYTHMIC FOCUS      AVNRT and Atrial tachycardia    CARPAL TUNNEL RELEASE      CATARACT REMOVAL Bilateral     CORONARY ANGIOPLASTY WITH STENT PLACEMENT  2005    CYSTOSCOPY  9/26/12    coagulation prostatic urethra    CYSTOSCOPY  10/8/15    TURP    FINGER SURGERY      X7    FINGER TRIGGER RELEASE      OTHER SURGICAL HISTORY Left [id number]    WIDE LOCAL EXCISION LEFT ARM MELANOMA, WIDE LOCAL EXCISION OF    SHOULDER SURGERY Bilateral     SPINAL FUSION      TURP  3-7-13       Allergies:  No Known Allergies    Medication:   Prior to Admission medications    Medication Sig Start Date End Date Taking? Authorizing Provider   pantoprazole (PROTONIX) 40 MG tablet TAKE 1 TABLET BY MOUTH DAILY WITH SUPPER 2/8/22   Mckayla Vazquez DO   sotalol (BETAPACE) 80 MG tablet Take 1 tablet by mouth 2 times daily 2/8/22   ELISHA Mcdowell CNP   pregabalin (LYRICA) 75 MG capsule Take 1 capsule twice daily 12/1/21 6/2/22  Anna Ramirez MD   fluticasone (FLONASE) 50 MCG/ACT nasal spray 2 sprays by Each Nostril route 2 times daily    Historical Provider, MD   apixaban (ELIQUIS) 5 MG TABS tablet Take 1 tablet by mouth 2 times daily 11/18/21   ELISHA Ortiz CNP   blood glucose monitor strips Test once a day for symptoms of irregular blood glucose.  6/7/21   Anna Ramirez MD   amLODIPine (NORVASC) 5 MG tablet Take 1 tablet by mouth daily 6/2/21   Anna Ramirez MD   insulin detemir (LEVEMIR FLEXTOUCH) 100 UNIT/ML injection pen Inject 24 Units into the skin nightly 6/2/21   Anna Ramirez MD   simvastatin (ZOCOR) 40 MG tablet Take 1 tablet by mouth daily 6/2/21   Anna Ramirez MD   nateglinide (STARLIX) 120 MG tablet Take 1 tablet by mouth 2 times daily (before meals) 6/2/21   Anna Ramirez MD   omega-3 acid ethyl esters (LOVAZA) 1 g capsule Take 1 capsule by mouth 2 times daily 6/2/21   Anna Ramirez MD   SITagliptin (JANUVIA) 50 MG tablet Take 1 tablet by mouth daily 6/2/21   Chrystal Duke MD   Insulin Pen Needle (B-D UF III MINI PEN NEEDLES) 31G X 5 MM MISC Use as directed 6/2/21   Chrystal Duke MD   glucose monitoring kit (FREESTYLE) monitoring kit 1 kit by Does not apply route daily 6/2/21   Chrystal Duke MD   carboxymethylcellulose (REFRESH PLUS) 0.5 % SOLN ophthalmic solution Apply 2 drops to eye 3 times daily 10/12/20   Historical Provider, MD   triamcinolone (KENALOG) 0.1 % cream Apply to itchy areas on the arms and legs twice daily for up to 2 weeks or until improved. 1/14/21   Jarvis Perez MD   fluorouracil (EFUDEX) 5 % cream Apply twice daily to affected area on the right cheek for 2 weeks. 11/19/20   Jarvis Perez MD   Loratadine (CLARITIN PO) Take by mouth Indications: Couple times a week    Historical Provider, MD   vitamin D (ERGOCALCIFEROL) 1.25 MG (95739 UT) CAPS capsule Take 50,000 Units by mouth once a week Mondays 4/13/20   Historical Provider, MD   Ascorbic Acid (VITAMIN C) 500 MG CAPS Take by mouth nightly Indications: Three times a week  Monday, Wednesday, Friday     Historical Provider, MD   Multiple Vitamins-Minerals (CENTRUM SILVER) TABS Take 1 tablet by mouth daily     Historical Provider, MD   ALPHA LIPOIC ACID PO Take 100 mg by mouth daily  2/20/10   Historical Provider, MD       Social History:   reports that he quit smoking about 48 years ago. He has never used smokeless tobacco. He reports current alcohol use of about 2.0 standard drinks of alcohol per week. He reports that he does not use drugs. Family History:  family history is not on file. Reviewed.  Denies family history of sudden cardiac death, arrhythmia, premature CAD    Review of System:    · General ROS: negative for - chills, fever   · Psychological ROS: negative for - anxiety or depression  · Ophthalmic ROS: negative for - eye pain or loss of vision  · ENT ROS: negative for - epistaxis, headaches, nasal discharge, sore throat   · Allergy and Immunology ROS: negative for - hives, nasal congestion   · Hematological and Lymphatic ROS: negative for - bleeding problems, blood clots, bruising or jaundice  · Endocrine ROS: negative for - skin changes, temperature intolerance or unexpected weight changes  · Respiratory ROS: negative for - cough, hemoptysis, pleuritic pain, SOB, sputum changes or wheezing  · Cardiovascular ROS: Per HPI. · Gastrointestinal ROS: negative for - abdominal pain, blood in stools, diarrhea, hematemesis, melena, nausea/vomiting or swallowing difficulty/pain  · Genito-Urinary ROS: negative for - dysuria or incontinence  · Musculoskeletal ROS: negative for - joint swelling or muscle pain  · Neurological ROS: negative for - confusion, dizziness, gait disturbance, headaches, numbness/tingling, seizures, speech problems, tremors, visual changes or weakness  · Dermatological ROS: negative for - rash    Physical Examination:  Vitals:    04/07/22 1307   BP: 121/60   Pulse: 60       · Constitutional: Oriented. No distress. · Head: Normocephalic and atraumatic. · Mouth/Throat: Oropharynx is clear and moist.   · Eyes: Conjunctivae normal. EOM are normal.   · Neck: Normal range of motion. Neck supple. No rigidity. No JVD present. · Cardiovascular: Normal rate, regular rhythm, S1&S2 and intact distal pulses. · Pulmonary/Chest: Bilateral respiratory sounds. No wheezes. No rhonchi. · Abdominal: Soft. Bowel sounds present. No distension, No tenderness. · Musculoskeletal: No tenderness. No edema    · Lymphadenopathy: Has no cervical adenopathy. · Neurological: Alert and oriented. Cranial nerve appears intact, No Gross deficit   · Skin: Skin is warm and dry. No rash noted. · Psychiatric: Has a normal mood, affect and behavior     Labs:  Reviewed. ECG: reviewed,NSR. Studies:   1. 30 day MediLynx (4/2020):  SSS, 9.1 sec pause, 14% AF    2. Echo 9/11/12:    The left ventricle   displays normal wall motion with an estimated ejection fraction greater than 65%.  Aortic valve is a sclerotic trileaflet valve. There is mitral annular calcification present.  The left atrium is normal size.  Right atrium and right ventricle are normal size. Tricuspid valve is normal.  Pulmonic valve is not seen. Glory Cheeks is no pericardial effusion.       3. Stress Test 8/31/17:    Summary    Normal myocardial perfusion study.    Normal LV function with ejection fraction of 63 %. 4. Cath 2005:  PCI    The MCOT, echocardiogram, stress test, and coronary angiography/PCI were reviewed by myself and used for my plan of care. The CIED was interrogated and programmed and I supervised and reviewed all the data. All findings and changes are in device interrogation sheat and reflect my personal interpretation and changes and is scanned to Epic. - The patient is counseled to follow a low salt diet to assure blood pressure remains controlled for cardiovascular risk factor modification.   - The patient is counseled to avoid excess caffeine, and energy drinks as this may exacerbated ectopy and arrhythmia. - The patient is counseled to get regular exercise 3-5 times per week to control cardiovascular risk factors. - The patient is counseled to lose weigt to control cardiovascular risk factors. -The patient is counseled about the health hazards of smoking including cardiovascular side effects and its impact on morbidity and mortality. Thank you for allowing me to participate in the care of Robert Mckeon. All questions and concerns were addressed to the patient/family. Alternatives to my treatment were discussed.      Don Jeffrey MD  Cardiac Electrophysiology  [facility]

## 2022-03-30 ENCOUNTER — OFFICE VISIT (OUTPATIENT)
Dept: DERMATOLOGY | Age: 87
End: 2022-03-30
Payer: OTHER GOVERNMENT

## 2022-03-30 VITALS — TEMPERATURE: 98.1 F

## 2022-03-30 DIAGNOSIS — Z85.828 HISTORY OF NONMELANOMA SKIN CANCER: ICD-10-CM

## 2022-03-30 DIAGNOSIS — L72.0 EPIDERMOID CYST: ICD-10-CM

## 2022-03-30 DIAGNOSIS — L57.0 AK (ACTINIC KERATOSIS): Primary | ICD-10-CM

## 2022-03-30 DIAGNOSIS — Z85.820 HISTORY OF MELANOMA: ICD-10-CM

## 2022-03-30 DIAGNOSIS — L82.1 SK (SEBORRHEIC KERATOSIS): ICD-10-CM

## 2022-03-30 PROCEDURE — 99213 OFFICE O/P EST LOW 20 MIN: CPT | Performed by: DERMATOLOGY

## 2022-03-30 PROCEDURE — 17003 DESTRUCT PREMALG LES 2-14: CPT | Performed by: DERMATOLOGY

## 2022-03-30 PROCEDURE — 17000 DESTRUCT PREMALG LESION: CPT | Performed by: DERMATOLOGY

## 2022-03-30 NOTE — PROGRESS NOTES
[facility]  Frank Blanc MD  [phone]      Chey Sorenson  1935    82 y.o. male     Date of Visit: 3/30/2022    Chief Complaint: skin lesions    History of Present Illness:    1. He returns today to follow-up for history of AK'shas few new lesions on the face and chest today. 2.  He complains of a couple of lesions on the upper portion of the back that are not painful or pruritic. 3.  He also reports multiple growths on the trunk. 4.  He has a history of 2 melanomasdenies any signs of recurrence. 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 Radha Kaur (stage IB) on 12/10/2014.     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 Calvin Estrada on 2/25/14 (stage IA). 5.  He has a history of multiple nonmelanoma skin cancersdenies any signs of recurrence. Derm Hx:     SCC in situ on the occipital scalptreated with Mohs by Dr. Juwan Rodriguez on 7/15/2021. SCC in situ of the left upper armtreated with curettage on 10/2/2020. 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 SCC in situ of the left lateral neckED with curettage on 1/20/2017. SCC on the left upper backexcised on 1/20/2017. SCC in situ of the right forearmtreated with curettage on 3/4/2016. SCC in situ of the left upper backtreated with curettage on 8/27/2015. SCC of the central chest - excised 3/27/15. BCC of the left forearm - excised on 2/25/14.      Has a pacemaker. Review of Systems:  Gen: Feels well, good sense of health.     Past Medical History, Family History, Surgical History, MG tablet Take 1 tablet by mouth daily 180 tablet 3    nateglinide (STARLIX) 120 MG tablet Take 1 tablet by mouth 2 times daily (before meals) 180 tablet 3    omega-3 acid ethyl esters (LOVAZA) 1 g capsule Take 1 capsule by mouth 2 times daily 180 capsule 3    SITagliptin (JANUVIA) 50 MG tablet Take 1 tablet by mouth daily 90 tablet 3    Insulin Pen Needle (B-D UF III MINI PEN NEEDLES) 31G X 5 MM MISC Use as directed 200 each 2    glucose monitoring kit (FREESTYLE) monitoring kit 1 kit by Does not apply route daily 1 kit 0    carboxymethylcellulose (REFRESH PLUS) 0.5 % SOLN ophthalmic solution Apply 2 drops to eye 3 times daily      triamcinolone (KENALOG) 0.1 % cream Apply to itchy areas on the arms and legs twice daily for up to 2 weeks or until improved. 80 g 2    fluorouracil (EFUDEX) 5 % cream Apply twice daily to affected area on the right cheek for 2 weeks. 40 g 0    Loratadine (CLARITIN PO) Take by mouth Indications: Couple times a week      vitamin D (ERGOCALCIFEROL) 1.25 MG (16609 UT) CAPS capsule Take 50,000 Units by mouth once a week Mondays      Ascorbic Acid (VITAMIN C) 500 MG CAPS Take by mouth nightly Indications: Three times a week  Monday, Wednesday, Friday       Multiple Vitamins-Minerals (CENTRUM SILVER) TABS Take 1 tablet by mouth daily       ALPHA LIPOIC ACID PO Take 100 mg by mouth daily          Physical Examination       The following were examined and determined to be normal: Psych/Neuro, Scalp/hair, Conjunctivae/eyelids, Gums/teeth/lips, Neck, Abdomen, Back, RUE, LUE, RLE, LLE and Nails/digits. The following were examined and determined to be abnormal: Head/face and Breast/axilla/chest.     Well appearing. 1.  Left temple and cheek - 2, right upper chest - 1: ill defined keratotic pink macules. 2.  Mid and central upper back - skin colored to pink nodules with overlying puncta. 3.  Trunk with stuck-on appearing tan-brown verrucous papules and plaques.   Left medial ankle - stuck on appearing grey white papule. 4.  Clear. 5.  Clear. Assessment and Plan     1. AK (actinic keratosis) - few    2 cycles of liquid nitrogen applied to 3 AKs: Left temple and cheek - 2, right upper chest - 1. Patient was educated regarding the potential risks of blister formation and discomfort. Wound care was discussed. 2. Epidermoid cysts - asymptomatic    Reassurance. Consider excision in the future if they enlarge or become painful. 3. SK (seborrheic keratosis) - multiple    Reassurance. 4. History of melanoma x 2 - no signs of local recurrence. Sun protective behaviors, including use of at least SPF 30 sunscreen, and self skin examinations were encouraged. Call for any new or concerning lesions. 5. History of nonmelanoma skin cancers - clear    Sun protective behaviors, including use of at least SPF 30 sunscreen, and self skin examinations were encouraged. Call for any new or concerning lesions. Return in about 6 months (around 9/30/2022).     --David Santiago MD

## 2022-04-07 ENCOUNTER — NURSE ONLY (OUTPATIENT)
Dept: CARDIOLOGY CLINIC | Age: 87
End: 2022-04-07
Payer: OTHER GOVERNMENT

## 2022-04-07 ENCOUNTER — OFFICE VISIT (OUTPATIENT)
Dept: CARDIOLOGY CLINIC | Age: 87
End: 2022-04-07
Payer: OTHER GOVERNMENT

## 2022-04-07 VITALS
HEART RATE: 60 BPM | WEIGHT: 213.8 LBS | BODY MASS INDEX: 29 KG/M2 | SYSTOLIC BLOOD PRESSURE: 121 MMHG | DIASTOLIC BLOOD PRESSURE: 60 MMHG

## 2022-04-07 DIAGNOSIS — Z95.0 PACEMAKER: ICD-10-CM

## 2022-04-07 DIAGNOSIS — I47.1 ATRIAL TACHYCARDIA (HCC): ICD-10-CM

## 2022-04-07 DIAGNOSIS — I49.5 SICK SINUS SYNDROME (HCC): ICD-10-CM

## 2022-04-07 DIAGNOSIS — R00.2 PALPITATIONS: ICD-10-CM

## 2022-04-07 DIAGNOSIS — I49.5 SSS (SICK SINUS SYNDROME) (HCC): Primary | ICD-10-CM

## 2022-04-07 DIAGNOSIS — I48.0 PAROXYSMAL ATRIAL FIBRILLATION (HCC): Chronic | ICD-10-CM

## 2022-04-07 DIAGNOSIS — R55 SYNCOPE AND COLLAPSE: ICD-10-CM

## 2022-04-07 DIAGNOSIS — I10 ESSENTIAL HYPERTENSION: ICD-10-CM

## 2022-04-07 DIAGNOSIS — I48.0 PAROXYSMAL ATRIAL FIBRILLATION (HCC): ICD-10-CM

## 2022-04-07 PROCEDURE — 93280 PM DEVICE PROGR EVAL DUAL: CPT | Performed by: INTERNAL MEDICINE

## 2022-04-07 PROCEDURE — 99214 OFFICE O/P EST MOD 30 MIN: CPT | Performed by: INTERNAL MEDICINE

## 2022-04-07 PROCEDURE — 93000 ELECTROCARDIOGRAM COMPLETE: CPT | Performed by: INTERNAL MEDICINE

## 2022-04-07 RX ORDER — FINASTERIDE 5 MG/1
TABLET, FILM COATED ORAL
COMMUNITY
Start: 2022-02-09

## 2022-04-07 NOTE — PROGRESS NOTES
Patient comes in for programming evaluation on their Medtronic dual chamber pacemaker. Last remote 3.15.2022    All sensing and pacing parameters are within normal range. Battery life 11.5 years  P wave 1.8mV  R wave 10.1 mV  Underlying Khanh, occasional 2:1 block at 38 bpm.   AP 73.7%.  5.9%. AT/AF burden 1.2%  PVCs 8/hr  Since 9.29.2021:  -22 treated AF episodes. 4 of 22 successfully pace terminated. Recent 3.20.2022 x 6 hrs.   -10 Monitored AT/AF. Recent 3.23.2022  Patient remains on eliquis, sotalol  No changes need to be made at this time. Please see interrogation for more detail. Patient will see Dr. Arianna Jones today and follow up in 3 months in office or remotely.

## 2022-04-11 RX ORDER — SIMVASTATIN 40 MG
TABLET ORAL
Qty: 90 TABLET | Refills: 3 | Status: SHIPPED | OUTPATIENT
Start: 2022-04-11

## 2022-04-27 ENCOUNTER — PROCEDURE VISIT (OUTPATIENT)
Dept: CARDIOLOGY CLINIC | Age: 87
End: 2022-04-27
Payer: OTHER GOVERNMENT

## 2022-04-27 DIAGNOSIS — I49.5 SSS (SICK SINUS SYNDROME) (HCC): ICD-10-CM

## 2022-04-27 DIAGNOSIS — I48.0 PAROXYSMAL ATRIAL FIBRILLATION (HCC): ICD-10-CM

## 2022-04-27 LAB
LV EF: 58 %
LVEF MODALITY: NORMAL

## 2022-04-27 PROCEDURE — 93306 TTE W/DOPPLER COMPLETE: CPT | Performed by: INTERNAL MEDICINE

## 2022-05-02 ENCOUNTER — TELEPHONE (OUTPATIENT)
Dept: INTERNAL MEDICINE CLINIC | Age: 87
End: 2022-05-02

## 2022-05-02 NOTE — TELEPHONE ENCOUNTER
Pt called into the office to request that a kidney function lab order. Pt will be going to the lab on getting it on Wednesday afternoon. Please advise and call once ordered.

## 2022-05-05 ENCOUNTER — HOSPITAL ENCOUNTER (OUTPATIENT)
Age: 87
Discharge: HOME | End: 2022-05-05
Payer: OTHER GOVERNMENT

## 2022-05-05 DIAGNOSIS — Z79.4 TYPE 2 DIABETES MELLITUS WITH DIABETIC POLYNEUROPATHY, WITH LONG-TERM CURRENT USE OF INSULIN (HCC): ICD-10-CM

## 2022-05-05 DIAGNOSIS — E11.42 TYPE 2 DIABETES MELLITUS WITH DIABETIC POLYNEUROPATHY, WITH LONG-TERM CURRENT USE OF INSULIN (HCC): ICD-10-CM

## 2022-05-05 LAB
A/G RATIO: 2 (ref 1.1–2.2)
ALBUMIN SERPL-MCNC: 4.5 G/DL (ref 3.4–5)
ALP BLD-CCNC: 58 U/L (ref 40–129)
ALT SERPL-CCNC: 17 U/L (ref 10–40)
ANION GAP SERPL CALCULATED.3IONS-SCNC: 14 MMOL/L (ref 3–16)
AST SERPL-CCNC: 28 U/L (ref 15–37)
BASOPHILS ABSOLUTE: 0.1 K/UL (ref 0–0.2)
BASOPHILS RELATIVE PERCENT: 0.7 %
BILIRUB SERPL-MCNC: 0.4 MG/DL (ref 0–1)
BUN BLDV-MCNC: 18 MG/DL (ref 7–20)
CALCIUM SERPL-MCNC: 10.3 MG/DL (ref 8.3–10.6)
CHLORIDE BLD-SCNC: 106 MMOL/L (ref 99–110)
CHOLESTEROL, TOTAL: 136 MG/DL (ref 0–199)
CO2: 23 MMOL/L (ref 21–32)
CREAT SERPL-MCNC: 1.3 MG/DL (ref 0.8–1.3)
EOSINOPHILS ABSOLUTE: 0.1 K/UL (ref 0–0.6)
EOSINOPHILS RELATIVE PERCENT: 1.8 %
GFR AFRICAN AMERICAN: >60
GFR NON-AFRICAN AMERICAN: 53
GLUCOSE BLD-MCNC: 122 MG/DL (ref 70–99)
HCT VFR BLD CALC: 45.6 % (ref 40.5–52.5)
HDLC SERPL-MCNC: 47 MG/DL (ref 40–60)
HEMOGLOBIN: 15.5 G/DL (ref 13.5–17.5)
LDL CHOLESTEROL CALCULATED: 48 MG/DL
LYMPHOCYTES ABSOLUTE: 1 K/UL (ref 1–5.1)
LYMPHOCYTES RELATIVE PERCENT: 12.1 %
MCH RBC QN AUTO: 31.2 PG (ref 26–34)
MCHC RBC AUTO-ENTMCNC: 33.9 G/DL (ref 31–36)
MCV RBC AUTO: 92.2 FL (ref 80–100)
MONOCYTES ABSOLUTE: 0.7 K/UL (ref 0–1.3)
MONOCYTES RELATIVE PERCENT: 8.2 %
NEUTROPHILS ABSOLUTE: 6.1 K/UL (ref 1.7–7.7)
NEUTROPHILS RELATIVE PERCENT: 77.2 %
PDW BLD-RTO: 13 % (ref 12.4–15.4)
PLATELET # BLD: 193 K/UL (ref 135–450)
PMV BLD AUTO: 8.4 FL (ref 5–10.5)
POTASSIUM SERPL-SCNC: 4.1 MMOL/L (ref 3.5–5.1)
PROSTATE SPECIFIC ANTIGEN: 0.83 NG/ML (ref 0–4)
RBC # BLD: 4.95 M/UL (ref 4.2–5.9)
SODIUM BLD-SCNC: 143 MMOL/L (ref 136–145)
TOTAL PROTEIN: 6.7 G/DL (ref 6.4–8.2)
TRIGL SERPL-MCNC: 204 MG/DL (ref 0–150)
VLDLC SERPL CALC-MCNC: 41 MG/DL
WBC # BLD: 7.9 K/UL (ref 4–11)

## 2022-05-05 PROCEDURE — 85025 COMPLETE CBC W/AUTO DIFF WBC: CPT

## 2022-05-05 PROCEDURE — 80061 LIPID PANEL: CPT

## 2022-05-05 PROCEDURE — 83036 HEMOGLOBIN GLYCOSYLATED A1C: CPT

## 2022-05-05 PROCEDURE — 80053 COMPREHEN METABOLIC PANEL: CPT

## 2022-05-05 PROCEDURE — 84153 ASSAY OF PSA TOTAL: CPT

## 2022-05-05 PROCEDURE — 36415 COLL VENOUS BLD VENIPUNCTURE: CPT

## 2022-05-06 LAB
ESTIMATED AVERAGE GLUCOSE: 128.4 MG/DL
HBA1C MFR BLD: 6.1 %

## 2022-05-09 RX ORDER — APIXABAN 5 MG/1
TABLET, FILM COATED ORAL
Qty: 60 TABLET | Refills: 5 | Status: SHIPPED | OUTPATIENT
Start: 2022-05-09 | End: 2022-10-25

## 2022-05-09 NOTE — TELEPHONE ENCOUNTER
Requested Prescriptions     Pending Prescriptions Disp Refills    ELIQUIS 5 MG TABS tablet [Pharmacy Med Name: ELIQUIS 5 MG TABLET] 60 tablet 5     Sig: TAKE 1 TABLET BY MOUTH TWICE A DAY          Number: 60    Refills: 5    Last Office Visit: 9/29/2021     Next Office Visit: 09/27/2022    Last Refill: 11/18/2021    Last Labs: 5/05/2022 PSA/CMP/ Lipid/ CBC/ Hemoglbin A1C

## 2022-05-23 LAB
CATARACTS: NEGATIVE
DIABETIC RETINOPATHY: NORMAL
GLAUCOMA: NEGATIVE
INTRAOCULAR PRESSURE EYE: NORMAL
VISUAL ACUITY DISTANCE LEFT EYE: NORMAL
VISUAL ACUITY DISTANCE RIGHT EYE: NORMAL

## 2022-05-23 RX ORDER — BLOOD SUGAR DIAGNOSTIC
STRIP MISCELLANEOUS
Qty: 100 STRIP | Refills: 3 | Status: SHIPPED | OUTPATIENT
Start: 2022-05-23 | End: 2022-09-27

## 2022-06-01 ENCOUNTER — OFFICE VISIT (OUTPATIENT)
Dept: INTERNAL MEDICINE CLINIC | Age: 87
End: 2022-06-01
Payer: OTHER GOVERNMENT

## 2022-06-01 VITALS
DIASTOLIC BLOOD PRESSURE: 70 MMHG | BODY MASS INDEX: 28.71 KG/M2 | SYSTOLIC BLOOD PRESSURE: 132 MMHG | HEIGHT: 72 IN | WEIGHT: 212 LBS

## 2022-06-01 DIAGNOSIS — N18.30 STAGE 3 CHRONIC KIDNEY DISEASE, UNSPECIFIED WHETHER STAGE 3A OR 3B CKD (HCC): ICD-10-CM

## 2022-06-01 DIAGNOSIS — K21.9 LARYNGOPHARYNGEAL REFLUX (LPR): ICD-10-CM

## 2022-06-01 DIAGNOSIS — G57.62 MORTON'S NEUROMA OF LEFT FOOT: ICD-10-CM

## 2022-06-01 DIAGNOSIS — E11.42 TYPE 2 DIABETES MELLITUS WITH DIABETIC POLYNEUROPATHY, WITH LONG-TERM CURRENT USE OF INSULIN (HCC): Primary | ICD-10-CM

## 2022-06-01 DIAGNOSIS — Z79.4 TYPE 2 DIABETES MELLITUS WITH DIABETIC POLYNEUROPATHY, WITH LONG-TERM CURRENT USE OF INSULIN (HCC): Primary | ICD-10-CM

## 2022-06-01 PROCEDURE — 99214 OFFICE O/P EST MOD 30 MIN: CPT | Performed by: INTERNAL MEDICINE

## 2022-06-01 PROCEDURE — 1123F ACP DISCUSS/DSCN MKR DOCD: CPT | Performed by: INTERNAL MEDICINE

## 2022-06-01 PROCEDURE — 3044F HG A1C LEVEL LT 7.0%: CPT | Performed by: INTERNAL MEDICINE

## 2022-06-01 RX ORDER — OMEGA-3-ACID ETHYL ESTERS 1 G/1
1 CAPSULE, LIQUID FILLED ORAL 2 TIMES DAILY
Qty: 180 CAPSULE | Refills: 3 | Status: SHIPPED | OUTPATIENT
Start: 2022-06-01

## 2022-06-01 RX ORDER — PANTOPRAZOLE SODIUM 40 MG/1
40 TABLET, DELAYED RELEASE ORAL
Qty: 90 TABLET | Refills: 3 | Status: SHIPPED | OUTPATIENT
Start: 2022-06-01

## 2022-06-01 RX ORDER — PREGABALIN 75 MG/1
CAPSULE ORAL
Qty: 270 CAPSULE | Refills: 1 | Status: SHIPPED | OUTPATIENT
Start: 2022-06-01 | End: 2022-12-01

## 2022-06-01 RX ORDER — INSULIN DETEMIR 100 [IU]/ML
24 INJECTION, SOLUTION SUBCUTANEOUS NIGHTLY
Qty: 10 PEN | Refills: 3 | Status: SHIPPED | OUTPATIENT
Start: 2022-06-01

## 2022-06-01 RX ORDER — NATEGLINIDE 120 MG/1
120 TABLET ORAL
Qty: 180 TABLET | Refills: 3 | Status: SHIPPED | OUTPATIENT
Start: 2022-06-01

## 2022-06-01 RX ORDER — PEN NEEDLE, DIABETIC 31 GX5/16"
NEEDLE, DISPOSABLE MISCELLANEOUS
Qty: 200 EACH | Refills: 2 | Status: SHIPPED | OUTPATIENT
Start: 2022-06-01

## 2022-06-01 RX ORDER — AMLODIPINE BESYLATE 5 MG/1
5 TABLET ORAL DAILY
Qty: 90 TABLET | Refills: 3 | Status: SHIPPED | OUTPATIENT
Start: 2022-06-01

## 2022-06-01 ASSESSMENT — PATIENT HEALTH QUESTIONNAIRE - PHQ9
1. LITTLE INTEREST OR PLEASURE IN DOING THINGS: 0
SUM OF ALL RESPONSES TO PHQ QUESTIONS 1-9: 0
2. FEELING DOWN, DEPRESSED OR HOPELESS: 0
SUM OF ALL RESPONSES TO PHQ9 QUESTIONS 1 & 2: 0
SUM OF ALL RESPONSES TO PHQ QUESTIONS 1-9: 0

## 2022-06-01 NOTE — LETTER
[facility] [address]  [address] [address]  Phone: [phone]  Fax: [phone]    Renetta Guzman MD         June 1, 2022     Patient: Charlesetta Bumpers   YOB: 1935   Date of Visit: 6/1/2022       To Whom It May Concern: It is my medical opinion that Analy Hernandez requires a disability parking placard for the following reasons:  He cannot walk 200 feet without stopping to rest.  Duration of need: 5 years    If you have any questions or concerns, please don't hesitate to call.     Sincerely,        Renetta Guzman MD

## 2022-06-01 NOTE — PROGRESS NOTES
Danielle Vargas (:  1935) is a 82 y.o. male,Established patient, here for evaluation of the following chief complaint(s):  Diabetes         ASSESSMENT/PLAN:  1. Type 2 diabetes mellitus with diabetic polyneuropathy, with long-term current use of insulin (HCC)  -Blood sugar control has been quite good, would continue the Levemir 24 units nightly and nateglinide 120 mg twice daily, discontinue Januvia. We can always increase the insulin a little bit if needed. -     pregabalin (LYRICA) 75 MG capsule; Take 1 capsule in the morning and 2 capsules at night, Disp-270 capsule, R-1Normal  -     Insulin Pen Needle (B-D UF III MINI PEN NEEDLES) 31G X 5 MM MISC; Disp-200 each, R-2, NormalUse as directed  -     Comprehensive Metabolic Panel; Future  -     Hemoglobin A1C; Future  -     MICROALBUMIN / CREATININE URINE RATIO; Future  -     Urinalysis with Microscopic; Future  2. Laryngopharyngeal reflux (LPR)  -Continue pantoprazole  -     pantoprazole (PROTONIX) 40 MG tablet; Take 1 tablet by mouth Daily with supper, Disp-90 tablet, R-3**Patient requests 90 days supply**Normal  -     MICROALBUMIN / CREATININE URINE RATIO; Future  -     Urinalysis with Microscopic; Future  3. Edwards's neuroma of left foot  -Pain in the left foot is consistent with a Edwards's neuroma, recommend following up with his podiatrist  4. Stage 3 chronic kidney disease, unspecified whether stage 3a or 3b CKD ([address]  -Has been stable, monitor  -     MICROALBUMIN / CREATININE URINE RATIO; Future  -     Urinalysis with Microscopic; Future      Return in about 6 months (around 2022) for DM follow up. Subjective   SUBJECTIVE/OBJECTIVE:  HPI    Type 2 diabetes - he is taking nateglinide, Januvia. The cost of Januvia has been high, wondering if there is another option. He is still using the Levemir once a day, no hypoglycemia. He is having increased pain/burning in the left 2nd toe, somewhat more in the first toe.  He is still taking the Lyrica. He has been taking the pantoprazole again for LPR, was restarted by ENT. Review of Systems       Objective   Physical Exam  Vitals reviewed. Constitutional:       General: He is not in acute distress. Appearance: Normal appearance. He is well-developed. HENT:      Head: Normocephalic and atraumatic. Cardiovascular:      Rate and Rhythm: Normal rate and regular rhythm. Heart sounds: Normal heart sounds. Pulmonary:      Effort: Pulmonary effort is normal. No respiratory distress. Breath sounds: Normal breath sounds. Musculoskeletal:      Comments: Tenderness between the first and second left metatarsal head   Skin:     General: Skin is warm and dry. Neurological:      Mental Status: He is alert. Psychiatric:         Mood and Affect: Mood normal.         Behavior: Behavior normal.         Thought Content: Thought content normal.         Judgment: Judgment normal.                  An electronic signature was used to authenticate this note.     --Karoline Jaquez MD

## 2022-06-01 NOTE — PATIENT INSTRUCTIONS
Patient Education        Edwards's Neuroma: Care Instructions  Your Care Instructions  When your toes are squeezed together, often over a period of months or even years, the nerve that runs between the toes can swell and get thicker. This is called a Edwards's neuroma. It may feel like a small lump is pushing inside the ball of your foot. When you walk or move your toes, you feel pain that sometimes moves into your toes. If the pressure continues, it may damage thenerve. If you catch the problem early and change your shoes, the nerve swelling may go away. Your doctor may advise you to wear wide-toed shoes. He or she also may suggest that you ice the sore spot and limit activities that put pressure on the nerve. If these steps do not help your symptoms, your doctor may have you use special pads or devices that spread the toes. This keeps them from squeezing the nerve. In some cases, you may get a cortisone shot to reduce swelling and pain. If these treatments don't help, your doctor may suggestsurgery to relieve pressure or remove the swollen nerve. Follow-up care is a key part of your treatment and safety. Be sure to make and go to all appointments, and call your doctor if you are having problems. It's also a good idea to know your test results and keep alist of the medicines you take. How can you care for yourself at home?  Ask your doctor if you can take an over-the-counter pain medicine, such as acetaminophen (Tylenol), ibuprofen (Advil, Motrin), or naproxen (Aleve). Be safe with medicines. Read and follow all instructions on the label.  Try to stay off your feet as much as possible until the pain and swelling go away.  Avoid wearing tight, pointy, or high-heeled shoes. Instead, wear roomy footwear.  Put ice or a cold pack on the area for 10 to 20 minutes at a time. Put a thin cloth between the ice and your skin.  Try massaging your feet. This relaxes the muscles around the nerve.    If your doctor prescribed special pads or a device to relieve pressure on your toes, use these items as directed.  Until all pain and swelling go away, avoid activities that put pressure on the toes. These include racquet sports and running. When should you call for help? Watch closely for changes in your health, and be sure to contact your doctor if:     You do not get better as expected. Where can you learn more? Go to [email or web address] and sign in to your [facility] account. Enter E100 in the Skysheet box to learn more about \"Edwards's Neuroma: Care Instructions. \"     If you do not have an account, please click on the \"Sign Up Now\" link. Current as of: July 1, 2021               Content Version: 13.2  © 1790-1893 [employer]wise, Inc[employer]. Care instructions adapted under license by [facility] ([facility]). If you have questions about a medical condition or this instruction, always ask your healthcare professional. [place] [address] any warranty or liability for your use of this information.

## 2022-06-14 ENCOUNTER — NURSE ONLY (OUTPATIENT)
Dept: CARDIOLOGY CLINIC | Age: 87
End: 2022-06-14
Payer: OTHER GOVERNMENT

## 2022-06-14 DIAGNOSIS — I49.5 SICK SINUS SYNDROME (HCC): ICD-10-CM

## 2022-06-14 DIAGNOSIS — I48.0 PAROXYSMAL ATRIAL FIBRILLATION (HCC): Chronic | ICD-10-CM

## 2022-06-14 DIAGNOSIS — Z95.0 PACEMAKER: ICD-10-CM

## 2022-06-15 PROCEDURE — 93296 REM INTERROG EVL PM/IDS: CPT | Performed by: INTERNAL MEDICINE

## 2022-06-15 PROCEDURE — 93294 REM INTERROG EVL PM/LDLS PM: CPT | Performed by: INTERNAL MEDICINE

## 2022-06-15 NOTE — PROGRESS NOTES
Remote transmission received from patient's dual chamber pacemaker monitor at home. Transmission shows normal sensing and pacing function. Noted NSVT and AT/AF, 7.2% burden, 10.6% Pace-Terminated episodes (Eliquis, sotalol). Ap 81.5%   11.6% (MVP On)  Echo 85.5369 showed EF of 55-60%. EP physician will review. See interrogation under cardiology tab in the [address] [place] [address] [address] field for more details. Will continue to monitor remotely.

## 2022-07-28 ENCOUNTER — PROCEDURE VISIT (OUTPATIENT)
Dept: VASCULAR SURGERY | Age: 87
End: 2022-07-28

## 2022-07-28 DIAGNOSIS — I65.23 ATHEROSCLEROSIS OF BOTH CAROTID ARTERIES: ICD-10-CM

## 2022-08-09 ENCOUNTER — TELEPHONE (OUTPATIENT)
Dept: VASCULAR SURGERY | Age: 87
End: 2022-08-09

## 2022-08-09 DIAGNOSIS — I65.23 ATHEROSCLEROSIS OF BOTH CAROTID ARTERIES: Primary | ICD-10-CM

## 2022-08-09 NOTE — TELEPHONE ENCOUNTER
Discussed results of carotid duplex with patient which shows stable carotid artery disease with no significant progression from previous study. Plan to repeat carotid duplex in 1 year.     Electronically signed by ELISHA Blanco CNP on 8/9/2022 at 8:42 AM

## 2022-09-19 ENCOUNTER — NURSE ONLY (OUTPATIENT)
Dept: CARDIOLOGY CLINIC | Age: 87
End: 2022-09-19
Payer: OTHER GOVERNMENT

## 2022-09-19 DIAGNOSIS — I49.5 SICK SINUS SYNDROME (HCC): ICD-10-CM

## 2022-09-19 DIAGNOSIS — Z95.0 PACEMAKER: Primary | ICD-10-CM

## 2022-09-19 PROCEDURE — 93296 REM INTERROG EVL PM/IDS: CPT | Performed by: INTERNAL MEDICINE

## 2022-09-19 PROCEDURE — 93294 REM INTERROG EVL PM/LDLS PM: CPT | Performed by: INTERNAL MEDICINE

## 2022-09-20 NOTE — PROGRESS NOTES
[facility] [place]   Electrophysiology  Office Visit  Date: 9/27/2022    Chief Complaint   Patient presents with    Atrial Fibrillation    Tachycardia    Bradycardia    Coronary Artery Disease    Loss of Consciousness     Cardiac HX: Shaye Almendarez is a 82 y.o. 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 Chana Martinez, 4/20/2020)). Interval History/HPI: Patient is here for follow-up for paroxysmal AF, sinus bradycardia, symptomatic sinus pauses and PPM management. Patient has a longstanding history of AVNRT and atrial tachycardia. He underwent a catheter ablation for AVNRT and atrial tachycardia in 2000. He developed paroxysmal atrial fibrillation and was placed on sotalol. He then developed syncope and near syncope and had a carotid Doppler that showed a distal occlusion. He wore a 30-day monitor in 2020 that showed evidence of symptomatic sinus pauses and underwent a dual-chamber MDT PPM in April 2020. He was placed on oral anticoagulation for his paroxysmal atrial fibrillation however he developed hematuria requiring a blood transfusion and was taken off [address] [place] [place] [address] Road. He was then noted to have paroxysmal AF on his device with episodes lasting up to 10 hours in length. Patient had a long discussion with Dr. Dalton Atkins regarding oral anticoagulation and was placed on Eliquis 5 mg twice a day. Patient presents today in normal sinus rhythm with a heart rate of 66 bpm.  Review of his device today shows that he atrially paces 86.4% of the time and ventricularly paces 20.2% of the time, he had 1 nonsustained VT episode lasting 1 second in length, he had 69 AT/AF episodes for total burden of 5.2%.   His longest episode of atrial fibrillation was 5 hours and 44 minutes in length. Patient is asymptomatic when he is out of rhythm. His burden of 5.3% is up from 6 months ago at which point it was 1.2.  3 months ago he was at 7.2% AF burden. He denies heart racing, palpitations or irregular heartbeats. He does have a history of snoring however he is not interested in a sleep study. We did discuss this at length today. He denies chest pain, shortness of breath, PND, orthopnea or lower extremity edema. He has had no further syncopal events since his device was implanted.     Home medications:   Current Outpatient Medications on File Prior to Visit   Medication Sig Dispense Refill    omega-3 acid ethyl esters (LOVAZA) 1 g capsule Take 1 capsule by mouth 2 times daily 180 capsule 3    pregabalin (LYRICA) 75 MG capsule Take 1 capsule in the morning and 2 capsules at night 270 capsule 1    amLODIPine (NORVASC) 5 MG tablet Take 1 tablet by mouth daily 90 tablet 3    insulin detemir (LEVEMIR FLEXTOUCH) 100 UNIT/ML injection pen Inject 24 Units into the skin nightly 10 pen 3    nateglinide (STARLIX) 120 MG tablet Take 1 tablet by mouth 2 times daily (before meals) 180 tablet 3    Insulin Pen Needle (B-D UF III MINI PEN NEEDLES) 31G X 5 MM MISC Use as directed 200 each 2    ELIQUIS 5 MG TABS tablet TAKE 1 TABLET BY MOUTH TWICE A DAY 60 tablet 5    simvastatin (ZOCOR) 40 MG tablet TAKE 1 TABLET DAILY 90 tablet 3    finasteride (PROSCAR) 5 MG tablet       sotalol (BETAPACE) 80 MG tablet Take 1 tablet by mouth 2 times daily 180 tablet 3    fluticasone (FLONASE) 50 MCG/ACT nasal spray 2 sprays by Each Nostril route 2 times daily      glucose monitoring kit (FREESTYLE) monitoring kit 1 kit by Does not apply route daily 1 kit 0    carboxymethylcellulose (REFRESH PLUS) 0.5 % SOLN ophthalmic solution Apply 2 drops to eye 3 times daily      triamcinolone (KENALOG) 0.1 % cream Apply to itchy areas on the arms and legs twice daily for up to 2 weeks or until improved. 80 g 2    fluorouracil (EFUDEX) 5 % cream Apply twice daily to affected area on the right cheek for 2 weeks. 40 g 0    Loratadine (CLARITIN PO) Take by mouth Indications: Couple times a week      vitamin D (ERGOCALCIFEROL) 1.25 MG (99983 UT) CAPS capsule Take 50,000 Units by mouth once a week Mondays      Ascorbic Acid (VITAMIN C) 500 MG CAPS Take by mouth nightly Indications: Three times a week  Monday, Wednesday, Friday       Multiple Vitamins-Minerals (CENTRUM SILVER) TABS Take 1 tablet by mouth daily       ALPHA LIPOIC ACID PO Take 100 mg by mouth daily       pantoprazole (PROTONIX) 40 MG tablet Take 1 tablet by mouth Daily with supper (Patient not taking: Reported on 9/27/2022) 90 tablet 3     No current facility-administered medications on file prior to visit. Past Medical History:   Diagnosis Date    Arrhythmia     Arthritis     hands shoulders neck    Atrial fibrillation (HCC)     coumadin    Atrial tachycardia (HCC)     CAD (coronary artery disease)     Cancer (HCC)     skin    Diabetes mellitus ([facility] [address]     Diverticulitis     Enlarged prostate     Hyperlipidemia     Hypertension     Pacemaker 04/20/2020    Pneumonia     ABOUT 5 YEARS AGO    Vasodepressor syncope         Past Surgical History:   Procedure Laterality Date    ABLATION OF DYSRHYTHMIC FOCUS      AVNRT and Atrial tachycardia    CARPAL TUNNEL RELEASE      CATARACT REMOVAL Bilateral     CORONARY ANGIOPLASTY WITH STENT PLACEMENT  2005    CYSTOSCOPY  9/26/12    coagulation prostatic urethra    CYSTOSCOPY  10/8/15    TURP    FINGER SURGERY      X7    FINGER TRIGGER RELEASE      OTHER SURGICAL HISTORY Left [id number]    WIDE LOCAL EXCISION LEFT ARM MELANOMA, WIDE LOCAL EXCISION OF    SHOULDER SURGERY Bilateral     SPINAL FUSION      TURP  3-7-13       No Known Allergies    Social History:  Reviewed. reports that he quit smoking about 48 years ago. His smoking use included cigarettes.  He has never used smokeless tobacco. He reports current alcohol use of about 2.0 standard drinks per week. He reports that he does not use drugs. Family History:  Reviewed. family history is not on file. Review of System:    Constitutional: No fevers, chills. Eyes: No visual changes or diplopia. No scleral icterus. ENT: No Headaches. No mouth sores or sore throat. Cardiovascular: No for chest pain, No for dyspnea on exertion, no for palpitations or No for loss of consciousness. No cough, hemoptysis, No for pleuritic pain, or phlebitis. Respiratory: No for cough or wheezing. No hematemesis. Gastrointestinal: No abdominal pain, blood in stools. Genitourinary: No dysuria, or hematuria. Musculoskeletal: No gait disturbance,    Integumentary: No rash or pruritis. Neurological: No headache, change in muscle strength, numbness or tingling. Psychiatric: No anxiety, or depression. Endocrine: No temperature intolerance. No excessive thirst, fluid intake, or urination. Hem/Lymph: No abnormal bruising or bleeding, blood clots or swollen lymph nodes. Allergic/Immunologic: No nasal congestion or hives. Physical Examination:  Vitals:    09/27/22 1437   BP: 130/60   Pulse: Wt Readings from Last 3 Encounters:   09/27/22 208 lb (94.3 kg)   06/01/22 212 lb (96.2 kg)   04/07/22 213 lb 12.8 oz (97 kg)       Constitutional: Oriented. No distress. Head: Normocephalic and atraumatic. Mouth/Throat: Oropharynx is clear and moist.   Eyes: Conjunctivae clear without jaunduice. PERRL. Neck: Neck supple. No rigidity. No JVD present. Cardiovascular: Normal rate, regular rhythm, S1&S2. Pulmonary/Chest: Bilateral respiratory sounds. No wheezes, No rhonchi. Abdominal: Soft. Bowel sounds present. No distension, No tenderness. Musculoskeletal: No tenderness. No edema    Lymphadenopathy: Has no cervical adenopathy. Neurological: Alert and oriented. Cranial nerve appears intact, No Gross deficit   Skin: Skin is warm and dry. No rash noted. Psychiatric: Has a normal mood, affect and behavior     Labs:  Reviewed. No results for input(s): NA, K, CL, CO2, PHOS, BUN, CREATININE, CA in the last 72 hours. Invalid input(s):  TSH  No results for input(s): WBC, HGB, HCT, MCV, PLT in the last 72 hours. Lab Results   Component Value Date/Time    CKTOTAL 386 02/20/2010 12:38 AM    CKMB 1.13 02/20/2010 12:38 AM    TROPONINI <0.01 04/20/2020 12:22 AM     Lab Results   Component Value Date/Time    BNP 20 09/11/2012 09:40 AM    BNP 22 02/19/2010 09:05 AM     Lab Results   Component Value Date/Time    PROTIME 11.3 04/18/2020 04:38 PM    PROTIME 12.4 10/12/2015 04:57 AM    PROTIME 13.1 10/11/2015 05:11 AM    PROTIME 23.7 06/23/2015 10:25 AM    PROTIME 40.5 04/21/2015 10:33 AM    PROTIME 35.2 02/23/2015 10:48 AM    INR 0.97 04/18/2020 04:38 PM    INR 1.14 10/12/2015 04:57 AM    INR 1.20 10/11/2015 05:11 AM     Lab Results   Component Value Date/Time    CHOL 136 05/05/2022 10:40 AM    HDL 47 05/05/2022 10:40 AM    HDL 42 05/17/2012 09:25 AM    TRIG 204 05/05/2022 10:40 AM       ECG: Personally reviewed: A paced, V sensed, HR 66, QRS 90, QTc 422    ECHO: 9/11/2012  CONCLUSIONS-   1. Hyperdynamic left ventricular systolic function with an  estimated ejection fraction of 65%. 2.    Sclerosis of the aortic valve without evidence of aortic  stenosis or regurgitation. 3.    Mitral annular calcification with mildly elevated mitral valve  velocity consistent with very mild mitral stenosis. Left atrial  size is normal    Stress Test: 8/31/2017  Summary    Normal myocardial perfusion study. Normal LV function with ejection fraction of 63 %. Cardiac Angiography: 2005 Dr. Major Bonds  Severe single-vessel coronary artery disease to posterior descending branch of dominant RCA.   Preserved LV function with normal EF estimated at 60%, successful percutaneous transluminal stent angioplasty of 2 lesions to the posterior descending of the RCA proximal lesion and mid lesion are both reduced to 0 post stent PCI. 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 10/09/2015    Hematuria 10/05/2015    Vasodepressor syncope 11/06/2014    Malignant melanoma ([facility] [facility] 03/30/2014    Basal cell carcinoma 03/30/2014    Headache 10/07/2013    DM (diabetes mellitus) ([address] 09/11/2012    BPH (benign prostatic hyperplasia) 09/11/2012    CKD (chronic kidney disease), stage III ([address] 09/11/2012    Atrial tachycardia ([facility] 06/29/2012    Coronary atherosclerosis of native coronary artery 06/27/2011    Paroxysmal atrial fibrillation ([facility] [facility] 06/27/2011    Palpitations 06/27/2011        Assessment:   1. Paroxysmal atrial fibrillation (HCC)    2. SSS (sick sinus syndrome) ([facility] [address]    3. Tachycardia    4. Sinus bradycardia    5. Pacemaker    6. Encounter for monitoring sotalol therapy    7. On continuous oral anticoagulation    8. Benign essential HTN         Cardiac HX: Rachelle Bloom is a 82 y.o. 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 Edy Bethea, 4/20/2020). AHB2EE2-JNMn 5. TSH 2.48 (5/13/2019). SSS/syncope  - No further episodes of syncope  - S/p dual-chamber MDT PPM  - Device check today shows that he atrially paces 86.4% of the time and ventricularly paces 20.2% of the time, he had 1 nonsustained VT episode lasting 1 second in length, he had 69 AT/AF episodes for total burden of 5.2%. His longest episode of atrial fibrillation was 5 hours and 44 minutes in length.   - Follow-up in 6 months      pAF  - In NSR -

## 2022-09-27 ENCOUNTER — OFFICE VISIT (OUTPATIENT)
Dept: CARDIOLOGY CLINIC | Age: 87
End: 2022-09-27
Payer: OTHER GOVERNMENT

## 2022-09-27 ENCOUNTER — NURSE ONLY (OUTPATIENT)
Dept: CARDIOLOGY CLINIC | Age: 87
End: 2022-09-27
Payer: OTHER GOVERNMENT

## 2022-09-27 VITALS
BODY MASS INDEX: 28.21 KG/M2 | DIASTOLIC BLOOD PRESSURE: 60 MMHG | WEIGHT: 208 LBS | HEART RATE: 66 BPM | SYSTOLIC BLOOD PRESSURE: 130 MMHG

## 2022-09-27 DIAGNOSIS — I48.0 PAROXYSMAL ATRIAL FIBRILLATION (HCC): Primary | ICD-10-CM

## 2022-09-27 DIAGNOSIS — I47.1 ATRIAL TACHYCARDIA (HCC): ICD-10-CM

## 2022-09-27 DIAGNOSIS — R00.0 TACHYCARDIA: ICD-10-CM

## 2022-09-27 DIAGNOSIS — R00.2 PALPITATIONS: ICD-10-CM

## 2022-09-27 DIAGNOSIS — I49.5 SSS (SICK SINUS SYNDROME) (HCC): ICD-10-CM

## 2022-09-27 DIAGNOSIS — Z51.81 ENCOUNTER FOR MONITORING SOTALOL THERAPY: ICD-10-CM

## 2022-09-27 DIAGNOSIS — Z95.0 PACEMAKER: Primary | ICD-10-CM

## 2022-09-27 DIAGNOSIS — Z95.0 PACEMAKER: ICD-10-CM

## 2022-09-27 DIAGNOSIS — Z79.899 ENCOUNTER FOR MONITORING SOTALOL THERAPY: ICD-10-CM

## 2022-09-27 DIAGNOSIS — I48.0 PAROXYSMAL ATRIAL FIBRILLATION (HCC): Chronic | ICD-10-CM

## 2022-09-27 DIAGNOSIS — R00.1 SINUS BRADYCARDIA: ICD-10-CM

## 2022-09-27 DIAGNOSIS — Z79.01 ON CONTINUOUS ORAL ANTICOAGULATION: ICD-10-CM

## 2022-09-27 DIAGNOSIS — I49.5 SICK SINUS SYNDROME (HCC): ICD-10-CM

## 2022-09-27 DIAGNOSIS — I10 BENIGN ESSENTIAL HTN: ICD-10-CM

## 2022-09-27 PROCEDURE — 99214 OFFICE O/P EST MOD 30 MIN: CPT | Performed by: NURSE PRACTITIONER

## 2022-09-27 PROCEDURE — 1123F ACP DISCUSS/DSCN MKR DOCD: CPT | Performed by: NURSE PRACTITIONER

## 2022-09-27 PROCEDURE — 93000 ELECTROCARDIOGRAM COMPLETE: CPT | Performed by: NURSE PRACTITIONER

## 2022-09-27 PROCEDURE — 93280 PM DEVICE PROGR EVAL DUAL: CPT | Performed by: INTERNAL MEDICINE

## 2022-09-27 RX ORDER — BLOOD SUGAR DIAGNOSTIC
STRIP MISCELLANEOUS
Qty: 100 STRIP | Refills: 3 | Status: SHIPPED | OUTPATIENT
Start: 2022-09-27

## 2022-09-27 NOTE — PROGRESS NOTES
Patient comes in for programming evaluation on their [employer] dual chamber pacemaker. Last remote 9.19.2022    All sensing and pacing parameters are within normal range. Battery life 11 years  Underlying Predominantly A dependent 30 bpm. (Intrinsic appears either junctional or SB w/first degree.)  AP 86.4%.  20.2%. AT/AF burden 5.2%  Since 4.7.2022:  -1 NSVT. Recent 5.26.2022 x 01 secs. -329 treated AT/AF Recent 9.22.2022.  51 of 329 successfully treated (15.5%)  -69 AT/AF. Patient remains on eliquis, sotalol. Additional carelink alerts turned On. Please see interrogation for more detail. Patient will see NPFW today and follow up in 3 months in office or remotely.

## 2022-10-05 ENCOUNTER — NURSE ONLY (OUTPATIENT)
Dept: CARDIOLOGY CLINIC | Age: 87
End: 2022-10-05

## 2022-10-05 NOTE — PROGRESS NOTES
[facility] remote transmission received from patient's dual chamber pacemaker monitor at home d/t AT/AF Daily [place] > Threshold of 6hrs/day. Transmission shows normal sensing and pacing function. Noted AT/AF/L, 3.9% burden, 0 of 1% Pace-Terminated episodes (Eliquis, sotalol). Ap 93.0%   31.6% (MVP On)  Echo 44.5218 showed EF of 55-60%. EP physician will review. See interrogation under cardiology tab in the [facility] [address] field for more details. Will continue to monitor remotely.

## 2022-10-25 RX ORDER — APIXABAN 5 MG/1
TABLET, FILM COATED ORAL
Qty: 180 TABLET | Refills: 3 | Status: SHIPPED | OUTPATIENT
Start: 2022-10-25

## 2022-10-25 NOTE — TELEPHONE ENCOUNTER
Requested Prescriptions     Pending Prescriptions Disp Refills    ELIQUIS 5 MG TABS tablet [Pharmacy Med Name: ELIQUIS 5 MG TABLET] 180 tablet 3     Sig: TAKE 1 TABLET BY MOUTH TWICE A DAY              Last Office Visit: 9/27/2022    Next Office Visit: 03.02.2023        Last Labs: [phone]

## 2022-10-26 ENCOUNTER — OFFICE VISIT (OUTPATIENT)
Dept: DERMATOLOGY | Age: 87
End: 2022-10-26
Payer: OTHER GOVERNMENT

## 2022-10-26 DIAGNOSIS — Z79.4 TYPE 2 DIABETES MELLITUS WITH DIABETIC POLYNEUROPATHY, WITH LONG-TERM CURRENT USE OF INSULIN (HCC): ICD-10-CM

## 2022-10-26 DIAGNOSIS — Z85.820 HISTORY OF MELANOMA: ICD-10-CM

## 2022-10-26 DIAGNOSIS — N18.30 STAGE 3 CHRONIC KIDNEY DISEASE, UNSPECIFIED WHETHER STAGE 3A OR 3B CKD (HCC): ICD-10-CM

## 2022-10-26 DIAGNOSIS — E11.42 TYPE 2 DIABETES MELLITUS WITH DIABETIC POLYNEUROPATHY, WITH LONG-TERM CURRENT USE OF INSULIN (HCC): ICD-10-CM

## 2022-10-26 DIAGNOSIS — L82.1 SK (SEBORRHEIC KERATOSIS): ICD-10-CM

## 2022-10-26 DIAGNOSIS — L57.0 AK (ACTINIC KERATOSIS): Primary | ICD-10-CM

## 2022-10-26 DIAGNOSIS — G57.62 MORTON'S NEUROMA OF LEFT FOOT: ICD-10-CM

## 2022-10-26 DIAGNOSIS — K21.9 LARYNGOPHARYNGEAL REFLUX (LPR): ICD-10-CM

## 2022-10-26 LAB
A/G RATIO: 2.2 (ref 1.1–2.2)
ALBUMIN SERPL-MCNC: 4.6 G/DL (ref 3.4–5)
ALP BLD-CCNC: 61 U/L (ref 40–129)
ALT SERPL-CCNC: 12 U/L (ref 10–40)
ANION GAP SERPL CALCULATED.3IONS-SCNC: 11 MMOL/L (ref 3–16)
AST SERPL-CCNC: 24 U/L (ref 15–37)
BACTERIA: ABNORMAL /HPF
BILIRUB SERPL-MCNC: 0.7 MG/DL (ref 0–1)
BILIRUBIN URINE: NEGATIVE
BLOOD, URINE: NEGATIVE
BUN BLDV-MCNC: 19 MG/DL (ref 7–20)
CALCIUM SERPL-MCNC: 10.2 MG/DL (ref 8.3–10.6)
CHLORIDE BLD-SCNC: 106 MMOL/L (ref 99–110)
CLARITY: CLEAR
CO2: 27 MMOL/L (ref 21–32)
COLOR: YELLOW
CREAT SERPL-MCNC: 1.5 MG/DL (ref 0.8–1.3)
CREATININE URINE: 100.7 MG/DL (ref 39–259)
EPITHELIAL CELLS, UA: 1 /HPF (ref 0–5)
GFR SERPL CREATININE-BSD FRML MDRD: 45 ML/MIN/{1.73_M2}
GLUCOSE BLD-MCNC: 104 MG/DL (ref 70–99)
GLUCOSE URINE: NEGATIVE MG/DL
HYALINE CASTS: 1 /LPF (ref 0–8)
KETONES, URINE: NEGATIVE MG/DL
LEUKOCYTE ESTERASE, URINE: ABNORMAL
MICROALBUMIN UR-MCNC: 1.5 MG/DL
MICROALBUMIN/CREAT UR-RTO: 14.9 MG/G (ref 0–30)
MICROSCOPIC EXAMINATION: YES
NITRITE, URINE: NEGATIVE
PH UA: 7.5 (ref 5–8)
POTASSIUM SERPL-SCNC: 5.1 MMOL/L (ref 3.5–5.1)
PROTEIN UA: NEGATIVE MG/DL
RBC UA: 1 /HPF (ref 0–4)
SODIUM BLD-SCNC: 144 MMOL/L (ref 136–145)
SPECIFIC GRAVITY UA: 1.01 (ref 1–1.03)
TOTAL PROTEIN: 6.7 G/DL (ref 6.4–8.2)
URINE TYPE: ABNORMAL
UROBILINOGEN, URINE: 0.2 E.U./DL
WBC UA: 7 /HPF (ref 0–5)

## 2022-10-26 PROCEDURE — 17003 DESTRUCT PREMALG LES 2-14: CPT | Performed by: DERMATOLOGY

## 2022-10-26 PROCEDURE — 1123F ACP DISCUSS/DSCN MKR DOCD: CPT | Performed by: DERMATOLOGY

## 2022-10-26 PROCEDURE — 17000 DESTRUCT PREMALG LESION: CPT | Performed by: DERMATOLOGY

## 2022-10-26 PROCEDURE — 99212 OFFICE O/P EST SF 10 MIN: CPT | Performed by: DERMATOLOGY

## 2022-10-26 NOTE — PROGRESS NOTES
[facility]  Mark Nino MD  [phone]      Heladio Dai  1935    82 y.o. male     Date of Visit: 10/26/2022    Chief Complaint: skin lesions    History of Present Illness:    1. He presents today for several persistent scaly lesions on the right forearm, chest and left cheek. 2.  He reports multiple asymptomatic lesions on the trunk and extremities. 3.  He has a history of 2 melanomas on the left earlobe and left forearm 8 years ago. Denies any signs of recurrence. 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 Venkatesh Epps (stage IB) on 12/10/2014. 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 Rinku Bang on 2/25/14 (stage IA). SCC in situ on the occipital scalp-treated with Mohs by Dr. Woody Angelo on 7/15/2021. SCC in situ of the left upper arm-treated with curettage on 10/2/2020. 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 Small squamous cell carcinoma the right superior shoulder-treated with curettage on 5/10/2017. 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 SCC on the left upper back-excised on 1/20/2017. SCC in situ of the right forearm-treated with curettage on 3/4/2016. SCC in situ of the left upper back-treated with curettage on 8/27/2015. SCC of the central chest - excised 3/27/15. BCC of the left forearm - excised on 2/25/14. Has a pacemaker. Review of Systems:  Gen: Feels well, good sense of health. Past Medical History, Family History, Surgical History, Medications and Allergies reviewed.     Past Medical History:   Diagnosis Date    Arrhythmia     Arthritis     hands shoulders neck    Atrial fibrillation (HCC)     coumadin    Atrial tachycardia (HCC)     CAD (coronary artery disease)     Cancer ([facility] [address]     skin    Diabetes mellitus ([facility] [address]     Diverticulitis     Enlarged prostate     Hyperlipidemia     Hypertension     Pacemaker 04/20/2020    Pneumonia     ABOUT 5 YEARS AGO    Vasodepressor syncope      Past Surgical History:   Procedure Laterality Date    ABLATION OF DYSRHYTHMIC FOCUS      AVNRT and Atrial tachycardia    CARPAL TUNNEL RELEASE      CATARACT REMOVAL Bilateral     CORONARY ANGIOPLASTY WITH STENT PLACEMENT  2005    CYSTOSCOPY  9/26/12    coagulation prostatic urethra    CYSTOSCOPY  10/8/15    TURP    FINGER SURGERY      X7    FINGER TRIGGER RELEASE      OTHER SURGICAL HISTORY Left [id number]    WIDE LOCAL EXCISION LEFT ARM MELANOMA, WIDE LOCAL EXCISION OF    SHOULDER SURGERY Bilateral     SPINAL FUSION      TURP  3-7-13       No Known Allergies  Outpatient Medications Marked as Taking for the 10/26/22 encounter (Office Visit) with Bradly Hitchcock MD   Medication Sig Dispense Refill    ELIQUIS 5 MG TABS tablet TAKE 1 TABLET BY MOUTH TWICE A  tablet 3    ACCU-CHEK GUIDE strip TEST ONCE A DAY FOR SYMPTOMS OF IRREGULAR BLOOD GLUCOSE. 100 strip 3    omega-3 acid ethyl esters (LOVAZA) 1 g capsule Take 1 capsule by mouth 2 times daily 180 capsule 3    pregabalin (LYRICA) 75 MG capsule Take 1 capsule in the morning and 2 capsules at night 270 capsule 1    amLODIPine (NORVASC) 5 MG tablet Take 1 tablet by mouth daily 90 tablet 3    insulin detemir (LEVEMIR FLEXTOUCH) 100 UNIT/ML injection pen Inject 24 Units into the skin nightly 10 pen 3    pantoprazole (PROTONIX) 40 MG tablet Take 1 tablet by mouth Daily with supper 90 tablet 3    nateglinide (STARLIX) 120 MG tablet Take 1 tablet by mouth 2 times daily (before meals) 180 tablet 3    Insulin Pen Needle (B-D UF III MINI PEN NEEDLES) 31G X 5 MM MISC Use as directed 200 each 2    simvastatin (ZOCOR) 40 MG tablet TAKE 1 TABLET DAILY 90 tablet 3    finasteride (PROSCAR) 5 MG tablet       sotalol (BETAPACE) 80 MG tablet Take 1 tablet by mouth 2 times daily 180 tablet 3    fluticasone (FLONASE) 50 MCG/ACT nasal spray 2 sprays by Each Nostril route 2 times daily      glucose monitoring kit (FREESTYLE) monitoring kit 1 kit by Does not apply route daily 1 kit 0    carboxymethylcellulose (REFRESH PLUS) 0.5 % SOLN ophthalmic solution Apply 2 drops to eye 3 times daily      triamcinolone (KENALOG) 0.1 % cream Apply to itchy areas on the arms and legs twice daily for up to 2 weeks or until improved. 80 g 2    fluorouracil (EFUDEX) 5 % cream Apply twice daily to affected area on the right cheek for 2 weeks. 40 g 0    Loratadine (CLARITIN PO) Take by mouth Indications: Couple times a week      vitamin D (ERGOCALCIFEROL) 1.25 MG (33196 UT) CAPS capsule Take 50,000 Units by mouth once a week Mondays      Ascorbic Acid (VITAMIN C) 500 MG CAPS Take by mouth nightly Indications: Three times a week  Monday, Wednesday, Friday       Multiple Vitamins-Minerals (CENTRUM SILVER) TABS Take 1 tablet by mouth daily       ALPHA LIPOIC ACID PO Take 100 mg by mouth daily            Physical Examination       The following were examined and determined to be normal: Psych/Neuro, Scalp/hair, Conjunctivae/eyelids, Gums/teeth/lips, Neck, Abdomen, Back, LUE, RLE, LLE, and Nails/digits. The following were examined and determined to be abnormal: Head/face, Breast/axilla/chest, and RUE. Well-appearing. 1.  Left central cheek-2, central upper chest-3, right proximal extensor forearm-1: Multiple hyperkeratotic erythematous macules. 2.  Lateral portions of the face, trunk and extremities with stuck on appearing verrucous brown papules and plaques. 3.  Clear. Assessment and Plan     1. AK (actinic keratosis) - 6    Cryotherapy was discussed and patient agreed to proceed. Consent was obtained.   6 lesions were treated cryotherapy: Left central cheek-2, central upper chest-3, right proximal extensor forearm-1. 2 cycles of liquid nitrogen applied to each lesion for 5 seconds using a Cry-Ac cryo spray gun. Patient was educated regarding the potential risks of blister formation and discomfort. Wound care was discussed. The patient tolerated the procedure well and there were no immediate complications. 2. SK (seborrheic keratosis) - multiple    Reassurance. 3. History of melanoma x 2 - no signs of recurrence. Sun protective behaviors, including use of at least SPF 30 sunscreen, and self skin examinations were encouraged. Call for any new or concerning lesions. Return in about 6 months (around 4/26/2023).     --Melva Villafana MD

## 2022-10-26 NOTE — RESULT ENCOUNTER NOTE
Unremarkable device check.    Continue to monitor    Eboni Gonzalez MD   Cardiac Electrophysiology  [facility]  Office [phone]

## 2022-10-26 NOTE — RESULT ENCOUNTER NOTE
Unremarkable device check.    Continue to monitor    Jihan Johnson MD   Cardiac Electrophysiology  [facility]  Office [phone]

## 2022-10-27 LAB
ESTIMATED AVERAGE GLUCOSE: 122.6 MG/DL
HBA1C MFR BLD: 5.9 %

## 2022-11-15 ENCOUNTER — PATIENT MESSAGE (OUTPATIENT)
Dept: DERMATOLOGY | Age: 87
End: 2022-11-15

## 2022-11-15 RX ORDER — VALACYCLOVIR HYDROCHLORIDE 500 MG/1
TABLET, FILM COATED ORAL
Qty: 24 TABLET | Refills: 0 | Status: SHIPPED | OUTPATIENT
Start: 2022-11-15

## 2022-11-15 NOTE — TELEPHONE ENCOUNTER
From: Marea Angelucci  To: Dr. Colby Back  Sent: 11/15/2022 12:23 PM EST  Subject: Prescription refill    I would like to see if I can get a new prescription for   Valacyclovir 500mg. You have prescribed this before and I am out. I would like it sent to [facility] on [address] [place] [address]. Thank you.

## 2022-11-17 ENCOUNTER — TELEPHONE (OUTPATIENT)
Dept: DERMATOLOGY | Age: 87
End: 2022-11-17

## 2022-11-17 NOTE — TELEPHONE ENCOUNTER
[phone]. Patient was in office sometime in October. He called the pharmacy trying to get a refill on Rx Valacyclovir and they told him he didn't have anymore refills available. He is wanting to know if this is true.     Please advise, thank you!!

## 2022-11-18 ENCOUNTER — NURSE ONLY (OUTPATIENT)
Dept: CARDIOLOGY CLINIC | Age: 87
End: 2022-11-18

## 2022-11-18 NOTE — RESULT ENCOUNTER NOTE
Unremarkable device check.    Continue to monitor    Lang Sanford MD   Cardiac Electrophysiology  [facility]  Office [phone]

## 2022-11-18 NOTE — PROGRESS NOTES
[facility] remote transmission received from patient's dual chamber pacemaker monitor at home d/t Cumulative RV Pacing greater than 40% for 7 days. Transmission shows normal sensing and pacing function. Noted AT/AF/L, 2.7% burden, 27.3% Pace-Terminated episodes (Eliquis, sotalol). Ap 89.3%   24.1% (MVP On)  PVCs 2.2/hr  Echo 78.9887 showed EF of 55-60%. EP physician will review. See interrogation under cardiology tab in the [facility] [address] field for more details. Will continue to monitor remotely.

## 2022-11-30 ENCOUNTER — OFFICE VISIT (OUTPATIENT)
Dept: INTERNAL MEDICINE CLINIC | Age: 87
End: 2022-11-30
Payer: OTHER GOVERNMENT

## 2022-11-30 VITALS
BODY MASS INDEX: 28.44 KG/M2 | WEIGHT: 210 LBS | SYSTOLIC BLOOD PRESSURE: 126 MMHG | DIASTOLIC BLOOD PRESSURE: 68 MMHG | HEIGHT: 72 IN

## 2022-11-30 DIAGNOSIS — R51.9 NONINTRACTABLE EPISODIC HEADACHE, UNSPECIFIED HEADACHE TYPE: Primary | ICD-10-CM

## 2022-11-30 DIAGNOSIS — K21.9 LARYNGOPHARYNGEAL REFLUX (LPR): ICD-10-CM

## 2022-11-30 DIAGNOSIS — Z79.4 TYPE 2 DIABETES MELLITUS WITH DIABETIC POLYNEUROPATHY, WITH LONG-TERM CURRENT USE OF INSULIN (HCC): ICD-10-CM

## 2022-11-30 DIAGNOSIS — E11.42 TYPE 2 DIABETES MELLITUS WITH DIABETIC POLYNEUROPATHY, WITH LONG-TERM CURRENT USE OF INSULIN (HCC): ICD-10-CM

## 2022-11-30 DIAGNOSIS — N18.30 STAGE 3 CHRONIC KIDNEY DISEASE, UNSPECIFIED WHETHER STAGE 3A OR 3B CKD (HCC): ICD-10-CM

## 2022-11-30 PROCEDURE — 99214 OFFICE O/P EST MOD 30 MIN: CPT | Performed by: INTERNAL MEDICINE

## 2022-11-30 PROCEDURE — 3044F HG A1C LEVEL LT 7.0%: CPT | Performed by: INTERNAL MEDICINE

## 2022-11-30 PROCEDURE — 3078F DIAST BP <80 MM HG: CPT | Performed by: INTERNAL MEDICINE

## 2022-11-30 PROCEDURE — 1123F ACP DISCUSS/DSCN MKR DOCD: CPT | Performed by: INTERNAL MEDICINE

## 2022-11-30 PROCEDURE — 3074F SYST BP LT 130 MM HG: CPT | Performed by: INTERNAL MEDICINE

## 2022-11-30 RX ORDER — SEMAGLUTIDE 1.34 MG/ML
INJECTION, SOLUTION SUBCUTANEOUS
Qty: 1 ADJUSTABLE DOSE PRE-FILLED PEN SYRINGE | Refills: 2 | Status: SHIPPED | OUTPATIENT
Start: 2022-11-30

## 2022-11-30 RX ORDER — PREGABALIN 75 MG/1
CAPSULE ORAL
Qty: 270 CAPSULE | Refills: 1 | Status: SHIPPED | OUTPATIENT
Start: 2022-11-30 | End: 2023-06-01

## 2022-11-30 SDOH — ECONOMIC STABILITY: FOOD INSECURITY: WITHIN THE PAST 12 MONTHS, THE FOOD YOU BOUGHT JUST DIDN'T LAST AND YOU DIDN'T HAVE MONEY TO GET MORE.: NEVER TRUE

## 2022-11-30 SDOH — ECONOMIC STABILITY: FOOD INSECURITY: WITHIN THE PAST 12 MONTHS, YOU WORRIED THAT YOUR FOOD WOULD RUN OUT BEFORE YOU GOT MONEY TO BUY MORE.: NEVER TRUE

## 2022-11-30 ASSESSMENT — SOCIAL DETERMINANTS OF HEALTH (SDOH): HOW HARD IS IT FOR YOU TO PAY FOR THE VERY BASICS LIKE FOOD, HOUSING, MEDICAL CARE, AND HEATING?: NOT HARD AT ALL

## 2022-11-30 ASSESSMENT — PATIENT HEALTH QUESTIONNAIRE - PHQ9
SUM OF ALL RESPONSES TO PHQ QUESTIONS 1-9: 0
2. FEELING DOWN, DEPRESSED OR HOPELESS: 0
SUM OF ALL RESPONSES TO PHQ9 QUESTIONS 1 & 2: 0
1. LITTLE INTEREST OR PLEASURE IN DOING THINGS: 0
SUM OF ALL RESPONSES TO PHQ QUESTIONS 1-9: 0

## 2022-11-30 NOTE — PROGRESS NOTES
Ezio Brown (:  1935) is a 82 y.o. male,Established patient, here for evaluation of the following chief complaint(s):  Diabetes         ASSESSMENT/PLAN:  1. Nonintractable episodic headache, unspecified headache type  -     MRI BRAIN W WO CONTRAST; Future  2. Type 2 diabetes mellitus with diabetic polyneuropathy, with long-term current use of insulin (HCC)  -Discussed GLP-1 versus SGLT2 inhibitors, if goal is to get off of insulin and GLP-1 is more likely to achieve that goal.  We will start Ozempic, since A1c is well controlled initially decrease Levemir to 20 units for the first month, then when increasing the Ozempic to 0.5 mg decrease further to 14 units. Continue Starlix for right now  -     pregabalin (LYRICA) 75 MG capsule; Take 1 capsule in the morning and 2 capsules at night, Disp-270 capsule, R-1Normal.  OARRS reviewed, no concerns  -     Comprehensive Metabolic Panel; Future  -     Hemoglobin A1C; Future  3. Stage 3 chronic kidney disease, unspecified whether stage 3a or 3b CKD ([facility])   -Stable, monitor  4. Laryngopharyngeal reflux (LPR)   -Suspect the increase in throat clearing is due to LPR, resume PPI    Return in about 3 months (around 2023) for DM follow up. Subjective   SUBJECTIVE/OBJECTIVE:  HPI    Pain centered around left ear - severe, sharp pain. Has been occurring on and off for 6 months, but acutely worse a couple nights ago. No facial pain or pain in the back of the head. Happening more frequently. Head has been achy for months. No nausea, vision changes. He does not have a history of headaches. He also feels like the right eye is bulging a little bit compared to the left. He has been having more congestion in the back of the throat, constantly feeling he is clearing his throat. He is currently off of the PPI, it had been discontinued by his nephrologist due to potential for nephrotoxicity.   He was released by the nephrologist since his renal function has been stable. He would like to try to get off of the insulin, he read about SGLT2 inhibitors and GLP-1 agonist.  He occasionally has hypoglycemia with the insulin. He tells neuropathy, Lyrica helps. Review of Systems       Objective   Physical Exam  Vitals reviewed. Constitutional:       General: He is not in acute distress. Appearance: Normal appearance. He is well-developed. HENT:      Head: Normocephalic and atraumatic. Eyes:      Comments: There are some asymmetry between the right and left eye, right eye appears to be either bulging slightly or puffy around the eye in comparison to the last   Cardiovascular:      Rate and Rhythm: Normal rate and regular rhythm. Heart sounds: Normal heart sounds. Pulmonary:      Effort: Pulmonary effort is normal. No respiratory distress. Breath sounds: Normal breath sounds. Skin:     General: Skin is warm and dry. Neurological:      Mental Status: He is alert. Psychiatric:         Behavior: Behavior normal.         Thought Content: Thought content normal.         Judgment: Judgment normal.                An electronic signature was used to authenticate this note.     --Kodak Epps MD

## 2022-11-30 NOTE — PATIENT INSTRUCTIONS
Decrease the levemir to 20 units  Start the Ozempic 0.25mg weekly for 4 weeks, then increase to 0.5mg weekly  When you start the 0.5mg weekly dose, decrease the levemir again to 14 units  Check the sugar every morning - if your fasting sugar is consistently under 100 then you need to decrease the insulin further

## 2022-12-02 PROBLEM — K21.9 LARYNGOPHARYNGEAL REFLUX (LPR): Status: ACTIVE | Noted: 2022-12-02

## 2022-12-05 ENCOUNTER — TELEPHONE (OUTPATIENT)
Dept: INTERNAL MEDICINE CLINIC | Age: 87
End: 2022-12-05

## 2022-12-05 NOTE — TELEPHONE ENCOUNTER
Pharmacy called in to let us know that this med is on  back order      Semaglutide,0.25 or 0.5MG/DOS, (OZEMPIC, 0.25 OR 0.5 MG/DOSE,) 2 MG/1.5ML SOPN             [facility] [address] [facility], [address] [place] [address],[address] [phone] - F [phone]

## 2022-12-05 NOTE — TELEPHONE ENCOUNTER
Did the pharmacy give him an estimate on how long they expect to wait?  We may be able to get samples to bridge until it is in stock

## 2022-12-07 RX ORDER — SEMAGLUTIDE 1.34 MG/ML
INJECTION, SOLUTION SUBCUTANEOUS
Qty: 1 ADJUSTABLE DOSE PRE-FILLED PEN SYRINGE | Refills: 2 | Status: SHIPPED | OUTPATIENT
Start: 2022-12-07

## 2022-12-07 NOTE — TELEPHONE ENCOUNTER
Spoke to pt wife she stated they did not give a time. She stated they are not sure when.      They suggested to send to another pharmacy     [address] [place]. [facility]  [phone]

## 2022-12-14 ENCOUNTER — TELEPHONE (OUTPATIENT)
Dept: CARDIOLOGY CLINIC | Age: 87
End: 2022-12-14

## 2022-12-14 NOTE — TELEPHONE ENCOUNTER
Form completed and faxed to [facility] radiology dept [phone]. Form will be uploaded in chart soon.

## 2022-12-14 NOTE — TELEPHONE ENCOUNTER
I have not received a MRI form. Where is patient having MRI so I can print off a MRI [employer] form and fax to radiology dept? Thanks.

## 2022-12-14 NOTE — TELEPHONE ENCOUNTER
Spoke with patient and patient's wife, not scheduled yet, unable to schedule without clearance from Dr. Fab Mackey. Would like to have it done at [facility].

## 2022-12-14 NOTE — TELEPHONE ENCOUNTER
Patient trying to get scheduled for an MRI but can't because he stated he was being held up in our office. Patient is very upset.  Patient wants a call back at [phone]

## 2022-12-19 ENCOUNTER — NURSE ONLY (OUTPATIENT)
Dept: CARDIOLOGY CLINIC | Age: 87
End: 2022-12-19
Payer: OTHER GOVERNMENT

## 2022-12-19 DIAGNOSIS — I49.5 SICK SINUS SYNDROME (HCC): ICD-10-CM

## 2022-12-19 DIAGNOSIS — Z95.0 PACEMAKER: Primary | ICD-10-CM

## 2022-12-19 PROCEDURE — 93296 REM INTERROG EVL PM/IDS: CPT | Performed by: INTERNAL MEDICINE

## 2022-12-19 PROCEDURE — 93294 REM INTERROG EVL PM/LDLS PM: CPT | Performed by: INTERNAL MEDICINE

## 2022-12-22 NOTE — PROGRESS NOTES
Remote transmission received from patient's dual chamber pacemaker monitor at home. Transmission shows normal sensing and pacing function. 3.2% AT/ AF burden, 8.3% Pace-Terminated episodes (Eliquis, sotalol). Ap 95.2%   44.7% (MVP On)  Echo 56.2037 showed EF of 55-60%. EP physician will review. See interrogation under cardiology tab in the [address] [place] [address] [address] field for more details. Will continue to monitor remotely.      (End of 91-day monitoring period 12/19/22)

## 2023-01-05 ENCOUNTER — HOSPITAL ENCOUNTER (OUTPATIENT)
Dept: MRI IMAGING | Age: 88
Discharge: HOME | End: 2023-01-05
Payer: OTHER GOVERNMENT

## 2023-01-05 DIAGNOSIS — R51.9 NONINTRACTABLE EPISODIC HEADACHE, UNSPECIFIED HEADACHE TYPE: ICD-10-CM

## 2023-01-05 LAB
CREAT SERPL-MCNC: 1.4 MG/DL (ref 0.8–1.3)
GFR SERPL CREATININE-BSD FRML MDRD: 49 ML/MIN/{1.73_M2}

## 2023-01-05 PROCEDURE — 6360000004 HC RX CONTRAST MEDICATION: Performed by: INTERNAL MEDICINE

## 2023-01-05 PROCEDURE — 70553 MRI BRAIN STEM W/O & W/DYE: CPT

## 2023-01-05 PROCEDURE — 82565 ASSAY OF CREATININE: CPT

## 2023-01-05 PROCEDURE — 36415 COLL VENOUS BLD VENIPUNCTURE: CPT

## 2023-01-05 PROCEDURE — A9577 INJ MULTIHANCE: HCPCS | Performed by: INTERNAL MEDICINE

## 2023-01-05 RX ADMIN — GADOBENATE DIMEGLUMINE 18 ML: 529 INJECTION, SOLUTION INTRAVENOUS at 13:58

## 2023-01-25 ENCOUNTER — TELEPHONE (OUTPATIENT)
Dept: INTERNAL MEDICINE CLINIC | Age: 88
End: 2023-01-25

## 2023-01-25 NOTE — TELEPHONE ENCOUNTER
----- Message from Kenya Tobar sent at 1/25/2023 10:57 AM EST -----  Subject: Appointment Request    Reason for Call: Established Patient Appointment needed: Urgent (Patient   Request) Medication Issues    QUESTIONS    Reason for appointment request? Available appointments did not meet   patient need     Additional Information for Provider?  Patient started medication   Semaglutide,0.25 or 0.5MG/DOS, (OZEMPIC, 0.25 OR 0.5 MG/DOSE,) 2 MG/1.5ML   SOPN and is having a reaction to it said it was in stomach and wanting to   speak to someone about it.  ---------------------------------------------------------------------------  --------------  Clark TREVINO  [phone]; OK to leave message on voicemail  ---------------------------------------------------------------------------  --------------  SCRIPT ANSWERS  COVID Screen: [facility]

## 2023-01-25 NOTE — TELEPHONE ENCOUNTER
It is probably a side effect from the medication.  Whether or not to stop the medication depends on how much it is bothering him

## 2023-01-25 NOTE — TELEPHONE ENCOUNTER
Patient calling again waiting on script to be sent to pharmacy, please review Spoke to pt stated his bowel movements have went from volume to little, feels like he has to belch all the time, inside of the stomach feels sore but not achy. Stated this started by the time he took the 2nd shot. He has taken 7 shots already, does not get worse when he takes the shot but it does not go away. Stated its there all the time some days better then others.

## 2023-01-25 NOTE — TELEPHONE ENCOUNTER
Spoke to pt gave MD advise. He stated some days it bothers him a lot and some it doesn't    Pt is going to continue to take the medication.

## 2023-02-04 DIAGNOSIS — I47.1 ATRIAL TACHYCARDIA (HCC): ICD-10-CM

## 2023-02-04 DIAGNOSIS — I48.0 PAROXYSMAL ATRIAL FIBRILLATION (HCC): ICD-10-CM

## 2023-02-04 DIAGNOSIS — R00.2 PALPITATIONS: ICD-10-CM

## 2023-02-04 DIAGNOSIS — I49.5 SICK SINUS SYNDROME (HCC): ICD-10-CM

## 2023-02-06 RX ORDER — SOTALOL HYDROCHLORIDE 80 MG/1
TABLET ORAL
Qty: 180 TABLET | Refills: 3 | Status: SHIPPED | OUTPATIENT
Start: 2023-02-06

## 2023-02-17 ENCOUNTER — HOSPITAL ENCOUNTER (OUTPATIENT)
Age: 88
Discharge: HOME | End: 2023-02-17
Payer: OTHER GOVERNMENT

## 2023-02-17 DIAGNOSIS — E11.42 TYPE 2 DIABETES MELLITUS WITH DIABETIC POLYNEUROPATHY, WITH LONG-TERM CURRENT USE OF INSULIN (HCC): ICD-10-CM

## 2023-02-17 DIAGNOSIS — Z79.4 TYPE 2 DIABETES MELLITUS WITH DIABETIC POLYNEUROPATHY, WITH LONG-TERM CURRENT USE OF INSULIN (HCC): ICD-10-CM

## 2023-02-17 LAB
A/G RATIO: 1.8 (ref 1.1–2.2)
ALBUMIN SERPL-MCNC: 4.4 G/DL (ref 3.4–5)
ALP BLD-CCNC: 63 U/L (ref 40–129)
ALT SERPL-CCNC: 16 U/L (ref 10–40)
ANION GAP SERPL CALCULATED.3IONS-SCNC: 11 MMOL/L (ref 3–16)
AST SERPL-CCNC: 23 U/L (ref 15–37)
BILIRUB SERPL-MCNC: 0.6 MG/DL (ref 0–1)
BUN BLDV-MCNC: 17 MG/DL (ref 7–20)
CALCIUM SERPL-MCNC: 11 MG/DL (ref 8.3–10.6)
CHLORIDE BLD-SCNC: 103 MMOL/L (ref 99–110)
CO2: 27 MMOL/L (ref 21–32)
CREAT SERPL-MCNC: 1.4 MG/DL (ref 0.8–1.3)
GFR SERPL CREATININE-BSD FRML MDRD: 49 ML/MIN/{1.73_M2}
GLUCOSE BLD-MCNC: 73 MG/DL (ref 70–99)
POTASSIUM SERPL-SCNC: 4.1 MMOL/L (ref 3.5–5.1)
SODIUM BLD-SCNC: 141 MMOL/L (ref 136–145)
TOTAL PROTEIN: 6.8 G/DL (ref 6.4–8.2)

## 2023-02-17 PROCEDURE — 36415 COLL VENOUS BLD VENIPUNCTURE: CPT

## 2023-02-17 PROCEDURE — 80053 COMPREHEN METABOLIC PANEL: CPT

## 2023-02-17 PROCEDURE — 83036 HEMOGLOBIN GLYCOSYLATED A1C: CPT

## 2023-02-18 LAB
ESTIMATED AVERAGE GLUCOSE: 116.9 MG/DL
HBA1C MFR BLD: 5.7 %

## 2023-02-24 ENCOUNTER — HOSPITAL ENCOUNTER (OUTPATIENT)
Age: 88
Discharge: HOME | End: 2023-02-24
Payer: OTHER GOVERNMENT

## 2023-02-24 DIAGNOSIS — E83.52 SERUM CALCIUM ELEVATED: Primary | ICD-10-CM

## 2023-02-24 DIAGNOSIS — E83.52 SERUM CALCIUM ELEVATED: ICD-10-CM

## 2023-02-24 LAB
ANION GAP SERPL CALCULATED.3IONS-SCNC: 8 MMOL/L (ref 3–16)
BUN BLDV-MCNC: 16 MG/DL (ref 7–20)
CALCIUM SERPL-MCNC: 10.5 MG/DL (ref 8.3–10.6)
CHLORIDE BLD-SCNC: 105 MMOL/L (ref 99–110)
CO2: 27 MMOL/L (ref 21–32)
CREAT SERPL-MCNC: 1.5 MG/DL (ref 0.8–1.3)
GFR SERPL CREATININE-BSD FRML MDRD: 45 ML/MIN/{1.73_M2}
GLUCOSE BLD-MCNC: 124 MG/DL (ref 70–99)
PARATHYROID HORMONE INTACT: 109.8 PG/ML (ref 14–72)
POTASSIUM SERPL-SCNC: 4.7 MMOL/L (ref 3.5–5.1)
SODIUM BLD-SCNC: 140 MMOL/L (ref 136–145)

## 2023-02-24 PROCEDURE — 82306 VITAMIN D 25 HYDROXY: CPT

## 2023-02-24 PROCEDURE — 80048 BASIC METABOLIC PNL TOTAL CA: CPT

## 2023-02-24 PROCEDURE — 82330 ASSAY OF CALCIUM: CPT

## 2023-02-24 PROCEDURE — 36415 COLL VENOUS BLD VENIPUNCTURE: CPT

## 2023-02-24 PROCEDURE — 83970 ASSAY OF PARATHORMONE: CPT

## 2023-02-25 LAB — VITAMIN D 25-HYDROXY: 50 NG/ML

## 2023-02-27 SDOH — ECONOMIC STABILITY: TRANSPORTATION INSECURITY
IN THE PAST 12 MONTHS, HAS LACK OF TRANSPORTATION KEPT YOU FROM MEETINGS, WORK, OR FROM GETTING THINGS NEEDED FOR DAILY LIVING?: NO

## 2023-02-27 SDOH — ECONOMIC STABILITY: FOOD INSECURITY: WITHIN THE PAST 12 MONTHS, YOU WORRIED THAT YOUR FOOD WOULD RUN OUT BEFORE YOU GOT MONEY TO BUY MORE.: NEVER TRUE

## 2023-02-27 SDOH — ECONOMIC STABILITY: HOUSING INSECURITY
IN THE LAST 12 MONTHS, WAS THERE A TIME WHEN YOU DID NOT HAVE A STEADY PLACE TO SLEEP OR SLEPT IN A SHELTER (INCLUDING NOW)?: NO

## 2023-02-27 SDOH — ECONOMIC STABILITY: FOOD INSECURITY: WITHIN THE PAST 12 MONTHS, THE FOOD YOU BOUGHT JUST DIDN'T LAST AND YOU DIDN'T HAVE MONEY TO GET MORE.: NEVER TRUE

## 2023-02-27 SDOH — ECONOMIC STABILITY: INCOME INSECURITY: HOW HARD IS IT FOR YOU TO PAY FOR THE VERY BASICS LIKE FOOD, HOUSING, MEDICAL CARE, AND HEATING?: NOT VERY HARD

## 2023-02-27 NOTE — PROGRESS NOTES
[address]   Cardiac Electrophysiology Consultation   Date: 3/2/2023  Reason for Consultation:   Consult Requesting Physician: No att. providers found     Chief Complaint:   Chief Complaint   Patient presents with    Follow-up     6-7 Month follow up      HPI: Laila Jackson is a 82 y.o. 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 Echo (4/2022) showed LVEF 55-60%. Interval History: Today, he is here for 6 mo f/u, presenting in SR with stable QTc. He has been feeling well for the past 6 months. Denies complaints of palpitations, dizziness, CP, SOB, orthopnea, presyncope, or syncope. He has started Starlix recently and has lost some weight with improved blood sugar control. All sensing and pacing parameters appear unchanged since last in office check on 09.27.2022.  10.5 years estimated until JACOB/RRT. AP 82.1%.  55.3%. PVC 3.6/hr  1 treated AT/AF episode with a burden of 1%    Assessment:  1. Syncope / SSS, s/p dual chamber PPM   2. PAF, on sotalol, Eliquis  3. 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 HTN, stable on amlodipine  5. CAD  6. HLD, on statin  7. DM    Plan:  Due to high , obtain echo to assess LVEF  2. No changes to current medications or with device settings  3. Continue with remote home transmission every 3 months  4.    Follow up with EP NP in 6 months, sooner if EF is low      Past Medical History:   Diagnosis Date    Arrhythmia     Arthritis     hands shoulders neck    Atrial fibrillation (HCC)     coumadin    Atrial tachycardia (HCC)     CAD (coronary artery disease)     Cancer (HCC)     skin    Diabetes mellitus ([facility] [address]     Diverticulitis     Enlarged prostate     Hyperlipidemia     Hypertension     Pacemaker 04/20/2020    Pneumonia     ABOUT 5 YEARS AGO Vasodepressor syncope         Past Surgical History:   Procedure Laterality Date    ABLATION OF DYSRHYTHMIC FOCUS      AVNRT and Atrial tachycardia    CARPAL TUNNEL RELEASE      CATARACT REMOVAL Bilateral     CORONARY ANGIOPLASTY WITH STENT PLACEMENT  2005    CYSTOSCOPY  9/26/12    coagulation prostatic urethra    CYSTOSCOPY  10/8/15    TURP    FINGER SURGERY      X7    FINGER TRIGGER RELEASE      OTHER SURGICAL HISTORY Left [id number]    WIDE LOCAL EXCISION LEFT ARM MELANOMA, WIDE LOCAL EXCISION OF    SHOULDER SURGERY Bilateral     SPINAL FUSION      TURP  3-7-13       Allergies:  No Known Allergies    Medication:   Prior to Admission medications    Medication Sig Start Date End Date Taking?  Authorizing Provider   amLODIPine (NORVASC) 5 MG tablet Take 1 tablet by mouth daily 3/1/23  Yes Amanda Lemus MD   omega-3 acid ethyl esters (LOVAZA) 1 g capsule Take 1 capsule by mouth 2 times daily 3/1/23  Yes Amanda Lemus MD   nateglinide (STARLIX) 120 MG tablet Take 1 tablet by mouth 2 times daily (before meals) 3/1/23  Yes Amanda Lemus MD   Semaglutide,0.25 or 0.5MG/DOS, (OZEMPIC, 0.25 OR 0.5 MG/DOSE,) 2 MG/1.5ML SOPN Inject 0.5mg weekly 3/1/23  Yes Amanda Lemus MD   sotalol (BETAPACE) 80 MG tablet TAKE 1 TABLET BY MOUTH TWICE A DAY 2/6/23  Yes ELISHA Montague CNP   pregabalin (LYRICA) 75 MG capsule Take 1 capsule in the morning and 2 capsules at night 11/30/22 6/1/23 Yes Amanda Lemus MD   valACYclovir (VALTREX) 500 MG tablet TAKE 1 TABLET BY MOUTH TWICE DAILY FOR 3 DAYS AT FIRST SYMPTOMS OF RECURRENCE ON THE BUTTOCK 11/15/22  Yes Rafael Pelayo MD   ELIQUIS 5 MG TABS tablet TAKE 1 TABLET BY MOUTH TWICE A DAY 10/25/22  Yes ELISHA Montague CNP   ACCU-CHEK GUIDE strip TEST ONCE A DAY FOR SYMPTOMS OF IRREGULAR BLOOD GLUCOSE. 9/27/22  Yes Amanda Lemus MD   insulin detemir (LEVEMIR FLEXTOUCH) 100 UNIT/ML injection pen Inject 24 Units into the skin nightly  Patient taking differently: Inject 20 Units into the skin nightly 6/1/22  Yes Jadon Connolly MD   pantoprazole (PROTONIX) 40 MG tablet Take 1 tablet by mouth Daily with supper 6/1/22  Yes Jadon Connolly MD   Insulin Pen Needle (B-D UF III MINI PEN NEEDLES) 31G X 5 MM MISC Use as directed 6/1/22  Yes Jadon Connolly MD   simvastatin (ZOCOR) 40 MG tablet TAKE 1 TABLET DAILY 4/11/22  Yes Jadon Connolly MD   finasteride (PROSCAR) 5 MG tablet  2/9/22  Yes Historical Provider, MD   glucose monitoring kit (FREESTYLE) monitoring kit 1 kit by Does not apply route daily 6/2/21  Yes Jadon Connolly MD   carboxymethylcellulose (REFRESH PLUS) 0.5 % SOLN ophthalmic solution Apply 2 drops to eye 3 times daily 10/12/20  Yes Historical Provider, MD   triamcinolone (KENALOG) 0.1 % cream Apply to itchy areas on the arms and legs twice daily for up to 2 weeks or until improved. 1/14/21  Yes Ludwin Vásquez MD   fluorouracil (EFUDEX) 5 % cream Apply twice daily to affected area on the right cheek for 2 weeks. 11/19/20  Yes Ludwin Vásquez MD   Loratadine (CLARITIN PO) Take by mouth Indications: Couple times a week   Yes Historical Provider, MD   Ascorbic Acid (VITAMIN C) 500 MG CAPS Take by mouth nightly Indications: Three times a week  Monday, Wednesday, Friday    Yes Historical Provider, MD   Multiple Vitamins-Minerals (CENTRUM SILVER) TABS Take 1 tablet by mouth daily    Yes Historical Provider, MD   ALPHA LIPOIC ACID PO Take 100 mg by mouth daily  2/20/10  Yes Historical Provider, MD       Social History:   reports that he quit smoking about 49 years ago. His smoking use included cigarettes. He has never used smokeless tobacco. He reports current alcohol use of about 2.0 standard drinks per week. He reports that he does not use drugs. Family History:  family history is not on file. Reviewed.  Denies family history of sudden cardiac death, arrhythmia, premature CAD    Review of System:    General ROS: negative for - chills, fever   Psychological ROS: negative for - anxiety or depression  Ophthalmic ROS: negative for - eye pain or loss of vision  ENT ROS: negative for - epistaxis, headaches, nasal discharge, sore throat   Allergy and Immunology ROS: negative for - hives, nasal congestion   Hematological and Lymphatic ROS: negative for - bleeding problems, blood clots, bruising or jaundice  Endocrine ROS: negative for - skin changes, temperature intolerance or unexpected weight changes  Respiratory ROS: negative for - cough, hemoptysis, pleuritic pain, SOB, sputum changes or wheezing  Cardiovascular ROS: Per HPI. Gastrointestinal ROS: negative for - abdominal pain, blood in stools, diarrhea, hematemesis, melena, nausea/vomiting or swallowing difficulty/pain  Genito-Urinary ROS: negative for - dysuria or incontinence  Musculoskeletal ROS: negative for - joint swelling or muscle pain  Neurological ROS: negative for - confusion, dizziness, gait disturbance, headaches, numbness/tingling, seizures, speech problems, tremors, visual changes or weakness  Dermatological ROS: negative for - rash    Physical Examination:  Vitals:    03/02/23 1502   BP: 112/60         Constitutional: Oriented. No distress. Head: Normocephalic and atraumatic. Mouth/Throat: Oropharynx is clear and moist.   Eyes: Conjunctivae normal. EOM are normal.   Neck: Normal range of motion. Neck supple. No rigidity. No JVD present. Cardiovascular: Normal rate, regular rhythm, S1&S2 and intact distal pulses. Pulmonary/Chest: Bilateral respiratory sounds. No wheezes. No rhonchi. Abdominal: Soft. Bowel sounds present. No distension, No tenderness. Musculoskeletal: No tenderness. No edema    Lymphadenopathy: Has no cervical adenopathy. Neurological: Alert and oriented. Cranial nerve appears intact, No Gross deficit   Skin: Skin is warm and dry. No rash noted. Psychiatric: Has a normal mood, affect and behavior     Labs:  Reviewed. ECG: reviewed, NSR 66, QTc 414 ms.      Studies:   1. 30 day [facility] (4/2020): SSS, 9.1 sec pause, 14% AF    2. Echo 4/27/22:    Summary   Left ventricular cavity size is normal.   There is mild concentric left ventricular hypertrophy. Left ventricular function is normal with ejection fraction estimated at   55-60%. No regional wall motion abnormalities are noted. Normal diastolic function. Mitral annular calcification is present. Aortic valve appears thickened/calcified but opens adequately. Mild aortic regurgitation is present. Mild tricuspid regurgitation. Estimated pulmonary artery systolic pressure is at 29 mmHg assuming a right   atrial pressure of 3 mmHg. 3. Stress Test 8/31/17:    Summary    Normal myocardial perfusion study. Normal LV function with ejection fraction of 63 %. 4. Cath 2005:  PCI    The MCOT, echocardiogram, stress test, and coronary angiography/PCI were reviewed by myself and used for my plan of care. The CIED was interrogated and programmed and I supervised and reviewed all the data. All findings and changes are in device interrogation sheat and reflect my personal interpretation and changes and is scanned to Epic. - The patient is counseled to follow a low salt diet to assure blood pressure remains controlled for cardiovascular risk factor modification.   - The patient is counseled to avoid excess caffeine, and energy drinks as this may exacerbated ectopy and arrhythmia. - The patient is counseled to get regular exercise 3-5 times per week to control cardiovascular risk factors. - The patient is counseled to lose weigt to control cardiovascular risk factors. -The patient is counseled about the health hazards of smoking including cardiovascular side effects and its impact on morbidity and mortality. Thank you for allowing me to participate in the care of Boogie Justin. All questions and concerns were addressed to the patient/family. Alternatives to my treatment were discussed.      Maddie Florez RN, am scribing for and in the presence of Dr. Ya Aguayo. 03/02/23 3:24 PM  Jeyson Winkler, MORRIS    I, Mae Venegas MD, personally performed the services prescribed in this documentation as scribed by Ms. Jeyson Winkler RN in my presence and it is both accurate and complete.    Mae Venegas MD  Cardiac Electrophysiology  [address] [address]

## 2023-03-01 ENCOUNTER — OFFICE VISIT (OUTPATIENT)
Dept: INTERNAL MEDICINE CLINIC | Age: 88
End: 2023-03-01
Payer: OTHER GOVERNMENT

## 2023-03-01 VITALS
HEIGHT: 72 IN | BODY MASS INDEX: 27.09 KG/M2 | WEIGHT: 200 LBS | DIASTOLIC BLOOD PRESSURE: 72 MMHG | SYSTOLIC BLOOD PRESSURE: 130 MMHG

## 2023-03-01 DIAGNOSIS — Z79.4 TYPE 2 DIABETES MELLITUS WITH DIABETIC POLYNEUROPATHY, WITH LONG-TERM CURRENT USE OF INSULIN (HCC): Primary | ICD-10-CM

## 2023-03-01 DIAGNOSIS — J31.0 CHRONIC RHINITIS: ICD-10-CM

## 2023-03-01 DIAGNOSIS — E11.42 TYPE 2 DIABETES MELLITUS WITH DIABETIC POLYNEUROPATHY, WITH LONG-TERM CURRENT USE OF INSULIN (HCC): Primary | ICD-10-CM

## 2023-03-01 DIAGNOSIS — R20.2 NUMBNESS AND TINGLING IN BOTH HANDS: ICD-10-CM

## 2023-03-01 DIAGNOSIS — N18.30 STAGE 3 CHRONIC KIDNEY DISEASE, UNSPECIFIED WHETHER STAGE 3A OR 3B CKD (HCC): ICD-10-CM

## 2023-03-01 DIAGNOSIS — E21.3 HYPERPARATHYROIDISM, UNSPECIFIED (HCC): ICD-10-CM

## 2023-03-01 DIAGNOSIS — R20.0 NUMBNESS AND TINGLING IN BOTH HANDS: ICD-10-CM

## 2023-03-01 DIAGNOSIS — G47.00 INSOMNIA, UNSPECIFIED TYPE: ICD-10-CM

## 2023-03-01 PROCEDURE — 3044F HG A1C LEVEL LT 7.0%: CPT | Performed by: INTERNAL MEDICINE

## 2023-03-01 PROCEDURE — 3078F DIAST BP <80 MM HG: CPT | Performed by: INTERNAL MEDICINE

## 2023-03-01 PROCEDURE — 1123F ACP DISCUSS/DSCN MKR DOCD: CPT | Performed by: INTERNAL MEDICINE

## 2023-03-01 PROCEDURE — 99214 OFFICE O/P EST MOD 30 MIN: CPT | Performed by: INTERNAL MEDICINE

## 2023-03-01 PROCEDURE — 3075F SYST BP GE 130 - 139MM HG: CPT | Performed by: INTERNAL MEDICINE

## 2023-03-01 RX ORDER — OMEGA-3-ACID ETHYL ESTERS 1 G/1
1 CAPSULE, LIQUID FILLED ORAL 2 TIMES DAILY
Qty: 180 CAPSULE | Refills: 3 | Status: SHIPPED | OUTPATIENT
Start: 2023-03-01

## 2023-03-01 RX ORDER — NATEGLINIDE 120 MG/1
120 TABLET ORAL
Qty: 180 TABLET | Refills: 3 | Status: SHIPPED | OUTPATIENT
Start: 2023-03-01

## 2023-03-01 RX ORDER — AMLODIPINE BESYLATE 5 MG/1
5 TABLET ORAL DAILY
Qty: 90 TABLET | Refills: 3 | Status: SHIPPED | OUTPATIENT
Start: 2023-03-01

## 2023-03-01 RX ORDER — SEMAGLUTIDE 1.34 MG/ML
INJECTION, SOLUTION SUBCUTANEOUS
Qty: 1 ADJUSTABLE DOSE PRE-FILLED PEN SYRINGE | Refills: 5 | Status: SHIPPED | OUTPATIENT
Start: 2023-03-01

## 2023-03-01 ASSESSMENT — PATIENT HEALTH QUESTIONNAIRE - PHQ9
SUM OF ALL RESPONSES TO PHQ9 QUESTIONS 1 & 2: 0
SUM OF ALL RESPONSES TO PHQ QUESTIONS 1-9: 0
2. FEELING DOWN, DEPRESSED OR HOPELESS: 0
SUM OF ALL RESPONSES TO PHQ QUESTIONS 1-9: 0
SUM OF ALL RESPONSES TO PHQ QUESTIONS 1-9: 0
1. LITTLE INTEREST OR PLEASURE IN DOING THINGS: 0
SUM OF ALL RESPONSES TO PHQ QUESTIONS 1-9: 0

## 2023-03-01 NOTE — PROGRESS NOTES
Jone Aguilar (:  1935) is a 82 y.o. male,Established patient, here for evaluation of the following chief complaint(s):  Diabetes         ASSESSMENT/PLAN:  1. Type 2 diabetes mellitus with diabetic polyneuropathy, with long-term current use of insulin ([facility])  -A1c is extremely well controlled at 5.7. We will decrease the insulin further to 16 units. Reassess in 4 months, may consider further increase in Ozempic at that time depending on side effects. Continue nateglinide and metformin  -     CBC with Auto Differential; Future  -     Comprehensive Metabolic Panel; Future  -     Lipid Panel; Future  -     Hemoglobin A1C; Future  2. Hyperparathyroidism, unspecified ([address]   -Repeat calcium on BMP was back in normal range, ionized calcium was slightly high. PTH is high, consistent with hyperparathyroidism. Looking back in , the PTH was elevated then as well. We will continue to monitor the calcium, no further action needed at this time, would avoid calcium supplementation. 3. Stage 3 chronic kidney disease, unspecified whether stage 3a or 3b CKD ([facility])   -Stable, avoid nephrotoxins  4. Chronic rhinitis   -On MRI no evidence of sinusitis, less likely to be an infection. Could consider trying Flonase  5. Insomnia, unspecified type   -I suspect the biggest issue is the nap in the evening. Would first try to address that. May consider trazodone in the future  6. Numbness and tingling in both hands   -Possibly manifestation of diabetic neuropathy versus carpal tunnel, discussed nerve conduction testing but since he already had a carpal tunnel release we likely would not change anything based on the results of that test.  Can try splints and see if this makes any difference, if it improves symptoms would support carpal tunnel. Continue to monitor. Return in about 4 months (around 2023) for DM follow up.          Subjective   SUBJECTIVE/OBJECTIVE:  HPI    Type 2 diabetes -he is taking the nateglinide, Lantus dose is decreased to 20 units. Sugars are generally well controlled, fasting sugars are often in the 90s or low 100s. Some have gone down into the 80s. He is doing okay with the Ozempic, still at the 0.5 mg dose. Has some GI side effects, particularly increased frequency of bowel movements. Neuropathy in the feet is stable. He is noticing that his hands are cold at night, intermittently having numbness and tingling, particularly when using the computer keyboard. He had prior carpal tunnel release, symptoms are similar to when he had carpal tunnel. They had improved after surgery. He has not noted any color change in the hands. He has been released by the nephrologist to follow-up only if kidney function is worsening. He does try to stay hydrated. Does not take NSAIDs. He is taking a multivitamin, no longer taking the calcium supplement since his calcium came back high. He has been having clear nasal discharge on the left, colored, more light yellow, on the right. No sinus pain. This has been going on for months. He is having trouble falling asleep at night. He falls asleep after dinner, may sleep for an hour and a half or up to 4 hours. He goes to bed at 2 AM.  Goal would be to go to bed more like 11 PM to midnight. He has tried melatonin without effect    Review of Systems       Objective   Physical Exam  Vitals reviewed. Constitutional:       General: He is not in acute distress. Appearance: Normal appearance. He is well-developed. HENT:      Head: Normocephalic and atraumatic. Cardiovascular:      Rate and Rhythm: Normal rate and regular rhythm. Heart sounds: Normal heart sounds. Pulmonary:      Effort: Pulmonary effort is normal. No respiratory distress. Breath sounds: Normal breath sounds. Skin:     General: Skin is warm and dry. Neurological:      Mental Status: He is alert.    Psychiatric:         Mood and Affect: Mood normal. Behavior: Behavior normal.         Thought Content: Thought content normal.         Judgment: Judgment normal.                An electronic signature was used to authenticate this note.     --Hollie Muñoz MD

## 2023-03-02 ENCOUNTER — NURSE ONLY (OUTPATIENT)
Dept: CARDIOLOGY CLINIC | Age: 88
End: 2023-03-02

## 2023-03-02 ENCOUNTER — OFFICE VISIT (OUTPATIENT)
Dept: CARDIOLOGY CLINIC | Age: 88
End: 2023-03-02

## 2023-03-02 VITALS — BODY MASS INDEX: 26.45 KG/M2 | DIASTOLIC BLOOD PRESSURE: 60 MMHG | SYSTOLIC BLOOD PRESSURE: 112 MMHG | WEIGHT: 195 LBS

## 2023-03-02 DIAGNOSIS — I48.0 PAROXYSMAL ATRIAL FIBRILLATION (HCC): Chronic | ICD-10-CM

## 2023-03-02 DIAGNOSIS — I49.5 SSS (SICK SINUS SYNDROME) (HCC): ICD-10-CM

## 2023-03-02 DIAGNOSIS — R00.2 PALPITATIONS: ICD-10-CM

## 2023-03-02 DIAGNOSIS — Z95.0 PACEMAKER: Primary | ICD-10-CM

## 2023-03-02 DIAGNOSIS — I10 BENIGN ESSENTIAL HTN: ICD-10-CM

## 2023-03-02 DIAGNOSIS — Z95.0 PACEMAKER: ICD-10-CM

## 2023-03-02 DIAGNOSIS — I49.5 SICK SINUS SYNDROME (HCC): ICD-10-CM

## 2023-03-02 DIAGNOSIS — I25.10 ATHEROSCLEROSIS OF NATIVE CORONARY ARTERY OF NATIVE HEART WITHOUT ANGINA PECTORIS: ICD-10-CM

## 2023-03-02 DIAGNOSIS — I47.1 ATRIAL TACHYCARDIA (HCC): ICD-10-CM

## 2023-03-02 DIAGNOSIS — R55 SYNCOPE AND COLLAPSE: ICD-10-CM

## 2023-03-02 DIAGNOSIS — I47.1 AVNRT (AV NODAL RE-ENTRY TACHYCARDIA) (HCC): ICD-10-CM

## 2023-03-02 DIAGNOSIS — I48.0 PAROXYSMAL ATRIAL FIBRILLATION (HCC): Primary | ICD-10-CM

## 2023-03-02 RX ORDER — SOTALOL HYDROCHLORIDE 80 MG/1
TABLET ORAL
Qty: 180 TABLET | Refills: 3 | Status: SHIPPED | OUTPATIENT
Start: 2023-03-02

## 2023-03-02 NOTE — PROGRESS NOTES
Patient comes in for programming evaluation on their Medtronic dual chamber pacemaker. Echo [id number] showed EF of 55-60%. Last remote 12.19.2022. All sensing and pacing parameters appear unchanged since last in office check on 09.27.2022.  10.5 years estimated until JACOB/RRT. AP 82.1%.  55.3%. PVC 3.6/hr  1 treated AT/AF episode with a burden of 1%  Patient remains on eliquis, sotalol. Time and carelink alerts adjusted. Please see interrogation for more detail. Patient will see Brandi Guzmán today. We will continue to monitor remotely.

## 2023-03-03 NOTE — RESULT ENCOUNTER NOTE
Unremarkable device check.    Continue to monitor    Juanis Urbina MD   Cardiac Electrophysiology  [facility]  Office [phone]

## 2023-03-07 ENCOUNTER — OFFICE VISIT (OUTPATIENT)
Dept: DERMATOLOGY | Age: 88
End: 2023-03-07
Payer: OTHER GOVERNMENT

## 2023-03-07 DIAGNOSIS — C44.629 SCC (SQUAMOUS CELL CARCINOMA), ARM, LEFT: ICD-10-CM

## 2023-03-07 DIAGNOSIS — L57.0 AK (ACTINIC KERATOSIS): Primary | ICD-10-CM

## 2023-03-07 PROCEDURE — 17000 DESTRUCT PREMALG LESION: CPT | Performed by: DERMATOLOGY

## 2023-03-07 PROCEDURE — 17261 DSTRJ MAL LES T/A/L .6-1.0CM: CPT | Performed by: DERMATOLOGY

## 2023-03-07 PROCEDURE — 17003 DESTRUCT PREMALG LES 2-14: CPT | Performed by: DERMATOLOGY

## 2023-03-07 NOTE — PROGRESS NOTES
[facility]  Joi Forbes MD  [phone]      Alen Ortega  1935    82 y.o. male     Date of Visit: 3/7/2023    Chief Complaint: skin lesions    History of Present Illness:    1. He presents today for few persistent scaly lesions on the left cheek and chest.    2.  He complains of a newly noted tender lesion on the left upper arm. 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 Yamilka Bay (stage IB) on 12/10/2014. 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 Sujatha Will on 2/25/14 (stage IA). SCC in situ on the occipital scalp-treated with Mohs by Dr. Maria R Venegas on 7/15/2021. SCC in situ of the left upper arm-treated with curettage on 10/2/2020. 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 Small squamous cell carcinoma the right superior shoulder-treated with curettage on 5/10/2017. 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 SCC on the left upper back-excised on 1/20/2017. SCC in situ of the right forearm-treated with curettage on 3/4/2016. SCC in situ of the left upper back-treated with curettage on 8/27/2015. SCC of the central chest - excised 3/27/15. BCC of the left forearm - excised on 2/25/14. Has a pacemaker. Review of Systems:  Gen: Feels well, good sense of health. Skin: No new or changing moles. Past Medical History, Family History, Surgical History, Medications and Allergies reviewed.     Past Medical History:   Diagnosis Date    Arrhythmia     Arthritis     hands shoulders neck    Atrial fibrillation (HCC)     coumadin    Atrial tachycardia (HCC)     CAD (coronary artery disease)     Cancer ([facility] [address]     skin    Diabetes mellitus ([facility] [address]     Diverticulitis     Enlarged prostate     Hyperlipidemia     Hypertension     Pacemaker 04/20/2020    Pneumonia     ABOUT 5 YEARS AGO    Vasodepressor syncope      Past Surgical History:   Procedure Laterality Date    ABLATION OF DYSRHYTHMIC FOCUS      AVNRT and Atrial tachycardia    CARPAL TUNNEL RELEASE      CATARACT REMOVAL Bilateral     CORONARY ANGIOPLASTY WITH STENT PLACEMENT  2005    CYSTOSCOPY  9/26/12    coagulation prostatic urethra    CYSTOSCOPY  10/8/15    TURP    FINGER SURGERY      X7    FINGER TRIGGER RELEASE      OTHER SURGICAL HISTORY Left [id number]    WIDE LOCAL EXCISION LEFT ARM MELANOMA, WIDE LOCAL EXCISION OF    SHOULDER SURGERY Bilateral     SPINAL FUSION      TURP  3-7-13       No Known Allergies  Outpatient Medications Marked as Taking for the 3/7/23 encounter (Office Visit) with Vania Gleason MD   Medication Sig Dispense Refill    sotalol (BETAPACE) 80 MG tablet TAKE 1 TABLET BY MOUTH TWICE A  tablet 3    amLODIPine (NORVASC) 5 MG tablet Take 1 tablet by mouth daily 90 tablet 3    omega-3 acid ethyl esters (LOVAZA) 1 g capsule Take 1 capsule by mouth 2 times daily 180 capsule 3    nateglinide (STARLIX) 120 MG tablet Take 1 tablet by mouth 2 times daily (before meals) 180 tablet 3    Semaglutide,0.25 or 0.5MG/DOS, (OZEMPIC, 0.25 OR 0.5 MG/DOSE,) 2 MG/1.5ML SOPN Inject 0.5mg weekly 1 Adjustable Dose Pre-filled Pen Syringe 5    pregabalin (LYRICA) 75 MG capsule Take 1 capsule in the morning and 2 capsules at night 270 capsule 1    valACYclovir (VALTREX) 500 MG tablet TAKE 1 TABLET BY MOUTH TWICE DAILY FOR 3 DAYS AT FIRST SYMPTOMS OF RECURRENCE ON THE BUTTOCK 24 tablet 0    ELIQUIS 5 MG TABS tablet TAKE 1 TABLET BY MOUTH TWICE A  tablet 3    ACCU-CHEK GUIDE strip TEST ONCE A DAY FOR SYMPTOMS OF IRREGULAR BLOOD GLUCOSE. 100 strip 3    insulin detemir (LEVEMIR FLEXTOUCH) 100 UNIT/ML injection pen Inject 24 Units into the skin nightly (Patient taking differently: Inject 20 Units into the skin nightly) 10 pen 3    pantoprazole (PROTONIX) 40 MG tablet Take 1 tablet by mouth Daily with supper 90 tablet 3    Insulin Pen Needle (B-D UF III MINI PEN NEEDLES) 31G X 5 MM MISC Use as directed 200 each 2    simvastatin (ZOCOR) 40 MG tablet TAKE 1 TABLET DAILY 90 tablet 3    finasteride (PROSCAR) 5 MG tablet       glucose monitoring kit (FREESTYLE) monitoring kit 1 kit by Does not apply route daily 1 kit 0    carboxymethylcellulose (REFRESH PLUS) 0.5 % SOLN ophthalmic solution Apply 2 drops to eye 3 times daily      triamcinolone (KENALOG) 0.1 % cream Apply to itchy areas on the arms and legs twice daily for up to 2 weeks or until improved. 80 g 2    fluorouracil (EFUDEX) 5 % cream Apply twice daily to affected area on the right cheek for 2 weeks. 40 g 0    Loratadine (CLARITIN PO) Take by mouth Indications: Couple times a week      Ascorbic Acid (VITAMIN C) 500 MG CAPS Take by mouth nightly Indications: Three times a week  Monday, Wednesday, Friday       Multiple Vitamins-Minerals (CENTRUM SILVER) TABS Take 1 tablet by mouth daily       ALPHA LIPOIC ACID PO Take 100 mg by mouth daily              Physical Examination       The following were examined and determined to be normal: Psych/Neuro, Scalp/hair, Conjunctivae/eyelids, Gums/teeth/lips, Neck, Abdomen, and Back. The following were examined and determined to be abnormal: Head/face, Breast/axilla/chest, RUE, and LUE. Well-appearing. 1.  Right distal extensor forearm-1, upper chest-2, right supraclavicular region-1, left inferior lateral cheek-2, left lateral cheek-1: Several hyperkeratotic erythematous macules. 2.  Left upper arm with a 7 mm dome-shaped pink nodule with central hyperkeratosis. Assessment and Plan     1. AK (actinic keratosis) -     Cryotherapy was discussed and patient agreed to proceed. Consent was obtained.   7 lesions were treated cryotherapy: Right distal extensor forearm-1, upper chest-2, right supraclavicular region-1, left inferior lateral cheek-2, left lateral cheek-1. 2 cycles of liquid nitrogen applied to each lesion for 5 seconds using a [employer]-Ac cryo spray gun. Patient was educated regarding the potential risks of blister formation and discomfort. Wound care was discussed. The patient tolerated the procedure well and there were no immediate complications. 2. Small SCC (squamous cell carcinoma), arm, left - 7 mm    Discussed likely diagnosis; patient agreeable to shave biopsy and curettage (written consent obtained). We also discussed the risks of bleeding, scar, and infection. The area(s) to be biopsied were marked with a surgical pen. Alcohol was used to cleanse the site. Local anesthesia was acheived with 1% lidocaine with epinephrine. Shave biopsy was performed using a razor blade followed by sharp curettage in multiple directions. Hemostasis was achieved with aluminum chloride. The wound(s) were dressed with petrolatum and covered with a bandage. Wound care instructions were reviewed. 1 Specimen (s) sent to pathology. The specimen bottles were appropriately labeled. The patient tolerated the procedure well and there were no immediate complications. Return in about 6 months (around 9/7/2023).     --Micki Bowling MD

## 2023-03-07 NOTE — PATIENT INSTRUCTIONS

## 2023-03-10 ENCOUNTER — PROCEDURE VISIT (OUTPATIENT)
Dept: CARDIOLOGY CLINIC | Age: 88
End: 2023-03-10

## 2023-03-10 DIAGNOSIS — I48.0 PAROXYSMAL ATRIAL FIBRILLATION (HCC): ICD-10-CM

## 2023-03-10 LAB
DERMATOLOGY PATHOLOGY REPORT: ABNORMAL
LV EF: 58 %
LVEF MODALITY: NORMAL

## 2023-03-14 RX ORDER — VALACYCLOVIR HYDROCHLORIDE 500 MG/1
TABLET, FILM COATED ORAL
Qty: 24 TABLET | Refills: 0 | Status: SHIPPED | OUTPATIENT
Start: 2023-03-14

## 2023-03-16 ENCOUNTER — TELEPHONE (OUTPATIENT)
Dept: CARDIOLOGY CLINIC | Age: 88
End: 2023-03-16

## 2023-03-23 ENCOUNTER — NURSE ONLY (OUTPATIENT)
Dept: CARDIOLOGY CLINIC | Age: 88
End: 2023-03-23
Payer: OTHER GOVERNMENT

## 2023-03-23 DIAGNOSIS — I48.0 PAROXYSMAL ATRIAL FIBRILLATION (HCC): Chronic | ICD-10-CM

## 2023-03-23 DIAGNOSIS — Z95.0 PACEMAKER: Primary | ICD-10-CM

## 2023-03-23 DIAGNOSIS — I49.5 SICK SINUS SYNDROME (HCC): ICD-10-CM

## 2023-03-23 PROCEDURE — 93296 REM INTERROG EVL PM/IDS: CPT | Performed by: INTERNAL MEDICINE

## 2023-03-23 PROCEDURE — 93294 REM INTERROG EVL PM/LDLS PM: CPT | Performed by: INTERNAL MEDICINE

## 2023-03-28 NOTE — PROGRESS NOTES
[facility] remote transmission received from patient's dual chamber pacemaker monitor at home d/t AT/AF >= 1 hr for 1 day. Transmission shows normal sensing and pacing function. Noted AT/AF/L, 0.3% burden, 0 of 1 Pace-Terminated episodes (Eliquis, sotalol). MRI SureScan noted 01.05.23. Ap 65.5%   22.7% (MVP On)  PVCs 4.8/hr  Echo 03.10.23 showed EF of 55-60%. End of 91-day monitoring period 3/23/23. EP physician will review. See interrogation under cardiology tab in the [facility] [address] field for more details. Will continue to monitor remotely.

## 2023-04-20 ENCOUNTER — TELEPHONE (OUTPATIENT)
Dept: CARDIOLOGY CLINIC | Age: 88
End: 2023-04-20

## 2023-04-20 NOTE — TELEPHONE ENCOUNTER
Spoke with patient and he preferred that I mail him a new card for Eliquis. This has been put in the mail.

## 2023-04-20 NOTE — TELEPHONE ENCOUNTER
Patient called and inquired about another discount card. He stated the pharmacy said his card has . He said he needs it for his Eliquis. He said the pharmacy has it but he can't get it now. He requested if you could send the discount card via fax to the pharmacy, if not he'll come and get it.   He wants a call back [phone]    [facility]/PHARMACY [facility] [address] [facility], [address] [facility] [place] -  [phone]

## 2023-05-01 ENCOUNTER — NURSE ONLY (OUTPATIENT)
Dept: DERMATOLOGY | Age: 88
End: 2023-05-01
Payer: OTHER GOVERNMENT

## 2023-05-01 ENCOUNTER — TELEPHONE (OUTPATIENT)
Dept: DERMATOLOGY | Age: 88
End: 2023-05-01

## 2023-05-01 DIAGNOSIS — C44.629 SQUAMOUS CELL CARCINOMA OF SKIN OF LEFT UPPER ARM: Primary | ICD-10-CM

## 2023-05-01 PROCEDURE — 99213 OFFICE O/P EST LOW 20 MIN: CPT | Performed by: DERMATOLOGY

## 2023-05-01 PROCEDURE — 1123F ACP DISCUSS/DSCN MKR DOCD: CPT | Performed by: DERMATOLOGY

## 2023-05-01 NOTE — TELEPHONE ENCOUNTER
Pt left a voicemail at 12:11 pm. He had a biopsy done back on 3/7/23. He says he is still having significant pain in his left arm.  Please call pt at [phone]

## 2023-05-01 NOTE — PROGRESS NOTES
Subjective:      Patient ID: Kevyn Crystal is a 82 y.o. male. HPI    Patient presents today for wound check of left upper arm status post biopsy and curettage of a small SCC on the left upper arm. He reports persistent tenderness at the site. Review of Systems    Objective:   Physical Exam    Left upper arm with an 8 mm tender hyperkeratotic papule. Assessment:      Likely residual SCC of the left upper arm      Plan:      Return for excision on a Friday and a 30-minute slot. He has a pacemaker, will plan to use bipolar cautery.         Travis Brown MD

## 2023-05-10 NOTE — PROGRESS NOTES
[facility]  Monica Medina MD  [phone]      Veronika Ar  1935    82 y.o. male     Date of Visit: 3/4/2020    Chief Complaint: skin lesions    History of Present Illness:    1. Follow-up for history of actinic keratoses-has several new lesions today on the chest and left fourth finger. 2.  He also complains of a newly noted lesion on the central upper chest.    3.  He complains of multiple asymptomatic growths on the trunk. 4.  He has a history of 2 melanomas, most recently 6 years ago. He denies any signs of recurrence. 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 Daria Mchugh (stage IB) on 12/10/2014.     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 Una Harden on 2/25/14 (stage IA). 5.  He also has a history of multiple nonmelanoma skin cancers-denies any signs of recurrence. 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 Small squamous cell carcinoma the right superior shoulder-treated with curettage on 5/10/2017. 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 SCC on the left upper back-excised on 1/20/2017. SCC in situ of the right forearm-treated with curettage on 3/4/2016. SCC in situ of the left upper back-treated with curettage on 8/27/2015. SCC of the central chest - excised 3/27/15. BCC of the left forearm - excised on 2/25/14.     Review of Systems:  Skin: No new or changing moles. Past Medical History, Family History, Surgical History, Medications and Allergies reviewed.     Past Medical History:   Diagnosis Date    Arrhythmia     Arthritis     hands shoulders neck    Atrial fibrillation (HCC)     coumadin    Atrial tachycardia (HCC)     CAD (coronary artery disease)     [Dear  ___] : Dear  [unfilled], [Courtesy Letter:] : I had the pleasure of seeing your patient, [unfilled], in my office today. tablet Take 1 tablet by mouth daily 90 tablet 3    acetaminophen 650 MG TABS Take 650 mg by mouth every 4 hours as needed 120 tablet 3    B-D UF III MINI PEN NEEDLES 31G X 5 MM MISC       pregabalin (LYRICA) 75 MG capsule Take 75 mg by mouth 2 times daily       omega-3 acid ethyl esters (LOVAZA) 1 G capsule Take 2 capsules by mouth 2 times daily. Take 1 tablet twice daily. 360 capsule 1    nateglinide (STARLIX) 120 MG tablet Take 120 mg by mouth 2 times daily (before meals).  Multiple Vitamins-Minerals (CENTRUM SILVER) TABS Take  by mouth 2 times daily.  Pantoprazole Sodium (PROTONIX PO) Take 40 mg by mouth every other day.  ALPHA LIPOIC ACID PO Take 600 mg by mouth daily.  simvastatin (ZOCOR) 40 MG tablet Take 40 mg by mouth nightly. Physical Examination       The following were examined and determined to be normal: Psych/Neuro, Scalp/hair, Conjunctivae/eyelids, Gums/teeth/lips, Neck, Abdomen, Back, RUE, LUE, RLE and LLE. The following were examined and determined to be abnormal: Head/face, Breast/axilla/chest and Nails/digits. Well-appearing. 1.  Proximal portion of the left index finger-1, central upper chest-1, right upper chest-2, right clavicular region-2, left central cheek-2: Several keratotic erythematous macules and patches. 2.  A.  Right lateral cheek with a 1.5 cm ill-define focally crusted erythematous patch. B.  Central chest with a crusted dome-shaped pink papule measuring about 1 cm in size. 3.  Scattered on the trunk are stuck on appearing verrucous brown papules and plaques. 4.  Left earlobe absent. Surgical scar surrounding that area without any induration or nodularity. Left mid extensor forearm with a linear surgical scar without any discoloration or nodularity. 5.  Clear. Assessment and Plan     1.  Actinic keratoses -     2 cycles of liquid nitrogen applied to 8 AKs: Proximal portion of the left index finger-1, central upper [Please see my note below.] : Please see my note below. [Consult Closing:] : Thank you very much for allowing me to participate in the care of this patient.  If you have any questions, please do not hesitate to contact me. [FreeTextEntry3] : Sincerely,\par \par Lin Godinez PA-C, MO[place]STA\par

## 2023-05-11 NOTE — PROGRESS NOTES
[facility]  Mary Carmen Wagner MD  [phone]      Vlad Brody  1935    82 y.o. male     Date of Visit: 5/12/2023    Chief Complaint: SCC    History of Present Illness:    Here today for excision of an SCC on the left upper arm. Component 3/7/23 0000   Dermatology Pathology Report SEE COMMENTS Abnormal     Comment: RESULTS   DIAGNOSIS   DIAGNOSIS:     LEFT UPPER ARM-     Squamous cell carcinoma, moderately differentiated     RESULTS Elba Bell MD   Electronic Signature: 10 MAR 2023 02:17 PM        On Eliquis. Has a pacemaker. Review of Systems:  Gen: Feels well, good sense of health. Heme: No abnormal bruising or bleeding. Past Medical History, Family History, Surgical History, Medications and Allergies reviewed.     Past Medical History:   Diagnosis Date    Arrhythmia     Arthritis     hands shoulders neck    Atrial fibrillation (HCC)     coumadin    Atrial tachycardia (HCC)     CAD (coronary artery disease)     Cancer (HCC)     skin    Diabetes mellitus ([address]     Diverticulitis     Enlarged prostate     Hyperlipidemia     Hypertension     Pacemaker 04/20/2020    Pneumonia     ABOUT 5 YEARS AGO    Vasodepressor syncope      Past Surgical History:   Procedure Laterality Date    ABLATION OF DYSRHYTHMIC FOCUS      AVNRT and Atrial tachycardia    CARPAL TUNNEL RELEASE      CATARACT REMOVAL Bilateral     CORONARY ANGIOPLASTY WITH STENT PLACEMENT  2005    CYSTOSCOPY  9/26/12    coagulation prostatic urethra    CYSTOSCOPY  10/8/15    TURP    FINGER SURGERY      X7    FINGER TRIGGER RELEASE      OTHER SURGICAL HISTORY Left [id number]    WIDE LOCAL EXCISION LEFT ARM MELANOMA, WIDE LOCAL EXCISION OF    SHOULDER SURGERY Bilateral     SPINAL FUSION      TURP  3-7-13       No Known Allergies  Outpatient Medications Marked as Taking for the 5/12/23 encounter (Procedure visit) with Thalia Arroyo MD   Medication Sig Dispense Refill    simvastatin (ZOCOR) 40 MG tablet

## 2023-05-12 ENCOUNTER — PROCEDURE VISIT (OUTPATIENT)
Dept: DERMATOLOGY | Age: 88
End: 2023-05-12

## 2023-05-12 DIAGNOSIS — C44.629 SQUAMOUS CELL CARCINOMA OF SKIN OF LEFT UPPER ARM: Primary | ICD-10-CM

## 2023-05-17 LAB — DERMATOLOGY PATHOLOGY REPORT: ABNORMAL

## 2023-05-26 ENCOUNTER — NURSE ONLY (OUTPATIENT)
Dept: DERMATOLOGY | Age: 88
End: 2023-05-26

## 2023-05-26 DIAGNOSIS — Z48.02 VISIT FOR SUTURE REMOVAL: Primary | ICD-10-CM

## 2023-05-26 PROCEDURE — 99024 POSTOP FOLLOW-UP VISIT: CPT | Performed by: DERMATOLOGY

## 2023-06-06 DIAGNOSIS — Z79.4 TYPE 2 DIABETES MELLITUS WITH DIABETIC POLYNEUROPATHY, WITH LONG-TERM CURRENT USE OF INSULIN (HCC): ICD-10-CM

## 2023-06-06 DIAGNOSIS — E11.42 TYPE 2 DIABETES MELLITUS WITH DIABETIC POLYNEUROPATHY, WITH LONG-TERM CURRENT USE OF INSULIN (HCC): ICD-10-CM

## 2023-06-06 RX ORDER — PREGABALIN 75 MG/1
CAPSULE ORAL
Qty: 270 CAPSULE | Refills: 1 | Status: SHIPPED | OUTPATIENT
Start: 2023-06-06 | End: 2024-06-06

## 2023-06-23 ENCOUNTER — HOSPITAL ENCOUNTER (OUTPATIENT)
Age: 88
Discharge: HOME | End: 2023-06-23
Payer: OTHER GOVERNMENT

## 2023-06-23 DIAGNOSIS — Z79.4 TYPE 2 DIABETES MELLITUS WITH DIABETIC POLYNEUROPATHY, WITH LONG-TERM CURRENT USE OF INSULIN (HCC): ICD-10-CM

## 2023-06-23 DIAGNOSIS — E11.42 TYPE 2 DIABETES MELLITUS WITH DIABETIC POLYNEUROPATHY, WITH LONG-TERM CURRENT USE OF INSULIN (HCC): ICD-10-CM

## 2023-06-23 LAB
ALBUMIN SERPL-MCNC: 4.2 G/DL (ref 3.4–5)
ALBUMIN/GLOB SERPL: 1.8 {RATIO} (ref 1.1–2.2)
ALP SERPL-CCNC: 63 U/L (ref 40–129)
ALT SERPL-CCNC: 16 U/L (ref 10–40)
ANION GAP SERPL CALCULATED.3IONS-SCNC: 11 MMOL/L (ref 3–16)
AST SERPL-CCNC: 25 U/L (ref 15–37)
BASOPHILS # BLD: 0 K/UL (ref 0–0.2)
BASOPHILS NFR BLD: 0.5 %
BILIRUB SERPL-MCNC: 0.4 MG/DL (ref 0–1)
BUN SERPL-MCNC: 18 MG/DL (ref 7–20)
CALCIUM SERPL-MCNC: 10.1 MG/DL (ref 8.3–10.6)
CHLORIDE SERPL-SCNC: 106 MMOL/L (ref 99–110)
CHOLEST SERPL-MCNC: 125 MG/DL (ref 0–199)
CO2 SERPL-SCNC: 23 MMOL/L (ref 21–32)
CREAT SERPL-MCNC: 1.3 MG/DL (ref 0.8–1.3)
DEPRECATED RDW RBC AUTO: 13.1 % (ref 12.4–15.4)
EOSINOPHIL # BLD: 0.1 K/UL (ref 0–0.6)
EOSINOPHIL NFR BLD: 1.3 %
GFR SERPLBLD CREATININE-BSD FMLA CKD-EPI: 54 ML/MIN/{1.73_M2}
GLUCOSE SERPL-MCNC: 110 MG/DL (ref 70–99)
HCT VFR BLD AUTO: 42.9 % (ref 40.5–52.5)
HDLC SERPL-MCNC: 55 MG/DL (ref 40–60)
HGB BLD-MCNC: 14.3 G/DL (ref 13.5–17.5)
LDLC SERPL CALC-MCNC: 47 MG/DL
LYMPHOCYTES # BLD: 0.9 K/UL (ref 1–5.1)
LYMPHOCYTES NFR BLD: 12.3 %
MCH RBC QN AUTO: 31.4 PG (ref 26–34)
MCHC RBC AUTO-ENTMCNC: 33.3 G/DL (ref 31–36)
MCV RBC AUTO: 94.4 FL (ref 80–100)
MONOCYTES # BLD: 0.6 K/UL (ref 0–1.3)
MONOCYTES NFR BLD: 8.2 %
NEUTROPHILS # BLD: 5.6 K/UL (ref 1.7–7.7)
NEUTROPHILS NFR BLD: 77.7 %
PLATELET # BLD AUTO: 179 K/UL (ref 135–450)
PMV BLD AUTO: 9.5 FL (ref 5–10.5)
POTASSIUM SERPL-SCNC: 4.1 MMOL/L (ref 3.5–5.1)
PROT SERPL-MCNC: 6.5 G/DL (ref 6.4–8.2)
RBC # BLD AUTO: 4.55 M/UL (ref 4.2–5.9)
SODIUM SERPL-SCNC: 140 MMOL/L (ref 136–145)
TRIGL SERPL-MCNC: 117 MG/DL (ref 0–150)
VLDLC SERPL CALC-MCNC: 23 MG/DL
WBC # BLD AUTO: 7.2 K/UL (ref 4–11)

## 2023-06-23 PROCEDURE — 85025 COMPLETE CBC W/AUTO DIFF WBC: CPT

## 2023-06-23 PROCEDURE — 83036 HEMOGLOBIN GLYCOSYLATED A1C: CPT

## 2023-06-23 PROCEDURE — 80061 LIPID PANEL: CPT

## 2023-06-23 PROCEDURE — 36415 COLL VENOUS BLD VENIPUNCTURE: CPT

## 2023-06-23 PROCEDURE — 80053 COMPREHEN METABOLIC PANEL: CPT

## 2023-06-24 LAB
EST. AVERAGE GLUCOSE BLD GHB EST-MCNC: 119.8 MG/DL
HBA1C MFR BLD: 5.8 %

## 2023-07-05 ENCOUNTER — OFFICE VISIT (OUTPATIENT)
Dept: INTERNAL MEDICINE CLINIC | Age: 88
End: 2023-07-05
Payer: OTHER GOVERNMENT

## 2023-07-05 VITALS
SYSTOLIC BLOOD PRESSURE: 136 MMHG | DIASTOLIC BLOOD PRESSURE: 68 MMHG | BODY MASS INDEX: 26.14 KG/M2 | WEIGHT: 193 LBS | HEIGHT: 72 IN

## 2023-07-05 DIAGNOSIS — E11.42 TYPE 2 DIABETES MELLITUS WITH DIABETIC POLYNEUROPATHY, WITH LONG-TERM CURRENT USE OF INSULIN (HCC): Primary | ICD-10-CM

## 2023-07-05 DIAGNOSIS — K21.9 LARYNGOPHARYNGEAL REFLUX (LPR): ICD-10-CM

## 2023-07-05 DIAGNOSIS — Z79.4 TYPE 2 DIABETES MELLITUS WITH DIABETIC POLYNEUROPATHY, WITH LONG-TERM CURRENT USE OF INSULIN (HCC): Primary | ICD-10-CM

## 2023-07-05 DIAGNOSIS — M25.551 RIGHT HIP PAIN: ICD-10-CM

## 2023-07-05 PROCEDURE — 3074F SYST BP LT 130 MM HG: CPT | Performed by: INTERNAL MEDICINE

## 2023-07-05 PROCEDURE — 3078F DIAST BP <80 MM HG: CPT | Performed by: INTERNAL MEDICINE

## 2023-07-05 PROCEDURE — 1123F ACP DISCUSS/DSCN MKR DOCD: CPT | Performed by: INTERNAL MEDICINE

## 2023-07-05 PROCEDURE — 3044F HG A1C LEVEL LT 7.0%: CPT | Performed by: INTERNAL MEDICINE

## 2023-07-05 PROCEDURE — 99214 OFFICE O/P EST MOD 30 MIN: CPT | Performed by: INTERNAL MEDICINE

## 2023-07-05 RX ORDER — NATEGLINIDE 120 MG/1
120 TABLET ORAL
Qty: 180 TABLET | Refills: 1 | Status: SHIPPED | OUTPATIENT
Start: 2023-07-05

## 2023-07-05 RX ORDER — LANSOPRAZOLE 30 MG/1
30 CAPSULE, DELAYED RELEASE ORAL DAILY
Qty: 90 CAPSULE | Refills: 1 | Status: SHIPPED | OUTPATIENT
Start: 2023-07-05

## 2023-07-05 NOTE — PATIENT INSTRUCTIONS
Increase the pregabalin to 1 tab during the day and 3 at night  If you have side effects, then we may need to try a different one

## 2023-07-05 NOTE — PROGRESS NOTES
Meghana Hayward (:  1935) is a 82 y.o. male,Established patient, here for evaluation of the following chief complaint(s):  Diabetes         ASSESSMENT/PLAN:  1. Type 2 diabetes mellitus with diabetic polyneuropathy, with long-term current use of insulin (HCC)  - A1C still extremely well-controlled at 5.8. Will continue decreasing the long-acting insulin, anticipate we will likely be able to discontinue it by next visit. Continue Ozempic 0.5mg weekly, discussed dose increase but will hold off. If A1C is still extremely well controlled off insulin, will decrease nateglinide.  - worsening neuropathy symptoms, discussed changing medication vs increasing nighttime pregabalin. Will increase pregabalin to 75mg in the morning and 225mg at night, if ineffective or develops side effects, consider gabapentin vs alternative medication such as duloxetine. Already on an ALA supplement. -     Comprehensive Metabolic Panel; Future  -     Lipid Panel; Future  -     CBC with Auto Differential; Future  -     Hemoglobin A1C; Future  2. Laryngopharyngeal reflux (LPR)   - change to lansoprazole 30mg daily, reassess at next visit. I suspect the shortness of breath is unrelated to the cough, more likely heart related, though he has to have a significant amount of exertion before he develops any symptoms. 3. Right hip pain  - requested PT at [facility] location, will fax referral  -     External Referral To Physical Therapy      Return in about 4 months (around 2023) for DM follow up. Subjective   SUBJECTIVE/OBJECTIVE:  HPI    Type 2 diabetes - daily sugars are  fasting, nothing lower than 83. He is using 12 units of [employer] [place]. He is taking the Ozempic weekly, was having some stomach issues but stopped miralax and it seemed to help. Still taking the nateglinide. Sleep is worse, the neuropathy has been bothering him more.      Cough is worse, had congestion in the throat but feels like moving in the

## 2023-07-24 ENCOUNTER — NURSE ONLY (OUTPATIENT)
Dept: CARDIOLOGY CLINIC | Age: 88
End: 2023-07-24
Payer: OTHER GOVERNMENT

## 2023-07-24 DIAGNOSIS — I49.5 SICK SINUS SYNDROME (HCC): ICD-10-CM

## 2023-07-24 DIAGNOSIS — Z95.0 PACEMAKER: Primary | ICD-10-CM

## 2023-07-24 PROCEDURE — 93294 REM INTERROG EVL PM/LDLS PM: CPT | Performed by: INTERNAL MEDICINE

## 2023-07-24 PROCEDURE — 93296 REM INTERROG EVL PM/IDS: CPT | Performed by: INTERNAL MEDICINE

## 2023-08-16 ENCOUNTER — PROCEDURE VISIT (OUTPATIENT)
Dept: VASCULAR SURGERY | Age: 88
End: 2023-08-16
Payer: OTHER GOVERNMENT

## 2023-08-16 DIAGNOSIS — I65.23 ATHEROSCLEROSIS OF BOTH CAROTID ARTERIES: ICD-10-CM

## 2023-08-16 PROCEDURE — 93880 EXTRACRANIAL BILAT STUDY: CPT | Performed by: SURGERY

## 2023-08-21 ENCOUNTER — TELEPHONE (OUTPATIENT)
Dept: INTERNAL MEDICINE CLINIC | Age: 88
End: 2023-08-21

## 2023-08-21 RX ORDER — BLOOD SUGAR DIAGNOSTIC
STRIP MISCELLANEOUS
Qty: 100 STRIP | Refills: 3 | Status: SHIPPED | OUTPATIENT
Start: 2023-08-21

## 2023-08-21 RX ORDER — VALACYCLOVIR HYDROCHLORIDE 500 MG/1
TABLET, FILM COATED ORAL
Qty: 24 TABLET | Refills: 0 | Status: SHIPPED | OUTPATIENT
Start: 2023-08-21

## 2023-08-21 NOTE — TELEPHONE ENCOUNTER
Spoke to pt he stated he told them he has been off insulin off 10 days. He is wanting to know if he should go ahead and go to 1mg. His glucose readings have been about the same they were before going off of insulin. He currently has 5 weeks left on current dose.

## 2023-08-21 NOTE — TELEPHONE ENCOUNTER
Pt called in and would not really tell me what he wanted to talk about. Pt wanted to talk with Dr. Sarah Thompson or Veronica Ohara. Stated that he wants to know what the new strength for the ozempic is. Please call and advise.

## 2023-08-21 NOTE — TELEPHONE ENCOUNTER
At the last appointment we decided to wait to increase the Ozempic and try to stop the insulin first, then decide. The next dose up from what he is currently taking is 1mg.

## 2023-08-25 ENCOUNTER — TELEPHONE (OUTPATIENT)
Dept: VASCULAR SURGERY | Age: 88
End: 2023-08-25

## 2023-08-25 DIAGNOSIS — I65.23 ATHEROSCLEROSIS OF BOTH CAROTID ARTERIES: Primary | ICD-10-CM

## 2023-08-25 NOTE — TELEPHONE ENCOUNTER
Discussed results of carotid duplex with patient which are stable. Plan to repeat carotid duplex in 1 year.       Electronically signed by ELISHA Keen CNP on 8/25/2023 at 12:56 PM

## 2023-08-25 NOTE — PROGRESS NOTES
[facility]   Electrophysiology  Office Visit  Date: 9/12/2023    Chief Complaint   Patient presents with    Atrial Fibrillation    Tachycardia    Coronary Artery Disease    Hypertension    Bradycardia     Cardiac HX: Belen Xiao is a 82 y.o. 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 Lyn Bryson, 4/20/2020). Interval History/HPI: Patient is here for follow-up for paroxysmal AF, SB, symptomatic sinus pauses and PPM management. Patient has a long standing history of AVNRT/SVT and AT. He had undergone a catheter ablation for AVNRT and atrial tachycardia in 2000. He then developed paroxysmal AF and was placed on sotalol. He developed syncope and near syncope and had a carotid Doppler that showed a distal occlusion. He wore a 30-day monitor in 2020 that showed evidence of symptomatic sinus pauses and underwent a dual-chamber MDT PPM in April 2020. He had been placed on oral anticoagulation for his atrial fibrillation however he developed hematuria requiring a blood transfusion and was taken off of [address] He then had recurrent AF on his device with episodes lasting up to 10 hours in length. Patient had had a long discussion with Dr. Jose F Sigala regarding [address] was placed back on Eliquis 5 mg twice a day. Today he presents in NSR. He is asymptomatic when he is in AF. Remains on Eliquis 5 mg twice a day. He has noted occ hematuria however not nearly to the extent he had before. He has seen urology and he is going to have a scan. He is on sotalol 80 mg twice a day. His QTc is 479. His labs are reviewed in the office today.   Review of his device today shows that he atrially paces 79.9% of the time,

## 2023-08-28 RX ORDER — SEMAGLUTIDE 0.68 MG/ML
INJECTION, SOLUTION SUBCUTANEOUS
Qty: 9 ML | Refills: 1 | Status: SHIPPED | OUTPATIENT
Start: 2023-08-28

## 2023-09-08 ENCOUNTER — TRANSCRIBE ORDERS (OUTPATIENT)
Dept: ADMINISTRATIVE | Age: 88
End: 2023-09-08

## 2023-09-08 DIAGNOSIS — R31.0 GROSS HEMATURIA: Primary | ICD-10-CM

## 2023-09-12 ENCOUNTER — NURSE ONLY (OUTPATIENT)
Dept: CARDIOLOGY CLINIC | Age: 88
End: 2023-09-12

## 2023-09-12 ENCOUNTER — OFFICE VISIT (OUTPATIENT)
Dept: CARDIOLOGY CLINIC | Age: 88
End: 2023-09-12
Payer: OTHER GOVERNMENT

## 2023-09-12 VITALS
HEART RATE: 62 BPM | BODY MASS INDEX: 25.63 KG/M2 | SYSTOLIC BLOOD PRESSURE: 100 MMHG | DIASTOLIC BLOOD PRESSURE: 56 MMHG | WEIGHT: 189 LBS

## 2023-09-12 DIAGNOSIS — I48.0 PAROXYSMAL ATRIAL FIBRILLATION (HCC): Primary | Chronic | ICD-10-CM

## 2023-09-12 DIAGNOSIS — Z51.81 ENCOUNTER FOR MONITORING SOTALOL THERAPY: ICD-10-CM

## 2023-09-12 DIAGNOSIS — I47.1 ATRIAL TACHYCARDIA (HCC): ICD-10-CM

## 2023-09-12 DIAGNOSIS — I45.5 SINUS PAUSE: ICD-10-CM

## 2023-09-12 DIAGNOSIS — Z79.01 ON CONTINUOUS ORAL ANTICOAGULATION: ICD-10-CM

## 2023-09-12 DIAGNOSIS — I48.0 PAROXYSMAL ATRIAL FIBRILLATION (HCC): Chronic | ICD-10-CM

## 2023-09-12 DIAGNOSIS — I47.1 AVNRT (AV NODAL RE-ENTRY TACHYCARDIA) (HCC): ICD-10-CM

## 2023-09-12 DIAGNOSIS — I49.5 SICK SINUS SYNDROME (HCC): ICD-10-CM

## 2023-09-12 DIAGNOSIS — Z95.0 PACEMAKER: Primary | ICD-10-CM

## 2023-09-12 DIAGNOSIS — Z79.899 ENCOUNTER FOR MONITORING SOTALOL THERAPY: ICD-10-CM

## 2023-09-12 DIAGNOSIS — I49.5 SSS (SICK SINUS SYNDROME) (HCC): ICD-10-CM

## 2023-09-12 DIAGNOSIS — I25.10 CORONARY ARTERY DISEASE INVOLVING NATIVE CORONARY ARTERY OF NATIVE HEART WITHOUT ANGINA PECTORIS: ICD-10-CM

## 2023-09-12 DIAGNOSIS — Z95.0 PACEMAKER: ICD-10-CM

## 2023-09-12 PROCEDURE — 3074F SYST BP LT 130 MM HG: CPT | Performed by: NURSE PRACTITIONER

## 2023-09-12 PROCEDURE — 99215 OFFICE O/P EST HI 40 MIN: CPT | Performed by: NURSE PRACTITIONER

## 2023-09-12 PROCEDURE — 1123F ACP DISCUSS/DSCN MKR DOCD: CPT | Performed by: NURSE PRACTITIONER

## 2023-09-12 PROCEDURE — 3078F DIAST BP <80 MM HG: CPT | Performed by: NURSE PRACTITIONER

## 2023-09-12 PROCEDURE — 93000 ELECTROCARDIOGRAM COMPLETE: CPT | Performed by: NURSE PRACTITIONER

## 2023-09-13 ENCOUNTER — OFFICE VISIT (OUTPATIENT)
Dept: DERMATOLOGY | Age: 88
End: 2023-09-13
Payer: OTHER GOVERNMENT

## 2023-09-13 DIAGNOSIS — L57.0 AK (ACTINIC KERATOSIS): Primary | ICD-10-CM

## 2023-09-13 DIAGNOSIS — L72.3 INFLAMED EPIDERMOID CYST OF SKIN: ICD-10-CM

## 2023-09-13 PROCEDURE — 17000 DESTRUCT PREMALG LESION: CPT | Performed by: DERMATOLOGY

## 2023-09-13 PROCEDURE — 10060 I&D ABSCESS SIMPLE/SINGLE: CPT | Performed by: DERMATOLOGY

## 2023-09-13 PROCEDURE — 17003 DESTRUCT PREMALG LES 2-14: CPT | Performed by: DERMATOLOGY

## 2023-09-13 NOTE — PROGRESS NOTES
[facility]  Morris Lepe MD  [phone]      Ban Levels  1935    82 y.o. male     Date of Visit: 9/13/2023    Chief Complaint: skin lesions    History of Present Illness:    1. He presents today to follow-up for history of actinic keratoses-has several new lesions of the face and right forearm today. 2.  He also reports an enlarging painful cyst on the central upper back. 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 Nura Ennis (stage IB) on 12/10/2014. 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 Armani Nina on 2/25/14 (stage IA). Moderately differentiated SCC on the left upper arm-excised on 5/12/2023. SCC in situ on the occipital scalp-treated with Mohs by Dr. Brittany Lopez on 7/15/2021. SCC in situ of the left upper arm-treated with curettage on 10/2/2020. 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 Small squamous cell carcinoma the right superior shoulder-treated with curettage on 5/10/2017. 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 SCC on the left upper back-excised on 1/20/2017. SCC in situ of the right forearm-treated with curettage on 3/4/2016. SCC in situ of the left upper back-treated with curettage on 8/27/2015. SCC of the central chest - excised 3/27/15. BCC of the left forearm - excised on 2/25/14. Has a pacemaker. Review of Systems:  Gen: Feels well, good sense of health. Past Medical History, Family History, Surgical History, Medications and Allergies reviewed.     Past Medical History:   Diagnosis Date    Arrhythmia     Arthritis     hands shoulders neck    Atrial

## 2023-09-19 ENCOUNTER — TELEPHONE (OUTPATIENT)
Dept: INTERNAL MEDICINE CLINIC | Age: 88
End: 2023-09-19

## 2023-09-19 RX ORDER — SEMAGLUTIDE 1.34 MG/ML
1 INJECTION, SOLUTION SUBCUTANEOUS WEEKLY
Qty: 9 ML | Refills: 1 | Status: SHIPPED | OUTPATIENT
Start: 2023-09-19 | End: 2023-09-20 | Stop reason: RX

## 2023-09-19 NOTE — TELEPHONE ENCOUNTER
Pt's wife is stating the last time we called the ozempic in it was supposed to be for the higher dose. When they went to pharmacy it will still for the lower dose.   Please call and advise

## 2023-09-19 NOTE — TELEPHONE ENCOUNTER
Sent the new dose.  It looks like the refill request from [facility] was for the old dose so it was just sent over the same

## 2023-09-20 ENCOUNTER — TELEPHONE (OUTPATIENT)
Dept: INTERNAL MEDICINE CLINIC | Age: 88
End: 2023-09-20

## 2023-09-20 RX ORDER — SEMAGLUTIDE 0.68 MG/ML
INJECTION, SOLUTION SUBCUTANEOUS
Qty: 9 ML | Refills: 1 | Status: SHIPPED | OUTPATIENT
Start: 2023-09-20

## 2023-09-20 NOTE — TELEPHONE ENCOUNTER
Yes, if he can't get the new dose yet I would stay at the 0.5mg dose, we can increase when the supply is better for the higher dose  I will keep an eye out for the notes from Dr. Bhumi Gaines

## 2023-09-29 ENCOUNTER — HOSPITAL ENCOUNTER (OUTPATIENT)
Dept: CT IMAGING | Age: 88
Discharge: HOME | End: 2023-09-29
Attending: UROLOGY
Payer: MEDICARE

## 2023-09-29 DIAGNOSIS — R31.0 GROSS HEMATURIA: ICD-10-CM

## 2023-09-29 LAB
BUN SERPL-MCNC: 21 MG/DL (ref 7–20)
CREAT SERPL-MCNC: 1.4 MG/DL (ref 0.8–1.3)
GFR SERPLBLD CREATININE-BSD FMLA CKD-EPI: 49 ML/MIN/{1.73_M2}

## 2023-09-29 PROCEDURE — 84520 ASSAY OF UREA NITROGEN: CPT

## 2023-09-29 PROCEDURE — 36415 COLL VENOUS BLD VENIPUNCTURE: CPT

## 2023-09-29 PROCEDURE — 6360000004 HC RX CONTRAST MEDICATION: Performed by: UROLOGY

## 2023-09-29 PROCEDURE — 74178 CT ABD&PLV WO CNTR FLWD CNTR: CPT | Performed by: UROLOGY

## 2023-09-29 PROCEDURE — 82565 ASSAY OF CREATININE: CPT

## 2023-09-29 RX ADMIN — IOPAMIDOL 120 ML: 755 INJECTION, SOLUTION INTRAVENOUS at 14:54

## 2023-10-09 ENCOUNTER — TELEPHONE (OUTPATIENT)
Dept: DERMATOLOGY | Age: 88
End: 2023-10-09

## 2023-10-09 NOTE — TELEPHONE ENCOUNTER
Pt is calling. He says that he was in on 9/13/23. He had a cyst drained on his back. He is still experiencing drainage.  Is that normal?    Phone is [phone]

## 2023-10-11 ENCOUNTER — OFFICE VISIT (OUTPATIENT)
Dept: DERMATOLOGY | Age: 88
End: 2023-10-11
Payer: OTHER GOVERNMENT

## 2023-10-11 DIAGNOSIS — L72.3 INFLAMED EPIDERMOID CYST OF SKIN: Primary | ICD-10-CM

## 2023-10-11 PROCEDURE — 10060 I&D ABSCESS SIMPLE/SINGLE: CPT | Performed by: DERMATOLOGY

## 2023-10-11 NOTE — PROGRESS NOTES
tablet Take 1 tablet by mouth daily 90 tablet 3    omega-3 acid ethyl esters (LOVAZA) 1 g capsule Take 1 capsule by mouth 2 times daily 180 capsule 3    ELIQUIS 5 MG TABS tablet TAKE 1 TABLET BY MOUTH TWICE A  tablet 3    Insulin Pen Needle (B-D UF III MINI PEN NEEDLES) 31G X 5 MM MISC Use as directed 200 each 2    finasteride (PROSCAR) 5 MG tablet       glucose monitoring kit (FREESTYLE) monitoring kit 1 kit by Does not apply route daily 1 kit 0    carboxymethylcellulose (REFRESH PLUS) 0.5 % SOLN ophthalmic solution Apply 2 drops to eye 3 times daily      triamcinolone (KENALOG) 0.1 % cream Apply to itchy areas on the arms and legs twice daily for up to 2 weeks or until improved. 80 g 2    fluorouracil (EFUDEX) 5 % cream Apply twice daily to affected area on the right cheek for 2 weeks. 40 g 0    Loratadine (CLARITIN PO) Take by mouth Indications: Couple times a week      Ascorbic Acid (VITAMIN C) 500 MG CAPS Take by mouth nightly Indications: Three times a week  Monday, Wednesday, Friday       Multiple Vitamins-Minerals (CENTRUM SILVER) TABS Take 1 tablet by mouth daily      ALPHA LIPOIC ACID PO Take 100 mg by mouth daily              Physical Examination       Well-appearing. 1.  Central upper back with a 2 cm focally fluctuant erythematous nodule with eccentric overlying punctum. Assessment and Plan     1. Inflamed epidermoid cyst of skin of the central upper back -     The skin overlying the cyst was cleansed with alcohol. Local anesthesia was achieved with 1% lidocaine with epinephrine. An incision was performed using a #11 blade. Pus and a small amount of keratin and cyst wall was drained from the nodule. Blood loss was minimal and there were no immediate complications. The wound was dressed and the patient left the office in good condition.          --Mika Em MD

## 2023-10-17 ENCOUNTER — HOSPITAL ENCOUNTER (OUTPATIENT)
Age: 88
End: 2023-10-17
Attending: UROLOGY
Payer: OTHER GOVERNMENT

## 2023-10-17 ENCOUNTER — ANESTHESIA (OUTPATIENT)
Dept: OPERATING ROOM | Age: 88
End: 2023-10-17
Payer: OTHER GOVERNMENT

## 2023-10-17 ENCOUNTER — HOSPITAL ENCOUNTER (OUTPATIENT)
Age: 88
Setting detail: OUTPATIENT SURGERY
Discharge: HOME | End: 2023-10-17
Attending: UROLOGY | Admitting: UROLOGY
Payer: OTHER GOVERNMENT

## 2023-10-17 ENCOUNTER — HOSPITAL ENCOUNTER (OUTPATIENT)
Age: 88
Discharge: HOME | End: 2023-10-17
Payer: OTHER GOVERNMENT

## 2023-10-17 ENCOUNTER — ANESTHESIA EVENT (OUTPATIENT)
Dept: OPERATING ROOM | Age: 88
End: 2023-10-17
Payer: OTHER GOVERNMENT

## 2023-10-17 VITALS
BODY MASS INDEX: 25.06 KG/M2 | SYSTOLIC BLOOD PRESSURE: 122 MMHG | RESPIRATION RATE: 8 BRPM | HEART RATE: 61 BPM | WEIGHT: 185 LBS | HEIGHT: 72 IN | DIASTOLIC BLOOD PRESSURE: 62 MMHG | TEMPERATURE: 97 F | OXYGEN SATURATION: 95 %

## 2023-10-17 DIAGNOSIS — R31.0 GROSS HEMATURIA: ICD-10-CM

## 2023-10-17 LAB
ALBUMIN SERPL-MCNC: 4.4 G/DL (ref 3.4–5)
ALBUMIN/GLOB SERPL: 2.2 {RATIO} (ref 1.1–2.2)
ALP SERPL-CCNC: 60 U/L (ref 40–129)
ALT SERPL-CCNC: 15 U/L (ref 10–40)
ANION GAP SERPL CALCULATED.3IONS-SCNC: 10 MMOL/L (ref 3–16)
ANION GAP SERPL CALCULATED.3IONS-SCNC: 9 MMOL/L (ref 3–16)
AST SERPL-CCNC: 26 U/L (ref 15–37)
BASOPHILS # BLD: 0.1 K/UL (ref 0–0.2)
BASOPHILS NFR BLD: 0.8 %
BILIRUB SERPL-MCNC: 0.3 MG/DL (ref 0–1)
BUN SERPL-MCNC: 19 MG/DL (ref 7–20)
BUN SERPL-MCNC: 19 MG/DL (ref 7–20)
CALCIUM SERPL-MCNC: 10.1 MG/DL (ref 8.3–10.6)
CALCIUM SERPL-MCNC: 10.2 MG/DL (ref 8.3–10.6)
CHLORIDE SERPL-SCNC: 107 MMOL/L (ref 99–110)
CHLORIDE SERPL-SCNC: 107 MMOL/L (ref 99–110)
CO2 SERPL-SCNC: 24 MMOL/L (ref 21–32)
CO2 SERPL-SCNC: 24 MMOL/L (ref 21–32)
CREAT SERPL-MCNC: 1.3 MG/DL (ref 0.8–1.3)
CREAT SERPL-MCNC: 1.4 MG/DL (ref 0.8–1.3)
DEPRECATED RDW RBC AUTO: 13.6 % (ref 12.4–15.4)
EOSINOPHIL # BLD: 0.1 K/UL (ref 0–0.6)
EOSINOPHIL NFR BLD: 1.2 %
GFR SERPLBLD CREATININE-BSD FMLA CKD-EPI: 49 ML/MIN/{1.73_M2}
GFR SERPLBLD CREATININE-BSD FMLA CKD-EPI: 54 ML/MIN/{1.73_M2}
GLUCOSE BLD-MCNC: 110 MG/DL (ref 70–99)
GLUCOSE BLD-MCNC: 98 MG/DL (ref 70–99)
GLUCOSE SERPL-MCNC: 100 MG/DL (ref 70–99)
GLUCOSE SERPL-MCNC: 108 MG/DL (ref 70–99)
HCT VFR BLD AUTO: 41.2 % (ref 40.5–52.5)
HGB BLD-MCNC: 13.6 G/DL (ref 13.5–17.5)
LYMPHOCYTES # BLD: 1 K/UL (ref 1–5.1)
LYMPHOCYTES NFR BLD: 13.6 %
MCH RBC QN AUTO: 31.1 PG (ref 26–34)
MCHC RBC AUTO-ENTMCNC: 33.1 G/DL (ref 31–36)
MCV RBC AUTO: 94 FL (ref 80–100)
MONOCYTES # BLD: 0.6 K/UL (ref 0–1.3)
MONOCYTES NFR BLD: 7.2 %
NEUTROPHILS # BLD: 5.9 K/UL (ref 1.7–7.7)
NEUTROPHILS NFR BLD: 77.2 %
PERFORMED ON: ABNORMAL
PERFORMED ON: NORMAL
PLATELET # BLD AUTO: 199 K/UL (ref 135–450)
PMV BLD AUTO: 8.6 FL (ref 5–10.5)
POTASSIUM SERPL-SCNC: 4.3 MMOL/L (ref 3.5–5.1)
POTASSIUM SERPL-SCNC: 4.5 MMOL/L (ref 3.5–5.1)
PROT SERPL-MCNC: 6.4 G/DL (ref 6.4–8.2)
RBC # BLD AUTO: 4.38 M/UL (ref 4.2–5.9)
SODIUM SERPL-SCNC: 140 MMOL/L (ref 136–145)
SODIUM SERPL-SCNC: 141 MMOL/L (ref 136–145)
WBC # BLD AUTO: 7.7 K/UL (ref 4–11)

## 2023-10-17 PROCEDURE — 2580000003 HC RX 258: Performed by: UROLOGY

## 2023-10-17 PROCEDURE — 85025 COMPLETE CBC W/AUTO DIFF WBC: CPT

## 2023-10-17 PROCEDURE — 2500000003 HC RX 250 WO HCPCS: Performed by: NURSE ANESTHETIST, CERTIFIED REGISTERED

## 2023-10-17 PROCEDURE — 7100000001 HC PACU RECOVERY - ADDTL 15 MIN: Performed by: UROLOGY

## 2023-10-17 PROCEDURE — 3700000001 HC ADD 15 MINUTES (ANESTHESIA): Performed by: UROLOGY

## 2023-10-17 PROCEDURE — 2709999900 HC NON-CHARGEABLE SUPPLY: Performed by: UROLOGY

## 2023-10-17 PROCEDURE — 6360000002 HC RX W HCPCS: Performed by: NURSE ANESTHETIST, CERTIFIED REGISTERED

## 2023-10-17 PROCEDURE — 6360000002 HC RX W HCPCS: Performed by: UROLOGY

## 2023-10-17 PROCEDURE — 80053 COMPREHEN METABOLIC PANEL: CPT

## 2023-10-17 PROCEDURE — 7100000010 HC PHASE II RECOVERY - FIRST 15 MIN: Performed by: UROLOGY

## 2023-10-17 PROCEDURE — 3700000000 HC ANESTHESIA ATTENDED CARE: Performed by: UROLOGY

## 2023-10-17 PROCEDURE — 88342 IMHCHEM/IMCYTCHM 1ST ANTB: CPT

## 2023-10-17 PROCEDURE — 88307 TISSUE EXAM BY PATHOLOGIST: CPT

## 2023-10-17 PROCEDURE — 88360 TUMOR IMMUNOHISTOCHEM/MANUAL: CPT

## 2023-10-17 PROCEDURE — 7100000000 HC PACU RECOVERY - FIRST 15 MIN: Performed by: UROLOGY

## 2023-10-17 PROCEDURE — 36415 COLL VENOUS BLD VENIPUNCTURE: CPT

## 2023-10-17 PROCEDURE — 80061 LIPID PANEL: CPT

## 2023-10-17 PROCEDURE — 3600000004 HC SURGERY LEVEL 4 BASE: Performed by: UROLOGY

## 2023-10-17 PROCEDURE — 7100000011 HC PHASE II RECOVERY - ADDTL 15 MIN: Performed by: UROLOGY

## 2023-10-17 PROCEDURE — 2720000010 HC SURG SUPPLY STERILE: Performed by: UROLOGY

## 2023-10-17 PROCEDURE — 83036 HEMOGLOBIN GLYCOSYLATED A1C: CPT

## 2023-10-17 PROCEDURE — 6360000002 HC RX W HCPCS: Performed by: ANESTHESIOLOGY

## 2023-10-17 PROCEDURE — 3600000014 HC SURGERY LEVEL 4 ADDTL 15MIN: Performed by: UROLOGY

## 2023-10-17 RX ORDER — CIPROFLOXACIN 2 MG/ML
400 INJECTION, SOLUTION INTRAVENOUS
Status: COMPLETED | OUTPATIENT
Start: 2023-10-17 | End: 2023-10-17

## 2023-10-17 RX ORDER — LIDOCAINE HYDROCHLORIDE 20 MG/ML
INJECTION, SOLUTION EPIDURAL; INFILTRATION; INTRACAUDAL; PERINEURAL PRN
Status: DISCONTINUED | OUTPATIENT
Start: 2023-10-17 | End: 2023-10-17 | Stop reason: SDUPTHER

## 2023-10-17 RX ORDER — FENTANYL CITRATE 50 UG/ML
INJECTION, SOLUTION INTRAMUSCULAR; INTRAVENOUS PRN
Status: DISCONTINUED | OUTPATIENT
Start: 2023-10-17 | End: 2023-10-17 | Stop reason: SDUPTHER

## 2023-10-17 RX ORDER — SODIUM CHLORIDE 0.9 % (FLUSH) 0.9 %
5-40 SYRINGE (ML) INJECTION EVERY 12 HOURS SCHEDULED
Status: DISCONTINUED | OUTPATIENT
Start: 2023-10-17 | End: 2023-10-17 | Stop reason: HOSPADM

## 2023-10-17 RX ORDER — LIDOCAINE HYDROCHLORIDE 10 MG/ML
0.5 INJECTION, SOLUTION EPIDURAL; INFILTRATION; INTRACAUDAL; PERINEURAL ONCE
Status: DISCONTINUED | OUTPATIENT
Start: 2023-10-17 | End: 2023-10-17 | Stop reason: HOSPADM

## 2023-10-17 RX ORDER — ONDANSETRON 2 MG/ML
INJECTION INTRAMUSCULAR; INTRAVENOUS PRN
Status: DISCONTINUED | OUTPATIENT
Start: 2023-10-17 | End: 2023-10-17 | Stop reason: SDUPTHER

## 2023-10-17 RX ORDER — HYDROMORPHONE HYDROCHLORIDE 2 MG/ML
0.5 INJECTION, SOLUTION INTRAMUSCULAR; INTRAVENOUS; SUBCUTANEOUS EVERY 5 MIN PRN
Status: DISCONTINUED | OUTPATIENT
Start: 2023-10-17 | End: 2023-10-17 | Stop reason: HOSPADM

## 2023-10-17 RX ORDER — PROPOFOL 10 MG/ML
INJECTION, EMULSION INTRAVENOUS PRN
Status: DISCONTINUED | OUTPATIENT
Start: 2023-10-17 | End: 2023-10-17 | Stop reason: SDUPTHER

## 2023-10-17 RX ORDER — DEXAMETHASONE SODIUM PHOSPHATE 4 MG/ML
INJECTION, SOLUTION INTRA-ARTICULAR; INTRALESIONAL; INTRAMUSCULAR; INTRAVENOUS; SOFT TISSUE PRN
Status: DISCONTINUED | OUTPATIENT
Start: 2023-10-17 | End: 2023-10-17 | Stop reason: SDUPTHER

## 2023-10-17 RX ORDER — SODIUM CHLORIDE 9 MG/ML
INJECTION, SOLUTION INTRAVENOUS PRN
Status: DISCONTINUED | OUTPATIENT
Start: 2023-10-17 | End: 2023-10-17 | Stop reason: HOSPADM

## 2023-10-17 RX ORDER — AMOXICILLIN 250 MG
1 CAPSULE ORAL 2 TIMES DAILY
Qty: 50 TABLET | Refills: 0 | Status: SHIPPED | OUTPATIENT
Start: 2023-10-17

## 2023-10-17 RX ORDER — SODIUM CHLORIDE 0.9 % (FLUSH) 0.9 %
5-40 SYRINGE (ML) INJECTION PRN
Status: DISCONTINUED | OUTPATIENT
Start: 2023-10-17 | End: 2023-10-17 | Stop reason: HOSPADM

## 2023-10-17 RX ORDER — ONDANSETRON 2 MG/ML
4 INJECTION INTRAMUSCULAR; INTRAVENOUS
Status: DISCONTINUED | OUTPATIENT
Start: 2023-10-17 | End: 2023-10-17 | Stop reason: HOSPADM

## 2023-10-17 RX ORDER — HYDROMORPHONE HYDROCHLORIDE 2 MG/ML
0.25 INJECTION, SOLUTION INTRAMUSCULAR; INTRAVENOUS; SUBCUTANEOUS EVERY 5 MIN PRN
Status: DISCONTINUED | OUTPATIENT
Start: 2023-10-17 | End: 2023-10-17 | Stop reason: HOSPADM

## 2023-10-17 RX ORDER — MAGNESIUM HYDROXIDE 1200 MG/15ML
LIQUID ORAL
Status: COMPLETED | OUTPATIENT
Start: 2023-10-17 | End: 2023-10-17

## 2023-10-17 RX ORDER — SODIUM CHLORIDE 9 MG/ML
INJECTION, SOLUTION INTRAVENOUS CONTINUOUS
Status: DISCONTINUED | OUTPATIENT
Start: 2023-10-17 | End: 2023-10-17 | Stop reason: HOSPADM

## 2023-10-17 RX ORDER — HYDROCODONE BITARTRATE AND ACETAMINOPHEN 5; 325 MG/1; MG/1
1 TABLET ORAL EVERY 6 HOURS PRN
Qty: 12 TABLET | Refills: 0 | Status: SHIPPED | OUTPATIENT
Start: 2023-10-17 | End: 2023-10-20

## 2023-10-17 RX ADMIN — HYDROMORPHONE HYDROCHLORIDE 0.5 MG: 2 INJECTION, SOLUTION INTRAMUSCULAR; INTRAVENOUS; SUBCUTANEOUS at 08:46

## 2023-10-17 RX ADMIN — FENTANYL CITRATE 50 MCG: 50 INJECTION, SOLUTION INTRAMUSCULAR; INTRAVENOUS at 07:44

## 2023-10-17 RX ADMIN — CIPROFLOXACIN 400 MG: 2 INJECTION, SOLUTION INTRAVENOUS at 07:38

## 2023-10-17 RX ADMIN — PROPOFOL 100 MG: 10 INJECTION, EMULSION INTRAVENOUS at 07:44

## 2023-10-17 RX ADMIN — PHENYLEPHRINE HYDROCHLORIDE 100 MCG: 10 INJECTION INTRAVENOUS at 07:48

## 2023-10-17 RX ADMIN — PHENYLEPHRINE HYDROCHLORIDE 100 MCG: 10 INJECTION INTRAVENOUS at 07:55

## 2023-10-17 RX ADMIN — HYDROMORPHONE HYDROCHLORIDE 0.5 MG: 2 INJECTION, SOLUTION INTRAMUSCULAR; INTRAVENOUS; SUBCUTANEOUS at 09:14

## 2023-10-17 RX ADMIN — ONDANSETRON 4 MG: 2 INJECTION INTRAMUSCULAR; INTRAVENOUS at 07:48

## 2023-10-17 RX ADMIN — LIDOCAINE HYDROCHLORIDE 50 MG: 20 INJECTION, SOLUTION EPIDURAL; INFILTRATION; INTRACAUDAL; PERINEURAL at 07:44

## 2023-10-17 RX ADMIN — PHENYLEPHRINE HYDROCHLORIDE 100 MCG: 10 INJECTION INTRAVENOUS at 08:02

## 2023-10-17 RX ADMIN — SODIUM CHLORIDE: 9 INJECTION, SOLUTION INTRAVENOUS at 07:00

## 2023-10-17 RX ADMIN — HYDROMORPHONE HYDROCHLORIDE 0.5 MG: 2 INJECTION, SOLUTION INTRAMUSCULAR; INTRAVENOUS; SUBCUTANEOUS at 08:58

## 2023-10-17 RX ADMIN — DEXAMETHASONE SODIUM PHOSPHATE 4 MG: 4 INJECTION, SOLUTION INTRAMUSCULAR; INTRAVENOUS at 07:48

## 2023-10-17 ASSESSMENT — PAIN - FUNCTIONAL ASSESSMENT: PAIN_FUNCTIONAL_ASSESSMENT: 0-10

## 2023-10-17 ASSESSMENT — PAIN SCALES - GENERAL
PAINLEVEL_OUTOF10: 7

## 2023-10-17 ASSESSMENT — PAIN DESCRIPTION - LOCATION
LOCATION: PENIS

## 2023-10-17 NOTE — DISCHARGE INSTRUCTIONS
Follow up with Dr. Evie Ramirez with any questions, concerns, results, and an appointment after your procedure in the time period recommended. OK to resume eliquis on Thursday. Stop the medication if hematuria starts. Resume the medication when urine color back to clear. Call Dr. Evie Ramirez if clarification or issues. UROLOGY DISCHARGE INSTRUCTIONS    Follow your surgeons instructions. Your urine may look pink or red and it may burn when you urinate. You may also feel like you have to urinate often. Call your surgeon if your temperature is higher than 101 degrees, your urine is grossly bloody, or has large clots. Drink plenty of fluids unless your physician advises against it. If you can not urinate once you are home, call your surgeon or go to the Emergency Room. If you are discharged with a catheter in your bladder, follow instructions on care and removal. (See cathter removal/care instructions)  Take medications as directed. Take pain medication with food. Do not drive or operate machinery while taking narcotics. Call your surgeon for any problems or questions        SED[place] DISCHARGE INSTRUCTIONS  10/17/2023    Wear your seatbelt home. You are under the influence of drugs. Do not drink alcohol, drive, operate machinery, or make any important decisions or sign any legal documents for 24 hours  A responsible adult needs to be with you for 24 hours. You may experience lightheadedness, dizziness, nausea, heightened emotions and/or sleepiness following surgery. Rest at home today- increase activity as tolerated. Progress slowly to a regular diet and drink plenty of fluids unless your physician has instructed you otherwise. If nausea becomes a problem, call your physician. Coughing, sore throat, and muscle aches are other side effects of anesthesia and should improve with time. Do not drive or operate machinery while taking narcotics.

## 2023-10-17 NOTE — H&P
Urology History and Physical  Inpatient Setting - [facility]    Provider: Nola Beebe MD  Patient ID:  Admission Date: 10/17/2023 Name: Pool Handley  OR Date: n/a MRN: [id number]   Patient Location: OR/NONE : 1935  Attending: Nola Beebe MD Date of Service: 10/17/2023  PCP: Charito Garcia MD     Diagnoses:  Bladder tumor    Assessment/Plan:  Continue to the OR as planned for cysto with TURBT    All the patients questions were answered in detail. He understands the plan as listed above.                                                                                                                                               _____________________________________________________________    CC: Pre-op    HPI: Pool Handley is a 82 y.o. male. The history and physical exam was reviewed. The patient was seen and examined in pre-op. He had a chance to ask questions which were answered. There has been no interval changes. Plan to continue to the OR    Past Medical History:   He has a past medical history of Arrhythmia, Arthritis, Atrial fibrillation ([address]), Atrial tachycardia, CAD (coronary artery disease), Cancer ([address]), Diabetes mellitus ([address]), Diverticulitis, Enlarged prostate, History of blood transfusion, Hyperlipidemia, Hypertension, Pacemaker (2020), Pneumonia, and Vasodepressor syncope. Past Surgical History:  He has a past surgical history that includes shoulder surgery (Bilateral); Finger surgery; Coronary angioplasty with stent (); Cataract removal (Bilateral); ablation of dysrhythmic focus; Cystoscopy (2012); TURP (2013); other surgical history (Left, 2014); Carpal tunnel release; Finger trigger release; Cystocopy (10/08/2015); Spinal fusion (N/A); Colonoscopy; and pacemaker placement. Allergies:   No Known Allergies    Social History:  He reports that he quit smoking about 49 years ago. His smoking use included cigarettes.  He has

## 2023-10-17 NOTE — ANESTHESIA POSTPROCEDURE EVALUATION
Department of Anesthesiology  Postprocedure Note    Patient: Jenniffer Franco  MRN: [id number]  YOB: 1935  Date of evaluation: 10/17/2023      Procedure Summary     Date: 10/17/23 Room / Location: [address]    Anesthesia Start: 0740 Anesthesia Stop: 0831    Procedure: CYSTOSCOPY WITH TRANSURETHRAL RESECTION OF BLADDER TUMOR Diagnosis:       Gross hematuria      (Gross hematuria [R31.0])    Surgeons: Florina Montano MD Responsible Provider: Rhonda Zavala MD    Anesthesia Type: general ASA Status: 3          Anesthesia Type: No value filed.     Osman Phase I: Osman Score: 8    Osman Phase II:        Anesthesia Post Evaluation    Patient location during evaluation: PACU  Patient participation: complete - patient participated  Level of consciousness: awake  Airway patency: patent  Nausea & Vomiting: no nausea and no vomiting  Complications: no  Cardiovascular status: blood pressure returned to baseline  Respiratory status: acceptable  Hydration status: stable  Pain management: satisfactory to patient

## 2023-10-17 NOTE — OP NOTE
Urology Operative Report  [facility]    Provider: Mikayla Stern MD Patient ID:  Admission Date: 10/17/2023 Name: Talib Hitchcock  OR Date: 10/17/2023  MRN: [id number]   Patient Location: OR/NONE : 1935  Attending: Mikayla Stern MD Date of Service: 10/17/2023  PCP: Burgess Xander MD     Date of Operation: 10/17/2023    Preoperative Diagnosis: bladder tumor    Postoperative Diagnosis: same    Procedure:    1. Cystoscopy  2. Transurethral resection of bladder tumor, medium (2-5cm)    Surgeon:   Mikayla Stern MD    Anesthesia: General endotracheal anesthesia    Indications: Talib Hitchcock is a 82 y.o. male who presents for the above named surgery. Informed consent was obtained and the risks, benefits, and details of the procedure were explained to the patient who elected to proceed. Details of Procedure: The patient was brought to the operating room and placed in the supine position on the operating room table. SCDs were placed on the lower extremities. Following induction of anesthesia the patient was positioned in a lithotomy position, all pressure points were padded, and the genitals were prepped and draped in the usual sterile fashion. A routine timeout was performed, confirming the patient, procedure, site, risk of fire, patient allergies and confirming that preoperative antibiotics had been administered prior to beginning. A 21 fr rigid cystoscope was advanced via a normal appearing urethra into the bladder. The bladder was inspected systematically with a 30 degree and 70 degree lenses. There was a bladder tumor identified at the bladder neck overlying the RIGHT UO, and another tumor on the right anterior bladder wall. The cystoscope was removed and a resectoscope advanced into the bladder. Using cutting current resection was done systematically until all visible tumor had been resected and evacuated from the bladder.  Hemostasis was ensured by filling under

## 2023-10-17 NOTE — ANESTHESIA PRE PROCEDURE
PROTIME 23.7 06/23/2015 10:25 AM    INR 0.97 04/18/2020 04:38 PM    APTT 33.3 04/18/2020 04:38 PM       HCG (If Applicable): No results found for: \"PREGTESTUR\", \"PREGSERUM\", \"HCG\", \"HCGQUANT\"     ABGs: No results found for: \"PHART\", \"PO2ART\", \"UQB7ZGP\", \"UFF5QEM\", \"BEART\", \"Y3YEEKMO\"     Type & Screen (If Applicable):  No results found for: \"LABABO\", \"LABRH\"    Drug/Infectious Status (If Applicable):  No results found for: \"HIV\", \"HEPCAB\"    COVID-19 Screening (If Applicable): No results found for: \"COVID19\"        Anesthesia Evaluation  Patient summary reviewed and Nursing notes reviewed  Airway: Mallampati: II  TM distance: >3 FB   Neck ROM: full  Mouth opening: > = 3 FB   Dental: normal exam   (+) partials      Pulmonary:normal exam  breath sounds clear to auscultation  (+) pneumonia: resolved,                             Cardiovascular:    (+) hypertension:, pacemaker: pacemaker, CAD:,       ECG reviewed  Rhythm: regular  Rate: normal  Echocardiogram reviewed               ROS comment: Left ventricular cavity size is normal. Normal left ventricular wall thickness. Left ventricular function is normal with ejection fraction estimated at 55-60%. No regional wall motion abnormalities are noted. Indeterminate diastolic function. Thickening of leaflets of mitral valve. Mitral annular calcification is present. Aortic valve appears thickened/calcified but opens adequately. Mild tricuspid regurgitation. Estimated pulmonary artery systolic pressure is at 33 mmHg assuming a right atrial pressure of 8 mmHg       Neuro/Psych:   (+) headaches:,             GI/Hepatic/Renal:             Endo/Other:    (+) Diabetes, . Abdominal:             Vascular: Other Findings:           Anesthesia Plan      general     ASA 3       Induction: intravenous. MIPS: Postoperative opioids intended and Prophylactic antiemetics administered. Anesthetic plan and risks discussed with patient.       Plan discussed with

## 2023-10-17 NOTE — PROGRESS NOTES
Discharge instructions and new medication prescriptions reviewed with pt and pts wife at bedside, both verbalized understanding. Pt safely dressed and transferred self to wheelchair, IV removed without complications. Pt discharged in wheelchair with all belongings to car by Cy Mejia, pt tolerated well.
Pt arrived from OR to PACU, awakens to voice. VSS, O2 sats 99% on 6 L simple mask. Will monitor.
Pt resting quietly in bed, awake, states pain is tolerable for him at this time. VSS, O2 sats 97% on room air. Pt seen by anesthesia, phase 1 criteria met. Will discharge pt from PACU.
Teaching / education initiated regarding perioperative experience, expectations, and pain management during stay. Patient verbalized understanding.
not be allowed to leave alone or drive yourself home. It is strongly suggested someone stay with you the first 24 hrs. Your surgery will be cancelled if you do not have a ride home. 8. A parent/legal guardian must accompany a child scheduled for surgery and plan to stay at the hospital until the child is discharged. Please do not bring other children with you. 9. Please wear simple, loose fitting clothing to the hospital.  Paul Guerrated not bring valuables (money, credit cards, checkbooks, etc.) Do not wear any makeup (including no eye makeup) or nail polish on your fingers or toes. 10. DO NOT wear any jewelry or piercings on day of surgery. All body piercing jewelry must be removed. 11. If you have _x__dentures, they will be removed before going to the OR; we will provide you a container. If you wear ___contact lenses or _x__glasses, they will be removed; please bring a case for them. 12. Please see your family doctor/pediatrician for a history & physical and/or concerning medications. Bring any test results/reports from your physician's office. PCP__________________Phone___________H&P Appt. Date________             13 If you  have a Living Will and Durable Power of  for Healthcare, please bring in a copy. 15. Notify your Surgeon if you develop any illness between now and surgery  time, cough, cold, fever, sore throat, nausea, vomiting, etc.  Please notify your surgeon if you experience dizziness, shortness of breath or blurred vision between now & the time of your surgery             15. DO NOT shave your operative site 96 hours prior to surgery. For face & neck surgery, men may use an electric razor 48 hours prior to surgery. 16. Shower the night before or morning of surgery using an antibacterial soap or as you have been instructed. 17. To provide excellent care visitors will be limited to one in the room at any given time.

## 2023-10-18 LAB
CHOLEST SERPL-MCNC: 125 MG/DL (ref 0–199)
EST. AVERAGE GLUCOSE BLD GHB EST-MCNC: 116.9 MG/DL
HBA1C MFR BLD: 5.7 %
HDLC SERPL-MCNC: 51 MG/DL (ref 40–60)
LDLC SERPL CALC-MCNC: 49 MG/DL
TRIGL SERPL-MCNC: 123 MG/DL (ref 0–150)
VLDLC SERPL CALC-MCNC: 25 MG/DL

## 2023-10-23 ENCOUNTER — TELEPHONE (OUTPATIENT)
Dept: CARDIOLOGY CLINIC | Age: 88
End: 2023-10-23

## 2023-10-23 NOTE — TELEPHONE ENCOUNTER
Pt reports this am, ~ 10 AM, BP: 100/62  At 3:20 pm & ` every 10 minutes after that, 73/47, 86/51 & 97/54  States has been drinking water and ate sandwich; did feel dizzy this am  S/p TURP (Dr Marquez Yun, 10/17)  States is urinating ok, still having 'some drips of blood but it is better'  Advised to stay adequately hydrated. Per Mita Lovelace, hold amlodipine if SBP less than 110. Will monitor BP and call office with updates. Advised to contact urology re: resumption of Eliquis . Verb understanding.

## 2023-11-03 ENCOUNTER — TELEPHONE (OUTPATIENT)
Dept: CARDIOLOGY CLINIC | Age: 88
End: 2023-11-03

## 2023-11-03 NOTE — TELEPHONE ENCOUNTER
Patient called and stated he's having some issues with his blood pressure. He stated his this morning was BP 78/46. His last reading was 92/52. He wants to speak to someone regarding what to do. Call back [phone].

## 2023-11-03 NOTE — TELEPHONE ENCOUNTER
Spoke with patient, states he is mostly symptom free with low BP readings. No dizziness/lightheadedness or feeling faint. He states he has two monitors he is using-one wrist, and the other a cuff. Currently not taking Amlodipine due to bp being lower than 100. Also he states that his Eliquis has been stopped currently while having hematuria due to bladder CA. Patient will bring monitors in on Monday 11/6/2023-to have them calibrated with our manual cuff.

## 2023-11-06 ENCOUNTER — NURSE ONLY (OUTPATIENT)
Dept: CARDIOLOGY CLINIC | Age: 88
End: 2023-11-06

## 2023-11-06 VITALS — HEART RATE: 62 BPM | DIASTOLIC BLOOD PRESSURE: 66 MMHG | SYSTOLIC BLOOD PRESSURE: 121 MMHG

## 2023-11-06 PROCEDURE — 93296 REM INTERROG EVL PM/IDS: CPT | Performed by: INTERNAL MEDICINE

## 2023-11-06 PROCEDURE — 93294 REM INTERROG EVL PM/LDLS PM: CPT | Performed by: INTERNAL MEDICINE

## 2023-11-06 NOTE — PROGRESS NOTES
Per Karen Alvarez S:  BP home brachial machine: 120/77  BP home wrist: 115/71  BP manual: 100/50    Per Sumi H:  BP home brachial: 121/66  BP manual: 110/62    Pt has not been taking amlodipine since he called the office on 10/23/23 for SBP in the 70-80 with c/o dizziness. Since stopping the amlodipine, he had an isolated reading of 78/46 (last Fri). At that time, he was asymptomatic. Review of his home log shows that this is an outlier. All other SBPs have ranged from . Per FW, remain off amlodipine and continue to monitor. Regarding Eliquis. He had c/o hematuria. Eliquis was stopped and pt s/p cysto and pt found to have bladder CA. About 2 weeks after the cysto, he resumed Elqiuis. After 2 doses, he developed gross hematuria again so he stopped Eliquis. Pt reports that urology has left the decision to go back on Eliquis up to cardiology. He is planned for a repeat cysto in 4 weeks with possible chemical wash after that. Per FW, remain off Eliquis for now due to hematuria. Will re-evaluate after the cysto and treatment.

## 2023-11-08 ENCOUNTER — OFFICE VISIT (OUTPATIENT)
Dept: INTERNAL MEDICINE CLINIC | Age: 88
End: 2023-11-08
Payer: OTHER GOVERNMENT

## 2023-11-08 VITALS — WEIGHT: 188 LBS | BODY MASS INDEX: 25.5 KG/M2 | DIASTOLIC BLOOD PRESSURE: 68 MMHG | SYSTOLIC BLOOD PRESSURE: 126 MMHG

## 2023-11-08 DIAGNOSIS — C67.9 MALIGNANT NEOPLASM OF URINARY BLADDER, UNSPECIFIED SITE (HCC): ICD-10-CM

## 2023-11-08 DIAGNOSIS — N18.31 STAGE 3A CHRONIC KIDNEY DISEASE (HCC): ICD-10-CM

## 2023-11-08 DIAGNOSIS — I48.0 PAROXYSMAL ATRIAL FIBRILLATION (HCC): Chronic | ICD-10-CM

## 2023-11-08 DIAGNOSIS — Z79.4 TYPE 2 DIABETES MELLITUS WITH DIABETIC POLYNEUROPATHY, WITH LONG-TERM CURRENT USE OF INSULIN (HCC): Primary | ICD-10-CM

## 2023-11-08 DIAGNOSIS — I10 ESSENTIAL HYPERTENSION: ICD-10-CM

## 2023-11-08 DIAGNOSIS — E11.42 TYPE 2 DIABETES MELLITUS WITH DIABETIC POLYNEUROPATHY, WITH LONG-TERM CURRENT USE OF INSULIN (HCC): Primary | ICD-10-CM

## 2023-11-08 PROCEDURE — 3044F HG A1C LEVEL LT 7.0%: CPT | Performed by: INTERNAL MEDICINE

## 2023-11-08 PROCEDURE — 1123F ACP DISCUSS/DSCN MKR DOCD: CPT | Performed by: INTERNAL MEDICINE

## 2023-11-08 PROCEDURE — 3078F DIAST BP <80 MM HG: CPT | Performed by: INTERNAL MEDICINE

## 2023-11-08 PROCEDURE — 99214 OFFICE O/P EST MOD 30 MIN: CPT | Performed by: INTERNAL MEDICINE

## 2023-11-08 PROCEDURE — 3074F SYST BP LT 130 MM HG: CPT | Performed by: INTERNAL MEDICINE

## 2023-11-08 RX ORDER — PREGABALIN 75 MG/1
CAPSULE ORAL
Qty: 360 CAPSULE | Refills: 1 | Status: SHIPPED | OUTPATIENT
Start: 2023-11-08 | End: 2024-11-08

## 2023-11-08 NOTE — PROGRESS NOTES
Rate and Rhythm: Normal rate and regular rhythm. Heart sounds: Normal heart sounds. Pulmonary:      Effort: Pulmonary effort is normal. No respiratory distress. Breath sounds: Normal breath sounds. Skin:     General: Skin is warm and dry. Neurological:      Mental Status: He is alert. Psychiatric:         Behavior: Behavior normal.         Thought Content: Thought content normal.         Judgment: Judgment normal.                  An electronic signature was used to authenticate this note.     --Trisha Garcia MD

## 2023-11-27 ENCOUNTER — TELEPHONE (OUTPATIENT)
Dept: INTERNAL MEDICINE CLINIC | Age: 88
End: 2023-11-27

## 2023-11-27 NOTE — TELEPHONE ENCOUNTER
You took patient off of nateglinide 120 mg     Blood sugar numbers increased about 25 pts daily was this expected should her resume meds     Running current   162-11/25  146-11/27    Average 115    Please advise

## 2023-11-27 NOTE — TELEPHONE ENCOUNTER
I do expect some increase in the numbers, but if it is consistently over 130 for fasting sugar would add back nateglinide just with the largest meal of the day

## 2023-11-28 RX ORDER — LANSOPRAZOLE 30 MG/1
30 CAPSULE, DELAYED RELEASE ORAL DAILY
Qty: 90 CAPSULE | Refills: 1 | Status: SHIPPED | OUTPATIENT
Start: 2023-11-28

## 2023-12-04 RX ORDER — VALACYCLOVIR HYDROCHLORIDE 500 MG/1
TABLET, FILM COATED ORAL
Qty: 24 TABLET | Refills: 0 | Status: SHIPPED | OUTPATIENT
Start: 2023-12-04

## 2023-12-19 ENCOUNTER — HOSPITAL ENCOUNTER (OUTPATIENT)
Age: 88
Setting detail: OUTPATIENT SURGERY
Discharge: HOME | End: 2023-12-19
Attending: UROLOGY | Admitting: UROLOGY
Payer: OTHER GOVERNMENT

## 2023-12-19 VITALS
WEIGHT: 180 LBS | DIASTOLIC BLOOD PRESSURE: 59 MMHG | HEIGHT: 72 IN | SYSTOLIC BLOOD PRESSURE: 135 MMHG | HEART RATE: 61 BPM | RESPIRATION RATE: 16 BRPM | TEMPERATURE: 97 F | OXYGEN SATURATION: 96 % | BODY MASS INDEX: 24.38 KG/M2

## 2023-12-19 LAB
GLUCOSE BLD-MCNC: 116 MG/DL (ref 70–99)
GLUCOSE BLD-MCNC: 95 MG/DL (ref 70–99)
PERFORMED ON: ABNORMAL
PERFORMED ON: NORMAL

## 2023-12-19 PROCEDURE — 88341 IMHCHEM/IMCYTCHM EA ADD ANTB: CPT

## 2023-12-19 PROCEDURE — 88342 IMHCHEM/IMCYTCHM 1ST ANTB: CPT

## 2023-12-19 PROCEDURE — 3700000001 HC ADD 15 MINUTES (ANESTHESIA): Performed by: UROLOGY

## 2023-12-19 PROCEDURE — 7100000011 HC PHASE II RECOVERY - ADDTL 15 MIN: Performed by: UROLOGY

## 2023-12-19 PROCEDURE — C1769 GUIDE WIRE: HCPCS | Performed by: UROLOGY

## 2023-12-19 PROCEDURE — 2709999900 HC NON-CHARGEABLE SUPPLY: Performed by: UROLOGY

## 2023-12-19 PROCEDURE — 88305 TISSUE EXAM BY PATHOLOGIST: CPT

## 2023-12-19 PROCEDURE — 7100000010 HC PHASE II RECOVERY - FIRST 15 MIN: Performed by: UROLOGY

## 2023-12-19 PROCEDURE — 7100000001 HC PACU RECOVERY - ADDTL 15 MIN: Performed by: UROLOGY

## 2023-12-19 PROCEDURE — 3700000000 HC ANESTHESIA ATTENDED CARE: Performed by: UROLOGY

## 2023-12-19 PROCEDURE — 6360000002 HC RX W HCPCS: Performed by: UROLOGY

## 2023-12-19 PROCEDURE — 2580000003 HC RX 258: Performed by: UROLOGY

## 2023-12-19 PROCEDURE — 3600000014 HC SURGERY LEVEL 4 ADDTL 15MIN: Performed by: UROLOGY

## 2023-12-19 PROCEDURE — 3600000004 HC SURGERY LEVEL 4 BASE: Performed by: UROLOGY

## 2023-12-19 PROCEDURE — 7100000000 HC PACU RECOVERY - FIRST 15 MIN: Performed by: UROLOGY

## 2023-12-19 RX ORDER — ACETAMINOPHEN 325 MG/1
650 TABLET ORAL ONCE
Status: DISCONTINUED | OUTPATIENT
Start: 2023-12-19 | End: 2023-12-19 | Stop reason: HOSPADM

## 2023-12-19 RX ORDER — AMOXICILLIN AND CLAVULANATE POTASSIUM 875; 125 MG/1; MG/1
1 TABLET, FILM COATED ORAL 2 TIMES DAILY
Qty: 14 TABLET | Refills: 0 | Status: SHIPPED | OUTPATIENT
Start: 2023-12-19 | End: 2023-12-26

## 2023-12-19 RX ORDER — SODIUM CHLORIDE 0.9 % (FLUSH) 0.9 %
5-40 SYRINGE (ML) INJECTION PRN
Status: DISCONTINUED | OUTPATIENT
Start: 2023-12-19 | End: 2023-12-19 | Stop reason: HOSPADM

## 2023-12-19 RX ORDER — HYDROMORPHONE HYDROCHLORIDE 2 MG/ML
0.2 INJECTION, SOLUTION INTRAMUSCULAR; INTRAVENOUS; SUBCUTANEOUS EVERY 5 MIN PRN
Status: DISCONTINUED | OUTPATIENT
Start: 2023-12-19 | End: 2023-12-19 | Stop reason: HOSPADM

## 2023-12-19 RX ORDER — OXYCODONE HYDROCHLORIDE 5 MG/1
5 TABLET ORAL
Status: DISCONTINUED | OUTPATIENT
Start: 2023-12-19 | End: 2023-12-19 | Stop reason: HOSPADM

## 2023-12-19 RX ORDER — ONDANSETRON 2 MG/ML
4 INJECTION INTRAMUSCULAR; INTRAVENOUS
Status: DISCONTINUED | OUTPATIENT
Start: 2023-12-19 | End: 2023-12-19 | Stop reason: HOSPADM

## 2023-12-19 RX ORDER — SODIUM CHLORIDE 0.9 % (FLUSH) 0.9 %
5-40 SYRINGE (ML) INJECTION EVERY 12 HOURS SCHEDULED
Status: DISCONTINUED | OUTPATIENT
Start: 2023-12-19 | End: 2023-12-19 | Stop reason: HOSPADM

## 2023-12-19 RX ORDER — MAGNESIUM HYDROXIDE 1200 MG/15ML
LIQUID ORAL CONTINUOUS PRN
Status: COMPLETED | OUTPATIENT
Start: 2023-12-19 | End: 2023-12-19

## 2023-12-19 RX ORDER — SODIUM CHLORIDE 9 MG/ML
INJECTION, SOLUTION INTRAVENOUS CONTINUOUS
Status: DISCONTINUED | OUTPATIENT
Start: 2023-12-19 | End: 2023-12-19 | Stop reason: HOSPADM

## 2023-12-19 RX ORDER — SODIUM CHLORIDE, SODIUM LACTATE, POTASSIUM CHLORIDE, CALCIUM CHLORIDE 600; 310; 30; 20 MG/100ML; MG/100ML; MG/100ML; MG/100ML
INJECTION, SOLUTION INTRAVENOUS CONTINUOUS
Status: DISCONTINUED | OUTPATIENT
Start: 2023-12-19 | End: 2023-12-19 | Stop reason: HOSPADM

## 2023-12-19 RX ORDER — LEVOFLOXACIN 5 MG/ML
500 INJECTION, SOLUTION INTRAVENOUS
Status: COMPLETED | OUTPATIENT
Start: 2023-12-19 | End: 2023-12-19

## 2023-12-19 RX ORDER — SODIUM CHLORIDE 9 MG/ML
INJECTION, SOLUTION INTRAVENOUS PRN
Status: DISCONTINUED | OUTPATIENT
Start: 2023-12-19 | End: 2023-12-19 | Stop reason: HOSPADM

## 2023-12-19 RX ORDER — FENTANYL CITRATE 50 UG/ML
25 INJECTION, SOLUTION INTRAMUSCULAR; INTRAVENOUS EVERY 5 MIN PRN
Status: DISCONTINUED | OUTPATIENT
Start: 2023-12-19 | End: 2023-12-19 | Stop reason: HOSPADM

## 2023-12-19 RX ORDER — LIDOCAINE HYDROCHLORIDE 10 MG/ML
0.5 INJECTION, SOLUTION EPIDURAL; INFILTRATION; INTRACAUDAL; PERINEURAL ONCE
Status: DISCONTINUED | OUTPATIENT
Start: 2023-12-19 | End: 2023-12-19 | Stop reason: HOSPADM

## 2023-12-19 RX ORDER — CIPROFLOXACIN 2 MG/ML
400 INJECTION, SOLUTION INTRAVENOUS
Status: DISCONTINUED | OUTPATIENT
Start: 2023-12-19 | End: 2023-12-19

## 2023-12-19 RX ADMIN — LEVOFLOXACIN 500 MG: 500 INJECTION, SOLUTION INTRAVENOUS at 11:07

## 2023-12-19 NOTE — PROGRESS NOTES
Patient transferred from OR to PACU, responds to voice, VSS, alves draining to leg bag, clear yellow urine.

## 2023-12-19 NOTE — DISCHARGE INSTRUCTIONS
UROLOGY DISCHARGE INSTRUCTIONS    Follow your surgeons instructions. Your urine may look pink and it may burn when you urinate. You may also feel like you have to urinate often. Call your surgeon if your temperature is higher than 101 degrees, your urine is grossly bloody, or has large clots. Drink plenty of fluids unless your physician advises against it. If you can not urinate once you are home, call your surgeon or go to the Emergency Room. If you are discharged with a catheter into your bladder follow instructions on care and removal. ( See cathter removal/ care sheet.)  Take medications as directed. Take pain medication with food. Do not drive or operate machinery while taking narcotics. Call your surgeon for any problems or questions                          CATHETER CARE INSTRUCTION SHEET      Cleanse skin and catheter tubing with soap and water. Empty bag and measure and record output. Observe urine and patient, record observations, and report any observed or patient reported concerns to the RN. If you are discharged with a catheter:  [facility] your hands before and after handling your catheter. **You may shower with the catheter, and wash the catheter with soap and water. **Remove the cork and drain your bladder when you feel the urge to urinate. If you have a drainage bag, empty as needed. **Your urine may be clear, pink or bloody. This is normal.  Notify your doctor if bleeding worsens. **Increase your water intake if you have blood in your urine. **Follow your doctor's instructions regarding activity restrictions and returning to work. Call your doctor immediately if:  **You are passing large clots in your urine. **You feel the catheter is not draining properly. **You develop a fever over 101. **Your urine becomes cloudy or has a foul odor. **You have persistent low back or abdominal pain. There is a balloon filled with sterile water that holds your catheter in place.     To

## 2023-12-19 NOTE — H&P
Urology History and Physical  Inpatient Setting - [facility]    Provider: Mary Chandra MD  Patient ID:  Admission Date: 2023 Name: Gretchen Huang  OR Date: n/a MRN: [id number]   Patient Location: OR/NONE : 1935  Attending: Mary Chandra MD Date of Service: 2023  PCP: Aneudy Peña MD     Diagnoses:  BPH w LUTS and hematuria    Assessment/Plan:  Continue to the OR as planned for cystoscopy with possible biopsy. All the patients questions were answered in detail. He understands the plan as listed above.                                                                                                                                               _____________________________________________________________    CC: Pre-op    HPI: Gretchen Huang is a 82 y.o. male. The history and physical exam was reviewed. The patient was seen and examined in pre-op. He had a chance to ask questions which were answered. There has been no interval changes. Plan to continue to the OR    Past Medical History:   He has a past medical history of Arrhythmia, Arthritis, Atrial fibrillation ([address]), Atrial tachycardia, CAD (coronary artery disease), Cancer ([address]), Diabetes mellitus ([address]), Diverticulitis, Enlarged prostate, History of blood transfusion, Hyperlipidemia, Hypertension, Pacemaker (2020), Pneumonia, and Vasodepressor syncope. Past Surgical History:  He has a past surgical history that includes shoulder surgery (Bilateral); Finger surgery; Coronary angioplasty with stent (); Cataract removal (Bilateral); ablation of dysrhythmic focus; Cystoscopy (2012); TURP (2013); other surgical history (Left, 2014); Carpal tunnel release; Finger trigger release; Cystocopy (10/08/2015); Spinal fusion (N/A); Colonoscopy; pacemaker placement; and Cystoscopy (N/A, 10/17/2023).      Allergies:   No Known Allergies    Social History:  He reports that he quit smoking about
Yes

## 2023-12-19 NOTE — PROGRESS NOTES
Pt discharged home per MD orders. Pr peripheral IV removed, intact, bleeding controlled. Pt and wife understand catheter care and removal. Pt safely transported off with with all belonging.

## 2023-12-19 NOTE — PROGRESS NOTES
Patient awake and alert, VSS, alves draining to leg bag, phase I discharge criteria met, will transfer to [facility].

## 2023-12-19 NOTE — OP NOTE
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 Cystoscopy  2. Bladder wall biopsy  3. Fulguration of bladder biopsy   4. Urethral dilation    Surgeon:   Collin Mcduffie MD    Anesthesia: General LMA anesthesia    Indications: Tiago Holloway is a 82 y.o. male who presents for the above named surgery. Informed consent was obtained and the risks, benefits, and details of the procedure were explained to the patient who elected to proceed. Details of Procedure: The patient was brought to the operating room and placed in the supine position on the operating room table. SCDs were placed on the lower extremities. Following induction of anesthesia the patient was positioned in a lithotomy position, all pressure points were padded, and the genitals were prepped and draped in the usual sterile fashion. A routine timeout was performed, confirming the patient, procedure, site, risk of fire, patient allergies and confirming that preoperative antibiotics had been administered prior to beginning. A 21 fr rigid cystoscope was advanced into the bladder. At the level of the bulbar urethra there was a soft and short appearing narrowing. A sensor wire was advanced via the cystoscope. The urethra was then dilated using the tip of the cystoscope which passed with only mild advancing pressure. The bladder was inspected using a 30 and 70 degree lenses. There were no discrete papillary lesions appreciated on the bladder mucosa.   There was general inflammation with erythema patches on

## 2023-12-28 ENCOUNTER — HOSPITAL ENCOUNTER (INPATIENT)
Age: 88
LOS: 2 days | Discharge: HOME | DRG: 322 | End: 2023-12-30
Attending: EMERGENCY MEDICINE | Admitting: INTERNAL MEDICINE
Payer: MEDICARE

## 2023-12-28 ENCOUNTER — APPOINTMENT (OUTPATIENT)
Dept: GENERAL RADIOLOGY | Age: 88
DRG: 322 | End: 2023-12-28
Payer: MEDICARE

## 2023-12-28 ENCOUNTER — TELEPHONE (OUTPATIENT)
Dept: CARDIOLOGY CLINIC | Age: 88
End: 2023-12-28

## 2023-12-28 DIAGNOSIS — I20.0 UNSTABLE ANGINA (HCC): Primary | ICD-10-CM

## 2023-12-28 DIAGNOSIS — I21.4 NSTEMI (NON-ST ELEVATED MYOCARDIAL INFARCTION) (HCC): ICD-10-CM

## 2023-12-28 LAB
ALBUMIN SERPL-MCNC: 4.2 G/DL (ref 3.4–5)
ALBUMIN/GLOB SERPL: 2 {RATIO} (ref 1.1–2.2)
ALP SERPL-CCNC: 74 U/L (ref 40–129)
ALT SERPL-CCNC: 13 U/L (ref 10–40)
ANION GAP SERPL CALCULATED.3IONS-SCNC: 8 MMOL/L (ref 3–16)
ANTI-XA UNFRAC HEPARIN: 0.41 IU/ML (ref 0.3–0.7)
ANTI-XA UNFRAC HEPARIN: <0.1 IU/ML (ref 0.3–0.7)
APTT BLD: 28.6 SEC (ref 22.7–35.9)
AST SERPL-CCNC: 25 U/L (ref 15–37)
BASOPHILS # BLD: 0.1 K/UL (ref 0–0.2)
BASOPHILS NFR BLD: 0.7 %
BILIRUB SERPL-MCNC: 0.3 MG/DL (ref 0–1)
BUN SERPL-MCNC: 22 MG/DL (ref 7–20)
CALCIUM SERPL-MCNC: 10.3 MG/DL (ref 8.3–10.6)
CHLORIDE SERPL-SCNC: 106 MMOL/L (ref 99–110)
CO2 SERPL-SCNC: 26 MMOL/L (ref 21–32)
CREAT SERPL-MCNC: 1.1 MG/DL (ref 0.8–1.3)
DEPRECATED RDW RBC AUTO: 13.1 % (ref 12.4–15.4)
EKG ATRIAL RATE: 60 BPM
EKG ATRIAL RATE: 84 BPM
EKG DIAGNOSIS: NORMAL
EKG DIAGNOSIS: NORMAL
EKG P-R INTERVAL: 168 MS
EKG P-R INTERVAL: 186 MS
EKG Q-T INTERVAL: 464 MS
EKG Q-T INTERVAL: 522 MS
EKG QRS DURATION: 184 MS
EKG QRS DURATION: 184 MS
EKG QTC CALCULATION (BAZETT): 522 MS
EKG QTC CALCULATION (BAZETT): 548 MS
EKG R AXIS: -50 DEGREES
EKG R AXIS: -62 DEGREES
EKG T AXIS: 75 DEGREES
EKG T AXIS: 90 DEGREES
EKG VENTRICULAR RATE: 60 BPM
EKG VENTRICULAR RATE: 84 BPM
EOSINOPHIL # BLD: 0.1 K/UL (ref 0–0.6)
EOSINOPHIL NFR BLD: 1.7 %
GFR SERPLBLD CREATININE-BSD FMLA CKD-EPI: >60 ML/MIN/{1.73_M2}
GLUCOSE SERPL-MCNC: 140 MG/DL (ref 70–99)
HCT VFR BLD AUTO: 41.6 % (ref 40.5–52.5)
HGB BLD-MCNC: 14.4 G/DL (ref 13.5–17.5)
INR PPP: 1.08 (ref 0.84–1.16)
LIPASE SERPL-CCNC: 61 U/L (ref 13–60)
LYMPHOCYTES # BLD: 1 K/UL (ref 1–5.1)
LYMPHOCYTES NFR BLD: 12.9 %
MCH RBC QN AUTO: 32.1 PG (ref 26–34)
MCHC RBC AUTO-ENTMCNC: 34.5 G/DL (ref 31–36)
MCV RBC AUTO: 93 FL (ref 80–100)
MONOCYTES # BLD: 0.7 K/UL (ref 0–1.3)
MONOCYTES NFR BLD: 8.8 %
NEUTROPHILS # BLD: 5.9 K/UL (ref 1.7–7.7)
NEUTROPHILS NFR BLD: 75.9 %
NT-PROBNP SERPL-MCNC: 2821 PG/ML (ref 0–449)
PLATELET # BLD AUTO: 199 K/UL (ref 135–450)
PMV BLD AUTO: 8.3 FL (ref 5–10.5)
POTASSIUM SERPL-SCNC: 4.5 MMOL/L (ref 3.5–5.1)
PROT SERPL-MCNC: 6.3 G/DL (ref 6.4–8.2)
PROTHROMBIN TIME: 14 SEC (ref 11.5–14.8)
RBC # BLD AUTO: 4.48 M/UL (ref 4.2–5.9)
SODIUM SERPL-SCNC: 140 MMOL/L (ref 136–145)
TROPONIN, HIGH SENSITIVITY: 129 NG/L (ref 0–22)
TROPONIN, HIGH SENSITIVITY: 145 NG/L (ref 0–22)
TROPONIN, HIGH SENSITIVITY: 219 NG/L (ref 0–22)
WBC # BLD AUTO: 7.7 K/UL (ref 4–11)

## 2023-12-28 PROCEDURE — 84484 ASSAY OF TROPONIN QUANT: CPT

## 2023-12-28 PROCEDURE — 6360000002 HC RX W HCPCS: Performed by: INTERNAL MEDICINE

## 2023-12-28 PROCEDURE — 83880 ASSAY OF NATRIURETIC PEPTIDE: CPT

## 2023-12-28 PROCEDURE — 93005 ELECTROCARDIOGRAM TRACING: CPT | Performed by: EMERGENCY MEDICINE

## 2023-12-28 PROCEDURE — 93010 ELECTROCARDIOGRAM REPORT: CPT | Performed by: INTERNAL MEDICINE

## 2023-12-28 PROCEDURE — 6370000000 HC RX 637 (ALT 250 FOR IP): Performed by: EMERGENCY MEDICINE

## 2023-12-28 PROCEDURE — 85610 PROTHROMBIN TIME: CPT

## 2023-12-28 PROCEDURE — C8929 TTE W OR WO FOL WCON,DOPPLER: HCPCS

## 2023-12-28 PROCEDURE — 36415 COLL VENOUS BLD VENIPUNCTURE: CPT

## 2023-12-28 PROCEDURE — 6360000004 HC RX CONTRAST MEDICATION: Performed by: INTERNAL MEDICINE

## 2023-12-28 PROCEDURE — 71045 X-RAY EXAM CHEST 1 VIEW: CPT

## 2023-12-28 PROCEDURE — 99223 1ST HOSP IP/OBS HIGH 75: CPT | Performed by: INTERNAL MEDICINE

## 2023-12-28 PROCEDURE — 99285 EMERGENCY DEPT VISIT HI MDM: CPT

## 2023-12-28 PROCEDURE — 2060000000 HC ICU INTERMEDIATE R&B

## 2023-12-28 PROCEDURE — 80053 COMPREHEN METABOLIC PANEL: CPT

## 2023-12-28 PROCEDURE — 85730 THROMBOPLASTIN TIME PARTIAL: CPT

## 2023-12-28 PROCEDURE — 83690 ASSAY OF LIPASE: CPT

## 2023-12-28 PROCEDURE — 6370000000 HC RX 637 (ALT 250 FOR IP): Performed by: INTERNAL MEDICINE

## 2023-12-28 PROCEDURE — 6360000002 HC RX W HCPCS: Performed by: EMERGENCY MEDICINE

## 2023-12-28 PROCEDURE — 96374 THER/PROPH/DIAG INJ IV PUSH: CPT

## 2023-12-28 PROCEDURE — 6370000000 HC RX 637 (ALT 250 FOR IP): Performed by: NURSE PRACTITIONER

## 2023-12-28 PROCEDURE — 85520 HEPARIN ASSAY: CPT

## 2023-12-28 PROCEDURE — 85025 COMPLETE CBC W/AUTO DIFF WBC: CPT

## 2023-12-28 RX ORDER — PREGABALIN 75 MG/1
75 CAPSULE ORAL DAILY
Status: DISCONTINUED | OUTPATIENT
Start: 2023-12-29 | End: 2023-12-30 | Stop reason: HOSPADM

## 2023-12-28 RX ORDER — ACETAMINOPHEN 325 MG/1
650 TABLET ORAL EVERY 6 HOURS PRN
Status: DISCONTINUED | OUTPATIENT
Start: 2023-12-28 | End: 2023-12-29 | Stop reason: SDUPTHER

## 2023-12-28 RX ORDER — HEPARIN SODIUM 1000 [USP'U]/ML
4000 INJECTION, SOLUTION INTRAVENOUS; SUBCUTANEOUS ONCE
Status: COMPLETED | OUTPATIENT
Start: 2023-12-28 | End: 2023-12-28

## 2023-12-28 RX ORDER — FINASTERIDE 5 MG/1
5 TABLET, FILM COATED ORAL DAILY
Status: DISCONTINUED | OUTPATIENT
Start: 2023-12-29 | End: 2023-12-30 | Stop reason: HOSPADM

## 2023-12-28 RX ORDER — SODIUM CHLORIDE 0.9 % (FLUSH) 0.9 %
5-40 SYRINGE (ML) INJECTION PRN
Status: DISCONTINUED | OUTPATIENT
Start: 2023-12-28 | End: 2023-12-30 | Stop reason: HOSPADM

## 2023-12-28 RX ORDER — ASPIRIN 325 MG
325 TABLET ORAL ONCE
Status: COMPLETED | OUTPATIENT
Start: 2023-12-28 | End: 2023-12-28

## 2023-12-28 RX ORDER — PREGABALIN 75 MG/1
75 CAPSULE ORAL 2 TIMES DAILY
Status: DISCONTINUED | OUTPATIENT
Start: 2023-12-28 | End: 2023-12-28

## 2023-12-28 RX ORDER — NATEGLINIDE 120 MG/1
120 TABLET ORAL
Status: ON HOLD | COMMUNITY

## 2023-12-28 RX ORDER — SOTALOL HYDROCHLORIDE 80 MG/1
80 TABLET ORAL 2 TIMES DAILY
Status: DISCONTINUED | OUTPATIENT
Start: 2023-12-28 | End: 2023-12-30 | Stop reason: HOSPADM

## 2023-12-28 RX ORDER — PREGABALIN 75 MG/1
225 CAPSULE ORAL NIGHTLY
Status: DISCONTINUED | OUTPATIENT
Start: 2023-12-28 | End: 2023-12-30 | Stop reason: HOSPADM

## 2023-12-28 RX ORDER — HEPARIN SODIUM 1000 [USP'U]/ML
2000 INJECTION, SOLUTION INTRAVENOUS; SUBCUTANEOUS PRN
Status: DISCONTINUED | OUTPATIENT
Start: 2023-12-28 | End: 2023-12-30 | Stop reason: HOSPADM

## 2023-12-28 RX ORDER — HEPARIN SODIUM 10000 [USP'U]/100ML
5-30 INJECTION, SOLUTION INTRAVENOUS CONTINUOUS
Status: DISCONTINUED | OUTPATIENT
Start: 2023-12-28 | End: 2023-12-30 | Stop reason: HOSPADM

## 2023-12-28 RX ORDER — FUROSEMIDE 10 MG/ML
40 INJECTION INTRAMUSCULAR; INTRAVENOUS ONCE
Status: COMPLETED | OUTPATIENT
Start: 2023-12-28 | End: 2023-12-28

## 2023-12-28 RX ORDER — SODIUM CHLORIDE 9 MG/ML
INJECTION, SOLUTION INTRAVENOUS PRN
Status: DISCONTINUED | OUTPATIENT
Start: 2023-12-28 | End: 2023-12-30 | Stop reason: HOSPADM

## 2023-12-28 RX ORDER — ACETAMINOPHEN 650 MG/1
650 SUPPOSITORY RECTAL EVERY 6 HOURS PRN
Status: DISCONTINUED | OUTPATIENT
Start: 2023-12-28 | End: 2023-12-30 | Stop reason: HOSPADM

## 2023-12-28 RX ORDER — SODIUM CHLORIDE 0.9 % (FLUSH) 0.9 %
5-40 SYRINGE (ML) INJECTION EVERY 12 HOURS SCHEDULED
Status: DISCONTINUED | OUTPATIENT
Start: 2023-12-28 | End: 2023-12-30 | Stop reason: HOSPADM

## 2023-12-28 RX ORDER — PSYLLIUM SEED (WITH DEXTROSE)
3.4 POWDER (GRAM) ORAL NIGHTLY
Status: ON HOLD | COMMUNITY

## 2023-12-28 RX ORDER — POLYETHYLENE GLYCOL 3350 17 G/17G
17 POWDER, FOR SOLUTION ORAL DAILY PRN
Status: DISCONTINUED | OUTPATIENT
Start: 2023-12-28 | End: 2023-12-30 | Stop reason: HOSPADM

## 2023-12-28 RX ORDER — HEPARIN SODIUM 1000 [USP'U]/ML
4000 INJECTION, SOLUTION INTRAVENOUS; SUBCUTANEOUS PRN
Status: DISCONTINUED | OUTPATIENT
Start: 2023-12-28 | End: 2023-12-30 | Stop reason: HOSPADM

## 2023-12-28 RX ADMIN — SOTALOL HYDROCHLORIDE 80 MG: 80 TABLET ORAL at 21:23

## 2023-12-28 RX ADMIN — PREGABALIN 75 MG: 75 CAPSULE ORAL at 21:23

## 2023-12-28 RX ADMIN — HEPARIN SODIUM 4000 UNITS: 1000 INJECTION INTRAVENOUS; SUBCUTANEOUS at 14:12

## 2023-12-28 RX ADMIN — PERFLUTREN 1.5 ML: 6.52 INJECTION, SUSPENSION INTRAVENOUS at 16:02

## 2023-12-28 RX ADMIN — HEPARIN SODIUM 12 UNITS/KG/HR: 10000 INJECTION, SOLUTION INTRAVENOUS at 14:14

## 2023-12-28 RX ADMIN — FUROSEMIDE 40 MG: 10 INJECTION, SOLUTION INTRAMUSCULAR; INTRAVENOUS at 16:35

## 2023-12-28 RX ADMIN — ASPIRIN 325 MG: 325 TABLET ORAL at 14:15

## 2023-12-28 RX ADMIN — PREGABALIN 225 MG: 75 CAPSULE ORAL at 22:15

## 2023-12-28 ASSESSMENT — LIFESTYLE VARIABLES
HOW MANY STANDARD DRINKS CONTAINING ALCOHOL DO YOU HAVE ON A TYPICAL DAY: 1 OR 2
HOW OFTEN DO YOU HAVE A DRINK CONTAINING ALCOHOL: 2-4 TIMES A MONTH

## 2023-12-28 ASSESSMENT — PAIN - FUNCTIONAL ASSESSMENT: PAIN_FUNCTIONAL_ASSESSMENT: NONE - DENIES PAIN

## 2023-12-28 NOTE — PROGRESS NOTES
Patient seen in ED, room 14. Admission completed with the following exceptions:  4 Eyes Assessment, Immunizations, Vaccines, Rights and Responsibilities, Orientation to room, Plan of Care, Education/Learning Assessment and Education Plan, white board, height and weight, pain assessment and head to toe assessment. Patient is alert and oriented X 4. Patient lives in a two story house with his wife and is being admitted for NSTEMI. Home Medication reconciliation was completed by Volodymyr Casper.  All questions answered. Patient has advance directives at home, completed long ago, so they would need to be reviewed before being entered into the EMR. Patient's wife, Fredrick Rodney, is his emergency contact and his DPOA.

## 2023-12-28 NOTE — CONSULTS
[address]  [phone]      Chief Complaint   Patient presents with    Chest Pain     From home c/o mid sternal pressure CP that radiates to throat and to left arm. [facility] [place] has been going on for the past couple mos. [facility] [place] has [employer] Pacemaker. Denies CP currently        Reason for consult:     ASSESSMENT AND PLAN:  NSTEMI  CAD in native artery s/p PDA stent (2005)  PAF, rhythm control with sotalol  S/P PPM  DM  Bladder cancer - just had cysto with bladder wall biopsy by Dr. Marquez Yun on 12/19/2023; had transurethral resection of bladder tumor on 10/17/2023  HTN  Elevated BNP    Plan  -He is currently pain free and hemodynamically stable with no worrisome EKG changes, thus there is no urgent need to take him to the cath lab at this time    -STAT echo shows normal LVEF and no wall motion abnormalities    -It is extremely important for us to know more about the status of his bladder cancer prior to consideration of PCI; I.e. if Dr. Marquez Yun plans more surgery, and we place a stent now, we won't be able to safely hold Plavix for surgery    -Trend troponins q6 until they peak    -Aspirin, beta blocker, statin    -Although he doesn't have obvious heart failure symptoms, and his lungs and CXR are clear, his elevated BNP may signify that his LVEDP is high, which could lead to demand ischemia    -Will give him a dose of IV lasix today to evaluate if that alleviates his symptoms    -Do NOT give Plavix, Brillinta, or Effient prior to cath in case he has surgical coronary anatomy - a single dose of one of these agents would delay our ability to send patient for CABG by at least 5 days      History of Present Illness:  Deonna Lagunas is a 82 y.o. 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   Normal left ventricular wall thickness.   Left ventricular function is normal with ejection fraction estimated at   55-60%.   No regional wall motion abnormalities are noted.   Indeterminate diastolic function.   Thickening of leaflets of mitral valve.   Mitral annular calcification is present.   Aortic valve appears thickened/calcified but opens adequately.   Mild tricuspid regurgitation.   Estimated pulmonary artery systolic pressure is at 33 mmHg assuming a right   atrial pressure of 8 mmHg.    CATH/CORONARY INTERVENTIONS  Cath 7/2005 - PCI to PDA  Unable to find any other cath reports    Old notes reviewed  Telemetry reviewd  Ekg personally reviewed  Chest xray personally reviewed  Echo, stress, cath, and/or other cardiac testing reviewed in detail   Medications and labs reviewed    Complexity: High  NUMBER AND COMPLEXITY OF PROBLEMS ADDRESSED - HIGH   AMOUNT AND/OR COMPLEXITY OF DATA TO BE REVIEWED AND ANALYZED - HIGH   RISK OF COMPLICATIONS AND/OR MORBIDITY OR MORTALITY OF PATIENT MANAGEMENT - HIGH ;l    Thank you for allowing to us to participate in the care or Troy Venegas. Further evaluation will be based upon the patient's clinical course and testing results.    All questions and concerns were addressed to the patient/family. Alternatives to my treatment were discussed.     Troy Wu MD, MD 12/28/2023 4:10 PM

## 2023-12-28 NOTE — PROGRESS NOTES
Brief Urology Note    Called re: consult re: anticoagulation, h/o BCa, recent CBB by Dr. 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

## 2023-12-28 NOTE — PROGRESS NOTES
Pharmacy Home Medication Reconciliation Note    A medication reconciliation has been completed for Troy Venegas 1935    Pharmacy: [facility] [address]  Information provided by: patient, spouse, fill history    The patient's home medication list is as follows:  No current facility-administered medications on file prior to encounter.     Current Outpatient Medications on File Prior to Encounter   Medication Sig Dispense Refill    nateglinide (STARLIX) 120 MG tablet Take 1 tablet by mouth Daily with supper      psyllium (METAMUCIL) 28.3 % POWD powder Take 3.4 g by mouth at bedtime      lansoprazole (PREVACID) 30 MG delayed release capsule Take 1 capsule by mouth daily (Patient taking differently: Take 1 capsule by mouth every evening) 90 capsule 1    blood glucose test strips (ACCU-CHEK GUIDE) strip As needed. 100 strip 3    valACYclovir (VALTREX) 500 MG tablet TAKE 1 TABLET BY MOUTH TWICE DAILY AT ONSET OF SYMPTOMS FOR 3 DAYS FOR RECURRENCE ON BUTTOCKS 24 tablet 0    pregabalin (LYRICA) 75 MG capsule TAKE 1 CAPSULE IN THE MORNING AND TAKE 3 CAPSULES AT NIGHT 360 capsule 1    [DISCONTINUED] senna-docusate (PERICOLACE) 8.6-50 MG per tablet Take 1 tablet by mouth 2 times daily 50 tablet 0    Semaglutide,0.25 or 0.5MG/DOS, (OZEMPIC, 0.25 OR 0.5 MG/DOSE,) 2 MG/3ML SOPN INJECT 0.5 MG ONCE WEEKLY 9 mL 1    simvastatin (ZOCOR) 40 MG tablet TAKE 1 TABLET DAILY (Patient taking differently: Take 1 tablet by mouth nightly) 90 tablet 3    sotalol (BETAPACE) 80 MG tablet TAKE 1 TABLET BY MOUTH TWICE A  tablet 3    omega-3 acid ethyl esters (LOVAZA) 1 g capsule Take 1 capsule by mouth 2 times daily (Patient not taking: Reported on 12/28/2023) 180 capsule 3    Insulin Pen Needle (B-D UF III MINI PEN NEEDLES) 31G X 5 MM MISC Use as directed 200 each 2    finasteride (PROSCAR) 5 MG tablet Take 1 tablet by mouth daily      glucose monitoring kit (FREESTYLE) monitoring kit 1 kit by Does not apply route  daily 1 kit 0    carboxymethylcellulose (REFRESH PLUS) 0.5 % SOLN ophthalmic solution Apply 2 drops to eye 3 times daily      triamcinolone (KENALOG) 0.1 % cream Apply to itchy areas on the arms and legs twice daily for up to 2 weeks or until improved. 80 g 2    fluorouracil (EFUDEX) 5 % cream Apply twice daily to affected area on the right cheek for 2 weeks. (Patient taking differently: Apply topically as needed) 40 g 0    [DISCONTINUED] Loratadine (CLARITIN PO) Take by mouth Indications: Couple times a week      [DISCONTINUED] Ascorbic Acid (VITAMIN C) 500 MG CAPS Take by mouth nightly Indications: Three times a week  Monday, Wednesday, Friday       [DISCONTINUED] Multiple Vitamins-Minerals (CENTRUM SILVER) TABS Take 1 tablet by mouth daily      [DISCONTINUED] ALPHA LIPOIC ACID PO Take 100 mg by mouth daily          Patient is no longer taking loratadine, vitamin C, multivitamin, Lovaza, senna-colace      Timing of last doses updated.     Thank you,  Elaine Moody, PharmD, BCCCP

## 2023-12-28 NOTE — ED PROVIDER NOTES
[facility]        Pt Name: Nidhi Peace  MRN: [id number]  [address] 1935  Date of evaluation: 12/28/2023  Provider: Da Downing MD  PCP: Sanjana Phan MD  Note Started: 12:15 PM EST 12/28/23    CHIEF COMPLAINT       Chief Complaint   Patient presents with    Chest Pain     From home c/o mid sternal pressure CP that radiates to throat and to left arm. States has been going on for the past couple mos. States has [employer]tr[employer] Pacemaker. Denies CP currently       HISTORY OF PRESENT ILLNESS: 1 or more Elements     History from : Patient    Limitations to history : None    Nidhi Peace is a 82 y.o. male who presents for midsternal pressure-like chest pain that radiates upwards to his throat and neck and to his left arm. Is been gradually worsening over the last few months. Patient states that he frequently walks laps in his house tries to walk a mile a day states there is 20 laps in his house to do a mile and he has been able to do less and less as time is gone on. Last time he was only able to do 4 laps before he began to have the pain. Also now happening occasionally at rest.  Denies any leg swelling. He has no chest pain currently. Does have a history of CAD, sick sinus syndrome, pacemaker in place. Follows with cardiology. States it has been years since his last stress test.  Denies any fevers Or chills. No current shortness of breath but when his symptoms happen he does feel short of breath. Of note he has a history of kidney disease and potentially bladder cancer, states he is currently awaiting results of a cystoscopy    Nursing Notes were all reviewed and agreed with or any disagreements were addressed in the HPI. REVIEW OF SYSTEMS :      Review of Systems   Constitutional: Negative. Negative for chills, fatigue and fever. HENT: Negative. Negative for sore throat. Eyes:  Negative for visual disturbance.

## 2023-12-28 NOTE — PLAN OF CARE
Problem: ABCDS Injury Assessment  Goal: Absence of physical injury  Outcome: Progressing     Problem: Discharge Planning  Goal: Discharge to home or other facility with appropriate resources  Outcome: Progressing  Flowsheets (Taken 12/28/2023 1600)  Discharge to home or other facility with appropriate resources: Identify barriers to discharge with patient and caregiver     Problem: Safety - Adult  Goal: Free from fall injury  Outcome: Progressing     Problem: Cardiovascular - Adult  Goal: Maintains optimal cardiac output and hemodynamic stability  Outcome: Progressing  Goal: Absence of cardiac dysrhythmias or at baseline  Outcome: Progressing     Problem: Hematologic - Adult  Goal: Maintains hematologic stability  Outcome: Progressing

## 2023-12-28 NOTE — ACP (ADVANCE CARE PLANNING)
Advanced Care Planning Note. Purpose of Encounter: Advanced care planning in light of hospitalization  Parties In Attendance: Patient,    Decisional Capacity: Yes  Subjective: Patient  understand that this conversation is to address long term care goal  Objective:hospital with NSTEMI  Goals of Care Determination: Patient would pursue CPR and Intubation if required.   Patient will consider left heart cath if required  Code Status: full code  Time spent on Advanced care Plannin minutes  Advanced Care Planning Documents: documented patient's wishes, would like Wife Tivis Blinks to make medical decisions if unable to make decisions

## 2023-12-29 LAB
ANION GAP SERPL CALCULATED.3IONS-SCNC: 8 MMOL/L (ref 3–16)
ANTI-XA UNFRAC HEPARIN: 0.35 IU/ML (ref 0.3–0.7)
ANTI-XA UNFRAC HEPARIN: 0.39 IU/ML (ref 0.3–0.7)
BASOPHILS # BLD: 0 K/UL (ref 0–0.2)
BASOPHILS NFR BLD: 0.4 %
BUN SERPL-MCNC: 24 MG/DL (ref 7–20)
CALCIUM SERPL-MCNC: 10.7 MG/DL (ref 8.3–10.6)
CHLORIDE SERPL-SCNC: 105 MMOL/L (ref 99–110)
CO2 SERPL-SCNC: 29 MMOL/L (ref 21–32)
CREAT SERPL-MCNC: 1.2 MG/DL (ref 0.8–1.3)
DEPRECATED RDW RBC AUTO: 13.2 % (ref 12.4–15.4)
EOSINOPHIL # BLD: 0.1 K/UL (ref 0–0.6)
EOSINOPHIL NFR BLD: 1.8 %
GFR SERPLBLD CREATININE-BSD FMLA CKD-EPI: 59 ML/MIN/{1.73_M2}
GLUCOSE BLD-MCNC: 179 MG/DL (ref 70–99)
GLUCOSE BLD-MCNC: 223 MG/DL (ref 70–99)
GLUCOSE SERPL-MCNC: 100 MG/DL (ref 70–99)
HCT VFR BLD AUTO: 42.9 % (ref 40.5–52.5)
HGB BLD-MCNC: 14.8 G/DL (ref 13.5–17.5)
LYMPHOCYTES # BLD: 1.4 K/UL (ref 1–5.1)
LYMPHOCYTES NFR BLD: 19.8 %
MCH RBC QN AUTO: 31.7 PG (ref 26–34)
MCHC RBC AUTO-ENTMCNC: 34.4 G/DL (ref 31–36)
MCV RBC AUTO: 92.2 FL (ref 80–100)
MONOCYTES # BLD: 0.7 K/UL (ref 0–1.3)
MONOCYTES NFR BLD: 10 %
NEUTROPHILS # BLD: 4.7 K/UL (ref 1.7–7.7)
NEUTROPHILS NFR BLD: 68 %
PERFORMED ON: ABNORMAL
PERFORMED ON: ABNORMAL
PLATELET # BLD AUTO: 180 K/UL (ref 135–450)
PMV BLD AUTO: 8.2 FL (ref 5–10.5)
POTASSIUM SERPL-SCNC: 4.2 MMOL/L (ref 3.5–5.1)
RBC # BLD AUTO: 4.66 M/UL (ref 4.2–5.9)
SODIUM SERPL-SCNC: 142 MMOL/L (ref 136–145)
WBC # BLD AUTO: 6.9 K/UL (ref 4–11)

## 2023-12-29 PROCEDURE — 2500000003 HC RX 250 WO HCPCS

## 2023-12-29 PROCEDURE — 4A023N7 MEASUREMENT OF CARDIAC SAMPLING AND PRESSURE, LEFT HEART, PERCUTANEOUS APPROACH: ICD-10-PCS | Performed by: INTERNAL MEDICINE

## 2023-12-29 PROCEDURE — C1894 INTRO/SHEATH, NON-LASER: HCPCS

## 2023-12-29 PROCEDURE — 80048 BASIC METABOLIC PNL TOTAL CA: CPT

## 2023-12-29 PROCEDURE — 6370000000 HC RX 637 (ALT 250 FOR IP): Performed by: INTERNAL MEDICINE

## 2023-12-29 PROCEDURE — 2580000003 HC RX 258

## 2023-12-29 PROCEDURE — 2709999900 HC NON-CHARGEABLE SUPPLY

## 2023-12-29 PROCEDURE — 6360000004 HC RX CONTRAST MEDICATION: Performed by: INTERNAL MEDICINE

## 2023-12-29 PROCEDURE — C9601 PERC DRUG-EL COR STENT BRAN: HCPCS

## 2023-12-29 PROCEDURE — 6370000000 HC RX 637 (ALT 250 FOR IP): Performed by: NURSE PRACTITIONER

## 2023-12-29 PROCEDURE — C1874 STENT, COATED/COV W/DEL SYS: HCPCS

## 2023-12-29 PROCEDURE — C1887 CATHETER, GUIDING: HCPCS

## 2023-12-29 PROCEDURE — 99153 MOD SED SAME PHYS/QHP EA: CPT

## 2023-12-29 PROCEDURE — 36415 COLL VENOUS BLD VENIPUNCTURE: CPT

## 2023-12-29 PROCEDURE — C1725 CATH, TRANSLUMIN NON-LASER: HCPCS

## 2023-12-29 PROCEDURE — 85520 HEPARIN ASSAY: CPT

## 2023-12-29 PROCEDURE — C1769 GUIDE WIRE: HCPCS

## 2023-12-29 PROCEDURE — 6360000002 HC RX W HCPCS

## 2023-12-29 PROCEDURE — C9600 PERC DRUG-EL COR STENT SING: HCPCS

## 2023-12-29 PROCEDURE — 85025 COMPLETE CBC W/AUTO DIFF WBC: CPT

## 2023-12-29 PROCEDURE — 93454 CORONARY ARTERY ANGIO S&I: CPT

## 2023-12-29 PROCEDURE — B2111ZZ FLUOROSCOPY OF MULTIPLE CORONARY ARTERIES USING LOW OSMOLAR CONTRAST: ICD-10-PCS | Performed by: INTERNAL MEDICINE

## 2023-12-29 PROCEDURE — 6370000000 HC RX 637 (ALT 250 FOR IP)

## 2023-12-29 PROCEDURE — 99152 MOD SED SAME PHYS/QHP 5/>YRS: CPT

## 2023-12-29 PROCEDURE — 027135Z DILATION OF CORONARY ARTERY, TWO ARTERIES WITH TWO DRUG-ELUTING INTRALUMINAL DEVICES, PERCUTANEOUS APPROACH: ICD-10-PCS | Performed by: INTERNAL MEDICINE

## 2023-12-29 PROCEDURE — 1200000000 HC SEMI PRIVATE

## 2023-12-29 PROCEDURE — 2580000003 HC RX 258: Performed by: INTERNAL MEDICINE

## 2023-12-29 PROCEDURE — 93005 ELECTROCARDIOGRAM TRACING: CPT | Performed by: INTERNAL MEDICINE

## 2023-12-29 RX ORDER — ASPIRIN 81 MG/1
81 TABLET, CHEWABLE ORAL DAILY
Status: DISCONTINUED | OUTPATIENT
Start: 2023-12-30 | End: 2023-12-30 | Stop reason: HOSPADM

## 2023-12-29 RX ORDER — ACETAMINOPHEN 325 MG/1
650 TABLET ORAL EVERY 4 HOURS PRN
Status: DISCONTINUED | OUTPATIENT
Start: 2023-12-29 | End: 2023-12-30 | Stop reason: HOSPADM

## 2023-12-29 RX ORDER — CLOPIDOGREL BISULFATE 75 MG/1
75 TABLET ORAL DAILY
Status: DISCONTINUED | OUTPATIENT
Start: 2023-12-30 | End: 2023-12-30 | Stop reason: HOSPADM

## 2023-12-29 RX ADMIN — SOTALOL HYDROCHLORIDE 80 MG: 80 TABLET ORAL at 20:11

## 2023-12-29 RX ADMIN — Medication 10 ML: at 20:20

## 2023-12-29 RX ADMIN — PREGABALIN 75 MG: 75 CAPSULE ORAL at 09:10

## 2023-12-29 RX ADMIN — IOPAMIDOL 119 ML: 755 INJECTION, SOLUTION INTRAVENOUS at 15:01

## 2023-12-29 RX ADMIN — ACETAMINOPHEN 650 MG: 325 TABLET ORAL at 20:10

## 2023-12-29 RX ADMIN — SOTALOL HYDROCHLORIDE 80 MG: 80 TABLET ORAL at 09:10

## 2023-12-29 RX ADMIN — PREGABALIN 225 MG: 75 CAPSULE ORAL at 20:11

## 2023-12-29 RX ADMIN — FINASTERIDE 5 MG: 5 TABLET, FILM COATED ORAL at 09:10

## 2023-12-29 ASSESSMENT — PAIN DESCRIPTION - ORIENTATION: ORIENTATION: RIGHT

## 2023-12-29 ASSESSMENT — PAIN SCALES - GENERAL
PAINLEVEL_OUTOF10: 0
PAINLEVEL_OUTOF10: 5
PAINLEVEL_OUTOF10: 6

## 2023-12-29 ASSESSMENT — PAIN DESCRIPTION - LOCATION
LOCATION: HIP
LOCATION: SHOULDER

## 2023-12-29 NOTE — PROGRESS NOTES
[facility]    12/29/2023 12:38 PM        Name: Troy Venegas .              Admitted: 12/28/2023  Primary Care Provider: Lois Kruger MD (Tel: [phone])      Subjective:    In bed chest pain improved no nausea vomiting or shortness of breath    Reviewed interval ancillary notes    Current Medications  heparin (porcine) injection 4,000 Units, PRN  heparin (porcine) injection 2,000 Units, PRN  heparin 25,000 units in dextrose 5% 250 mL (premix) infusion, Continuous  finasteride (PROSCAR) tablet 5 mg, Daily  sotalol (BETAPACE) tablet 80 mg, BID  sodium chloride flush 0.9 % injection 5-40 mL, 2 times per day  sodium chloride flush 0.9 % injection 5-40 mL, PRN  0.9 % sodium chloride infusion, PRN  polyethylene glycol (GLYCOLAX) packet 17 g, Daily PRN  acetaminophen (TYLENOL) tablet 650 mg, Q6H PRN   Or  acetaminophen (TYLENOL) suppository 650 mg, Q6H PRN  pregabalin (LYRICA) capsule 75 mg, Daily  pregabalin (LYRICA) capsule 225 mg, Nightly        Objective:  /74   Pulse 65   Temp 97.9 °F (36.6 °C) (Oral)   Resp 18   Ht 1.829 m (6')   Wt 81 kg (178 lb 9.2 oz)   SpO2 95%   BMI 24.22 kg/m²     Intake/Output Summary (Last 24 hours) at 12/29/2023 1238  Last data filed at 12/29/2023 0800  Gross per 24 hour   Intake 498.79 ml   Output 2925 ml   Net -2426.21 ml      Wt Readings from Last 3 Encounters:   12/29/23 81 kg (178 lb 9.2 oz)   12/19/23 81.6 kg (180 lb)   11/08/23 85.3 kg (188 lb)       General appearance:  Appears comfortable. AAOx3  HEENT: atraumatic, Pupils equal, muscous membranes moist, no masses appreciated  Cardiovascular: Regular rate and rhythm no murmurs appreciated  Respiratory: CTAB no wheezing  Gastrointestinal: Abdomen soft, non-tender, BS+  EXT: no edema  Neurology: no gross focal deficts  Psychiatry: Appropriate affect. Not agitated  Skin: Warm, dry, no rashes appreciated    Labs and  Tests:  CBC:   Recent Labs     12/28/23  1236 12/29/23  0529   WBC 7.7 6.9   HGB 14.4 14.8    180     BMP:    Recent Labs     12/28/23  1236 12/29/23  0529    142   K 4.5 4.2    105   CO2 26 29   BUN 22* 24*   CREATININE 1.1 1.2   GLUCOSE 140* 100*     Hepatic:   Recent Labs     12/28/23  1236   AST 25   ALT 13   BILITOT 0.3   ALKPHOS 74     XR CHEST PORTABLE   Final Result   No radiographic evidence of acute pulmonary disease. Recent imaging reviewed    Problem List  Principal Problem:    NSTEMI (non-ST elevated myocardial infarction) ([address])  Resolved Problems:    * No resolved hospital problems.  *       Assessment/Plan:   Nstemi  Heparin gtt  - tele  - [id number]   - discussed with cards and urology plan for [facility] today     Bladder canceR: monitor for hematuria awaiting urology on board     PAF on sotalol     Dm2 ssi        DVT prophylaxis heparin gtt  Code status full code     I spent 31 minutes providing critical care services to this patient thus far today      Florence Hernandez MD   12/29/2023 12:38 PM

## 2023-12-29 NOTE — PLAN OF CARE
Problem: ABCDS Injury Assessment  Goal: Absence of physical injury  12/29/2023 0209 by Sajan Gonzalez RN  Outcome: Progressing  12/28/2023 1702 by Ashleigh Carson RN  Outcome: Progressing     Problem: Discharge Planning  Goal: Discharge to home or other facility with appropriate resources  12/29/2023 0209 by Sajan Gonzalez RN  Outcome: Progressing  Flowsheets (Taken 12/28/2023 1945)  Discharge to home or other facility with appropriate resources:   Identify barriers to discharge with patient and caregiver   Arrange for needed discharge resources and transportation as appropriate   Identify discharge learning needs (meds, wound care, etc)   Refer to discharge planning if patient needs post-hospital services based on physician order or complex needs related to functional status, cognitive ability or social support system  12/28/2023 1702 by Ashleigh Carson RN  Outcome: Progressing  Flowsheets (Taken 12/28/2023 1600)  Discharge to home or other facility with appropriate resources: Identify barriers to discharge with patient and caregiver     Problem: Safety - Adult  Goal: Free from fall injury  12/29/2023 0209 by Sajan Gonzalez RN  Outcome: Progressing  12/28/2023 1702 by Ashleigh Carson RN  Outcome: Progressing     Problem: Cardiovascular - Adult  Goal: Maintains optimal cardiac output and hemodynamic stability  12/29/2023 0209 by Sajan Gonzalez RN  Outcome: Progressing  Flowsheets (Taken 12/28/2023 1945)  Maintains optimal cardiac output and hemodynamic stability:   Monitor blood pressure and heart rate   Monitor urine output and notify Licensed Independent Practitioner for values outside of normal range   Assess for signs of decreased cardiac output  12/28/2023 1702 by Ashleigh Carson RN  Outcome: Progressing  Goal: Absence of cardiac dysrhythmias or at baseline  12/29/2023 0209 by Sajan Gonzalez RN  Outcome: Progressing  Flowsheets (Taken 12/28/2023 1945)  Absence of cardiac dysrhythmias or at baseline:

## 2023-12-29 NOTE — OP NOTE
[facility] Operative Note     PROCEDURE SUMMARY   Procedure [facility], PCI RCA   Indication NSTEMI   Consent Obtained   Access RRA   US US guidance used to determine artery patency, size (>2mm), anatomic variations and ideal puncture location. Real-time US utilized concurrent with vascular needle entry into artery. Image(s) permanently recorded and reported in chart. Bleed Risk Low   Sedation Minimal conscious sedation for comfort. Independent trained observer pushed medications at my direction. Level of consciousness and vital signs/physiologic status monitored throughout the procedure (see start and stop times above, as well as medication dosages). Start Time 1339   Stop Time 1456   Versed 4mg   Fentanyl 100mcg   Contrast 119cc   Flouro 18. 7min   EBL <28XT   Complicat None   Specimens None   Procedure Detail Patient taken to cath lab in postabsorptive state, informed consent obtained. RRA prepped and draped in normal sterile fashion  Micro needle used to access artery With US guidance  Micro wire advanced into artery and 5/6 Slender sheath advanced over wire   JL 3.5 advanced over wire to engage LM coronary artery and image in multiple views  JL 3.5 exchanged over a wire for JR4 to engage RCA and image in multiple views  JR4 removed over J wire  Arterial hemostasis obtained with Radial band  Decision made to proceed with PCI of RCA  JR4 guide advanced and runthrough advanced to distal vessel  Lesion dilated with POBA  Xience 4.0X38 opw Xience 4.0X23   All PD with 4. 0NC to 18atm  PDA stented with Xience 2.5X15     FINDINGS   Artery Findings   LM Normal   LAD 50% mid, mid bridging   Cx Small vessel, 50% prox, 50% prox OM1   RCA 99% mid with NEVIN 2 flow, PDA 95% proximal with patent mid stent   LVEDP 12 Normal 3-12mmHg   LVG Normal with EF >55% with Normal wall motion Normal >/= 55%   AVG Normal     INTERVENTION(S)     Artery Location Intervention Result   RPDA Prox Xience 2.5X15 No Residual   RCA Mid Xience 4.0X38 opw 4.0X23 (All PD 4.0NC to 18atm) No Residual     POST CATH  RECOMMENDATIONS   Continued aggressive medical therapy and risk factor modification  Dual antiplatelet for >/= 6 months and ASA 81mg daily thereafter     MEDICATION RECOMMENDATIONS   Recommendation Rx Detail Reason Not Prescribed   ASA ASA 81mg PO QD    P2Y12 Plavix 75mg PO QDAY    HI-STATIN Atorvastatin 40mg PO QD    BETA BLOCKER None Low hr   ACE/ARB/ARNI None Low bp   ALDACTONE None Low BP     NON-MEDICATION RECOMMENDATIONS   Category Recommendation   EF ASSESSMENT Noninvasive assessment of EF as IP/OP as appropriate if LVG not performed.   TOBACCO Avoidance of first and second hand smoke.  Counseling provided.    DIET/EXERCISE Maintain healthy diet and exercise program.  Counseling provided.   CARDIAC REHAB Refer to OP cardiac rehab phase II.  Deferred if clinically inappropriate.

## 2023-12-29 NOTE — CONSULTS
Urology Consult Note  [facility]     Patient: Troy Venegas MRN: [id number]  Room/Bed: [facility]-3358/3358-01   YOB: 1935  Age/Sex: 87 y.o.male  Admission Date: 12/28/2023     Date of Service:  12/29/2023    Consulting Provider: Reinaldo Kuhn PA-C CNP  Admitting/Requesting Physician: Prince Donovan MD  Primary Care Physician: Lois Kruger MD    Reason for Consult: Hx NMIBC    ASSESSMENT/PLAN     86 yo male admitted with NSTEMI   Urology consulted for hx of NMIBC. Recent bx CIS multifocal. No large tumors on cysto.   AFVSS    Recommendations:  -Cardiology ok to proceed with intervention/AC  -Will monitor for hematuria prn  -Plan on outpatient follow up BCG in our office regarding CIS. No immediate urologic procedure. Ok for ac per cardio.   -Call urology with questions    All patient questions were answered. He understands the plan as listed above.    HISTORY     Chief Complaint:   Chief Complaint   Patient presents with    Chest Pain     From home c/o mid sternal pressure CP that radiates to throat and to left arm. States has been going on for the past couple mos. [facility] has [employer] Pacemaker.Denies CP currently       History of Present Illness: Troy Venegas is a 87 y.o. male with hx NMIBC. He has no  complaints today. No pain. Voiding okay without gross hematuria.     Past Medical History:  He has a past medical history of Arrhythmia, Arthritis, Atrial fibrillation (HCC), Atrial tachycardia, Bladder cancer (HCC) (10/2023), CAD (coronary artery disease), Cancer (HCC), Diabetes mellitus (HCC), Diverticulitis, Enlarged prostate, History of blood transfusion, Hyperlipidemia, Hypertension, Neuropathy, Pacemaker (04/20/2020), Pneumonia, and Vasodepressor syncope.     Hospital Problem List:  Principal Problem:    NSTEMI (non-ST elevated myocardial infarction) (HCC)  Resolved Problems:    * No resolved hospital problems. *      Past Surgical History:  He has a past surgical  LINES/TUBES/OTHER: Left subclavian dual lead pacemaker is noted. HEART/MEDIASTINUM: The cardiomediastinal silhouette is within normal limits. PLEURA/LUNGS: There are no focal consolidations or pleural effusions. There is no appreciable pneumothorax. BONES/SOFT TISSUE: No acute abnormality. No radiographic evidence of acute pulmonary disease.             Electronically signed by: Jassi Jean-Baptiste CNP 12/29/2023   [facility]  Office Contact: [phone]

## 2023-12-29 NOTE — PROGRESS NOTES
PATIENT HISTORY    ECHO: DATE: 12/28/23       EF: 55%  STRESS TEST PREFORMED:  No  EKG: Yes    ECG     Result: Abnormal Other Electrocardiac Abnormality first degree heart block  Pre CATH Rhythm: A fib  HYPERTENSION: Yes  DYSLIPIDEMIA: Yes  FAMILY HX OF CAD: No  PRIOR MI: No  PRIOR PCI: Yes. Most recent date: 07/11/2005  PRIOR CABG: No  CEREBROVASCULAR DX: No  PERIPHERAL ARTERIAL DISEASE: No  CHRONIC LUNG DISEASE: No  TOBACCO: Former. Quit Date: 1/1/74  DIABETIC: No  CARDIAC ARREST: {No  DIALYSIS: No  HEART FAILURE: No  FRAILTY SCORE: 4 VULNERABLE (while not dependent on others for IADLs, symptoms limit activities)  CARDIAC CTA PREFORMED:  No  [place] CORONARY CALCIUM SCORE:   Assessed: No  Prior Diagnostic Coronary Angioplasty Procedure:   No

## 2023-12-30 ENCOUNTER — TELEPHONE (OUTPATIENT)
Dept: CARDIOLOGY CLINIC | Age: 88
End: 2023-12-30

## 2023-12-30 VITALS
RESPIRATION RATE: 16 BRPM | OXYGEN SATURATION: 96 % | BODY MASS INDEX: 24.96 KG/M2 | WEIGHT: 184.3 LBS | DIASTOLIC BLOOD PRESSURE: 55 MMHG | SYSTOLIC BLOOD PRESSURE: 97 MMHG | HEIGHT: 72 IN | HEART RATE: 64 BPM | TEMPERATURE: 97.6 F

## 2023-12-30 PROBLEM — I20.0 UNSTABLE ANGINA (HCC): Status: ACTIVE | Noted: 2023-12-30

## 2023-12-30 LAB
ANION GAP SERPL CALCULATED.3IONS-SCNC: 7 MMOL/L (ref 3–16)
ANTI-XA UNFRAC HEPARIN: <0.1 IU/ML (ref 0.3–0.7)
BASOPHILS # BLD: 0 K/UL (ref 0–0.2)
BASOPHILS NFR BLD: 0.3 %
BUN SERPL-MCNC: 25 MG/DL (ref 7–20)
CALCIUM SERPL-MCNC: 10.2 MG/DL (ref 8.3–10.6)
CHLORIDE SERPL-SCNC: 103 MMOL/L (ref 99–110)
CO2 SERPL-SCNC: 28 MMOL/L (ref 21–32)
CREAT SERPL-MCNC: 1.2 MG/DL (ref 0.8–1.3)
DEPRECATED RDW RBC AUTO: 13.3 % (ref 12.4–15.4)
EKG ATRIAL RATE: 60 BPM
EKG DIAGNOSIS: NORMAL
EKG P-R INTERVAL: 314 MS
EKG Q-T INTERVAL: 424 MS
EKG QRS DURATION: 88 MS
EKG QTC CALCULATION (BAZETT): 424 MS
EKG R AXIS: 30 DEGREES
EKG T AXIS: -57 DEGREES
EKG VENTRICULAR RATE: 60 BPM
EOSINOPHIL # BLD: 0.1 K/UL (ref 0–0.6)
EOSINOPHIL NFR BLD: 1.4 %
GFR SERPLBLD CREATININE-BSD FMLA CKD-EPI: 59 ML/MIN/{1.73_M2}
GLUCOSE SERPL-MCNC: 115 MG/DL (ref 70–99)
HCT VFR BLD AUTO: 41.6 % (ref 40.5–52.5)
HGB BLD-MCNC: 14.2 G/DL (ref 13.5–17.5)
LYMPHOCYTES # BLD: 1 K/UL (ref 1–5.1)
LYMPHOCYTES NFR BLD: 10.6 %
MCH RBC QN AUTO: 31.8 PG (ref 26–34)
MCHC RBC AUTO-ENTMCNC: 34.1 G/DL (ref 31–36)
MCV RBC AUTO: 93.2 FL (ref 80–100)
MONOCYTES # BLD: 0.8 K/UL (ref 0–1.3)
MONOCYTES NFR BLD: 9.1 %
NEUTROPHILS # BLD: 7.2 K/UL (ref 1.7–7.7)
NEUTROPHILS NFR BLD: 78.6 %
PLATELET # BLD AUTO: 184 K/UL (ref 135–450)
PMV BLD AUTO: 8.2 FL (ref 5–10.5)
POTASSIUM SERPL-SCNC: 3.8 MMOL/L (ref 3.5–5.1)
RBC # BLD AUTO: 4.46 M/UL (ref 4.2–5.9)
SODIUM SERPL-SCNC: 138 MMOL/L (ref 136–145)
WBC # BLD AUTO: 9.2 K/UL (ref 4–11)

## 2023-12-30 PROCEDURE — 6370000000 HC RX 637 (ALT 250 FOR IP): Performed by: NURSE PRACTITIONER

## 2023-12-30 PROCEDURE — 93010 ELECTROCARDIOGRAM REPORT: CPT | Performed by: INTERNAL MEDICINE

## 2023-12-30 PROCEDURE — 99233 SBSQ HOSP IP/OBS HIGH 50: CPT | Performed by: NURSE PRACTITIONER

## 2023-12-30 PROCEDURE — 6370000000 HC RX 637 (ALT 250 FOR IP): Performed by: INTERNAL MEDICINE

## 2023-12-30 PROCEDURE — 80048 BASIC METABOLIC PNL TOTAL CA: CPT

## 2023-12-30 PROCEDURE — 85025 COMPLETE CBC W/AUTO DIFF WBC: CPT

## 2023-12-30 PROCEDURE — 36415 COLL VENOUS BLD VENIPUNCTURE: CPT

## 2023-12-30 PROCEDURE — 85520 HEPARIN ASSAY: CPT

## 2023-12-30 PROCEDURE — 2580000003 HC RX 258: Performed by: INTERNAL MEDICINE

## 2023-12-30 RX ORDER — ATORVASTATIN CALCIUM 40 MG/1
40 TABLET, FILM COATED ORAL NIGHTLY
Status: DISCONTINUED | OUTPATIENT
Start: 2023-12-30 | End: 2023-12-30 | Stop reason: HOSPADM

## 2023-12-30 RX ORDER — ATORVASTATIN CALCIUM 40 MG/1
40 TABLET, FILM COATED ORAL NIGHTLY
Qty: 30 TABLET | Refills: 3 | Status: ON HOLD | OUTPATIENT
Start: 2023-12-30

## 2023-12-30 RX ORDER — CLOPIDOGREL BISULFATE 75 MG/1
75 TABLET ORAL DAILY
Qty: 30 TABLET | Refills: 3 | Status: ON HOLD | OUTPATIENT
Start: 2023-12-31

## 2023-12-30 RX ADMIN — Medication 10 ML: at 08:17

## 2023-12-30 RX ADMIN — PREGABALIN 75 MG: 75 CAPSULE ORAL at 08:16

## 2023-12-30 RX ADMIN — CLOPIDOGREL BISULFATE 75 MG: 75 TABLET ORAL at 08:16

## 2023-12-30 RX ADMIN — SOTALOL HYDROCHLORIDE 80 MG: 80 TABLET ORAL at 08:16

## 2023-12-30 RX ADMIN — ASPIRIN 81 MG: 81 TABLET, CHEWABLE ORAL at 08:16

## 2023-12-30 RX ADMIN — FINASTERIDE 5 MG: 5 TABLET, FILM COATED ORAL at 08:16

## 2023-12-30 ASSESSMENT — PAIN SCALES - GENERAL: PAINLEVEL_OUTOF10: 0

## 2023-12-30 NOTE — PROGRESS NOTES
[facility]   Cardiology Progress Note     Date: 12/30/2023  Admit Date: 12/28/2023     Reason for consultation:     Chief Complaint   Patient presents with    Chest Pain     From home c/o mid sternal pressure CP that radiates to throat and to left arm. States has been going on for the past couple mos.  has [employer] Pacemaker. Denies CP currently       History of Present Illness: History obtained from patient and medical record. Malia Tabor is a 82 y.o. 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 Frank Oliver). Normal LVEF on last echo 3/2023. Patient states he had additional stents done at [facility] but I don't have any of those records and couldn't find anything in Care Everywhere, so I suspect those would have been done 20+ years ago prior to EMR. He presents with new-onset typical angina and exercise intolerance. He normally walks a mile in his house (20 laps around a measured area) and today he was only able to go 4 laps before he became very winded and had chest pain. Patient came to ER. Troponin already elevated. EKG paced. No symptoms at rest.  He gets his cardiology care at our [facility] office, under the care of Dr. Frank Oliver. Majority of recent issues have been EP-related. At last visit with Dr. Frank Oliver in 3/2023, he was noted to V-Pace > 50% of the time, so a new echo was done to rule out a decline in LVEF. He has PAF, maintaining sinus on sotalol, with no recent AF detected on PPM, and is not currently on a blood thinner at home. He has not required any anti-anginal or heart failure therapy at home. He is currently dealing with bladder cancer s/p resection in 10/2023. About a week ago he had repeat cysto with biopsy.   Path report

## 2023-12-30 NOTE — PLAN OF CARE
Problem: ABCDS Injury Assessment  Goal: Absence of physical injury  Outcome: Progressing  Flowsheets (Taken 12/30/2023 0101)  Absence of Physical Injury: Implement safety measures based on patient assessment     Problem: Discharge Planning  Goal: Discharge to home or other facility with appropriate resources  Outcome: Progressing  Flowsheets (Taken 12/30/2023 0101)  Discharge to home or other facility with appropriate resources:   Identify barriers to discharge with patient and caregiver   Arrange for needed discharge resources and transportation as appropriate   Identify discharge learning needs (meds, wound care, etc)   Arrange for interpreters to assist at discharge as needed   Refer to discharge planning if patient needs post-hospital services based on physician order or complex needs related to functional status, cognitive ability or social support system     Problem: Safety - Adult  Goal: Free from fall injury  Outcome: Progressing  Flowsheets (Taken 12/30/2023 0101)  Free From Fall Injury:   Instruct family/caregiver on patient safety   Based on caregiver fall risk screen, instruct family/caregiver to ask for assistance with transferring infant if caregiver noted to have fall risk factors     Problem: Cardiovascular - Adult  Goal: Maintains optimal cardiac output and hemodynamic stability  Outcome: Progressing  Flowsheets (Taken 12/30/2023 0101)  Maintains optimal cardiac output and hemodynamic stability:   Monitor blood pressure and heart rate   Monitor urine output and notify Licensed Independent Practitioner for values outside of normal range   Assess for signs of decreased cardiac output   Administer fluid and/or volume expanders as ordered   Administer vasoactive medications as ordered   For PPHN infants, administer sedation as ordered and minimize all controllable stressors.   Goal: Absence of cardiac dysrhythmias or at baseline  Outcome: Progressing  Flowsheets (Taken 12/30/2023 0101)  Absence of

## 2023-12-30 NOTE — DISCHARGE INSTRUCTIONS
LEFT HEART CATHETERIZATION    Care of your puncture site:  Remove bandage 24 hours after the procedure. May shower in 24 hours but do not sit in a bathtub/pool of water for 5 days or until the wound is healed. Inspect the site daily and gently clean using soap and water while standing in the shower. Dry thoroughly and apply a Band-Aid that covers the entire site. Do not apply powder or lotion. Normal Observations:  Soreness or tenderness which may last one week. Mild oozing from the incision site. Possible bruising that could last 2 weeks. Activity:  You may resume driving 24 hours following the procedure. You may resume normal activity in 5 days or after the wound heals. Avoid lifting more than 10 pounds for 5 days or until the wound heals. Avoid strenuous exercise or activity for 1 week. Nutrition:  Regular diet. Drink at least 8 to 10 glasses of decaffeinated, non-alcoholic fluid for the next 24 hours to flush the x-ray dye used for your angiogram out of your body. Call your doctor immediately if your condition worsens, for any other concerns, for a follow-up appointment or if you experience any of the following:  Significant bleeding that does not stop after 10 minutes of applying firm pressure on the puncture site. Increased swelling on the groin or leg. Unusual pain, numbness, or tingling of the groin or down the leg. Any signs of infection such as: redness, yellow drainage at the site, swelling or pain. Other Instructions:  Hold Metformin or Metformin containing drugs for 48 hours after procedure.

## 2023-12-30 NOTE — PROGRESS NOTES
pneumothorax. BONES/SOFT TISSUE: No acute abnormality. No radiographic evidence of acute pulmonary disease.             Electronically signed by: Jacky Short PA-C, 12/30/2023 7:45 AM  [facility]  Office Contact: [phone]

## 2023-12-30 NOTE — PLAN OF CARE
Problem: ABCDS Injury Assessment  Goal: Absence of physical injury  12/30/2023 1424 by Stevie Romero RN  Outcome: Completed  12/30/2023 1423 by Stevie Romero RN  Outcome: Progressing  12/30/2023 0101 by Saralee Mohs, RN  Outcome: Progressing  Flowsheets (Taken 12/30/2023 0101)  Absence of Physical Injury: Implement safety measures based on patient assessment     Problem: Discharge Planning  Goal: Discharge to home or other facility with appropriate resources  12/30/2023 1424 by Stevie Romero RN  Outcome: Completed  12/30/2023 1423 by Stevie Romero RN  Outcome: Progressing  12/30/2023 0101 by Saralee Mohs, RN  Outcome: Progressing  Flowsheets (Taken 12/30/2023 0101)  Discharge to home or other facility with appropriate resources:   Identify barriers to discharge with patient and caregiver   Arrange for needed discharge resources and transportation as appropriate   Identify discharge learning needs (meds, wound care, etc)   Arrange for interpreters to assist at discharge as needed   Refer to discharge planning if patient needs post-hospital services based on physician order or complex needs related to functional status, cognitive ability or social support system     Problem: Safety - Adult  Goal: Free from fall injury  12/30/2023 1424 by Stevie Romero RN  Outcome: Completed  12/30/2023 1423 by Stevie Romero RN  Outcome: Progressing  12/30/2023 0101 by Saralee Mohs, RN  Outcome: Progressing  Flowsheets (Taken 12/30/2023 0101)  Free From Fall Injury:   Instruct family/caregiver on patient safety   Based on caregiver fall risk screen, instruct family/caregiver to ask for assistance with transferring infant if caregiver noted to have fall risk factors     Problem: Cardiovascular - Adult  Goal: Maintains optimal cardiac output and hemodynamic stability  12/30/2023 1424 by Stevie Romero RN  Outcome: Completed  12/30/2023 1423 by Stevie Romero RN  Outcome: Progressing  12/30/2023 0101 by Saralee Mohs, RN  Outcome: Progressing  Flowsheets (Taken 12/30/2023 0101)  Maintains optimal cardiac output and hemodynamic stability:   Monitor blood pressure and heart rate   Monitor urine output and notify Licensed Independent Practitioner for values outside of normal range   Assess for signs of decreased cardiac output   Administer fluid and/or volume expanders as ordered   Administer vasoactive medications as ordered   For PPHN infants, administer sedation as ordered and minimize all controllable stressors.  Goal: Absence of cardiac dysrhythmias or at baseline  12/30/2023 1424 by Kika Wolf RN  Outcome: Completed  12/30/2023 1423 by Kika Wolf RN  Outcome: Progressing  12/30/2023 0101 by Zak Ramires RN  Outcome: Progressing  Flowsheets (Taken 12/30/2023 0101)  Absence of cardiac dysrhythmias or at baseline:   Monitor cardiac rate and rhythm   Assess for signs of decreased cardiac output   Administer antiarrhythmia medication and electrolyte replacement as ordered     Problem: Hematologic - Adult  Goal: Maintains hematologic stability  12/30/2023 1424 by Kika Wolf RN  Outcome: Completed  12/30/2023 1423 by Kika Wolf RN  Outcome: Progressing  12/30/2023 0101 by Zak Ramires RN  Outcome: Progressing  Flowsheets (Taken 12/30/2023 0101)  Maintains hematologic stability:   Assess for signs and symptoms of bleeding or hemorrhage   Monitor labs for bleeding or clotting disorders   Administer blood products/factors as ordered     Problem: Pain  Goal: Verbalizes/displays adequate comfort level or baseline comfort level  12/30/2023 1424 by Kika Wolf RN  Outcome: Completed  12/30/2023 1423 by Kika Wolf RN  Outcome: Progressing  12/30/2023 0101 by Zak Ramires RN  Outcome: Progressing  Flowsheets (Taken 12/30/2023 0101)  Verbalizes/displays adequate comfort level or baseline comfort level:   Encourage patient to monitor pain and request assistance   Assess pain using appropriate pain scale   Administer analgesics

## 2023-12-30 NOTE — PLAN OF CARE
Problem: ABCDS Injury Assessment  Goal: Absence of physical injury  12/30/2023 1423 by Sammy Bales RN  Outcome: Progressing     Problem: Discharge Planning  Goal: Discharge to home or other facility with appropriate resources  12/30/2023 1423 by Sammy Bales RN  Outcome: Progressing     Problem: Safety - Adult  Goal: Free from fall injury  12/30/2023 1423 by Sammy Bales RN  Outcome: Progressing     Problem: Cardiovascular - Adult  Goal: Maintains optimal cardiac output and hemodynamic stability  12/30/2023 1423 by Sammy Bales RN  Outcome: Progressing     Problem: Cardiovascular - Adult  Goal: Absence of cardiac dysrhythmias or at baseline  12/30/2023 1423 by Sammy Bales RN  Outcome: Progressing

## 2023-12-30 NOTE — PROGRESS NOTES
Data- discharge order received, pt verbalized agreement to discharge, disposition to previous residence, no needs for HHC/DME. Action- discharge instructions prepared and given to pt, pt verbalized understanding. Medication information packet given r/t NEW and/or CHANGED prescriptions emphasizing name/purpose/side effects, pt verbalized understanding. Discharge instruction summary: Diet- cardiac, Activity- up as tolerated, Primary Care Physician as followsErlin Nelson -[phone] f/u appointment in 1 week, immunizations reviewed and updated, prescription medications filled at pharmacy. Inpatient surgical procedure precautions reviewed: [facility]. CHF Education reviewed. Pt/ Family has had a total of 60 minutes CHF education this admission encounter. 1. WEIGHT: Admit Weight - Scale: 82.6 kg (182 lb) (12/28/23 1204)        Today  Weight - Scale: 83.6 kg (184 lb 4.9 oz) (12/30/23 0545)       2. O2 SAT.: SpO2: 96 % (12/30/23 0800)    Response- Pt belongings gathered, IV removed. Disposition is home (no HHC/DME needs), transported with RN, taken to lobby via w/c w/ wife and daughter, no complications.

## 2024-01-02 ENCOUNTER — TELEPHONE (OUTPATIENT)
Dept: CARDIOLOGY CLINIC | Age: 89
End: 2024-01-02

## 2024-01-02 NOTE — TELEPHONE ENCOUNTER
Please let patient know Dr Jung reviewed and does not need to see the patient sooner than current 1/19 appointment

## 2024-01-02 NOTE — TELEPHONE ENCOUNTER
Called wife, no answer, lmom w/message below. Advised to call back if they have any que or concern.

## 2024-01-02 NOTE — TELEPHONE ENCOUNTER
Pt wife called stating [facility] put 3 stents in and they need to establish care in office with [facility], pt is scheduled with [facility] at the [facility] office 1/19/2024. Does the pt need to get seen sooner than the 1/19  appt?    Pls advise thank you

## 2024-01-03 ENCOUNTER — APPOINTMENT (OUTPATIENT)
Dept: CT IMAGING | Age: 89
DRG: 684 | End: 2024-01-03
Payer: OTHER GOVERNMENT

## 2024-01-03 ENCOUNTER — HOSPITAL ENCOUNTER (INPATIENT)
Age: 89
LOS: 4 days | Discharge: HOME | DRG: 684 | End: 2024-01-08
Attending: EMERGENCY MEDICINE | Admitting: FAMILY MEDICINE
Payer: OTHER GOVERNMENT

## 2024-01-03 DIAGNOSIS — R33.8 ACUTE RETENTION OF URINE: Primary | ICD-10-CM

## 2024-01-03 DIAGNOSIS — Z79.01 ANTICOAGULATED: ICD-10-CM

## 2024-01-03 DIAGNOSIS — N17.9 AKI (ACUTE KIDNEY INJURY) (HCC): ICD-10-CM

## 2024-01-03 DIAGNOSIS — N30.01 ACUTE CYSTITIS WITH HEMATURIA: ICD-10-CM

## 2024-01-03 LAB
ALBUMIN SERPL-MCNC: 4.3 G/DL (ref 3.4–5)
ALBUMIN/GLOB SERPL: 2 {RATIO} (ref 1.1–2.2)
ALP SERPL-CCNC: 74 U/L (ref 40–129)
ALT SERPL-CCNC: 10 U/L (ref 10–40)
ANION GAP SERPL CALCULATED.3IONS-SCNC: 8 MMOL/L (ref 3–16)
APTT BLD: 33.2 SEC (ref 22.7–35.9)
AST SERPL-CCNC: 19 U/L (ref 15–37)
BACTERIA URNS QL MICRO: ABNORMAL /HPF
BASOPHILS # BLD: 0.1 K/UL (ref 0–0.2)
BASOPHILS NFR BLD: 0.6 %
BILIRUB SERPL-MCNC: 0.5 MG/DL (ref 0–1)
BILIRUB UR QL STRIP.AUTO: ABNORMAL
BUN SERPL-MCNC: 21 MG/DL (ref 7–20)
CALCIUM SERPL-MCNC: 10.7 MG/DL (ref 8.3–10.6)
CHLORIDE SERPL-SCNC: 104 MMOL/L (ref 99–110)
CLARITY UR: ABNORMAL
CO2 SERPL-SCNC: 24 MMOL/L (ref 21–32)
COLOR UR: ABNORMAL
CREAT SERPL-MCNC: 1.4 MG/DL (ref 0.8–1.3)
DEPRECATED RDW RBC AUTO: 13 % (ref 12.4–15.4)
EOSINOPHIL # BLD: 0.1 K/UL (ref 0–0.6)
EOSINOPHIL NFR BLD: 1.6 %
EPI CELLS #/AREA URNS AUTO: 1 /HPF (ref 0–5)
GFR SERPLBLD CREATININE-BSD FMLA CKD-EPI: 49 ML/MIN/{1.73_M2}
GLUCOSE SERPL-MCNC: 136 MG/DL (ref 70–99)
GLUCOSE UR STRIP.AUTO-MCNC: NEGATIVE MG/DL
HCT VFR BLD AUTO: 41 % (ref 40.5–52.5)
HGB BLD-MCNC: 14.1 G/DL (ref 13.5–17.5)
HGB UR QL STRIP.AUTO: ABNORMAL
HYALINE CASTS #/AREA URNS AUTO: 0 /LPF (ref 0–8)
INR PPP: 1.09 (ref 0.84–1.16)
KETONES UR STRIP.AUTO-MCNC: NEGATIVE MG/DL
LEUKOCYTE ESTERASE UR QL STRIP.AUTO: ABNORMAL
LYMPHOCYTES # BLD: 1.1 K/UL (ref 1–5.1)
LYMPHOCYTES NFR BLD: 13.3 %
MCH RBC QN AUTO: 31.9 PG (ref 26–34)
MCHC RBC AUTO-ENTMCNC: 34.3 G/DL (ref 31–36)
MCV RBC AUTO: 92.9 FL (ref 80–100)
MONOCYTES # BLD: 0.7 K/UL (ref 0–1.3)
MONOCYTES NFR BLD: 9.2 %
NEUTROPHILS # BLD: 6.1 K/UL (ref 1.7–7.7)
NEUTROPHILS NFR BLD: 75.3 %
NITRITE UR QL STRIP.AUTO: POSITIVE
PH UR STRIP.AUTO: 5 [PH] (ref 5–8)
PLATELET # BLD AUTO: 192 K/UL (ref 135–450)
PMV BLD AUTO: 8.3 FL (ref 5–10.5)
POTASSIUM SERPL-SCNC: 4 MMOL/L (ref 3.5–5.1)
PROT SERPL-MCNC: 6.5 G/DL (ref 6.4–8.2)
PROT UR STRIP.AUTO-MCNC: 100 MG/DL
PROTHROMBIN TIME: 14.1 SEC (ref 11.5–14.8)
RBC # BLD AUTO: 4.41 M/UL (ref 4.2–5.9)
RBC CLUMPS #/AREA URNS AUTO: 5734 /HPF (ref 0–4)
SODIUM SERPL-SCNC: 136 MMOL/L (ref 136–145)
SP GR UR STRIP.AUTO: 1.01 (ref 1–1.03)
UA COMPLETE W REFLEX CULTURE PNL UR: YES
UA DIPSTICK W REFLEX MICRO PNL UR: YES
URN SPEC COLLECT METH UR: ABNORMAL
UROBILINOGEN UR STRIP-ACNC: 0.2 E.U./DL
WBC # BLD AUTO: 8.2 K/UL (ref 4–11)
WBC #/AREA URNS AUTO: 15 /HPF (ref 0–5)

## 2024-01-03 PROCEDURE — 85025 COMPLETE CBC W/AUTO DIFF WBC: CPT

## 2024-01-03 PROCEDURE — 51702 INSERT TEMP BLADDER CATH: CPT

## 2024-01-03 PROCEDURE — 87086 URINE CULTURE/COLONY COUNT: CPT

## 2024-01-03 PROCEDURE — 81001 URINALYSIS AUTO W/SCOPE: CPT

## 2024-01-03 PROCEDURE — 80053 COMPREHEN METABOLIC PANEL: CPT

## 2024-01-03 PROCEDURE — 85610 PROTHROMBIN TIME: CPT

## 2024-01-03 PROCEDURE — 96375 TX/PRO/DX INJ NEW DRUG ADDON: CPT

## 2024-01-03 PROCEDURE — 74176 CT ABD & PELVIS W/O CONTRAST: CPT

## 2024-01-03 PROCEDURE — 96365 THER/PROPH/DIAG IV INF INIT: CPT

## 2024-01-03 PROCEDURE — 85730 THROMBOPLASTIN TIME PARTIAL: CPT

## 2024-01-03 PROCEDURE — 99285 EMERGENCY DEPT VISIT HI MDM: CPT

## 2024-01-03 RX ORDER — MORPHINE SULFATE 4 MG/ML
4 INJECTION, SOLUTION INTRAMUSCULAR; INTRAVENOUS
Status: COMPLETED | OUTPATIENT
Start: 2024-01-03 | End: 2024-01-04

## 2024-01-03 RX ORDER — 0.9 % SODIUM CHLORIDE 0.9 %
500 INTRAVENOUS SOLUTION INTRAVENOUS ONCE
Status: COMPLETED | OUTPATIENT
Start: 2024-01-03 | End: 2024-01-04

## 2024-01-03 ASSESSMENT — ENCOUNTER SYMPTOMS
DIARRHEA: 0
WHEEZING: 0
STRIDOR: 0
NAUSEA: 0
CONSTIPATION: 0
COLOR CHANGE: 0
RECTAL PAIN: 0
BACK PAIN: 0
COUGH: 0
SHORTNESS OF BREATH: 0
VOMITING: 0
ABDOMINAL PAIN: 1

## 2024-01-03 ASSESSMENT — PAIN - FUNCTIONAL ASSESSMENT: PAIN_FUNCTIONAL_ASSESSMENT: 0-10

## 2024-01-03 ASSESSMENT — PAIN SCALES - GENERAL: PAINLEVEL_OUTOF10: 4

## 2024-01-03 ASSESSMENT — LIFESTYLE VARIABLES: HOW MANY STANDARD DRINKS CONTAINING ALCOHOL DO YOU HAVE ON A TYPICAL DAY: PATIENT DOES NOT DRINK

## 2024-01-04 PROBLEM — R33.8 ACUTE RETENTION OF URINE: Status: ACTIVE | Noted: 2024-01-04

## 2024-01-04 LAB
ANION GAP SERPL CALCULATED.3IONS-SCNC: 6 MMOL/L (ref 3–16)
BACTERIA UR CULT: NORMAL
BASOPHILS # BLD: 0 K/UL (ref 0–0.2)
BASOPHILS NFR BLD: 0.7 %
BUN SERPL-MCNC: 20 MG/DL (ref 7–20)
CALCIUM SERPL-MCNC: 10 MG/DL (ref 8.3–10.6)
CHLORIDE SERPL-SCNC: 108 MMOL/L (ref 99–110)
CHP ED QC CHECK: NORMAL
CO2 SERPL-SCNC: 27 MMOL/L (ref 21–32)
CREAT SERPL-MCNC: 1.3 MG/DL (ref 0.8–1.3)
DEPRECATED RDW RBC AUTO: 13.1 % (ref 12.4–15.4)
EOSINOPHIL # BLD: 0.1 K/UL (ref 0–0.6)
EOSINOPHIL NFR BLD: 1.5 %
GFR SERPLBLD CREATININE-BSD FMLA CKD-EPI: 53 ML/MIN/{1.73_M2}
GLUCOSE BLD-MCNC: 116 MG/DL (ref 70–99)
GLUCOSE BLD-MCNC: 85 MG/DL
GLUCOSE BLD-MCNC: 85 MG/DL (ref 70–99)
GLUCOSE BLD-MCNC: 92 MG/DL (ref 70–99)
GLUCOSE SERPL-MCNC: 93 MG/DL (ref 70–99)
HCT VFR BLD AUTO: 36.6 % (ref 40.5–52.5)
HCT VFR BLD AUTO: 37.6 % (ref 40.5–52.5)
HGB BLD-MCNC: 12.6 G/DL (ref 13.5–17.5)
HGB BLD-MCNC: 12.7 G/DL (ref 13.5–17.5)
LYMPHOCYTES # BLD: 1 K/UL (ref 1–5.1)
LYMPHOCYTES NFR BLD: 14.7 %
MCH RBC QN AUTO: 31.1 PG (ref 26–34)
MCHC RBC AUTO-ENTMCNC: 33.8 G/DL (ref 31–36)
MCV RBC AUTO: 92.1 FL (ref 80–100)
MONOCYTES # BLD: 0.7 K/UL (ref 0–1.3)
MONOCYTES NFR BLD: 9.9 %
NEUTROPHILS # BLD: 5.2 K/UL (ref 1.7–7.7)
NEUTROPHILS NFR BLD: 73.2 %
PERFORMED ON: ABNORMAL
PERFORMED ON: NORMAL
PERFORMED ON: NORMAL
PLATELET # BLD AUTO: 179 K/UL (ref 135–450)
PMV BLD AUTO: 8.4 FL (ref 5–10.5)
POTASSIUM SERPL-SCNC: 3.9 MMOL/L (ref 3.5–5.1)
RBC # BLD AUTO: 4.08 M/UL (ref 4.2–5.9)
SODIUM SERPL-SCNC: 141 MMOL/L (ref 136–145)
WBC # BLD AUTO: 7.1 K/UL (ref 4–11)

## 2024-01-04 PROCEDURE — 85014 HEMATOCRIT: CPT

## 2024-01-04 PROCEDURE — 2580000003 HC RX 258: Performed by: NURSE PRACTITIONER

## 2024-01-04 PROCEDURE — 36415 COLL VENOUS BLD VENIPUNCTURE: CPT

## 2024-01-04 PROCEDURE — 6370000000 HC RX 637 (ALT 250 FOR IP): Performed by: NURSE PRACTITIONER

## 2024-01-04 PROCEDURE — 80048 BASIC METABOLIC PNL TOTAL CA: CPT

## 2024-01-04 PROCEDURE — 2580000003 HC RX 258: Performed by: PHYSICIAN ASSISTANT

## 2024-01-04 PROCEDURE — 6360000002 HC RX W HCPCS: Performed by: NURSE PRACTITIONER

## 2024-01-04 PROCEDURE — 6360000002 HC RX W HCPCS: Performed by: EMERGENCY MEDICINE

## 2024-01-04 PROCEDURE — 1200000000 HC SEMI PRIVATE

## 2024-01-04 PROCEDURE — 6360000002 HC RX W HCPCS: Performed by: PHYSICIAN ASSISTANT

## 2024-01-04 PROCEDURE — 85025 COMPLETE CBC W/AUTO DIFF WBC: CPT

## 2024-01-04 PROCEDURE — 6370000000 HC RX 637 (ALT 250 FOR IP): Performed by: INTERNAL MEDICINE

## 2024-01-04 PROCEDURE — 99223 1ST HOSP IP/OBS HIGH 75: CPT | Performed by: INTERNAL MEDICINE

## 2024-01-04 PROCEDURE — 85018 HEMOGLOBIN: CPT

## 2024-01-04 RX ORDER — FINASTERIDE 5 MG/1
5 TABLET, FILM COATED ORAL DAILY
Status: DISCONTINUED | OUTPATIENT
Start: 2024-01-04 | End: 2024-01-08 | Stop reason: HOSPADM

## 2024-01-04 RX ORDER — SODIUM CHLORIDE 9 MG/ML
INJECTION, SOLUTION INTRAVENOUS CONTINUOUS
Status: ACTIVE | OUTPATIENT
Start: 2024-01-04 | End: 2024-01-04

## 2024-01-04 RX ORDER — SOTALOL HYDROCHLORIDE 80 MG/1
80 TABLET ORAL 2 TIMES DAILY
Status: DISCONTINUED | OUTPATIENT
Start: 2024-01-04 | End: 2024-01-08 | Stop reason: HOSPADM

## 2024-01-04 RX ORDER — ONDANSETRON 4 MG/1
4 TABLET, ORALLY DISINTEGRATING ORAL EVERY 8 HOURS PRN
Status: DISCONTINUED | OUTPATIENT
Start: 2024-01-04 | End: 2024-01-08 | Stop reason: HOSPADM

## 2024-01-04 RX ORDER — INSULIN LISPRO 100 [IU]/ML
0-4 INJECTION, SOLUTION INTRAVENOUS; SUBCUTANEOUS NIGHTLY
Status: DISCONTINUED | OUTPATIENT
Start: 2024-01-04 | End: 2024-01-08 | Stop reason: HOSPADM

## 2024-01-04 RX ORDER — TRAMADOL HYDROCHLORIDE 50 MG/1
50 TABLET ORAL EVERY 8 HOURS PRN
Status: DISCONTINUED | OUTPATIENT
Start: 2024-01-04 | End: 2024-01-08 | Stop reason: HOSPADM

## 2024-01-04 RX ORDER — POLYETHYLENE GLYCOL 3350 17 G/17G
17 POWDER, FOR SOLUTION ORAL DAILY PRN
Status: DISCONTINUED | OUTPATIENT
Start: 2024-01-04 | End: 2024-01-08 | Stop reason: HOSPADM

## 2024-01-04 RX ORDER — ONDANSETRON 2 MG/ML
4 INJECTION INTRAMUSCULAR; INTRAVENOUS EVERY 6 HOURS PRN
Status: DISCONTINUED | OUTPATIENT
Start: 2024-01-04 | End: 2024-01-08 | Stop reason: HOSPADM

## 2024-01-04 RX ORDER — DEXTROSE MONOHYDRATE 100 MG/ML
INJECTION, SOLUTION INTRAVENOUS CONTINUOUS PRN
Status: DISCONTINUED | OUTPATIENT
Start: 2024-01-04 | End: 2024-01-08 | Stop reason: HOSPADM

## 2024-01-04 RX ORDER — SODIUM CHLORIDE 0.9 % (FLUSH) 0.9 %
5-40 SYRINGE (ML) INJECTION EVERY 12 HOURS SCHEDULED
Status: DISCONTINUED | OUTPATIENT
Start: 2024-01-04 | End: 2024-01-08 | Stop reason: HOSPADM

## 2024-01-04 RX ORDER — ATORVASTATIN CALCIUM 40 MG/1
40 TABLET, FILM COATED ORAL NIGHTLY
Status: DISCONTINUED | OUTPATIENT
Start: 2024-01-04 | End: 2024-01-08 | Stop reason: HOSPADM

## 2024-01-04 RX ORDER — SODIUM CHLORIDE 9 MG/ML
INJECTION, SOLUTION INTRAVENOUS PRN
Status: DISCONTINUED | OUTPATIENT
Start: 2024-01-04 | End: 2024-01-08 | Stop reason: HOSPADM

## 2024-01-04 RX ORDER — PREGABALIN 75 MG/1
225 CAPSULE ORAL NIGHTLY
Status: DISCONTINUED | OUTPATIENT
Start: 2024-01-04 | End: 2024-01-08 | Stop reason: HOSPADM

## 2024-01-04 RX ORDER — ACETAMINOPHEN 650 MG/1
650 SUPPOSITORY RECTAL EVERY 6 HOURS PRN
Status: DISCONTINUED | OUTPATIENT
Start: 2024-01-04 | End: 2024-01-08 | Stop reason: HOSPADM

## 2024-01-04 RX ORDER — SODIUM CHLORIDE 0.9 % (FLUSH) 0.9 %
5-40 SYRINGE (ML) INJECTION PRN
Status: DISCONTINUED | OUTPATIENT
Start: 2024-01-04 | End: 2024-01-08 | Stop reason: HOSPADM

## 2024-01-04 RX ORDER — INSULIN LISPRO 100 [IU]/ML
0-8 INJECTION, SOLUTION INTRAVENOUS; SUBCUTANEOUS
Status: DISCONTINUED | OUTPATIENT
Start: 2024-01-04 | End: 2024-01-08 | Stop reason: HOSPADM

## 2024-01-04 RX ORDER — PANTOPRAZOLE SODIUM 40 MG/1
40 TABLET, DELAYED RELEASE ORAL
Status: DISCONTINUED | OUTPATIENT
Start: 2024-01-04 | End: 2024-01-08 | Stop reason: HOSPADM

## 2024-01-04 RX ORDER — CARBOXYMETHYLCELLULOSE SODIUM 10 MG/ML
2 GEL OPHTHALMIC 3 TIMES DAILY
Status: DISCONTINUED | OUTPATIENT
Start: 2024-01-04 | End: 2024-01-08 | Stop reason: HOSPADM

## 2024-01-04 RX ORDER — ACETAMINOPHEN 325 MG/1
650 TABLET ORAL EVERY 6 HOURS PRN
Status: DISCONTINUED | OUTPATIENT
Start: 2024-01-04 | End: 2024-01-08 | Stop reason: HOSPADM

## 2024-01-04 RX ORDER — CLOPIDOGREL BISULFATE 75 MG/1
75 TABLET ORAL DAILY
Status: DISCONTINUED | OUTPATIENT
Start: 2024-01-04 | End: 2024-01-08 | Stop reason: HOSPADM

## 2024-01-04 RX ORDER — PREGABALIN 75 MG/1
75 CAPSULE ORAL DAILY
Status: DISCONTINUED | OUTPATIENT
Start: 2024-01-04 | End: 2024-01-08 | Stop reason: HOSPADM

## 2024-01-04 RX ORDER — TRAMADOL HYDROCHLORIDE 50 MG/1
50 TABLET ORAL EVERY 6 HOURS PRN
Status: DISCONTINUED | OUTPATIENT
Start: 2024-01-04 | End: 2024-01-04

## 2024-01-04 RX ADMIN — MORPHINE SULFATE 4 MG: 4 INJECTION, SOLUTION INTRAMUSCULAR; INTRAVENOUS at 00:06

## 2024-01-04 RX ADMIN — SOTALOL HYDROCHLORIDE 80 MG: 80 TABLET ORAL at 22:15

## 2024-01-04 RX ADMIN — ATORVASTATIN CALCIUM 40 MG: 40 TABLET, FILM COATED ORAL at 22:15

## 2024-01-04 RX ADMIN — ACETAMINOPHEN 650 MG: 325 TABLET ORAL at 05:04

## 2024-01-04 RX ADMIN — FINASTERIDE 5 MG: 5 TABLET, FILM COATED ORAL at 09:08

## 2024-01-04 RX ADMIN — SODIUM CHLORIDE 500 ML: 9 INJECTION, SOLUTION INTRAVENOUS at 00:11

## 2024-01-04 RX ADMIN — PREGABALIN 225 MG: 75 CAPSULE ORAL at 22:15

## 2024-01-04 RX ADMIN — TRAMADOL HYDROCHLORIDE 50 MG: 50 TABLET ORAL at 15:55

## 2024-01-04 RX ADMIN — CEFTRIAXONE SODIUM 1000 MG: 1 INJECTION, POWDER, FOR SOLUTION INTRAMUSCULAR; INTRAVENOUS at 23:32

## 2024-01-04 RX ADMIN — Medication 1 PACKET: at 22:15

## 2024-01-04 RX ADMIN — ACETAMINOPHEN 650 MG: 325 TABLET ORAL at 13:58

## 2024-01-04 RX ADMIN — Medication 5 ML: at 09:13

## 2024-01-04 RX ADMIN — CARBOXYMETHYLCELLULOSE SODIUM 2 DROP: 10 GEL OPHTHALMIC at 22:15

## 2024-01-04 RX ADMIN — SOTALOL HYDROCHLORIDE 80 MG: 80 TABLET ORAL at 09:08

## 2024-01-04 RX ADMIN — SODIUM CHLORIDE: 9 INJECTION, SOLUTION INTRAVENOUS at 05:04

## 2024-01-04 RX ADMIN — CLOPIDOGREL BISULFATE 75 MG: 75 TABLET ORAL at 22:15

## 2024-01-04 RX ADMIN — CARBOXYMETHYLCELLULOSE SODIUM 2 DROP: 10 GEL OPHTHALMIC at 09:08

## 2024-01-04 RX ADMIN — PANTOPRAZOLE SODIUM 40 MG: 40 TABLET, DELAYED RELEASE ORAL at 09:08

## 2024-01-04 RX ADMIN — CEFTRIAXONE SODIUM 1000 MG: 1 INJECTION, POWDER, FOR SOLUTION INTRAMUSCULAR; INTRAVENOUS at 00:17

## 2024-01-04 RX ADMIN — Medication 10 ML: at 22:16

## 2024-01-04 RX ADMIN — TRAMADOL HYDROCHLORIDE 50 MG: 50 TABLET ORAL at 10:36

## 2024-01-04 RX ADMIN — PREGABALIN 75 MG: 75 CAPSULE ORAL at 09:28

## 2024-01-04 ASSESSMENT — PAIN SCALES - GENERAL
PAINLEVEL_OUTOF10: 4
PAINLEVEL_OUTOF10: 5
PAINLEVEL_OUTOF10: 10
PAINLEVEL_OUTOF10: 6
PAINLEVEL_OUTOF10: 6
PAINLEVEL_OUTOF10: 2

## 2024-01-04 ASSESSMENT — PAIN DESCRIPTION - LOCATION
LOCATION: GENERALIZED
LOCATION: OTHER (COMMENT)
LOCATION: GENERALIZED

## 2024-01-04 ASSESSMENT — PAIN DESCRIPTION - DESCRIPTORS: DESCRIPTORS: ACHING

## 2024-01-04 ASSESSMENT — LIFESTYLE VARIABLES
HOW MANY STANDARD DRINKS CONTAINING ALCOHOL DO YOU HAVE ON A TYPICAL DAY: 1 OR 2
HOW OFTEN DO YOU HAVE A DRINK CONTAINING ALCOHOL: MONTHLY OR LESS

## 2024-01-04 NOTE — ED NOTES
ED TO INPATIENT SBAR HANDOFF    Patient Name: Troy Venegas   :  1935  88 y.o.   MRN:  [id number]  Preferred Name  Max  ED Room #:  ED-0019/19  Family/Caregiver Present yes   Restraints no   Sitter no   Sepsis Risk Score Sepsis Risk Score: 2    Situation  Code Status: Full Code     Allergies: Patient has no known allergies.  Weight: No data found.  Arrived from: home  Chief Complaint:   Chief Complaint   Patient presents with    Urinary Retention     PT came from home reports last time they urinated was 4:30 states they are passing blood and clots, pt had 3 stents placed on Friday.     Hospital Problem/Diagnosis:  Principal Problem:    Acute retention of urine  Resolved Problems:    * No resolved hospital problems. *    Imaging:   CT ABDOMEN PELVIS WO CONTRAST Additional Contrast? None   Final Result   1. No acute inflammatory process identified within the limits of a   noncontrast exam.  The bladder is decompressed with a Manning catheter.   Previously described suspected bladder mass is not delineated on this exam   and may be obscured.   2. Cholelithiasis.   3. Diverticulosis.           Abnormal labs:   Abnormal Labs Reviewed   COMPREHENSIVE METABOLIC PANEL - Abnormal; Notable for the following components:       Result Value    Glucose 136 (*)     BUN 21 (*)     Creatinine 1.4 (*)     Est, Glom Filt Rate 49 (*)     Calcium 10.7 (*)     All other components within normal limits   URINALYSIS WITH REFLEX TO CULTURE - Abnormal; Notable for the following components:    Color, UA RED (*)     Clarity, UA TURBID (*)     Bilirubin Urine SMALL (*)     Blood, Urine MODERATE (*)     Protein,  (*)     Nitrite, Urine POSITIVE (*)     Leukocyte Esterase, Urine MODERATE (*)     All other components within normal limits   MICROSCOPIC URINALYSIS - Abnormal; Notable for the following components:    WBC, UA 15 (*)     RBC, UA 5734 (*)     All other components within normal limits   BASIC METABOLIC PANEL W/ REFLEX TO

## 2024-01-04 NOTE — CONSULTS
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 Heart cath and stent placed 12/29/2023 per cardiology. Now on eliquis and Plavix. Admitted with gross hematuria and clot retention. A alves was placed in the ED.     Recommendations:  Hold AC  Irrigate the alves q8 hours  Upsize alves to 20/22F if unable to drain.     All patient questions were answered. He understands the plan as listed above.    HISTORY     Chief Complaint:   Chief Complaint   Patient presents with    Urinary Retention     PT came from home reports last time they urinated was 4:30 states they are passing blood and clots, pt had 3 stents placed on Friday.       History of Present Illness: Troy Venegas is a 88 y.o. male with urinary retention and gross hematuria. Onset of symptoms was several days ago with improving course since that time. Symptoms are aggravated by BPH and recent bx. Symptoms improved with holding AC. Associated symptoms include decreased urine output. Patient also reports weak stream. He has tried the following treatments: alves and flomax, holding AC.     Past Medical History:  He has a past medical history of Arrhythmia, Arthritis, Atrial fibrillation (HCC), Atrial tachycardia, Bladder cancer (HCC) (10/2023), CAD (coronary artery disease), Cancer (HCC), Diabetes mellitus (HCC), Diverticulitis, Enlarged prostate, History of blood transfusion, Hyperlipidemia, Hypertension, Neuropathy, Pacemaker (04/20/2020), Pneumonia,  below.    Constitutional: Negative  Genitourinary: see HPI  HEENT: Negative   Cardiovascular: Negative   Respiratory: Negative   Gastrointestinal: Negative   Musculoskeletal: Negative   Neurological: Negative   Psychiatric: Negative   Integumentary: Negative     PHYSICAL EXAM     Vitals:    01/04/24 0715   BP: (!) 123/57   Pulse: 60   Resp: 14   Temp:    SpO2: 98%     CONSTITUTIONAL: The patient is well nourished/developed, with no distress noted.   NEUROLOGICAL/PSYCHIATRIC: Oriented to place and time, normal affected noted.   NECK: The neck is symmetrical and supple, with no masses noted.   CARDIOVASCULAR: Regular rate and rhythm, no evidence of swelling noted.   RESPIRATORY: Normal respiratory effort with no wheezing noted.   ABDOMEN: Abdomen soft, non-tender, non-distended. No enlarged liver or spleen. No hernias noted. Stool occult blood not indicated.   SKIN: Skin appears normal.  LYMPHATICS: No adenopathy noted.   CVA: No CVA tenderness bilaterally.   GENITOURINARY: CYU noted in the alves.       Ins/Outs:    Intake/Output Summary (Last 24 hours) at 1/4/2024 0916  Last data filed at 1/4/2024 0649  Gross per 24 hour   Intake --   Output 2000 ml   Net -2000 ml       LABS     CBC   Lab Results   Component Value Date/Time    WBC 7.1 01/04/2024 06:28 AM    RBC 4.08 01/04/2024 06:28 AM    HGB 12.7 01/04/2024 06:28 AM    HCT 37.6 01/04/2024 06:28 AM    MCV 92.1 01/04/2024 06:28 AM    MCH 31.1 01/04/2024 06:28 AM    MCHC 33.8 01/04/2024 06:28 AM    RDW 13.1 01/04/2024 06:28 AM     01/04/2024 06:28 AM    MPV 8.4 01/04/2024 06:28 AM     BMP   Lab Results   Component Value Date/Time     01/04/2024 06:28 AM    K 3.9 01/04/2024 06:28 AM     01/04/2024 06:28 AM    CO2 27 01/04/2024 06:28 AM    BUN 20 01/04/2024 06:28 AM    CREATININE 1.3 01/04/2024 06:28 AM    GLUCOSE 85 01/04/2024 09:13 AM    CALCIUM 10.0 01/04/2024 06:28 AM     Urinalysis:   Lab Results   Component Value Date/Time    COLORU RED  Bones/Soft Tissues: Status post laminectomy at L4 and L5.  Advanced degenerative change at L5-S1.  No acute osseous abnormality identified.     1. No acute inflammatory process identified within the limits of a noncontrast exam.  The bladder is decompressed with a Manning catheter. Previously described suspected bladder mass is not delineated on this exam and may be obscured. 2. Cholelithiasis. 3. Diverticulosis.     XR CHEST PORTABLE    Result Date: 12/28/2023  EXAMINATION: ONE XRAY VIEW OF THE CHEST 12/28/2023 12:22 pm COMPARISON: None. HISTORY: ORDERING SYSTEM PROVIDED HISTORY: CHest pain TECHNOLOGIST PROVIDED HISTORY: Reason for exam:->CHest pain Reason for Exam: CHest pain FINDINGS: LINES/TUBES/OTHER: Left subclavian dual lead pacemaker is noted. HEART/MEDIASTINUM: The cardiomediastinal silhouette is within normal limits. PLEURA/LUNGS: There are no focal consolidations or pleural effusions. There is no appreciable pneumothorax. BONES/SOFT TISSUE: No acute abnormality.     No radiographic evidence of acute pulmonary disease.            Electronically signed by: ELISHA Abernathy CNP 1/4/2024   [facility]  Office Contact: [phone]

## 2024-01-04 NOTE — PROGRESS NOTES
Patient seen in ED, room 19.  Admission completed with the following exceptions:  4 Eyes Assessment, Immunizations, Vaccines, Rights and Responsibilities, Orientation to room, Plan of Care, Education/Learning Assessment and Education Plan, white board, height and weight, pain assessment and head to toe assessment.  Patient is alert and oriented X 4.  Patient lives at home with his spouse in a two story house  and is being admitted for Acute retention of urine.  Home Medications as well as Outside Sources has been verbally reviewed with patient and updated as appropriate and is now Completed.  Plan of care updated if indicated.  All questions answered.     Wife is at bedside.

## 2024-01-04 NOTE — H&P
V2.0  History and Physical      Name:  Troy Venegas /Age/Sex: 1935  (88 y.o. male)   MRN & CSN:  [id number] & [id number] Encounter Date/Time: 2024 1:16 AM EST   Location:  ED- PCP: Lois Kruger MD       Hospital Day: 2    Assessment and Plan:   Troy Venegas is a 88 y.o. male with a pmh of bladder cancer in 2023, status post multiple bladder procedure with the most recent one 2 weeks ago, recent cardiac cath with stents placement, on Eliquis.,  Who presents with acute urine retention with hematuria and blood clots.      Hospital Problems             Last Modified POA    * (Principal) Acute retention of urine 2024 Yes       Plan:  # Acute urine retention.  # Gross hematuria.  # Bladder cancer (2023) s/p multiple bladder procedures most recent procedure in 2023.  -CTAP: No acute inflammatory process.  Cholelithiasis.  Diverticulosis.  -Manning catheter inserted in ED with noted gross hematuria.    -Admit to [facility] with telemetry.  -Urology consulted by ED.  -Hold Eliquis and Plavix.  Last Eliquis dose was on 1/3  -Monitor CBC.    # Acute kidney injury.  Likely due to above.  Creatinine of 1.4.  Was 1.2 on  3.  -IV fluids.  Repeat labs in AM.    # Urinary tract infection.  Continue Rocephin pending cultures.    # Recent unstable angina.S/P cardiac catheterization with stent placement on 2023.  No chest pain.  No acute ischemia on EKG.  -Discharged on Eliquis and Plavix.  Holding both due to acute hematuria.  -Consult cardiology to assist with decision on anticoagulation.    Disposition:   Current Living situation: Lives at home and wife.  Expected Disposition: Home  Estimated D/C: 2 to 3 days    Diet Adult diet   DVT Prophylaxis [] Lovenox, []  Heparin, [x] SCDs, [] Ambulation,  [] Eliquis, [] Xarelto, [] Coumadin   Code Status Full code   Surrogate Decision Maker/ POA Self, wife     Personally reviewed Lab Studies and Imaging     Discussed management 
2

## 2024-01-04 NOTE — CONSULTS
[facility]   Electrophysiology Consultation   Date: 1/4/2024  Reason for Consultation: Atrial fibrillation and hematuria  Consult Requesting Physician: Katy Isaac MD     Chief Complaint   Patient presents with    Urinary Retention     PT came from home reports last time they urinated was 4:30 states they are passing blood and clots, pt had 3 stents placed on Friday.     HPI: Troy Venegas is a 88 y.o. male with history of paroxysmal atrial fibrillation on anticoagulation, CAD, hyperlipidemia, hypertension, pacemaker, history of prior bladder cancer and history of recurrent hematuria was admitted due to worsening of chronic hematuria and clots in the urine.  He had a stent to RCA on 12/29/2023.  He has to be on antiplatelet therapy.      Past Medical History:   Diagnosis Date    Arrhythmia     Arthritis     hands shoulders neck, R hip    Atrial fibrillation (HCC)     coumadin    Atrial tachycardia     Bladder cancer (HCC) 10/2023    CAD (coronary artery disease)     Cancer (HCC)     skin    Diabetes mellitus (HCC)     type 2    Diverticulitis     Enlarged prostate     History of blood transfusion     Hyperlipidemia     Hypertension     Neuropathy     Bilateral feet and lower legs    Pacemaker 04/20/2020    Pneumonia     ABOUT 5 YEARS AGO    Vasodepressor syncope         Past Surgical History:   Procedure Laterality Date    ABLATION OF DYSRHYTHMIC FOCUS      AVNRT and Atrial tachycardia    CARPAL TUNNEL RELEASE      CATARACT REMOVAL Bilateral     COLONOSCOPY      CORONARY ANGIOPLASTY WITH STENT PLACEMENT  2005    CYSTOSCOPY  09/26/2012    coagulation prostatic urethra    CYSTOSCOPY  10/08/2015    TURP    CYSTOSCOPY N/A 10/17/2023    CYSTOSCOPY WITH TRANSURETHRAL RESECTION OF BLADDER TUMOR performed by Rosalino Riddle MD at [facility] OR    CYSTOSCOPY N/A 12/19/2023    CYSTOSCOPY WITH BLADDER BIOPSY performed by Rosalino Riddle MD at [facility] OR    FINGER SURGERY      X7    FINGER TRIGGER RELEASE   93.9 %  Min: 91 %  Max: 98 %    Intake/Output Summary (Last 24 hours) at 1/4/2024 1335  Last data filed at 1/4/2024 0649  Gross per 24 hour   Intake --   Output 2000 ml   Net -2000 ml       Telemetry: Sinus rhythm atrial paced  Constitutional: Oriented. No distress.   Head: Normocephalic and atraumatic.   Mouth/Throat: Oropharynx is clear and moist.   Eyes: Conjunctivae normal. EOM are normal.   Neck: Neck supple. No rigidity. No JVD present.    Cardiovascular: Normal rate, regular rhythm, S1&S2.    Pulmonary/Chest: Bilateral respiratory sounds. No wheezes, No rhonchi.    Abdominal: Soft. Bowel sounds present. No distension, No tenderness.   Musculoskeletal: No tenderness. No edema    Lymphadenopathy: Has no cervical adenopathy.   Neurological: Alert and oriented. Cranial nerve appears intact, No Gross deficit   Skin: Skin is warm and dry. No rash noted.   Psychiatric: Has a normal behavior     Labs, diagnostic and imaging results reviewed.     Reviewed.   Recent Labs     01/03/24 2059 01/04/24  0628    141   K 4.0 3.9    108   CO2 24 27   BUN 21* 20   CREATININE 1.4* 1.3     Recent Labs     01/03/24 2059 01/04/24  0628   WBC 8.2 7.1   HGB 14.1 12.7*   HCT 41.0 37.6*   MCV 92.9 92.1    179     Lab Results   Component Value Date/Time    CKTOTAL 386 02/20/2010 12:38 AM    CKMB 1.13 02/20/2010 12:38 AM    TROPONINI <0.01 04/20/2020 12:22 AM     Lab Results   Component Value Date/Time    BNP 20 09/11/2012 09:40 AM    BNP 22 02/19/2010 09:05 AM     Lab Results   Component Value Date/Time    PROTIME 14.1 01/03/2024 08:59 PM    PROTIME 14.0 12/28/2023 12:36 PM    PROTIME 11.3 04/18/2020 04:38 PM    PROTIME 23.7 06/23/2015 10:25 AM    PROTIME 40.5 04/21/2015 10:33 AM    PROTIME 35.2 02/23/2015 10:48 AM    INR 1.09 01/03/2024 08:59 PM    INR 1.08 12/28/2023 12:36 PM    INR 0.97 04/18/2020 04:38 PM     Lab Results   Component Value Date/Time    CHOL 125 10/17/2023 06:40 AM    HDL 51 10/17/2023 06:40 AM     bleeding (eg.  Hematuria)     Increased bleeding risk (e.g. need for antiplatelet therapy)      We have discussed their unique stroke and bleeding risk both on and off oral-anticoagulation, and the rationale for appendage closure.      A decision aid tool (Decision AID for AFIB Stroke Prevention, CardioSmart, ACC) was used for discussing risks, benefits and alternatives to anticoagulation and left atrial appendage closure devices.      Based on both stroke and bleeding risk, a shared decision has been made to pursue closure of the left atrial appendage as an alternative to oral anticoagulant therapy for stroke prophylaxis and to reduce their long term risk of incidence of bleeding.    The procedure has been discussed in detail with patient and family members along with the risk and benefits. Success rate and complications associated with such a procedure have been discussed. Other options including AtriClip by CT surgery were discussed.  Continuing anticoagulation is also reasonable if patient tolerates it with no bleeding.     Risks including but not limited cardiac perforation, stroke, embolization of device, need for open heart surgery, cardiac arrest, mortality and morbidity have been discussed. We discussed that Watchman procedure reduce the chance of stroke but it does not eliminate it. Education materials, including decision aid, were provided.     All the questions were answered. Patient wishes to proceed.       - sick sinus syndrome    Status post pacemaker.    -Dual-chamber pacemaker, [employer]  Normal function.  Interrogation showed 1.1% A-fib burden on the last remote.    -CAD  Continue a statin and resume antiplatelet when possible.    -Hypertension    BP is well controlled. Continue current meds.    - JUANA    Likely due to obstruction.  Improved.    I independently reviewed and interpreted ECG, cardiac labs, other relevant labs,  echo pacemaker on the tele, cardiac cath CT scan CXR device check

## 2024-01-04 NOTE — ED PROVIDER NOTES
[facility] EMERGENCY DEPARTMENT  EMERGENCY DEPARTMENT ENCOUNTER        Pt Name: Troy Venegas  MRN: [id number]  Birthdate 1935  Date of evaluation: 1/3/2024  Provider: Guerline Soria PA-C  PCP: Lois Kruger MD  Note Started: 9:23 PM EST 1/3/24       I have seen and evaluated this patient with my supervising physician Josiah Medina DO.      CHIEF COMPLAINT       Chief Complaint   Patient presents with    Urinary Retention     PT came from home reports last time they urinated was 4:30 states they are passing blood and clots, pt had 3 stents placed on Friday.       HISTORY OF PRESENT ILLNESS: 1 or more Elements     History from : Patient    Limitations to history : None    Troy Venegas is a 88 y.o. male who presents to the emergency department complaining of urine retention since 4:30 PM today.  He has had this numerous times in the past however states that he had a TURP years ago which resolved issues with urine retention.  2 or 3 weeks ago patient states that he had parts of his bladder cancer removed and biopsied with a cystoscopy.  On 12/28/2023, he was diagnosed with unstable angina and subsequently 3 stents were placed and patient was discharged on Plavix and Eliquis.  Since his discharge from the hospital, he has had intermittent hematuria but states that he is started passing clots since yesterday.    Nursing Notes were all reviewed and agreed with or any disagreements were addressed in the HPI.    REVIEW OF SYSTEMS :      Review of Systems   Constitutional:  Negative for chills and fever.   HENT: Negative.     Eyes:  Negative for visual disturbance.   Respiratory:  Negative for cough, shortness of breath, wheezing and stridor.    Cardiovascular:  Negative for chest pain, palpitations and leg swelling.   Gastrointestinal:  Positive for abdominal pain. Negative for constipation, diarrhea, nausea, rectal pain and vomiting.   Genitourinary:  Positive for difficulty

## 2024-01-04 NOTE — ED PROVIDER NOTES
EMERGENCY MEDICINE ATTENDING NOTE  Josiah Medina Jr., CHEMA FINN, FAAKHARI        I personally saw the patient and made/approved the management plan and take responsibility for the patient management on Troy Venegas.  All diagnostic, treatment, and disposition decisions were made by myself in conjunction with the advanced practice provider.  I have participated in the medical decision making and directed the treatment plan and disposition of the patient.    For further details of Troy Venegas's emergency department encounter, please see the advanced practice provider's documentation.    I, Josiah Medina Jr., CHEMA FINN, IDALMIS, am the primary physician provider of record.      CHIEF COMPLAINT  Chief Complaint   Patient presents with    Urinary Retention     PT came from home reports last time they urinated was 4:30 states they are passing blood and clots, pt had 3 stents placed on Friday.       BRIEF HISTORY  Troy Venegas is a 88 y.o. male  who presents to the ED for evaluation of urinary retention.  States it has been since 430 since he urinated.  Was having some bleeding of clots before that.  Patient had recent procedure for scraping due to bladder cancer few weeks ago and was taken off of his blood thinners at that time.  Had a heart catheterization with several stents placed last week and was placed back on anticoagulation after that.  Other than urinary symptoms and that hematuria is having no other significant complaints at this time.    BRIEF/FOCUSED PHYSICAL EXAMINATION  VITAL SIGNS:    ED Triage Vitals [01/03/24 2005]   Enc Vitals Group      BP (!) 146/68      Pulse 73      Respirations 16      Temp 97.8 °F (36.6 °C)      Temp src       SpO2 98 %      Weight       Height       Head Circumference       Peak Flow       Pain Score       Pain Loc       Pain Edu?       Excl. in GC?      Physical Exam  Vitals and nursing note reviewed.   Constitutional:       General: He is not in acute

## 2024-01-04 NOTE — PROGRESS NOTES
Patient admitted to 3A from ER.  Patient and spouse oriented to room.  Assessment completed.  See MAR and flow sheet for details.

## 2024-01-04 NOTE — CONSULTS
[facility]  Cardiac Consult     Referring Provider:  Lois Kruger MD         History of Present Illness:  89 y/o male with CAD and atrial fib who we were asked to see for help with anticoagulation due to admission with hematuria.    He does have a h/o hematuria and prior bladder cancer. He is followed by Dr. Ocampo for atrial fib and pacemaker placement. He has been anticoagulated with eliquis. It was stopped a few years ago due to hematuria but restarted. He has mild chronic hematuria. He was recently admitted with unstable angina. He underwent cardiac cath with stenting of the RCA x 2 on 12/29.     He now present with severe hematuria and clots. Eliquis and plavix have been held.    Past Medical History:   has a past medical history of Arrhythmia, Arthritis, Atrial fibrillation (HCC), Atrial tachycardia, Bladder cancer (HCC), CAD (coronary artery disease), Cancer (HCC), Diabetes mellitus (HCC), Diverticulitis, Enlarged prostate, History of blood transfusion, Hyperlipidemia, Hypertension, Neuropathy, Pacemaker, Pneumonia, and Vasodepressor syncope.    Surgical History:   has a past surgical history that includes shoulder surgery (Bilateral); Finger surgery; Coronary angioplasty with stent (2005); Cataract removal (Bilateral); ablation of dysrhythmic focus; Cystoscopy (09/26/2012); TURP (03/07/2013); other surgical history (Left, 02/25/2014); Carpal tunnel release; Finger trigger release; Cystocopy (10/08/2015); Spinal fusion (N/A); Colonoscopy; pacemaker placement; Cystoscopy (N/A, 10/17/2023); Cystoscopy (N/A, 12/19/2023); and Skin cancer excision.     Social History:   reports that he quit smoking about 50 years ago. His smoking use included cigarettes. He has never used smokeless tobacco. He reports current alcohol use of about 2.0 standard drinks of alcohol per week. He reports that he does not use drugs.     Family History:  family history includes Cancer in his father and mother.

## 2024-01-05 PROBLEM — N17.9 AKI (ACUTE KIDNEY INJURY) (HCC): Status: ACTIVE | Noted: 2024-01-05

## 2024-01-05 LAB
ANION GAP SERPL CALCULATED.3IONS-SCNC: 9 MMOL/L (ref 3–16)
BASOPHILS # BLD: 0 K/UL (ref 0–0.2)
BASOPHILS NFR BLD: 0.5 %
BUN SERPL-MCNC: 20 MG/DL (ref 7–20)
CALCIUM SERPL-MCNC: 10.2 MG/DL (ref 8.3–10.6)
CHLORIDE SERPL-SCNC: 105 MMOL/L (ref 99–110)
CO2 SERPL-SCNC: 25 MMOL/L (ref 21–32)
CREAT SERPL-MCNC: 1.4 MG/DL (ref 0.8–1.3)
DEPRECATED RDW RBC AUTO: 13.1 % (ref 12.4–15.4)
EOSINOPHIL # BLD: 0.2 K/UL (ref 0–0.6)
EOSINOPHIL NFR BLD: 1.7 %
GFR SERPLBLD CREATININE-BSD FMLA CKD-EPI: 49 ML/MIN/{1.73_M2}
GLUCOSE BLD-MCNC: 103 MG/DL (ref 70–99)
GLUCOSE BLD-MCNC: 105 MG/DL (ref 70–99)
GLUCOSE BLD-MCNC: 124 MG/DL (ref 70–99)
GLUCOSE BLD-MCNC: 142 MG/DL (ref 70–99)
GLUCOSE SERPL-MCNC: 137 MG/DL (ref 70–99)
HCT VFR BLD AUTO: 39.2 % (ref 40.5–52.5)
HGB BLD-MCNC: 13.4 G/DL (ref 13.5–17.5)
LYMPHOCYTES # BLD: 1.1 K/UL (ref 1–5.1)
LYMPHOCYTES NFR BLD: 13.3 %
MCH RBC QN AUTO: 32.1 PG (ref 26–34)
MCHC RBC AUTO-ENTMCNC: 34.3 G/DL (ref 31–36)
MCV RBC AUTO: 93.5 FL (ref 80–100)
MONOCYTES # BLD: 0.6 K/UL (ref 0–1.3)
MONOCYTES NFR BLD: 7 %
NEUTROPHILS # BLD: 6.7 K/UL (ref 1.7–7.7)
NEUTROPHILS NFR BLD: 77.5 %
PERFORMED ON: ABNORMAL
PLATELET # BLD AUTO: 192 K/UL (ref 135–450)
PMV BLD AUTO: 8.3 FL (ref 5–10.5)
POTASSIUM SERPL-SCNC: 4.3 MMOL/L (ref 3.5–5.1)
RBC # BLD AUTO: 4.19 M/UL (ref 4.2–5.9)
SODIUM SERPL-SCNC: 139 MMOL/L (ref 136–145)
WBC # BLD AUTO: 8.6 K/UL (ref 4–11)

## 2024-01-05 PROCEDURE — 97116 GAIT TRAINING THERAPY: CPT

## 2024-01-05 PROCEDURE — 97165 OT EVAL LOW COMPLEX 30 MIN: CPT

## 2024-01-05 PROCEDURE — 36415 COLL VENOUS BLD VENIPUNCTURE: CPT

## 2024-01-05 PROCEDURE — 6370000000 HC RX 637 (ALT 250 FOR IP): Performed by: NURSE PRACTITIONER

## 2024-01-05 PROCEDURE — 1200000000 HC SEMI PRIVATE

## 2024-01-05 PROCEDURE — 6370000000 HC RX 637 (ALT 250 FOR IP): Performed by: INTERNAL MEDICINE

## 2024-01-05 PROCEDURE — 99223 1ST HOSP IP/OBS HIGH 75: CPT | Performed by: INTERNAL MEDICINE

## 2024-01-05 PROCEDURE — 80048 BASIC METABOLIC PNL TOTAL CA: CPT

## 2024-01-05 PROCEDURE — 97161 PT EVAL LOW COMPLEX 20 MIN: CPT

## 2024-01-05 PROCEDURE — 97535 SELF CARE MNGMENT TRAINING: CPT

## 2024-01-05 PROCEDURE — 97530 THERAPEUTIC ACTIVITIES: CPT

## 2024-01-05 PROCEDURE — 85025 COMPLETE CBC W/AUTO DIFF WBC: CPT

## 2024-01-05 PROCEDURE — 6360000002 HC RX W HCPCS: Performed by: NURSE PRACTITIONER

## 2024-01-05 PROCEDURE — 2580000003 HC RX 258: Performed by: NURSE PRACTITIONER

## 2024-01-05 RX ADMIN — SOTALOL HYDROCHLORIDE 80 MG: 80 TABLET ORAL at 20:40

## 2024-01-05 RX ADMIN — PREGABALIN 75 MG: 75 CAPSULE ORAL at 08:15

## 2024-01-05 RX ADMIN — ATORVASTATIN CALCIUM 40 MG: 40 TABLET, FILM COATED ORAL at 20:40

## 2024-01-05 RX ADMIN — CEFTRIAXONE SODIUM 1000 MG: 1 INJECTION, POWDER, FOR SOLUTION INTRAMUSCULAR; INTRAVENOUS at 21:00

## 2024-01-05 RX ADMIN — TRAMADOL HYDROCHLORIDE 50 MG: 50 TABLET ORAL at 09:42

## 2024-01-05 RX ADMIN — Medication 10 ML: at 20:39

## 2024-01-05 RX ADMIN — PREGABALIN 225 MG: 75 CAPSULE ORAL at 20:40

## 2024-01-05 RX ADMIN — Medication 1 PACKET: at 20:39

## 2024-01-05 RX ADMIN — PANTOPRAZOLE SODIUM 40 MG: 40 TABLET, DELAYED RELEASE ORAL at 08:16

## 2024-01-05 RX ADMIN — CLOPIDOGREL BISULFATE 75 MG: 75 TABLET ORAL at 08:16

## 2024-01-05 RX ADMIN — Medication 10 ML: at 08:52

## 2024-01-05 RX ADMIN — POLYETHYLENE GLYCOL 3350 17 G: 17 POWDER, FOR SOLUTION ORAL at 08:52

## 2024-01-05 RX ADMIN — FINASTERIDE 5 MG: 5 TABLET, FILM COATED ORAL at 08:16

## 2024-01-05 RX ADMIN — SOTALOL HYDROCHLORIDE 80 MG: 80 TABLET ORAL at 08:16

## 2024-01-05 ASSESSMENT — PAIN SCALES - GENERAL
PAINLEVEL_OUTOF10: 0
PAINLEVEL_OUTOF10: 7
PAINLEVEL_OUTOF10: 0

## 2024-01-05 ASSESSMENT — PAIN DESCRIPTION - DESCRIPTORS: DESCRIPTORS: DISCOMFORT

## 2024-01-05 ASSESSMENT — PAIN DESCRIPTION - LOCATION: LOCATION: PENIS

## 2024-01-05 ASSESSMENT — PAIN - FUNCTIONAL ASSESSMENT: PAIN_FUNCTIONAL_ASSESSMENT: ACTIVITIES ARE NOT PREVENTED

## 2024-01-05 NOTE — PROGRESS NOTES
Urology Progress Note  [facility]    Provider: Reinaldo Kuhn PA-C  Patient ID:  Admission Date: 1/3/2024 Name: Troy Venegas  OR Date: 1/3/2024 MRN: [id number]   Patient Location: [id number] : 1935  Attending: Katy Isaac MD Date of Service: 2024  PCP: Lois Kruger MD     Diagnoses:  1. Acute retention of urine    2. Anticoagulated    3. Acute cystitis with hematuria    4. JUANA (acute kidney injury) (HCC)      87 yo male s/p TURBT in Oct, pathology high grade NMIBC, and s/p cystoscopy, biopsy and urethral dilation 2023, pathology CIS. Heart cath and stent placed 2023 per cardiology. Now on eliquis and Plavix. Admitted with gross hematuria and clot retention. A alves was placed in the ED.   ==  Alves irrigated yesterday, starting to clear. No clots noted   Afebrile, cr stable, hgb stable   Cx negative    Assessment/Plan:  -Continue alves - draining clear nilda urine today. Irrigate prn and upsize to 20/ if alves clots off  -Continue to hold AC but can likely resume once urine completely clears. Will need to monitor for worsening hematuria once restarted on AC  -Patient appears to be doing okay from a urology standpoint. Will monitor for now. Would recommend removing alves prior to discharge.     The patient had a chance to ask questions which were answered. he understands the above plan.    Subjective:   Troy Venegas is a 88 y.o. male. He was seen and examined this morning. Today we discussed continuing alves until discharge     Objective:   Vitals:  Vitals:    24 0800   BP: 112/66   Pulse: 59   Resp: 18   Temp:    SpO2: 94%       Intake/Output Summary (Last 24 hours) at 2024 0825  Last data filed at 2024 0430  Gross per 24 hour   Intake 240 ml   Output 1450 ml   Net -1210 ml     Physical Exam:  Gen: Alert and oriented x3, no acute distress  CV: Regular rate   Resp: unlabored respirations  Abd: Soft, non-distended, non-tender, no  masses  Ext: no peripheral edema noted, moves upper and lower extremities spontaneously  Skin: warmand well perfused, no rashes noted on the face, or arms.     Labs:  Lab Results   Component Value Date    WBC 8.6 01/05/2024    HGB 13.4 (L) 01/05/2024    HCT 39.2 (L) 01/05/2024    MCV 93.5 01/05/2024     01/05/2024     Lab Results   Component Value Date    CREATININE 1.4 (H) 01/05/2024    BUN 20 01/05/2024     01/05/2024    K 4.3 01/05/2024     01/05/2024    CO2 25 01/05/2024       Reinaldo Kuhn PA-C   1/5/2024

## 2024-01-05 NOTE — PLAN OF CARE
Problem: ABCDS Injury Assessment  Goal: Absence of physical injury  Outcome: Progressing     Problem: Discharge Planning  Goal: Discharge to home or other facility with appropriate resources  1/5/2024 0145 by Norma Cannon RN  Outcome: Progressing  1/4/2024 1627 by Danae Clarke RN  Outcome: Progressing     Problem: Pain  Goal: Verbalizes/displays adequate comfort level or baseline comfort level  1/5/2024 0145 by Norma Cannon RN  Outcome: Progressing  1/4/2024 1627 by Danae Clarke RN  Outcome: Progressing     Problem: Safety - Adult  Goal: Free from fall injury  1/5/2024 0145 by Norma Cannon RN  Outcome: Progressing  1/4/2024 1627 by Danae Clarke RN  Outcome: Progressing

## 2024-01-05 NOTE — CARE COORDINATION
Case Management Assessment  Initial Evaluation    Date/Time of Evaluation: 1/5/2024 1:29 PM  Assessment Completed by: HI Adams    If patient is discharged prior to next notation, then this note serves as note for discharge by case management.    Patient Name: Troy Venegas                   YOB: 1935  Diagnosis: Anticoagulated [Z79.01]  Acute retention of urine [R33.8]  JUANA (acute kidney injury) (HCC) [N17.9]  Acute cystitis with hematuria [N30.01]                   Date / Time: 1/3/2024  8:01 PM    Patient Admission Status: Inpatient   Readmission Risk (Low < 19, Mod (19-27), High > 27): Readmission Risk Score: 15.6    Current PCP: Lois Kruger MD  PCP verified by CM? Yes    Chart Reviewed: Yes      History Provided by: Patient  Patient Orientation: Alert and Oriented    Patient Cognition: Alert    Hospitalization in the last 30 days (Readmission):  Yes    If yes, Readmission Assessment in CM Navigator will be completed.    Advance Directives:      Code Status: Full Code   Patient's Primary Decision Maker is: Legal Next of Kin    Primary Decision Maker: Denise Venegas K - Spouse - [phone]    Discharge Planning:    Patient lives with: Spouse/Significant Other Type of Home: House  Primary Care Giver: Self  Patient Support Systems include: Spouse/Significant Other   Current Financial resources: Medicare  Current community resources: None  Current services prior to admission: None            Current DME: Other (Comment) (n/a)            Type of Home Care services:  None    ADLS  Prior functional level: Independent in ADLs/IADLs  Current functional level: Other (see comment) (PT/OT pending.)    PT AM-PAC:   /24  OT AM-PAC:   /24    Family can provide assistance at DC: Yes  Would you like Case Management to discuss the discharge plan with any other family members/significant others, and if so, who? Yes  Plans to Return to Present Housing: Yes  Other Identified Issues/Barriers to RETURNING  to current housing:   Potential Assistance needed at discharge: Other (Comment) (TBD- PT/OT pending.)            Potential DME:    Patient expects to discharge to: House  Plan for transportation at discharge: Self    Financial    Payor: JAZMIN / Plan: JAZMIN [facility] / Product Type: *No Product type* /     Does insurance require precert for SNF: No    Potential assistance Purchasing Medications: No  Meds-to-Beds request:        [id number] [place], [place] - [address] - P [phone] - F [phone]  [address] [address]  Phone: [phone] Fax: [phone]    [facility] [place] - [address] - P [phone] - F [phone]  M[facility] with [facility]  [place] [address]  Phone: [phone] Fax: [phone]    [id number] [place], [place] - [address] - P [phone] - F [phone]  [address] [address]  Phone: [phone] Fax: [phone]      Notes:    Factors facilitating achievement of predicted outcomes: Family support, Cooperative, Pleasant, and Sense of humor    Barriers to discharge: Medical complications    Additional Case Management Notes: The SW spoke with the pt. The pt is from home with his wife . The pt reports to be independent prior to coming into the hospital. The pt is not active wit any services at this time.     PT/OT pending at this time       The Plan for Transition of Care is related to the following treatment goals of Anticoagulated [Z79.01]  Acute retention of urine [R33.8]  JUANA (acute kidney injury) (HCC) [N17.9]  Acute cystitis with hematuria [N30.01]    IF APPLICABLE: The Patient and/or patient representative Troy and his family were provided with a choice of provider and agrees with the discharge plan. Freedom of choice list with basic dialogue that supports the patient's individualized plan of care/goals and shares  the quality data associated with the providers was provided to:     Patient Representative Name:       The Patient and/or Patient Representative Agree with the Discharge Plan?      HI Adams  Case Management Department

## 2024-01-05 NOTE — PROGRESS NOTES
Patient complaining of pressure from abdomen and wanted to stand up. While standing patient felt some relief and urine was coming out around the catheter. Patient then returned to bed and catheter was irrigated and pink urine was returned without clots.

## 2024-01-05 NOTE — PROGRESS NOTES
PM assessment complete and documented. Meds given per MAR. Wife sleeping in recliner at bedside. Manning patent and secure to closed drainage system. Urine remains bloody. Pt and wife denies other needs at present. Will continue to monitor.

## 2024-01-05 NOTE — PROGRESS NOTES
[facility] - Inpatient Rehabilitation Department   Phone: [phone]    Occupational Therapy    [x] Initial Evaluation            [] Daily Treatment Note         [] Discharge Summary      Patient: Troy Venegas   : 1935   MRN: [id number]   Date of Service:  2024    Admitting Diagnosis:  Acute retention of urine  Current Admission Summary: Per ED note, \"Troy Venegas is a 88 y.o. male who presents to the emergency department complaining of urine retention since 4:30 PM today.  He has had this numerous times in the past however states that he had a TURP years ago which resolved issues with urine retention.  2 or 3 weeks ago patient states that he had parts of his bladder cancer removed and biopsied with a cystoscopy.  On 2023, he was diagnosed with unstable angina and subsequently 3 stents were placed and patient was discharged on Plavix and Eliquis.  Since his discharge from the hospital, he has had intermittent hematuria but states that he is started passing clots since yesterday.\"   Past Medical History:  has a past medical history of Arrhythmia, Arthritis, Atrial fibrillation (HCC), Atrial tachycardia, Bladder cancer (HCC), CAD (coronary artery disease), Cancer (HCC), Diabetes mellitus (HCC), Diverticulitis, Enlarged prostate, History of blood transfusion, Hyperlipidemia, Hypertension, Neuropathy, Pacemaker, Pneumonia, and Vasodepressor syncope.  Past Surgical History:  has a past surgical history that includes shoulder surgery (Bilateral); Finger surgery; Coronary angioplasty with stent (); Cataract removal (Bilateral); ablation of dysrhythmic focus; Cystoscopy (2012); TURP (2013); other surgical history (Left, 2014); Carpal tunnel release; Finger trigger release; Cystocopy (10/08/2015); Spinal fusion (N/A); Colonoscopy; pacemaker placement; Cystoscopy (N/A, 10/17/2023); Cystoscopy (N/A, 2023); Skin cancer excision; and other surgical  reading  Hearing:   hard of hearing, left hearing aid, right hearing aid (Doesn't wear hearing aids.)  Perception:   WFL  Observation:   General Observation:  On RA, alves catheter  Sensation:   WFL  Proprioception:    WFL  Tone:   Normotonic  Coordination Testing:   WFL    ROM:   (B) UE AROM WFL  Strength:   (B) UE strength grossly WFL    Therapist Clinical Decision Making (Complexity): low complexity  Clinical Presentation: stable      Subjective  General: Pt supine in bed, agreeable to OT eval. Wife, Jazmin, present.   Pain: 0/10  Pain Interventions: not applicable        Activities of Daily Living  Basic Activities of Daily Living  Grooming: stand by assistance in stance at sink for washing face, washing and brushing hair, oral care  Upper Extremity Bathing: stand by assistance in stance at sink  Lower Extremity Bathing: stand by assistance in stance for francois hygiene and bathing LEs to ankles in stance; s/u wash feet seated on EOB   Upper Extremity Dressing: minimal assistance don/doff gown  Lower Extremity Dressing: stand by assistance clothing mgt; I don/doff socks  Toileting Comments: Pt with alves catheter; declined need for BM  Instrumental Activities of Daily Living  No IADL completed on this date.    Functional Mobility  Bed Mobility:  Supine to Sit: modified independent, HOB elevated  Comments:  Transfers:  Sit to stand transfer:supervision  Stand to sit transfer: supervision  Comments:from EOB X 2 trials, to EOB, to low chair  Functional Mobility  Functional Mobility Activity: to/from bathroom, functional mobility ~150 ft ni ventura  Device Use: no device  Required Assistance: stand by assistance  Comment: No LOB, slow pace  Balance:  Static Sitting Balance: good: independent with functional balance in unsupported position  Dynamic Sitting Balance: good: independent with functional balance in unsupported position  Static Standing Balance: fair (+): maintains balance at SBA/supervision without use of UE  support  Dynamic Standing Balance: fair (+): maintains balance at SBA/supervision without use of UE support  Comments:    Other Therapeutic Interventions    Functional Outcomes  AM-PAC Inpatient Daily Activity Raw Score: 21    Cognition  WFL  Orientation:    alert and oriented x 4  Command Following:   WFL     Education  Barriers To Learning: hearing  Patient Education: patient educated on goals, OT role and benefits, plan of care, family education, discharge recommendations  Learning Assessment:  patient verbalizes and demonstrates understanding    Assessment  Activity Tolerance: Tolerated well  Impairments Requiring Therapeutic Intervention: decreased functional mobility, decreased ADL status  Prognosis: good  Clinical Assessment: Pt is below his baseline level of occupational function, based on the above deficits associated with acute urine retention. He is independent with ADLs and functional transfers/mobility at baseline, and today he requires SBA. He will benefit from continued skilled acute OT services to address these deficits and facilitated a return to his PLOF.   Safety Interventions: patient left in chair, call light within reach, nurse notified, and family/caregiver present    Plan  Frequency: 3-5 x/per week  Current Treatment Recommendations: functional mobility training, transfer training, and ADL/self-care training    Goals  Patient Goals: To return home   Short Term Goals:  Time Frame: Discharge  Patient will complete upper body ADL at Independent   Patient will complete lower body ADL at modified independent   Patient will complete toileting at Independent   Patient will complete grooming at modified independent, in stance at sink   Patient will complete functional transfers at Independent   Patient will complete functional mobility at Independent     Above goals reviewed on 1/5/2024.  All goals are ongoing at this time unless indicated above.       Therapy Session Time     Individual Group

## 2024-01-05 NOTE — PLAN OF CARE
Problem: ABCDS Injury Assessment  Goal: Absence of physical injury  1/5/2024 1217 by Tonja Garcia, RN  Outcome: Progressing     Problem: Discharge Planning  Goal: Discharge to home or other facility with appropriate resources  1/5/2024 1217 by Tonja Garcia, RN  Outcome: Progressing     Problem: Pain  Goal: Verbalizes/displays adequate comfort level or baseline comfort level  1/5/2024 1217 by Tonja Garcia, RN  Outcome: Progressing     Problem: Safety - Adult  Goal: Free from fall injury  1/5/2024 1217 by Tonja Garcia, RN  Outcome: Progressing

## 2024-01-05 NOTE — PROGRESS NOTES
Shift assessment completed, see flowsheets.  Medications administered, see MAR. Vital signs stable. Plan of care discussed with patient. Safety precautions in place. Call light and bedside table within reach.  Pt denies further needs at this time.  Will continue to monitor.  Tonja Garcia RN

## 2024-01-05 NOTE — CARE COORDINATION
01/05/24 1303   Readmission Assessment   Number of Days since last admission? 1-7 days   Previous Disposition Home with Family   Who is being Interviewed Patient   What was the patient's/caregiver's perception as to why they think they needed to return back to the hospital? Other (Comment)  (The pt reports he had bleeding and Urinary retention.)   Did you visit your Primary Care Physician after you left the hospital, before you returned this time? No   Why weren't you able to visit your PCP? Did not have an appointment   Did you see a specialist, such as Cardiac, Pulmonary, Orthopedic Physician, etc. after you left the hospital? No   Who advised the patient to return to the hospital? Self-referral   Does the patient report anything that got in the way of taking their medications? No   In our efforts to provide the best possible care to you and others like you, can you think of anything that we could have done to help you after you left the hospital the first time, so that you might not have needed to return so soon? Other (Comment)  (No concerns reported.)

## 2024-01-05 NOTE — PROGRESS NOTES
HOSPITALISTS PROGRESS NOTE    1/5/2024 9:38 AM        Name: Troy Venegas .              Admitted: 1/3/2024  Primary Care Provider: Lois Kruger MD (Tel: [phone])      Brief Course: This 88-year-old male with PMHx of bladder cancer; s/p TURP on 10/23, cystoscopy with biopsy and urethral dilatation on 12/19/2023, CAD; s/p PCI& stent to RCA on 12/29/2023 A-fib on Eliquis presented with hematuria, blood clots and acute urine retention      Interval history:   Pt seen and examined today   Overnight events noted and interval ancillary notes reviewed.  On room air satting well.  Afebrile overnight, WBCs WNL, urine culture in process  Hgb stable around 13.4. Creatinine remained at 1.4  Pt resting in bed.  Denies any new complaint.  Stated that he wants to take a nap      Assessment & Plan:     Gross hematuria/Acute urine retention.  Hx of bladder cancer;s/p TURP on 10/23, cystoscopy with biopsy and urethral dilatation on 12/19/2023   CT abdomen& pelvis -ve for any acute inflammatory process.  Urinary bladder decompressed with Manning's  Manning catheter inserted in ED with noted gross hematuria.    Urology on board; recommended continuing Manning's catheter and irrigating as needed if Manning's clot  Hold anticoagulation.  Monitor H&H closely    UTI; urinalysis of UTI; continue Rocephin awaiting urine culture and sensitivities     CKD stage IIIa;Creatinine at baseline on admission (1.2-1.4)  Avoid nephrotoxin, strict and output, daily weights and monitor renal function closely     Hx of A-fib; on sotalol and Eliquis.  Eliquis held 2/2 hematuria  EP cardiology consulted; recommending resuming Eliquis once cleared by urology.    Hx of CAD; s/p PCI and stent to RCA x 2 on 12/29/23; on statin, Plavix and Eliquis at home  Cardiology consulted; recommending continuing Plavix to avoid stent thrombosis.  Holding Eliquis.  Resume Eliquis once cleared by

## 2024-01-06 LAB
ANION GAP SERPL CALCULATED.3IONS-SCNC: 7 MMOL/L (ref 3–16)
BASOPHILS # BLD: 0.1 K/UL (ref 0–0.2)
BASOPHILS NFR BLD: 0.8 %
BUN SERPL-MCNC: 22 MG/DL (ref 7–20)
CALCIUM SERPL-MCNC: 10 MG/DL (ref 8.3–10.6)
CHLORIDE SERPL-SCNC: 107 MMOL/L (ref 99–110)
CO2 SERPL-SCNC: 24 MMOL/L (ref 21–32)
CREAT SERPL-MCNC: 1.3 MG/DL (ref 0.8–1.3)
DEPRECATED RDW RBC AUTO: 13.2 % (ref 12.4–15.4)
EOSINOPHIL # BLD: 0.2 K/UL (ref 0–0.6)
EOSINOPHIL NFR BLD: 2.3 %
GFR SERPLBLD CREATININE-BSD FMLA CKD-EPI: 53 ML/MIN/{1.73_M2}
GLUCOSE BLD-MCNC: 107 MG/DL (ref 70–99)
GLUCOSE BLD-MCNC: 118 MG/DL (ref 70–99)
GLUCOSE BLD-MCNC: 121 MG/DL (ref 70–99)
GLUCOSE BLD-MCNC: 135 MG/DL (ref 70–99)
GLUCOSE SERPL-MCNC: 124 MG/DL (ref 70–99)
HCT VFR BLD AUTO: 37.8 % (ref 40.5–52.5)
HGB BLD-MCNC: 12.9 G/DL (ref 13.5–17.5)
LYMPHOCYTES # BLD: 0.9 K/UL (ref 1–5.1)
LYMPHOCYTES NFR BLD: 11.5 %
MCH RBC QN AUTO: 31.6 PG (ref 26–34)
MCHC RBC AUTO-ENTMCNC: 34.1 G/DL (ref 31–36)
MCV RBC AUTO: 92.8 FL (ref 80–100)
MONOCYTES # BLD: 0.8 K/UL (ref 0–1.3)
MONOCYTES NFR BLD: 10.1 %
NEUTROPHILS # BLD: 6.2 K/UL (ref 1.7–7.7)
NEUTROPHILS NFR BLD: 75.3 %
PERFORMED ON: ABNORMAL
PLATELET # BLD AUTO: 182 K/UL (ref 135–450)
PMV BLD AUTO: 8.6 FL (ref 5–10.5)
POTASSIUM SERPL-SCNC: 4 MMOL/L (ref 3.5–5.1)
RBC # BLD AUTO: 4.07 M/UL (ref 4.2–5.9)
SODIUM SERPL-SCNC: 138 MMOL/L (ref 136–145)
WBC # BLD AUTO: 8.2 K/UL (ref 4–11)

## 2024-01-06 PROCEDURE — 36415 COLL VENOUS BLD VENIPUNCTURE: CPT

## 2024-01-06 PROCEDURE — 6370000000 HC RX 637 (ALT 250 FOR IP): Performed by: NURSE PRACTITIONER

## 2024-01-06 PROCEDURE — 99232 SBSQ HOSP IP/OBS MODERATE 35: CPT | Performed by: INTERNAL MEDICINE

## 2024-01-06 PROCEDURE — 80048 BASIC METABOLIC PNL TOTAL CA: CPT

## 2024-01-06 PROCEDURE — 1200000000 HC SEMI PRIVATE

## 2024-01-06 PROCEDURE — 6360000002 HC RX W HCPCS: Performed by: NURSE PRACTITIONER

## 2024-01-06 PROCEDURE — 85025 COMPLETE CBC W/AUTO DIFF WBC: CPT

## 2024-01-06 PROCEDURE — 6370000000 HC RX 637 (ALT 250 FOR IP): Performed by: INTERNAL MEDICINE

## 2024-01-06 PROCEDURE — 2580000003 HC RX 258: Performed by: NURSE PRACTITIONER

## 2024-01-06 RX ORDER — SENNA AND DOCUSATE SODIUM 50; 8.6 MG/1; MG/1
2 TABLET, FILM COATED ORAL DAILY PRN
Status: DISCONTINUED | OUTPATIENT
Start: 2024-01-06 | End: 2024-01-08 | Stop reason: HOSPADM

## 2024-01-06 RX ADMIN — PANTOPRAZOLE SODIUM 40 MG: 40 TABLET, DELAYED RELEASE ORAL at 05:07

## 2024-01-06 RX ADMIN — TRAMADOL HYDROCHLORIDE 50 MG: 50 TABLET ORAL at 09:23

## 2024-01-06 RX ADMIN — Medication 10 ML: at 08:56

## 2024-01-06 RX ADMIN — CEFTRIAXONE SODIUM 1000 MG: 1 INJECTION, POWDER, FOR SOLUTION INTRAMUSCULAR; INTRAVENOUS at 21:54

## 2024-01-06 RX ADMIN — PREGABALIN 75 MG: 75 CAPSULE ORAL at 08:56

## 2024-01-06 RX ADMIN — SOTALOL HYDROCHLORIDE 80 MG: 80 TABLET ORAL at 08:56

## 2024-01-06 RX ADMIN — PREGABALIN 225 MG: 75 CAPSULE ORAL at 21:48

## 2024-01-06 RX ADMIN — SOTALOL HYDROCHLORIDE 80 MG: 80 TABLET ORAL at 22:43

## 2024-01-06 RX ADMIN — CLOPIDOGREL BISULFATE 75 MG: 75 TABLET ORAL at 08:56

## 2024-01-06 RX ADMIN — FINASTERIDE 5 MG: 5 TABLET, FILM COATED ORAL at 09:10

## 2024-01-06 RX ADMIN — POLYETHYLENE GLYCOL 3350 17 G: 17 POWDER, FOR SOLUTION ORAL at 10:43

## 2024-01-06 RX ADMIN — ATORVASTATIN CALCIUM 40 MG: 40 TABLET, FILM COATED ORAL at 21:48

## 2024-01-06 RX ADMIN — Medication 10 ML: at 21:48

## 2024-01-06 ASSESSMENT — PAIN DESCRIPTION - LOCATION: LOCATION: PELVIS

## 2024-01-06 ASSESSMENT — PAIN SCALES - GENERAL
PAINLEVEL_OUTOF10: 0
PAINLEVEL_OUTOF10: 7
PAINLEVEL_OUTOF10: 4

## 2024-01-06 ASSESSMENT — PAIN DESCRIPTION - DESCRIPTORS: DESCRIPTORS: ACHING

## 2024-01-06 ASSESSMENT — PAIN SCALES - WONG BAKER: WONGBAKER_NUMERICALRESPONSE: 0

## 2024-01-06 NOTE — PROGRESS NOTES
Urology Progress Note  [facility]    Provider: Les Bryant MD  Patient ID:  Admission Date: 1/3/2024 Name: Troy Venegas  OR Date: 1/3/2024 MRN: [id number]   Patient Location: [facility]-3321/3321-01 : 1935  Attending: Katy Isaac MD Date of Service: 2024  PCP: Lois Kruger MD     Diagnoses:  1. Acute retention of urine    2. Anticoagulated    3. Acute cystitis with hematuria    4. JUANA (acute kidney injury) (HCC)      87 yo male s/p TURBT in Oct, pathology high grade NMIBC, and s/p cystoscopy, biopsy and urethral dilation 2023, pathology CIS. Heart cath and stent placed 2023 per cardiology. Now on eliquis and Plavix. Admitted with gross hematuria and clot retention. A alves was placed in the ED.   ==  Alves irrigated yesterday, currently draining completely clear. No clots noted   Afebrile, cr stable, hgb stable   Cx negative    Assessment/Plan:  -Continue alves - draining clear urine today. Irrigate prn and upsize to 20/ if alves clots off  -Continue to hold AC but can likely resume once urine completely clears. Will need to monitor for worsening hematuria once restarted on AC  -Patient appears to be doing okay from a urology standpoint. Will monitor for now. Would recommend removing alves prior to discharge.     The patient had a chance to ask questions which were answered. he understands the above plan.    Subjective:   Troy Venegas is a 88 y.o. male. He was seen and examined this morning. Today we discussed continuing alves until discharge     Objective:   Vitals:  Vitals:    24 0953   BP:    Pulse:    Resp: 14   Temp:    SpO2:        Intake/Output Summary (Last 24 hours) at 2024 1512  Last data filed at 2024 0434  Gross per 24 hour   Intake 92.12 ml   Output 1700 ml   Net -1607.88 ml       Physical Exam:  Gen: Alert and oriented x3, no acute distress  CV: Regular rate   Resp: unlabored respirations  Abd: Soft, non-distended, non-tender, no  masses  Ext: no peripheral edema noted, moves upper and lower extremities spontaneously  Skin: warmand well perfused, no rashes noted on the face, or arms.     Labs:  Lab Results   Component Value Date    WBC 8.2 01/06/2024    HGB 12.9 (L) 01/06/2024    HCT 37.8 (L) 01/06/2024    MCV 92.8 01/06/2024     01/06/2024     Lab Results   Component Value Date    CREATININE 1.3 01/06/2024    BUN 22 (H) 01/06/2024     01/06/2024    K 4.0 01/06/2024     01/06/2024    CO2 24 01/06/2024       Les Bryant MD   1/6/2024

## 2024-01-06 NOTE — PROGRESS NOTES
[facility]   Progress Note  Cardiology    CC:  Hematuria, CAD, Afib    HPI:  Hematuria resolving      Medications/Labs all Reviewed    Lab Results   Component Value Date    WBC 8.2 01/06/2024    HGB 12.9 (L) 01/06/2024    HCT 37.8 (L) 01/06/2024    MCV 92.8 01/06/2024     01/06/2024     Lab Results   Component Value Date    CREATININE 1.3 01/06/2024    BUN 22 (H) 01/06/2024     01/06/2024    K 4.0 01/06/2024     01/06/2024    CO2 24 01/06/2024     Lab Results   Component Value Date    INR 1.09 01/03/2024    PROTIME 14.1 01/03/2024        PHYSICAL EXAM   /64   Pulse 59   Temp 97.7 °F (36.5 °C) (Oral)   Resp 16   Ht 1.829 m (6')   Wt 86.4 kg (190 lb 6.4 oz)   SpO2 96%   BMI 25.82 kg/m²      Respiratory:  Resp Assessment: Normal respiratory effort  Resp Auscultation: Clear to auscultation bilaterally   Cardiovascular:  Auscultation: regular rate and rhythm, normal S1S2, no murmur, rub or gallop  Palpation:  Nl PMI  JVP:  normal  Extremities: No Edema  Abdomen:  Soft, non-tender  Normal bowel sounds  Extremities:   No Cyanosis or Clubbing  Neurological/Psychiatric:  Oriented to time, place, and person  Non-anxious  Skin:   Warm and dry        ASSESSMENT:     Hematuria:  Improving  Urology seeing with alves and irrigation.  Agree with holding eliquis  I am hesitant to hold plavix with multiple stents placed to RCA 6 days ago  Discussed with pt and wife. Eliquis had been held since October due to hematuria. Just restarted last week and then had severe bleeding.  I doubt that he is going to tolerate eliquis at this time. Pt and wife are hesitant to rtry at this time. Discussed risks both ways. High risk of bleeding restarting eliquis, vs risk of CVA off of eliquis. Afib burden low,<1%, but some risk still exist.      CAD:  RCA stent x 2 6 days ago  Continue plavix for now.   If bleeding continues we may have to hold but I would rather try not to due to risk of stent  thrombosis  If we do not restart eliquis, consider adding ASA 81 mg     Afib  PAF  Controlled on Sotalol.  0.1% Afib on last check  High CHADS2-Vasc=5  Long term anticoagulation with high risk due to hematuria and need for antiplatelets.  I believe Watchman should be considered.  EP has seen as well  Pt wishes to begin process          Juancarlos Jeffrey MD, 1/6/2024 9:10 AM

## 2024-01-06 NOTE — PROGRESS NOTES
HOSPITALISTS PROGRESS NOTE    1/6/2024 10:29 AM        Name: Troy Venegas .              Admitted: 1/3/2024  Primary Care Provider: Lois Kruger MD (Tel: [phone])      Brief Course: This 88-year-old male with PMHx of bladder cancer; s/p TURP on 10/23, cystoscopy with biopsy and urethral dilatation on 12/19/2023, CAD; s/p PCI& stent to RCA on 12/29/2023 A-fib on Eliquis presented with hematuria, blood clots and acute urine retention      Interval history:   Pt seen and examined today.  Overnight events noted, interval ancillary notes and labs reviewed.   Hemodynamically stable. Hgb stable around 12.9.  Hematuria resolving.  Creatinine 1.3 this morning.  Up in bed; denies any new complaint.   Plan to resume anticoagulant once urine clears completely per urology.        Assessment & Plan:     Gross hematuria/Acute urine retention.  Hx of bladder cancer;s/p TURP on 10/23, cystoscopy with biopsy and urethral dilatation on 12/19/2023   CT abdomen& pelvis -ve for any acute inflammatory process.  Urinary bladder decompressed with Manning's  Manning catheter inserted in ED with noted gross hematuria.    Urology on board; recommended continuing Manning's catheter and irrigating as needed if Manning's clot  Hold anticoagulation.  Monitor H&H closely    UTI; urinalysis of UTI; continue Rocephin awaiting urine culture and sensitivities     CKD stage IIIa;Creatinine at baseline on admission (1.2-1.4)  Avoid nephrotoxin, strict and output, daily weights and monitor renal function closely     Hx of A-fib; on sotalol and Eliquis.  Eliquis held 2/2 hematuria  EP cardiology consulted; recommending resuming Eliquis once cleared by urology.  If patient continues to have hematuria; Watchman could be considered      Hx of CAD; s/p PCI and stent to RCA x 2 on 12/29/23; on statin, Plavix and Eliquis at home  Cardiology consulted; recommending continuing Plavix to avoid  83.6 kg (184 lb 4.9 oz)   12/19/23 81.6 kg (180 lb)       Physical Examination:   General appearance:  No apparent distress, appears stated age and cooperative.  HEENT: Normocephalic, sclera clear., PERRLA.  Trachea midline, no adenopathy.  Cardiovascular: Regular rate and rhythm, normal S1, S2. No murmur.   Respiratory:Clear to auscultation bilaterally, no wheeze or crackles.   GI: Abdomen soft, no tenderness, not distended, normal bowel sounds  Musculoskeletal: No cyanosis in digits.  No BLE edema present  Neurology: CN 2-12 grossly intact. No speech or motor deficits  Psych: Not agitated, appropriate affect  Skin: Warm, dry, normal turgor    Labs and Tests:  CBC:   Recent Labs     01/04/24  0628 01/04/24 2119 01/05/24  0452 01/06/24  0534   WBC 7.1  --  8.6 8.2   HGB 12.7* 12.6* 13.4* 12.9*     --  192 182       BMP:    Recent Labs     01/04/24  0628 01/04/24  0913 01/05/24  0452 01/06/24  0534     --  139 138   K 3.9  --  4.3 4.0     --  105 107   CO2 27  --  25 24   BUN 20  --  20 22*   CREATININE 1.3  --  1.4* 1.3   GLUCOSE 93 85 137* 124*       Hepatic:   Recent Labs     01/03/24  2059   AST 19   ALT 10   BILITOT 0.5   ALKPHOS 74       CT ABDOMEN PELVIS WO CONTRAST Additional Contrast? None   Final Result   1. No acute inflammatory process identified within the limits of a   noncontrast exam.  The bladder is decompressed with a Manning catheter.   Previously described suspected bladder mass is not delineated on this exam   and may be obscured.   2. Cholelithiasis.   3. Diverticulosis.             Problem List  Principal Problem:    Acute retention of urine  Active Problems:    Coronary artery disease involving native coronary artery of native heart without angina pectoris    JUANA (acute kidney injury) (HCC)  Resolved Problems:    * No resolved hospital problems. *       CARA DIANE MD   1/6/2024 10:29 AM      Please note that some part of this chart was generated using Dragon dictation

## 2024-01-06 NOTE — PROGRESS NOTES
Dr Jeffrey rounded and spoke with patient about watchman device. Patient agreeable and will be starting approval process.

## 2024-01-06 NOTE — PLAN OF CARE
Problem: ABCDS Injury Assessment  Goal: Absence of physical injury  1/5/2024 2318 by Randall Falk RN  Outcome: Progressing  1/5/2024 2317 by Randall Falk RN  Outcome: Progressing  1/5/2024 1217 by Tonja Garcia RN  Outcome: Progressing     Problem: Discharge Planning  Goal: Discharge to home or other facility with appropriate resources  1/5/2024 2318 by Rnadall Falk RN  Outcome: Progressing  1/5/2024 2317 by Randall Falk RN  Outcome: Progressing  1/5/2024 1217 by Tonja Garcia RN  Outcome: Progressing     Problem: Pain  Goal: Verbalizes/displays adequate comfort level or baseline comfort level  1/5/2024 2318 by Randall Falk RN  Outcome: Progressing  1/5/2024 1217 by Tonja Garcia RN  Outcome: Progressing     Problem: Safety - Adult  Goal: Free from fall injury  1/5/2024 2318 by Randall Falk RN  Outcome: Progressing  1/5/2024 1217 by Tonja Garcia RN  Outcome: Progressing     Problem: Genitourinary - Adult  Goal: Absence of urinary retention  Outcome: Progressing  Goal: Urinary catheter remains patent  Outcome: Progressing

## 2024-01-07 LAB
ANION GAP SERPL CALCULATED.3IONS-SCNC: 11 MMOL/L (ref 3–16)
BASOPHILS # BLD: 0 K/UL (ref 0–0.2)
BASOPHILS NFR BLD: 0.7 %
BUN SERPL-MCNC: 18 MG/DL (ref 7–20)
CALCIUM SERPL-MCNC: 10.2 MG/DL (ref 8.3–10.6)
CHLORIDE SERPL-SCNC: 107 MMOL/L (ref 99–110)
CO2 SERPL-SCNC: 23 MMOL/L (ref 21–32)
CREAT SERPL-MCNC: 1.1 MG/DL (ref 0.8–1.3)
DEPRECATED RDW RBC AUTO: 13.1 % (ref 12.4–15.4)
EOSINOPHIL # BLD: 0.2 K/UL (ref 0–0.6)
EOSINOPHIL NFR BLD: 3.5 %
GFR SERPLBLD CREATININE-BSD FMLA CKD-EPI: >60 ML/MIN/{1.73_M2}
GLUCOSE BLD-MCNC: 115 MG/DL (ref 70–99)
GLUCOSE BLD-MCNC: 123 MG/DL (ref 70–99)
GLUCOSE BLD-MCNC: 133 MG/DL (ref 70–99)
GLUCOSE BLD-MCNC: 152 MG/DL (ref 70–99)
GLUCOSE SERPL-MCNC: 99 MG/DL (ref 70–99)
HCT VFR BLD AUTO: 37.2 % (ref 40.5–52.5)
HGB BLD-MCNC: 12.8 G/DL (ref 13.5–17.5)
LYMPHOCYTES # BLD: 1.1 K/UL (ref 1–5.1)
LYMPHOCYTES NFR BLD: 18.7 %
MCH RBC QN AUTO: 31.9 PG (ref 26–34)
MCHC RBC AUTO-ENTMCNC: 34.4 G/DL (ref 31–36)
MCV RBC AUTO: 92.8 FL (ref 80–100)
MONOCYTES # BLD: 0.6 K/UL (ref 0–1.3)
MONOCYTES NFR BLD: 10.1 %
NEUTROPHILS # BLD: 3.9 K/UL (ref 1.7–7.7)
NEUTROPHILS NFR BLD: 67 %
PERFORMED ON: ABNORMAL
PLATELET # BLD AUTO: 168 K/UL (ref 135–450)
PMV BLD AUTO: 8.7 FL (ref 5–10.5)
POTASSIUM SERPL-SCNC: 3.9 MMOL/L (ref 3.5–5.1)
RBC # BLD AUTO: 4.01 M/UL (ref 4.2–5.9)
SODIUM SERPL-SCNC: 141 MMOL/L (ref 136–145)
WBC # BLD AUTO: 5.8 K/UL (ref 4–11)

## 2024-01-07 PROCEDURE — 6370000000 HC RX 637 (ALT 250 FOR IP): Performed by: NURSE PRACTITIONER

## 2024-01-07 PROCEDURE — 2580000003 HC RX 258: Performed by: NURSE PRACTITIONER

## 2024-01-07 PROCEDURE — 6370000000 HC RX 637 (ALT 250 FOR IP): Performed by: INTERNAL MEDICINE

## 2024-01-07 PROCEDURE — 85025 COMPLETE CBC W/AUTO DIFF WBC: CPT

## 2024-01-07 PROCEDURE — 36415 COLL VENOUS BLD VENIPUNCTURE: CPT

## 2024-01-07 PROCEDURE — 94760 N-INVAS EAR/PLS OXIMETRY 1: CPT

## 2024-01-07 PROCEDURE — 99232 SBSQ HOSP IP/OBS MODERATE 35: CPT | Performed by: INTERNAL MEDICINE

## 2024-01-07 PROCEDURE — 1200000000 HC SEMI PRIVATE

## 2024-01-07 PROCEDURE — 80048 BASIC METABOLIC PNL TOTAL CA: CPT

## 2024-01-07 RX ORDER — ASPIRIN 81 MG/1
81 TABLET, CHEWABLE ORAL DAILY
Status: DISCONTINUED | OUTPATIENT
Start: 2024-01-07 | End: 2024-01-08 | Stop reason: HOSPADM

## 2024-01-07 RX ADMIN — ATORVASTATIN CALCIUM 40 MG: 40 TABLET, FILM COATED ORAL at 19:50

## 2024-01-07 RX ADMIN — Medication 10 ML: at 08:20

## 2024-01-07 RX ADMIN — SOTALOL HYDROCHLORIDE 80 MG: 80 TABLET ORAL at 19:49

## 2024-01-07 RX ADMIN — SOTALOL HYDROCHLORIDE 80 MG: 80 TABLET ORAL at 08:19

## 2024-01-07 RX ADMIN — PREGABALIN 75 MG: 75 CAPSULE ORAL at 08:19

## 2024-01-07 RX ADMIN — CLOPIDOGREL BISULFATE 75 MG: 75 TABLET ORAL at 08:19

## 2024-01-07 RX ADMIN — ASPIRIN 81 MG 81 MG: 81 TABLET ORAL at 10:58

## 2024-01-07 RX ADMIN — PANTOPRAZOLE SODIUM 40 MG: 40 TABLET, DELAYED RELEASE ORAL at 06:32

## 2024-01-07 RX ADMIN — PREGABALIN 225 MG: 75 CAPSULE ORAL at 19:49

## 2024-01-07 RX ADMIN — FINASTERIDE 5 MG: 5 TABLET, FILM COATED ORAL at 08:19

## 2024-01-07 ASSESSMENT — PAIN SCALES - GENERAL: PAINLEVEL_OUTOF10: 0

## 2024-01-07 NOTE — PROGRESS NOTES
HOSPITALISTS PROGRESS NOTE    1/7/2024 9:25 AM        Name: Troy Venegas .              Admitted: 1/3/2024  Primary Care Provider: Lois Kruger MD (Tel: [phone])      Brief Course: This 88-year-old male with PMHx of bladder cancer; s/p TURP on 10/23, cystoscopy with biopsy and urethral dilatation on 12/19/2023, CAD; s/p PCI& stent to RCA on 12/29/2023 A-fib on Eliquis presented with hematuria, blood clots and acute urine retention      Interval history:   Pt seen and examined today.  Overnight events noted, interval ancillary notes and labs reviewed.   Hematuria resolved.  Hemoglobin stable   Continue Plavix. Started on aspirin per cardiology recs.    Plan to continue with Manning's catheter upon discharge per urology recs  Patient up in bed; denies any new complaints.  Patient wanted to be monitored overnight for recurrent hematuria after receiving aspirin today.  Stated he does not want to go through all the procedure of getting readmitted again.  Plan for the patient to be discharged home tomorrow if medically stable      Assessment & Plan:     Gross hematuria/Acute urine retention; hematuria resolved  Hx of bladder cancer;s/p TURP on 10/23, cystoscopy with biopsy and urethral dilatation on 12/19/2023   CT abdomen& pelvis -ve for any acute inflammatory process.  Urinary bladder decompressed with Manning's  Urology on board; recommended continuing Manning's catheter and irrigating as needed if Manning's clot  Hold anticoagulation.  Monitor H&H closely    UTI; history of UTI.  Urine culture negative; Rocephin discontinued     CKD stage IIIa; Creatinine at baseline on admission (1.2-1.4)  Avoid nephrotoxin, strict and output, daily weights and monitor renal function closely     Hx of A-fib; on sotalol and Eliquis.  Eliquis DC'd 2/2 hematuria  EP cardiology consulted; recommending resuming Eliquis once cleared by urology.  Patient hesitant to  kg (189 lb 13.1 oz)   SpO2 92%   BMI 25.74 kg/m²     Intake/Output Summary (Last 24 hours) at 1/7/2024 0920  Last data filed at 1/7/2024 0636  Gross per 24 hour   Intake --   Output 2300 ml   Net -2300 ml        Wt Readings from Last 3 Encounters:   01/07/24 86.1 kg (189 lb 13.1 oz)   12/30/23 83.6 kg (184 lb 4.9 oz)   12/19/23 81.6 kg (180 lb)       Physical Examination:   General appearance:  No apparent distress, appears stated age and cooperative.  HEENT: Normocephalic, sclera clear., PERRLA.  Trachea midline, no adenopathy.  Cardiovascular: Regular rate and rhythm, normal S1, S2. No murmur.   Respiratory:Clear to auscultation bilaterally, no wheeze or crackles.   GI: Abdomen soft, no tenderness, not distended, normal bowel sounds  Musculoskeletal: No cyanosis in digits.  No BLE edema present  Neurology: CN 2-12 grossly intact. No speech or motor deficits  Psych: Not agitated, appropriate affect  Skin: Warm, dry, normal turgor    Labs and Tests:  CBC:   Recent Labs     01/05/24  0452 01/06/24  0534 01/07/24  0533   WBC 8.6 8.2 5.8   HGB 13.4* 12.9* 12.8*    182 168       BMP:    Recent Labs     01/05/24  0452 01/06/24  0534 01/07/24  0533    138 141   K 4.3 4.0 3.9    107 107   CO2 25 24 23   BUN 20 22* 18   CREATININE 1.4* 1.3 1.1   GLUCOSE 137* 124* 99       Hepatic:   No results for input(s): \"AST\", \"ALT\", \"ALB\", \"BILITOT\", \"ALKPHOS\" in the last 72 hours.    CT ABDOMEN PELVIS WO CONTRAST Additional Contrast? None   Final Result   1. No acute inflammatory process identified within the limits of a   noncontrast exam.  The bladder is decompressed with a Manning catheter.   Previously described suspected bladder mass is not delineated on this exam   and may be obscured.   2. Cholelithiasis.   3. Diverticulosis.             Problem List  Principal Problem:    Acute retention of urine  Active Problems:    Coronary artery disease involving native coronary artery of native heart without angina

## 2024-01-07 NOTE — PLAN OF CARE
Problem: ABCDS Injury Assessment  Goal: Absence of physical injury  1/6/2024 2355 by Randall Falk RN  Outcome: Progressing     Problem: Discharge Planning  Goal: Discharge to home or other facility with appropriate resources  1/7/2024 0956 by Danae Clarke RN  Outcome: Progressing  1/6/2024 2355 by Randall Falk RN  Outcome: Progressing     Problem: Pain  Goal: Verbalizes/displays adequate comfort level or baseline comfort level  1/7/2024 0956 by Danae Clarke RN  Outcome: Progressing  1/6/2024 2355 by Randall Falk RN  Outcome: Progressing     Problem: Safety - Adult  Goal: Free from fall injury  1/7/2024 0956 by Danae Clarke RN  Outcome: Progressing  1/6/2024 2355 by Randall Falk RN  Outcome: Progressing     Problem: Genitourinary - Adult  Goal: Absence of urinary retention  1/7/2024 0956 by Danae Clarke RN  Outcome: Progressing  1/6/2024 2355 by Randall Falk RN  Outcome: Progressing  Goal: Urinary catheter remains patent  1/7/2024 0956 by Danae Clarke RN  Outcome: Progressing  1/6/2024 2355 by Randall Falk RN  Outcome: Progressing     Problem: Genitourinary - Adult  Goal: Urinary catheter remains patent  1/7/2024 0956 by Danae Clarke RN  Outcome: Progressing  1/6/2024 2355 by Randall Falk RN  Outcome: Progressing

## 2024-01-07 NOTE — PROGRESS NOTES
Urology Progress Note  [facility]    Provider: Les Bryant MD  Patient ID:  Admission Date: 1/3/2024 Name: rToy Venegas  OR Date: 1/3/2024 MRN: [id number]   Patient Location: [facility]-3321/3321-01 : 1935  Attending: Katy Isaac MD Date of Service: 2024  PCP: Lois Kruger MD     Diagnoses:  1. Acute retention of urine    2. Anticoagulated    3. Acute cystitis with hematuria    4. JUANA (acute kidney injury) (HCC)      87 yo male s/p TURBT in Oct, pathology high grade NMIBC, and s/p cystoscopy, biopsy and urethral dilation 2023, pathology CIS. Heart cath and stent placed 2023 per cardiology. Now on eliquis and Plavix. Admitted with gross hematuria and clot retention. A alves was placed in the ED.   ==  Alves irrigated yesterday, currently draining completely clear. No clots noted   Afebrile, cr stable, hgb stable   Cx negative    Assessment/Plan:  -Continue alves - draining clear urine today. Irrigate prn and upsize to 20/ if alves clots off  -Continue to hold AC but can likely resume once urine completely clears. Will need to monitor for worsening hematuria once restarted on AC  -Patient appears to be doing okay from a urology standpoint. Will monitor for now. Would recommend removing alves prior to discharge.     The patient had a chance to ask questions which were answered. he understands the above plan.    Subjective:   Troy Venegas is a 88 y.o. male. He was seen and examined this morning. Today we discussed continuing alves until discharge     Objective:   Vitals:  Vitals:    24 0630   BP: (!) 144/82   Pulse: 60   Resp:    Temp:    SpO2: 95%       Intake/Output Summary (Last 24 hours) at 2024 0708  Last data filed at 2024 0636  Gross per 24 hour   Intake --   Output 2300 ml   Net -2300 ml       Physical Exam:  Gen: Alert and oriented x3, no acute distress  CV: Regular rate   Resp: unlabored respirations  Abd: Soft, non-distended, non-tender, no  masses  Ext: no peripheral edema noted, moves upper and lower extremities spontaneously  Skin: warmand well perfused, no rashes noted on the face, or arms.     Labs:  Lab Results   Component Value Date    WBC 5.8 01/07/2024    HGB 12.8 (L) 01/07/2024    HCT 37.2 (L) 01/07/2024    MCV 92.8 01/07/2024     01/07/2024     Lab Results   Component Value Date    CREATININE 1.1 01/07/2024    BUN 18 01/07/2024     01/07/2024    K 3.9 01/07/2024     01/07/2024    CO2 23 01/07/2024       Les Bryant MD   1/7/2024

## 2024-01-07 NOTE — PROGRESS NOTES
Spouse and patient educated regarding home care for alves catheter.  Both verbalized understanding.

## 2024-01-07 NOTE — PLAN OF CARE
Problem: ABCDS Injury Assessment  Goal: Absence of physical injury  Outcome: Progressing     Problem: Discharge Planning  Goal: Discharge to home or other facility with appropriate resources  Outcome: Progressing     Problem: Pain  Goal: Verbalizes/displays adequate comfort level or baseline comfort level  Outcome: Progressing     Problem: Safety - Adult  Goal: Free from fall injury  Outcome: Progressing     Problem: Genitourinary - Adult  Goal: Absence of urinary retention  Outcome: Progressing  Goal: Urinary catheter remains patent  Outcome: Progressing

## 2024-01-07 NOTE — DISCHARGE INSTRUCTIONS
Follow up with your PCP within 7-10 days of discharge.  Follow up with cardiology as instructed   Follow up with urology as instructed   Make appointment with EP cardiology ;Dr. Quesada for watchman discussion  Take all your medications as prescribed.

## 2024-01-07 NOTE — PROGRESS NOTES
[facility]   Progress Note  Cardiology    CC:  Hematuria, CAD, Afib    HPI:  Hematuria resolved. Alves still in.      Medications/Labs all Reviewed    Lab Results   Component Value Date    WBC 5.8 01/07/2024    HGB 12.8 (L) 01/07/2024    HCT 37.2 (L) 01/07/2024    MCV 92.8 01/07/2024     01/07/2024     Lab Results   Component Value Date    CREATININE 1.1 01/07/2024    BUN 18 01/07/2024     01/07/2024    K 3.9 01/07/2024     01/07/2024    CO2 23 01/07/2024     Lab Results   Component Value Date    INR 1.09 01/03/2024    PROTIME 14.1 01/03/2024        PHYSICAL EXAM   /73   Pulse 60   Temp 97.8 °F (36.6 °C)   Resp 12   Ht 1.829 m (6')   Wt 86.1 kg (189 lb 13.1 oz)   SpO2 92%   BMI 25.74 kg/m²      Respiratory:  Resp Assessment: Normal respiratory effort  Resp Auscultation: Clear to auscultation bilaterally   Cardiovascular:  Auscultation: regular rate and rhythm, normal S1S2, no murmur, rub or gallop  Palpation:  Nl PMI  JVP:  normal  Extremities: No Edema  Abdomen:  Soft, non-tender  Normal bowel sounds  Extremities:   No Cyanosis or Clubbing  Neurological/Psychiatric:  Oriented to time, place, and person  Non-anxious  Skin:   Warm and dry    Tele;  Paced  No afib    ASSESSMENT:     Hematuria:  Urine clear off of eliquis  Going home with alves per pt.    Again discussed in detail with pt and wife. They do not want to restart eliquis at this time due to concern of bleeding. I will add ASA 81 mg to plavix.  He will f/u with EP as outpt to discuss plan and possible watchman.  He would like to f/u with Lax to get his opinion on the situation and will also need f/u with Attari     CAD:  RCA stent x 2   Continue plavix. Add ASA since he is off of eliquis   He has f/u with Dr. Jung 1/19     Afib  PAF  Controlled on Sotalol.  0.1% Afib on last check  High CHADS2-Vasc=5  Long term anticoagulation with high risk due to hematuria and need for antiplatelets.  I believe Watchman  should be considered.  EP has seen as well  Pt wishes to begin process      Disp:  OK to d/c from cardiac standpoint-ASA 81 and plavix 75  He has f/u scheduled with Dr. Jung  He will call for appointment with Susan and Dr. Quesada (or Kristian MEJIAS)    Juancarlos Jeffrey MD, 1/7/2024 8:33 AM

## 2024-01-08 VITALS
HEART RATE: 56 BPM | WEIGHT: 181.44 LBS | HEIGHT: 72 IN | RESPIRATION RATE: 18 BRPM | SYSTOLIC BLOOD PRESSURE: 130 MMHG | TEMPERATURE: 97.8 F | OXYGEN SATURATION: 93 % | DIASTOLIC BLOOD PRESSURE: 73 MMHG | BODY MASS INDEX: 24.58 KG/M2

## 2024-01-08 PROBLEM — I21.4 NSTEMI (NON-ST ELEVATED MYOCARDIAL INFARCTION) (HCC): Status: RESOLVED | Noted: 2023-12-28 | Resolved: 2024-01-08

## 2024-01-08 PROBLEM — R33.8 ACUTE RETENTION OF URINE: Status: RESOLVED | Noted: 2024-01-04 | Resolved: 2024-01-08

## 2024-01-08 PROBLEM — N17.9 AKI (ACUTE KIDNEY INJURY) (HCC): Status: RESOLVED | Noted: 2024-01-05 | Resolved: 2024-01-08

## 2024-01-08 LAB
ANION GAP SERPL CALCULATED.3IONS-SCNC: 10 MMOL/L (ref 3–16)
BASOPHILS # BLD: 0.1 K/UL (ref 0–0.2)
BASOPHILS NFR BLD: 0.8 %
BUN SERPL-MCNC: 20 MG/DL (ref 7–20)
CALCIUM SERPL-MCNC: 10.1 MG/DL (ref 8.3–10.6)
CHLORIDE SERPL-SCNC: 107 MMOL/L (ref 99–110)
CO2 SERPL-SCNC: 25 MMOL/L (ref 21–32)
CREAT SERPL-MCNC: 1.1 MG/DL (ref 0.8–1.3)
DEPRECATED RDW RBC AUTO: 13 % (ref 12.4–15.4)
EOSINOPHIL # BLD: 0.2 K/UL (ref 0–0.6)
EOSINOPHIL NFR BLD: 3.2 %
GFR SERPLBLD CREATININE-BSD FMLA CKD-EPI: >60 ML/MIN/{1.73_M2}
GLUCOSE BLD-MCNC: 109 MG/DL (ref 70–99)
GLUCOSE BLD-MCNC: 116 MG/DL (ref 70–99)
GLUCOSE BLD-MCNC: 128 MG/DL (ref 70–99)
GLUCOSE SERPL-MCNC: 114 MG/DL (ref 70–99)
HCT VFR BLD AUTO: 39.9 % (ref 40.5–52.5)
HGB BLD-MCNC: 13.4 G/DL (ref 13.5–17.5)
LYMPHOCYTES # BLD: 0.9 K/UL (ref 1–5.1)
LYMPHOCYTES NFR BLD: 13.5 %
MCH RBC QN AUTO: 31.2 PG (ref 26–34)
MCHC RBC AUTO-ENTMCNC: 33.6 G/DL (ref 31–36)
MCV RBC AUTO: 92.9 FL (ref 80–100)
MONOCYTES # BLD: 0.6 K/UL (ref 0–1.3)
MONOCYTES NFR BLD: 9.6 %
NEUTROPHILS # BLD: 4.7 K/UL (ref 1.7–7.7)
NEUTROPHILS NFR BLD: 72.9 %
PERFORMED ON: ABNORMAL
PLATELET # BLD AUTO: 188 K/UL (ref 135–450)
PMV BLD AUTO: 8.3 FL (ref 5–10.5)
POTASSIUM SERPL-SCNC: 3.8 MMOL/L (ref 3.5–5.1)
RBC # BLD AUTO: 4.29 M/UL (ref 4.2–5.9)
SODIUM SERPL-SCNC: 142 MMOL/L (ref 136–145)
WBC # BLD AUTO: 6.4 K/UL (ref 4–11)

## 2024-01-08 PROCEDURE — 36415 COLL VENOUS BLD VENIPUNCTURE: CPT

## 2024-01-08 PROCEDURE — 80048 BASIC METABOLIC PNL TOTAL CA: CPT

## 2024-01-08 PROCEDURE — 6370000000 HC RX 637 (ALT 250 FOR IP): Performed by: NURSE PRACTITIONER

## 2024-01-08 PROCEDURE — 85025 COMPLETE CBC W/AUTO DIFF WBC: CPT

## 2024-01-08 PROCEDURE — 6370000000 HC RX 637 (ALT 250 FOR IP): Performed by: INTERNAL MEDICINE

## 2024-01-08 RX ORDER — ASPIRIN 81 MG/1
81 TABLET, CHEWABLE ORAL DAILY
Qty: 30 TABLET | Refills: 1 | Status: SHIPPED | OUTPATIENT
Start: 2024-01-08

## 2024-01-08 RX ADMIN — PANTOPRAZOLE SODIUM 40 MG: 40 TABLET, DELAYED RELEASE ORAL at 06:03

## 2024-01-08 RX ADMIN — FINASTERIDE 5 MG: 5 TABLET, FILM COATED ORAL at 10:06

## 2024-01-08 RX ADMIN — PREGABALIN 75 MG: 75 CAPSULE ORAL at 10:06

## 2024-01-08 RX ADMIN — SOTALOL HYDROCHLORIDE 80 MG: 80 TABLET ORAL at 10:07

## 2024-01-08 RX ADMIN — CLOPIDOGREL BISULFATE 75 MG: 75 TABLET ORAL at 10:07

## 2024-01-08 RX ADMIN — ASPIRIN 81 MG 81 MG: 81 TABLET ORAL at 10:07

## 2024-01-08 NOTE — PROGRESS NOTES
[facility]   Electrophysiology  Les Riojas, APRN-CNP  Attending EP: Dr. Quesada    Date: 1/23/2024  I had the privilege of visiting Troy Venegas in the office.     Chief Complaint:   Chief Complaint   Patient presents with    Follow-up     Patient is having dizzy spells, SOB after exercising.  BP after exercising yesterday 91/47.    Hypertension    Coronary Artery Disease     History of Present Illness: History obtained from patient and medical record.    Troy Venegas is 88 y.o. 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 History of prior bladder cancer and recurrent hematuria    -Interval history: Today, Troy Venegas is being seen for routine follow up. His wife is present for the visit. He is doing fair. He denies any further hematuria. He has a lot of questions about the Watchman. He has not started tx for his bladder cancer.     Denies having chest pain, palpitations, shortness of breath, orthopnea/PND, cough, or dizziness at the time of this visit.    With regard to medication therapy the patient has been compliant with prescribed regimen. They have tolerated therapy to date.     Allergies:  No Known Allergies    Home Medications:  Prior to Visit Medications    Medication Sig Taking? Authorizing Provider   tamsulosin (FLOMAX) 0.4 MG capsule Take 1 capsule by mouth daily Yes ProviderLiz MD   clopidogrel (PLAVIX) 75 MG tablet Take 1 tablet by mouth daily Yes Rosalino Jung MD   aspirin 81 MG chewable tablet Take 1 tablet by mouth daily Yes Katy Isaac MD   atorvastatin (LIPITOR) 40 MG tablet Take 1 tablet by mouth nightly Yes Prince Donovan MD   nateglinide (STARLIX) 120 MG tablet Take 1 tablet by mouth Daily with supper Starting 2 a day on 1/22 Yes Liz Aponte MD   psyllium (METAMUCIL) 28.3 % POWD powder Take 3.4 g by mouth at bedtime Yes Provider

## 2024-01-08 NOTE — PROGRESS NOTES
Discharge instructions and paperwork given to patient and spouse and verbalized understanding. Denies further questions. All belongings sent with patient. Patient to lobby for pickup.

## 2024-01-08 NOTE — PROGRESS NOTES
Shift assessment completed, see flowsheets.  Medications administered, see MAR. No complaints of pain or discomfort. Manning removed by urology. Patient informed to let RN know after urinating.  Plan of care discussed with patient and family.  Call light and bedside table within reach. Pt denies further needs at this time.  Will continue to monitor.  Tonja Garcia RN

## 2024-01-08 NOTE — PROGRESS NOTES
Physical Therapy  Troy Venegas    Chart reviewed.  PT attempted to see patient for follow up treatment - patient reports \"walking laps all day\" - wife present at bedside.  Per patient \"waiting on me to be able to pee so I can go home.\"  Patient and wife deny questions or concerns related to mobility and ability to d/c home once medically cleared.  Requesting to remain in room to continue to drink water and attempt to urinate.  Plan to continue to follow per plan of care.    Genoveva Greene PT, DPT [id number]

## 2024-01-08 NOTE — PLAN OF CARE
Problem: ABCDS Injury Assessment  Goal: Absence of physical injury  1/8/2024 1130 by Tonja Garcia RN  Outcome: Completed     Problem: Discharge Planning  Goal: Discharge to home or other facility with appropriate resources  1/8/2024 1130 by Tonja Garcia RN  Outcome: Completed     Problem: Pain  Goal: Verbalizes/displays adequate comfort level or baseline comfort level  1/8/2024 1130 by Tonja Garcia RN  Outcome: Completed     Problem: Safety - Adult  Goal: Free from fall injury  1/8/2024 1130 by Tonja Garcia RN  Outcome: Completed     Problem: Genitourinary - Adult  Goal: Absence of urinary retention  1/8/2024 1130 by Tonja Garcia RN  Outcome: Completed     Problem: Genitourinary - Adult  Goal: Urinary catheter remains patent  1/8/2024 1130 by Tonja Garcia, RN  Outcome: Completed

## 2024-01-08 NOTE — PROGRESS NOTES
dual-chamber pacemaker    Hypertension; continue current regimen and monitor BP closely       DVT PPX: Eliquis held due to hematuria  Code:Full Code    Disposition: Once acute medical issues have resolved    Current Medications  aspirin chewable tablet 81 mg, Daily  sennosides-docusate sodium (SENOKOT-S) 8.6-50 MG tablet 2 tablet, Daily PRN  atorvastatin (LIPITOR) tablet 40 mg, Nightly  carboxymethylcellulose PF (THERATEARS/REFRESH) 1 % ophthalmic gel 2 drop, TID  finasteride (PROSCAR) tablet 5 mg, Daily  pantoprazole (PROTONIX) tablet 40 mg, QAM AC  insulin lispro (HUMALOG) injection vial 0-8 Units, TID WC  insulin lispro (HUMALOG) injection vial 0-4 Units, Nightly  pregabalin (LYRICA) capsule 75 mg, Daily  pregabalin (LYRICA) capsule 225 mg, Nightly  psyllium (HYDROCIL) 95 % packet, Nightly  sotalol (BETAPACE) tablet 80 mg, BID  sodium chloride flush 0.9 % injection 5-40 mL, 2 times per day  sodium chloride flush 0.9 % injection 5-40 mL, PRN  0.9 % sodium chloride infusion, PRN  ondansetron (ZOFRAN-ODT) disintegrating tablet 4 mg, Q8H PRN   Or  ondansetron (ZOFRAN) injection 4 mg, Q6H PRN  polyethylene glycol (GLYCOLAX) packet 17 g, Daily PRN  acetaminophen (TYLENOL) tablet 650 mg, Q6H PRN   Or  acetaminophen (TYLENOL) suppository 650 mg, Q6H PRN  traMADol (ULTRAM) tablet 50 mg, Q8H PRN  clopidogrel (PLAVIX) tablet 75 mg, Daily  dextrose bolus 10% 125 mL, PRN   Or  dextrose bolus 10% 250 mL, PRN  dextrose 10 % infusion, Continuous PRN        Objective:  /73   Pulse 56   Temp 97.8 °F (36.6 °C) (Oral)   Resp 18   Ht 1.829 m (6')   Wt 82.3 kg (181 lb 7 oz)   SpO2 93%   BMI 24.61 kg/m²     Intake/Output Summary (Last 24 hours) at 1/8/2024 1716  Last data filed at 1/8/2024 0955  Gross per 24 hour   Intake 240 ml   Output 1200 ml   Net -960 ml        Wt Readings from Last 3 Encounters:   01/08/24 82.3 kg (181 lb 7 oz)   12/30/23 83.6 kg (184 lb 4.9 oz)   12/19/23 81.6 kg (180 lb)       Physical Examination:    General appearance:  No apparent distress, appears stated age and cooperative.  HEENT: Normocephalic, sclera clear., PERRLA.  Trachea midline, no adenopathy.  Cardiovascular: Regular rate and rhythm, normal S1, S2. No murmur.   Respiratory:Clear to auscultation bilaterally, no wheeze or crackles.   GI: Abdomen soft, no tenderness, not distended, normal bowel sounds  Musculoskeletal: No cyanosis in digits.  No BLE edema present  Neurology: CN 2-12 grossly intact. No speech or motor deficits  Psych: Not agitated, appropriate affect  Skin: Warm, dry, normal turgor    Labs and Tests:  CBC:   Recent Labs     01/06/24  0534 01/07/24  0533 01/08/24  0522   WBC 8.2 5.8 6.4   HGB 12.9* 12.8* 13.4*    168 188       BMP:    Recent Labs     01/06/24  0534 01/07/24  0533 01/08/24  0522    141 142   K 4.0 3.9 3.8    107 107   CO2 24 23 25   BUN 22* 18 20   CREATININE 1.3 1.1 1.1   GLUCOSE 124* 99 114*       Hepatic:   No results for input(s): \"AST\", \"ALT\", \"ALB\", \"BILITOT\", \"ALKPHOS\" in the last 72 hours.    CT ABDOMEN PELVIS WO CONTRAST Additional Contrast? None   Final Result   1. No acute inflammatory process identified within the limits of a   noncontrast exam.  The bladder is decompressed with a Manning catheter.   Previously described suspected bladder mass is not delineated on this exam   and may be obscured.   2. Cholelithiasis.   3. Diverticulosis.             Problem List  Principal Problem (Resolved):    Acute retention of urine  Active Problems:    Coronary artery disease involving native coronary artery of native heart without angina pectoris  Resolved Problems:    JUANA (acute kidney injury) (HCC)       CARA DIANE MD   1/8/2024 5:16 PM      Please note that some part of this chart was generated using Dragon dictation software. Although every effort was made to ensure the accuracy of this automated transcription, some errors in transcription may have occurred inadvertently. If you may need any  clarification, please do not hesitate to contact me through EPIC.

## 2024-01-08 NOTE — PLAN OF CARE
Problem: ABCDS Injury Assessment  Goal: Absence of physical injury  Outcome: Progressing     Problem: Discharge Planning  Goal: Discharge to home or other facility with appropriate resources  1/7/2024 2211 by Randall Falk RN  Outcome: Progressing  1/7/2024 0956 by Danae Clarke RN  Outcome: Progressing     Problem: Pain  Goal: Verbalizes/displays adequate comfort level or baseline comfort level  1/7/2024 2211 by Randall Falk RN  Outcome: Progressing  1/7/2024 0956 by Danae Clarke RN  Outcome: Progressing     Problem: Safety - Adult  Goal: Free from fall injury  1/7/2024 2211 by Randall Falk RN  Outcome: Progressing  1/7/2024 0956 by Danae Clarke RN  Outcome: Progressing     Problem: Genitourinary - Adult  Goal: Absence of urinary retention  1/7/2024 2211 by Randall Falk RN  Outcome: Progressing  1/7/2024 0956 by Danae Clarke RN  Outcome: Progressing  Goal: Urinary catheter remains patent  1/7/2024 2211 by Randall Falk RN  Outcome: Progressing  1/7/2024 0956 by Danae Clarke RN  Outcome: Progressing

## 2024-01-08 NOTE — CARE COORDINATION
Discharge Planning Note:    Chart reviewed and it appears that patient has minimal needs for discharge at this time. Risk Score 14%     Primary Care Physician is     Lois Kruger MD     Primary insurance is [employer] [facility]     Please notify case management if any discharge needs are identified.      Case management will continue to follow progress and update discharge plan as needed.     Electronically signed by Tara Canela RN on 1/8/2024 at 11:22 AM

## 2024-01-09 ENCOUNTER — TELEPHONE (OUTPATIENT)
Dept: INTERNAL MEDICINE CLINIC | Age: 89
End: 2024-01-09

## 2024-01-09 ENCOUNTER — TELEPHONE (OUTPATIENT)
Dept: CARDIOLOGY CLINIC | Age: 89
End: 2024-01-09

## 2024-01-09 DIAGNOSIS — I48.0 PAROXYSMAL A-FIB (HCC): Primary | ICD-10-CM

## 2024-01-09 DIAGNOSIS — Z01.818 PRE-OP TESTING: ICD-10-CM

## 2024-01-09 NOTE — TELEPHONE ENCOUNTER
Patient ready to be schedule for Watchman procedure called and left message for patient to call me.

## 2024-01-09 NOTE — TELEPHONE ENCOUNTER
Requesting Hospital Follow up for stents put in heart (released yesterday 01/08/24).  Also had bladder surgery on 12/19/23.    Please call patient back with an appt and time.

## 2024-01-09 NOTE — TELEPHONE ENCOUNTER
Care Transitions Initial Follow Up Call    Outreach made within 2 business days of discharge: Yes    Patient: Troy Venegas Patient : 1935   MRN: [id number]  Reason for Admission: There are no discharge diagnoses documented for the most recent discharge.  Discharge Date: 24       Spoke with: patient     Discharge department/facility: [facility] Interactive Patient Contact:  Was patient able to fill all prescriptions: Yes  Was patient instructed to bring all medications to the follow-up visit: Yes  Is patient taking all medications as directed in the discharge summary? Yes  Does patient understand their discharge instructions: Yes  Does patient have questions or concerns that need addressed prior to 7-14 day follow up office visit: no    Scheduled appointment with PCP within 7-14 days    Follow Up  Future Appointments   Date Time Provider Department Center   2024 10:40 AM Lois Kruger MD [facility] [address] [facility] [place] - [facility]   2024  1:15 PM Rosalino Jung MD [facility] CARD [facility]   2024  1:30 PM Les Riojas APRN - CNP FF Cardio [facility]   2024  2:00 PM BEATRICE, BEKAH DEVICE CHECK [facility] Card [facility]   2024  2:30 PM Kam Ocampo MD [place] [facility]   3/8/2024 11:00 AM Lois Kruger MD [facility] [place] - [facility]   3/20/2024  1:30 PM Reinaldo Connolly MD [facility]   2024 11:30 AM Reinaldo Connolly MD [facility] D[facility]       Elise Olivas

## 2024-01-13 NOTE — PROGRESS NOTES
Physician Progress Note      PATIENT:               ERIC GUADARRAMA  CSN #:                  [id number]  :                       1935  ADMIT DATE:       1/3/2024 8:01 PM  DISCH DATE:        2024 6:45 PM  RESPONDING  PROVIDER #:        Katy Isaac MD          QUERY TEXT:    Patient admitted with hematuria and is on chronic anticoagulation.   If   possible, please document in the progress notes and discharge summary if you   are evaluating and/or treating any of the following:    The medical record reflects the following:  Risk Factors: gross hematuria, on Eliquis for A- fib    Clinical Indicators: Per  HP- \"Manning catheter inserted in ED with noted   gross hematuria. Hold Eliquis and Plavix.  Last Eliquis dose was on 1/3\"  Per  PN- \"Hx of A-fib; on sotalol and Eliquis.  Eliquis DC'd 2/ hematuria  EP cardiology consulted; recommending resuming Eliquis once cleared by   urology.  Patient hesitant to restart Eliquis.  Continue Plavix.  Started on aspirin.    Watchman as outpatient\"  Per  Urology consult- \"urinary retention and gross hematuria, Symptoms   improved with holding AC\"    Treatment: Labs, DC'd Eliquis, Urology consult  Options provided:  -- Hematuria associated with Eliquis  -- Bleeding unrelated to anticoagulation  -- Other - I will add my own diagnosis  -- Disagree - Not applicable / Not valid  -- Disagree - Clinically unable to determine / Unknown  -- Refer to Clinical Documentation Reviewer    PROVIDER RESPONSE TEXT:    This patient has hematuria associated with Eliquis.    Query created by: Hope Hernández on 1/10/2024 8:51 AM      Electronically signed by:  Katy Isaac MD 2024 6:37 PM

## 2024-01-14 NOTE — DISCHARGE SUMMARY
Hospital Medicine Discharge Summary    Patient ID: Troy Venegas      Patient's PCP: Lois Kruger MD    Admit Date: 1/3/2024     Discharge Date: 1/8/2024     Admitting Physician: Lisa Rios MD     Discharge Physician: CARA DIANE MD     Hospital Course: This 88-year-old male with PMHx of bladder cancer; s/p TURP on 10/23, cystoscopy with biopsy and urethral dilatation on 12/19/2023, CAD; s/p PCI& stent to RCA on 12/29/2023 A-fib on Eliquis presented with hematuria, blood clots and acute urine retention      Gross hematuria/Acute urine retention; resolved  Pt with known hx of bladder cancer;s/p TURP on 10/23, cystoscopy with biopsy and urethral dilatation on 12/19/2023.  Manning's catheter was replaced in the ED. CT abdomen& pelvis -ve for any acute inflammatory process. Urinary bladder decompressed with Manning's. Eliquis held. Urology consulted; recommended irrigating q 8 hours. Manning's catheter was removed once hematuria resolved.  Patient was instructed to follow-up with urology as outpatient     UTI; UA s/o UTI.  Urine culture negative; Rocephin discontinued     CKD stage IIIa; Creatinine at baseline on admission (1.2-1.4)     Hx of A-fib; on sotalol and Eliquis.  Eliquis DC'd 2/2 hematuria  EP cardiology consulted; recommending resuming Eliquis once cleared by urology but pt and wife refused to restart Eliquis.  Patient was started on aspirin and was instructed to continue Plavix. Watchman as outpatient.     Hx of CAD; s/p PCI and stent to RCA x 2 on 12/29/23; on statin, Plavix and Eliquis at home  Cardiology consulted; recommending continuing Plavix to avoid stent thrombosis. Aspirin added as patient refused to restart Eliquis  .  Hx of SSS; s/p dual-chamber pacemaker     Hypertension; continue current regimen and monitor BP closely      Physical Exam Performed:     /73   Pulse 56   Temp 97.8 °F (36.6 °C) (Oral)   Resp 18   Ht 1.829 m (6')   Wt 82.3 kg (181 lb 7 oz)   SpO2 93%    may need any clarification, please do not hesitate to contact me through EPIC.

## 2024-01-17 ENCOUNTER — OFFICE VISIT (OUTPATIENT)
Dept: INTERNAL MEDICINE CLINIC | Age: 89
End: 2024-01-17

## 2024-01-17 VITALS
BODY MASS INDEX: 25.6 KG/M2 | HEIGHT: 72 IN | WEIGHT: 189 LBS | SYSTOLIC BLOOD PRESSURE: 128 MMHG | DIASTOLIC BLOOD PRESSURE: 64 MMHG

## 2024-01-17 DIAGNOSIS — Z09 HOSPITAL DISCHARGE FOLLOW-UP: Primary | ICD-10-CM

## 2024-01-17 DIAGNOSIS — I25.10 CORONARY ARTERY DISEASE INVOLVING NATIVE CORONARY ARTERY OF NATIVE HEART WITHOUT ANGINA PECTORIS: ICD-10-CM

## 2024-01-17 DIAGNOSIS — Z79.4 TYPE 2 DIABETES MELLITUS WITH DIABETIC POLYNEUROPATHY, WITH LONG-TERM CURRENT USE OF INSULIN (HCC): ICD-10-CM

## 2024-01-17 DIAGNOSIS — N40.1 BENIGN PROSTATIC HYPERPLASIA WITH URINARY OBSTRUCTION: ICD-10-CM

## 2024-01-17 DIAGNOSIS — E21.3 HYPERPARATHYROIDISM, UNSPECIFIED (HCC): ICD-10-CM

## 2024-01-17 DIAGNOSIS — I48.0 PAROXYSMAL ATRIAL FIBRILLATION (HCC): Chronic | ICD-10-CM

## 2024-01-17 DIAGNOSIS — N13.8 BENIGN PROSTATIC HYPERPLASIA WITH URINARY OBSTRUCTION: ICD-10-CM

## 2024-01-17 DIAGNOSIS — N18.31 STAGE 3A CHRONIC KIDNEY DISEASE (HCC): ICD-10-CM

## 2024-01-17 DIAGNOSIS — C67.9 MALIGNANT NEOPLASM OF URINARY BLADDER, UNSPECIFIED SITE (HCC): ICD-10-CM

## 2024-01-17 DIAGNOSIS — E11.42 TYPE 2 DIABETES MELLITUS WITH DIABETIC POLYNEUROPATHY, WITH LONG-TERM CURRENT USE OF INSULIN (HCC): ICD-10-CM

## 2024-01-17 ASSESSMENT — PATIENT HEALTH QUESTIONNAIRE - PHQ9
SUM OF ALL RESPONSES TO PHQ QUESTIONS 1-9: 0
1. LITTLE INTEREST OR PLEASURE IN DOING THINGS: 0
SUM OF ALL RESPONSES TO PHQ QUESTIONS 1-9: 0
SUM OF ALL RESPONSES TO PHQ QUESTIONS 1-9: 0
SUM OF ALL RESPONSES TO PHQ9 QUESTIONS 1 & 2: 0
SUM OF ALL RESPONSES TO PHQ QUESTIONS 1-9: 0
2. FEELING DOWN, DEPRESSED OR HOPELESS: 0

## 2024-01-17 NOTE — PROGRESS NOTES
Post-Discharge Transitional Care Follow Up      Troy Venegas   YOB: 1935    Date of Office Visit:  1/17/2024  Date of Hospital Admission: 1/3/24  Date of Hospital Discharge: 1/8/24  Readmission Risk Score (high >=14%. Medium >=10%):Readmission Risk Score: 13.7      Care management risk score Rising risk (score 2-5) and Complex Care (Scores >=6): No Risk Score On File     Non face to face  following discharge, date last encounter closed (first attempt may have been earlier): 01/09/2024     Call initiated 2 business days of discharge: Yes     Hospital discharge follow-up  Type 2 diabetes mellitus with diabetic polyneuropathy, with long-term current use of insulin (HCC)  - for now discontinue Ozempic, increase nateglinide back to twice per day. Will see if the lightheadedness improves off of Ozempic, I wonder if he gets dehydrated  Malignant neoplasm of urinary bladder, unspecified site (HCC)  - about to start treatment  Paroxysmal atrial fibrillation (HCC)  - off Eliquis, on sotalol. If lightheadedness persists despite stopping Ozempic, would discuss with cardiology in case it is this medication  Coronary artery disease involving native coronary artery of native heart without angina pectoris  - doing well after stenting, continue aspirin and plavix  Stage 3a chronic kidney disease (HCC)  - stable  Hyperparathyroidism, unspecified (HCC)  - stable  Benign prostatic hyperplasia with urinary obstruction  - on finasteride    Medical Decision Making: moderate complexity  Return for AWV.           Subjective:   HPI    Inpatient course: Discharge summary reviewed- see chart.    Interval history/Current status:     Sugars started going up in October - last 5-6 readings between 130-180. Prior to admission to the hospital it was running about the same. Having nausea with Ozempic.    Having episodes of dizziness. Blood pressure tends to drop when this happens. This was happening even before the

## 2024-01-19 ENCOUNTER — OFFICE VISIT (OUTPATIENT)
Age: 89
End: 2024-01-19
Payer: MEDICARE

## 2024-01-19 VITALS
DIASTOLIC BLOOD PRESSURE: 58 MMHG | HEIGHT: 72 IN | WEIGHT: 187.83 LBS | HEART RATE: 64 BPM | SYSTOLIC BLOOD PRESSURE: 116 MMHG | OXYGEN SATURATION: 98 % | BODY MASS INDEX: 25.44 KG/M2

## 2024-01-19 DIAGNOSIS — I25.83 CORONARY ARTERY DISEASE DUE TO LIPID RICH PLAQUE: Primary | ICD-10-CM

## 2024-01-19 DIAGNOSIS — I25.10 CORONARY ARTERY DISEASE DUE TO LIPID RICH PLAQUE: Primary | ICD-10-CM

## 2024-01-19 DIAGNOSIS — I10 ESSENTIAL HYPERTENSION: ICD-10-CM

## 2024-01-19 DIAGNOSIS — E78.00 HYPERCHOLESTEROLEMIA: ICD-10-CM

## 2024-01-19 DIAGNOSIS — I48.0 PAROXYSMAL ATRIAL FIBRILLATION (HCC): Chronic | ICD-10-CM

## 2024-01-19 PROCEDURE — G8484 FLU IMMUNIZE NO ADMIN: HCPCS | Performed by: INTERNAL MEDICINE

## 2024-01-19 PROCEDURE — 99214 OFFICE O/P EST MOD 30 MIN: CPT | Performed by: INTERNAL MEDICINE

## 2024-01-19 PROCEDURE — 1123F ACP DISCUSS/DSCN MKR DOCD: CPT | Performed by: INTERNAL MEDICINE

## 2024-01-19 PROCEDURE — G8427 DOCREV CUR MEDS BY ELIG CLIN: HCPCS | Performed by: INTERNAL MEDICINE

## 2024-01-19 PROCEDURE — 1111F DSCHRG MED/CURRENT MED MERGE: CPT | Performed by: INTERNAL MEDICINE

## 2024-01-19 PROCEDURE — 1036F TOBACCO NON-USER: CPT | Performed by: INTERNAL MEDICINE

## 2024-01-19 PROCEDURE — G8417 CALC BMI ABV UP PARAM F/U: HCPCS | Performed by: INTERNAL MEDICINE

## 2024-01-19 RX ORDER — CLOPIDOGREL BISULFATE 75 MG/1
75 TABLET ORAL DAILY
Qty: 90 TABLET | Refills: 3 | Status: SHIPPED | OUTPATIENT
Start: 2024-01-19

## 2024-01-19 RX ORDER — CLOPIDOGREL BISULFATE 75 MG/1
75 TABLET ORAL DAILY
Qty: 90 TABLET | Refills: 3 | Status: SHIPPED | OUTPATIENT
Start: 2024-01-19 | End: 2024-01-19

## 2024-01-19 NOTE — PATIENT INSTRUCTIONS
Make sure you follow up with EP next week with EP to discuss watchman     It is very important you continue to take plavix (also called clopidogrel) EVERY DAY for stent patency.    Stay hydrated. It may help with your dizziness. Cut back on caffeine. It can make you dehydrated.     Wearing compression socks, staying hydrated, increasing electrolyte/salt intake, restricting caffeine, and changing positions slowly may help with dizziness.

## 2024-01-19 NOTE — PROGRESS NOTES
[facility]  H+P  Consult  OP Visit  FU Visit   CC/HPI   CC Followup visit for cardiac conditions detailed in assessment and plan below.   Intervention PCI   General Doing well.  No new concerns.     Cardiac Sx -CP, -SOB, -dizziness, -syncope, -edema, -orthopnea, -pnd, -fatigue   HISTORY/ALLERGY/ROS   M/S/S/F Hx Reviewed in Epic and updated as appropriate   ALLERG Patient has no known allergies.   ROS Full ROS obtained and negative except as mentioned in HPI   MEDICATIONS   Cardiac medications reviewed in Epic during visit.   PHYSICAL EXAM   Vitals BP (!) 116/58 (Site: Left Upper Arm, Position: Sitting, Cuff Size: Medium Adult)   Pulse 64   Ht 1.829 m (6' 0.01\")   Wt 85.2 kg (187 lb 13.3 oz)   SpO2 98%   BMI 25.47 kg/m²    Gen Alert, coop, no distress Heart  RRR, no MRG   Lung CTA-B, unlabored, no DTP Extrem Edema -Grade 0 (0mm)      COMPLIANCE   Discussed and counseled on diet, exercise, weight loss, smoking, alcohol, drugs.  All questions answered.   CODING   SCI (80467) - 30-39 mins spent reviewing hx/tests/consults, performing exam, counseling/educating, ordering meds/tests/procedures, referring/communicating w/PCP/consultants, documenting in EMR, interpreting results, communicating medical information and plan with family.   SCRIBE ATTESTATION   Nurse I, Maddison Dickson, RN, am scribing for and in the presence of Rosalino Jung MD. 1/19/2024   Doctor Maddison Dickson is working as scribe for and in presence of me and may have prepopulated components of note with my historical intellectual property (IP) under my supervision.  Any additions to this IP performed in my presence and at my direction. Content and accuracy of this note reviewed by me (Rosalino Jung MD).   ASSESSMENT AND PLAN   *CAD   Date EF Results   Sx   Stable, as detailed above   [facility] 12/23  PCI prox RPDA and mid RCA (Erich)   MPI 8/17 NL No ischemia   TTE 4/22 60%    Plan   Continue aggressive medical treatment at doses above

## 2024-01-23 ENCOUNTER — NURSE ONLY (OUTPATIENT)
Dept: CARDIOLOGY CLINIC | Age: 89
End: 2024-01-23

## 2024-01-23 ENCOUNTER — TELEPHONE (OUTPATIENT)
Dept: CARDIOLOGY CLINIC | Age: 89
End: 2024-01-23

## 2024-01-23 ENCOUNTER — OFFICE VISIT (OUTPATIENT)
Dept: CARDIOLOGY CLINIC | Age: 89
End: 2024-01-23
Payer: MEDICARE

## 2024-01-23 VITALS
BODY MASS INDEX: 24.86 KG/M2 | WEIGHT: 187.6 LBS | DIASTOLIC BLOOD PRESSURE: 56 MMHG | HEIGHT: 73 IN | OXYGEN SATURATION: 96 % | SYSTOLIC BLOOD PRESSURE: 116 MMHG | HEART RATE: 73 BPM

## 2024-01-23 DIAGNOSIS — I10 ESSENTIAL HYPERTENSION: ICD-10-CM

## 2024-01-23 DIAGNOSIS — Z95.0 PACEMAKER: Primary | ICD-10-CM

## 2024-01-23 DIAGNOSIS — I47.19 ATRIAL TACHYCARDIA: ICD-10-CM

## 2024-01-23 DIAGNOSIS — I49.5 SICK SINUS SYNDROME (HCC): ICD-10-CM

## 2024-01-23 DIAGNOSIS — Z95.0 PACEMAKER: ICD-10-CM

## 2024-01-23 DIAGNOSIS — I25.10 CORONARY ARTERY DISEASE INVOLVING NATIVE CORONARY ARTERY OF NATIVE HEART WITHOUT ANGINA PECTORIS: ICD-10-CM

## 2024-01-23 DIAGNOSIS — I48.0 PAROXYSMAL ATRIAL FIBRILLATION (HCC): Primary | Chronic | ICD-10-CM

## 2024-01-23 DIAGNOSIS — I48.0 PAROXYSMAL ATRIAL FIBRILLATION (HCC): Chronic | ICD-10-CM

## 2024-01-23 PROCEDURE — G8484 FLU IMMUNIZE NO ADMIN: HCPCS | Performed by: NURSE PRACTITIONER

## 2024-01-23 PROCEDURE — 1036F TOBACCO NON-USER: CPT | Performed by: NURSE PRACTITIONER

## 2024-01-23 PROCEDURE — G8420 CALC BMI NORM PARAMETERS: HCPCS | Performed by: NURSE PRACTITIONER

## 2024-01-23 PROCEDURE — 99214 OFFICE O/P EST MOD 30 MIN: CPT | Performed by: NURSE PRACTITIONER

## 2024-01-23 PROCEDURE — 1123F ACP DISCUSS/DSCN MKR DOCD: CPT | Performed by: NURSE PRACTITIONER

## 2024-01-23 PROCEDURE — G8427 DOCREV CUR MEDS BY ELIG CLIN: HCPCS | Performed by: NURSE PRACTITIONER

## 2024-01-23 PROCEDURE — 1111F DSCHRG MED/CURRENT MED MERGE: CPT | Performed by: NURSE PRACTITIONER

## 2024-01-23 RX ORDER — TAMSULOSIN HYDROCHLORIDE 0.4 MG/1
0.4 CAPSULE ORAL DAILY
COMMUNITY

## 2024-01-23 NOTE — TELEPHONE ENCOUNTER
----- Message from ELISHA Duenas - CNP sent at 1/23/2024  3:24 PM EST -----  Schedule follow up in 2-3 months with me to see if he can be cleared for Watchman

## 2024-01-30 ENCOUNTER — TELEPHONE (OUTPATIENT)
Dept: INTERNAL MEDICINE CLINIC | Age: 89
End: 2024-01-30

## 2024-01-30 NOTE — TELEPHONE ENCOUNTER
Pt calling in concerned that his Plavix may be causing him severe stomach pain.  He had 3 stents placed in December and has been on the Plavix and a baby aspirin since.  Last taken yesterday evening.  Wife now decided to take him to [facility].       Please call pts cell: [phone]

## 2024-01-31 ENCOUNTER — APPOINTMENT (OUTPATIENT)
Dept: GENERAL RADIOLOGY | Age: 89
DRG: 336 | End: 2024-01-31
Payer: MEDICARE

## 2024-01-31 ENCOUNTER — APPOINTMENT (OUTPATIENT)
Dept: CT IMAGING | Age: 89
DRG: 336 | End: 2024-01-31
Payer: MEDICARE

## 2024-01-31 ENCOUNTER — HOSPITAL ENCOUNTER (INPATIENT)
Age: 89
LOS: 11 days | Discharge: HOME | DRG: 336 | End: 2024-02-11
Attending: EMERGENCY MEDICINE | Admitting: STUDENT IN AN ORGANIZED HEALTH CARE EDUCATION/TRAINING PROGRAM
Payer: MEDICARE

## 2024-01-31 DIAGNOSIS — K56.609 SBO (SMALL BOWEL OBSTRUCTION) (HCC): Primary | ICD-10-CM

## 2024-01-31 DIAGNOSIS — K62.89 PROCTITIS: ICD-10-CM

## 2024-01-31 LAB
ALBUMIN SERPL-MCNC: 4.7 G/DL (ref 3.4–5)
ALBUMIN/GLOB SERPL: 1.9 {RATIO} (ref 1.1–2.2)
ALP SERPL-CCNC: 88 U/L (ref 40–129)
ALT SERPL-CCNC: 12 U/L (ref 10–40)
ANION GAP SERPL CALCULATED.3IONS-SCNC: 10 MMOL/L (ref 3–16)
AST SERPL-CCNC: 22 U/L (ref 15–37)
BASOPHILS # BLD: 0 K/UL (ref 0–0.2)
BASOPHILS NFR BLD: 0.2 %
BILIRUB SERPL-MCNC: 0.7 MG/DL (ref 0–1)
BILIRUB UR QL STRIP.AUTO: NEGATIVE
BUN SERPL-MCNC: 17 MG/DL (ref 7–20)
CALCIUM SERPL-MCNC: 10.7 MG/DL (ref 8.3–10.6)
CHLORIDE SERPL-SCNC: 103 MMOL/L (ref 99–110)
CLARITY UR: CLEAR
CO2 SERPL-SCNC: 27 MMOL/L (ref 21–32)
COLOR UR: YELLOW
CREAT SERPL-MCNC: 1.3 MG/DL (ref 0.8–1.3)
DEPRECATED RDW RBC AUTO: 13.4 % (ref 12.4–15.4)
EKG ATRIAL RATE: 83 BPM
EKG DIAGNOSIS: NORMAL
EKG Q-T INTERVAL: 348 MS
EKG QRS DURATION: 78 MS
EKG QTC CALCULATION (BAZETT): 396 MS
EKG R AXIS: 9 DEGREES
EKG T AXIS: -39 DEGREES
EKG VENTRICULAR RATE: 78 BPM
EOSINOPHIL # BLD: 0.1 K/UL (ref 0–0.6)
EOSINOPHIL NFR BLD: 1 %
GFR SERPLBLD CREATININE-BSD FMLA CKD-EPI: 53 ML/MIN/{1.73_M2}
GLUCOSE SERPL-MCNC: 147 MG/DL (ref 70–99)
GLUCOSE UR STRIP.AUTO-MCNC: NEGATIVE MG/DL
HCT VFR BLD AUTO: 44.2 % (ref 40.5–52.5)
HGB BLD-MCNC: 15 G/DL (ref 13.5–17.5)
HGB UR QL STRIP.AUTO: NEGATIVE
INR PPP: 1.02 (ref 0.84–1.16)
KETONES UR STRIP.AUTO-MCNC: NEGATIVE MG/DL
LACTATE BLDV-SCNC: 1.2 MMOL/L (ref 0.4–1.9)
LACTATE BLDV-SCNC: 1.2 MMOL/L (ref 0.4–1.9)
LEUKOCYTE ESTERASE UR QL STRIP.AUTO: NEGATIVE
LIPASE SERPL-CCNC: 42 U/L (ref 13–60)
LYMPHOCYTES # BLD: 1 K/UL (ref 1–5.1)
LYMPHOCYTES NFR BLD: 7.5 %
MCH RBC QN AUTO: 31.6 PG (ref 26–34)
MCHC RBC AUTO-ENTMCNC: 34 G/DL (ref 31–36)
MCV RBC AUTO: 92.8 FL (ref 80–100)
MONOCYTES # BLD: 0.9 K/UL (ref 0–1.3)
MONOCYTES NFR BLD: 7.2 %
NEUTROPHILS # BLD: 11.1 K/UL (ref 1.7–7.7)
NEUTROPHILS NFR BLD: 84.1 %
NITRITE UR QL STRIP.AUTO: NEGATIVE
PH UR STRIP.AUTO: 6 [PH] (ref 5–8)
PLATELET # BLD AUTO: 194 K/UL (ref 135–450)
PMV BLD AUTO: 8.7 FL (ref 5–10.5)
POTASSIUM SERPL-SCNC: 4.1 MMOL/L (ref 3.5–5.1)
PROT SERPL-MCNC: 7.2 G/DL (ref 6.4–8.2)
PROT UR STRIP.AUTO-MCNC: NEGATIVE MG/DL
PROTHROMBIN TIME: 13.4 SEC (ref 11.5–14.8)
RBC # BLD AUTO: 4.76 M/UL (ref 4.2–5.9)
SODIUM SERPL-SCNC: 140 MMOL/L (ref 136–145)
SP GR UR STRIP.AUTO: 1.02 (ref 1–1.03)
UA COMPLETE W REFLEX CULTURE PNL UR: NORMAL
UA DIPSTICK W REFLEX MICRO PNL UR: NORMAL
URN SPEC COLLECT METH UR: NORMAL
UROBILINOGEN UR STRIP-ACNC: 1 E.U./DL
WBC # BLD AUTO: 13.2 K/UL (ref 4–11)

## 2024-01-31 PROCEDURE — 93005 ELECTROCARDIOGRAM TRACING: CPT | Performed by: EMERGENCY MEDICINE

## 2024-01-31 PROCEDURE — APPSS60 APP SPLIT SHARED TIME 46-60 MINUTES: Performed by: NURSE PRACTITIONER

## 2024-01-31 PROCEDURE — 99223 1ST HOSP IP/OBS HIGH 75: CPT | Performed by: SURGERY

## 2024-01-31 PROCEDURE — 96375 TX/PRO/DX INJ NEW DRUG ADDON: CPT

## 2024-01-31 PROCEDURE — 96361 HYDRATE IV INFUSION ADD-ON: CPT

## 2024-01-31 PROCEDURE — 74177 CT ABD & PELVIS W/CONTRAST: CPT

## 2024-01-31 PROCEDURE — 36415 COLL VENOUS BLD VENIPUNCTURE: CPT

## 2024-01-31 PROCEDURE — 71045 X-RAY EXAM CHEST 1 VIEW: CPT

## 2024-01-31 PROCEDURE — 96376 TX/PRO/DX INJ SAME DRUG ADON: CPT

## 2024-01-31 PROCEDURE — 85610 PROTHROMBIN TIME: CPT

## 2024-01-31 PROCEDURE — 2580000003 HC RX 258: Performed by: STUDENT IN AN ORGANIZED HEALTH CARE EDUCATION/TRAINING PROGRAM

## 2024-01-31 PROCEDURE — 81003 URINALYSIS AUTO W/O SCOPE: CPT

## 2024-01-31 PROCEDURE — 2060000000 HC ICU INTERMEDIATE R&B

## 2024-01-31 PROCEDURE — 6360000002 HC RX W HCPCS: Performed by: STUDENT IN AN ORGANIZED HEALTH CARE EDUCATION/TRAINING PROGRAM

## 2024-01-31 PROCEDURE — 2580000003 HC RX 258: Performed by: INTERNAL MEDICINE

## 2024-01-31 PROCEDURE — 83605 ASSAY OF LACTIC ACID: CPT

## 2024-01-31 PROCEDURE — 6360000002 HC RX W HCPCS: Performed by: EMERGENCY MEDICINE

## 2024-01-31 PROCEDURE — 83690 ASSAY OF LIPASE: CPT

## 2024-01-31 PROCEDURE — C9460 INJECTION, CANGRELOR: HCPCS | Performed by: INTERNAL MEDICINE

## 2024-01-31 PROCEDURE — APPNB30 APP NON BILLABLE TIME 0-30 MINS: Performed by: NURSE PRACTITIONER

## 2024-01-31 PROCEDURE — 2580000003 HC RX 258: Performed by: EMERGENCY MEDICINE

## 2024-01-31 PROCEDURE — 85025 COMPLETE CBC W/AUTO DIFF WBC: CPT

## 2024-01-31 PROCEDURE — 93010 ELECTROCARDIOGRAM REPORT: CPT | Performed by: INTERNAL MEDICINE

## 2024-01-31 PROCEDURE — 74018 RADEX ABDOMEN 1 VIEW: CPT

## 2024-01-31 PROCEDURE — 80053 COMPREHEN METABOLIC PANEL: CPT

## 2024-01-31 PROCEDURE — 6360000002 HC RX W HCPCS: Performed by: INTERNAL MEDICINE

## 2024-01-31 PROCEDURE — 96374 THER/PROPH/DIAG INJ IV PUSH: CPT

## 2024-01-31 PROCEDURE — 99285 EMERGENCY DEPT VISIT HI MDM: CPT

## 2024-01-31 PROCEDURE — 6370000000 HC RX 637 (ALT 250 FOR IP): Performed by: NURSE PRACTITIONER

## 2024-01-31 PROCEDURE — 6360000004 HC RX CONTRAST MEDICATION: Performed by: EMERGENCY MEDICINE

## 2024-01-31 PROCEDURE — 87040 BLOOD CULTURE FOR BACTERIA: CPT

## 2024-01-31 RX ORDER — ACETAMINOPHEN 325 MG/1
650 TABLET ORAL EVERY 6 HOURS PRN
Status: DISCONTINUED | OUTPATIENT
Start: 2024-01-31 | End: 2024-02-05

## 2024-01-31 RX ORDER — MORPHINE SULFATE 2 MG/ML
2 INJECTION, SOLUTION INTRAMUSCULAR; INTRAVENOUS EVERY 4 HOURS PRN
Status: DISCONTINUED | OUTPATIENT
Start: 2024-01-31 | End: 2024-02-06

## 2024-01-31 RX ORDER — ONDANSETRON 2 MG/ML
4 INJECTION INTRAMUSCULAR; INTRAVENOUS EVERY 6 HOURS PRN
Status: DISCONTINUED | OUTPATIENT
Start: 2024-01-31 | End: 2024-02-11 | Stop reason: HOSPADM

## 2024-01-31 RX ORDER — SODIUM CHLORIDE 9 MG/ML
INJECTION, SOLUTION INTRAVENOUS CONTINUOUS
Status: ACTIVE | OUTPATIENT
Start: 2024-01-31 | End: 2024-02-02

## 2024-01-31 RX ORDER — MAGNESIUM SULFATE IN WATER 40 MG/ML
2000 INJECTION, SOLUTION INTRAVENOUS PRN
Status: DISCONTINUED | OUTPATIENT
Start: 2024-01-31 | End: 2024-02-11 | Stop reason: HOSPADM

## 2024-01-31 RX ORDER — SODIUM CHLORIDE 9 MG/ML
INJECTION, SOLUTION INTRAVENOUS PRN
Status: DISCONTINUED | OUTPATIENT
Start: 2024-01-31 | End: 2024-02-11

## 2024-01-31 RX ORDER — POTASSIUM CHLORIDE 20 MEQ/1
40 TABLET, EXTENDED RELEASE ORAL PRN
Status: DISCONTINUED | OUTPATIENT
Start: 2024-01-31 | End: 2024-02-11 | Stop reason: HOSPADM

## 2024-01-31 RX ORDER — POLYETHYLENE GLYCOL 3350 17 G/17G
17 POWDER, FOR SOLUTION ORAL DAILY PRN
Status: DISCONTINUED | OUTPATIENT
Start: 2024-01-31 | End: 2024-02-06

## 2024-01-31 RX ORDER — SODIUM CHLORIDE 0.9 % (FLUSH) 0.9 %
5-40 SYRINGE (ML) INJECTION EVERY 12 HOURS SCHEDULED
Status: DISCONTINUED | OUTPATIENT
Start: 2024-01-31 | End: 2024-02-11 | Stop reason: HOSPADM

## 2024-01-31 RX ORDER — METRONIDAZOLE 500 MG/100ML
500 INJECTION, SOLUTION INTRAVENOUS ONCE
Status: DISCONTINUED | OUTPATIENT
Start: 2024-01-31 | End: 2024-01-31

## 2024-01-31 RX ORDER — SODIUM CHLORIDE 0.9 % (FLUSH) 0.9 %
5-40 SYRINGE (ML) INJECTION PRN
Status: DISCONTINUED | OUTPATIENT
Start: 2024-01-31 | End: 2024-02-11 | Stop reason: HOSPADM

## 2024-01-31 RX ORDER — MORPHINE SULFATE 4 MG/ML
4 INJECTION, SOLUTION INTRAMUSCULAR; INTRAVENOUS
Status: COMPLETED | OUTPATIENT
Start: 2024-01-31 | End: 2024-01-31

## 2024-01-31 RX ORDER — LEVOFLOXACIN 5 MG/ML
750 INJECTION, SOLUTION INTRAVENOUS ONCE
Status: COMPLETED | OUTPATIENT
Start: 2024-01-31 | End: 2024-01-31

## 2024-01-31 RX ORDER — 0.9 % SODIUM CHLORIDE 0.9 %
1000 INTRAVENOUS SOLUTION INTRAVENOUS ONCE
Status: COMPLETED | OUTPATIENT
Start: 2024-01-31 | End: 2024-01-31

## 2024-01-31 RX ORDER — ONDANSETRON 4 MG/1
4 TABLET, ORALLY DISINTEGRATING ORAL EVERY 8 HOURS PRN
Status: DISCONTINUED | OUTPATIENT
Start: 2024-01-31 | End: 2024-02-11 | Stop reason: HOSPADM

## 2024-01-31 RX ORDER — ONDANSETRON 2 MG/ML
4 INJECTION INTRAMUSCULAR; INTRAVENOUS
Status: COMPLETED | OUTPATIENT
Start: 2024-01-31 | End: 2024-01-31

## 2024-01-31 RX ORDER — ACETAMINOPHEN 650 MG/1
650 SUPPOSITORY RECTAL EVERY 6 HOURS PRN
Status: DISCONTINUED | OUTPATIENT
Start: 2024-01-31 | End: 2024-02-05

## 2024-01-31 RX ORDER — HEPARIN SODIUM 5000 [USP'U]/ML
5000 INJECTION, SOLUTION INTRAVENOUS; SUBCUTANEOUS EVERY 8 HOURS SCHEDULED
Status: DISCONTINUED | OUTPATIENT
Start: 2024-01-31 | End: 2024-02-07

## 2024-01-31 RX ORDER — POTASSIUM CHLORIDE 7.45 MG/ML
10 INJECTION INTRAVENOUS PRN
Status: DISCONTINUED | OUTPATIENT
Start: 2024-01-31 | End: 2024-02-11 | Stop reason: HOSPADM

## 2024-01-31 RX ADMIN — HEPARIN SODIUM 5000 UNITS: 5000 INJECTION INTRAVENOUS; SUBCUTANEOUS at 16:23

## 2024-01-31 RX ADMIN — Medication 1 SPRAY: at 16:18

## 2024-01-31 RX ADMIN — Medication 10 ML: at 09:00

## 2024-01-31 RX ADMIN — CANGRELOR 0.75 MCG/KG/MIN: 50 INJECTION, POWDER, LYOPHILIZED, FOR SOLUTION INTRAVENOUS at 18:50

## 2024-01-31 RX ADMIN — PIPERACILLIN AND TAZOBACTAM 3375 MG: 3; .375 INJECTION, POWDER, FOR SOLUTION INTRAVENOUS at 10:40

## 2024-01-31 RX ADMIN — Medication 10 ML: at 20:57

## 2024-01-31 RX ADMIN — SODIUM CHLORIDE 1000 ML: 9 INJECTION, SOLUTION INTRAVENOUS at 04:19

## 2024-01-31 RX ADMIN — HEPARIN SODIUM 5000 UNITS: 5000 INJECTION INTRAVENOUS; SUBCUTANEOUS at 21:31

## 2024-01-31 RX ADMIN — ONDANSETRON 4 MG: 2 INJECTION INTRAMUSCULAR; INTRAVENOUS at 06:16

## 2024-01-31 RX ADMIN — MORPHINE SULFATE 2 MG: 2 INJECTION, SOLUTION INTRAMUSCULAR; INTRAVENOUS at 11:58

## 2024-01-31 RX ADMIN — PIPERACILLIN AND TAZOBACTAM 3375 MG: 3; .375 INJECTION, POWDER, FOR SOLUTION INTRAVENOUS at 20:03

## 2024-01-31 RX ADMIN — SODIUM CHLORIDE: 9 INJECTION, SOLUTION INTRAVENOUS at 09:41

## 2024-01-31 RX ADMIN — IOPAMIDOL 75 ML: 755 INJECTION, SOLUTION INTRAVENOUS at 04:53

## 2024-01-31 RX ADMIN — MORPHINE SULFATE 2 MG: 2 INJECTION, SOLUTION INTRAMUSCULAR; INTRAVENOUS at 19:29

## 2024-01-31 RX ADMIN — MORPHINE SULFATE 4 MG: 4 INJECTION, SOLUTION INTRAMUSCULAR; INTRAVENOUS at 04:19

## 2024-01-31 RX ADMIN — LEVOFLOXACIN 750 MG: 5 INJECTION, SOLUTION INTRAVENOUS at 06:17

## 2024-01-31 RX ADMIN — MORPHINE SULFATE 4 MG: 4 INJECTION, SOLUTION INTRAMUSCULAR; INTRAVENOUS at 06:16

## 2024-01-31 RX ADMIN — ONDANSETRON 4 MG: 2 INJECTION INTRAMUSCULAR; INTRAVENOUS at 04:19

## 2024-01-31 NOTE — ED NOTES
Radiology called to report NG is not in correct position  I contacted Genie Rn in ICU results given

## 2024-01-31 NOTE — ED NOTES
ED TO INPATIENT SBAR HANDOFF    Patient Name: Troy Venegas   :  1935  88 y.o.   MRN:  [id number]  Preferred Name  Max  ED Room #:  ED-0017/17  Family/Caregiver Present yes   Restraints no   Sitter no   Sepsis Risk Score Sepsis Risk Score: 3.91    Situation  Code Status: Prior No additional code details.    Allergies: Patient has no known allergies.  Weight: Patient Vitals for the past 96 hrs (Last 3 readings):   Weight   24 0333 85.1 kg (187 lb 9.6 oz)     Arrived from: home  Chief Complaint:   Chief Complaint   Patient presents with    Abdominal Pain     Pt from home c/o all over abdominal pain x 1 day. Pt rates pain 8/10. Pt states he has a hx of diverticulitis.     Hospital Problem/Diagnosis:  Principal Problem:    Small bowel obstruction (HCC)  Resolved Problems:    * No resolved hospital problems. *    Imaging:   CT ABDOMEN PELVIS W IV CONTRAST Additional Contrast? None   Final Result   1.  Dilated loops of jejunum from left upper abdomen down into the lower   abdomen.  The transition point appears to be in the anterior midline upper   abdomen and suggests a moderate small bowel obstruction.  The ileal loops are   collapsed.      2.  Diverticulosis of the colon without convincing area of acute   diverticulitis.  However there is wall thickening and mural enhancement of   the rectum suggesting proctitis.      3.  The stomach is dilated with fluid and NG tube may be beneficial.  Some   reflux in the distal esophagus is also noted.      4.  Cholelithiasis and small hepatic cysts are redemonstrated.         XR CHEST PORTABLE    (Results Pending)     Abnormal labs:   Abnormal Labs Reviewed   CBC WITH AUTO DIFFERENTIAL - Abnormal; Notable for the following components:       Result Value    WBC 13.2 (*)     Neutrophils Absolute 11.1 (*)     All other components within normal limits   COMPREHENSIVE METABOLIC PANEL W/ REFLEX TO MG FOR LOW K - Abnormal; Notable for the following components:    Glucose

## 2024-01-31 NOTE — H&P
abdomen.  Collapsed ileum in the central and right lower abdomen.  Transition point appears to be in the midline anterior abdomen.  There is a mild edema of the mesenteric fat but no focal fluid collection.  There is no general ascites or pneumoperitoneum.  Some diverticulosis of the distal colon.  Wall thickening and mural enhancement are noted at the rectum down to the level of the anal rectal junction. Pelvis: The urinary bladder wall is borderline in thickness.  The prostate is not enlarged.  Trace amount of free fluid in the pelvis may be secondary to the bowel obstruction. Peritoneum/Retroperitoneum: Scattered atherosclerosis down the aorta with the mild narrowing at the bifurcation into the common iliac arteries.  However there is no abdominal aortic aneurysm or retroperitoneal hematoma. Periaortic and pericaval lymph nodes are not enlarged. Bones/Soft Tissues: Tiny fat containing umbilical hernia without acute complication.  Clips in the right and left scrotum could relate to previous vasectomy.  Mild degenerative changes along the sacroiliac joints and hips. Degenerative disc disease, chronic endplate changes, and facet arthropathy in the lower lumbar spine.  Previous laminectomies at L4 and L5.  No fixation hardware at the lumbar spine.     1.  Dilated loops of jejunum from left upper abdomen down into the lower abdomen.  The transition point appears to be in the anterior midline upper abdomen and suggests a moderate small bowel obstruction.  The ileal loops are collapsed. 2.  Diverticulosis of the colon without convincing area of acute diverticulitis.  However there is wall thickening and mural enhancement of the rectum suggesting proctitis. 3.  The stomach is dilated with fluid and NG tube may be beneficial.  Some reflux in the distal esophagus is also noted. 4.  Cholelithiasis and small hepatic cysts are redemonstrated.       This note was likely completed using voice recognition technology and may

## 2024-01-31 NOTE — ED PROVIDER NOTES
[facility] Emergency Department [facility]    I, Vitaly Gill MD, am the primary clinician of record.    CHIEF COMPLAINT  Chief Complaint   Patient presents with    Abdominal Pain     Pt from home c/o all over abdominal pain x 1 day. Pt rates pain 8/10. Pt states he has a hx of diverticulitis.        HISTORY OF PRESENT ILLNESS  Troy Venegas is a 88 y.o. male  who presents to the ED complaining of onset yesterday around 11am of abdominal pains.  Abd pain started lower and now is diffuse throughout the abd and radiates to the back.  No fevers.  +nausea, but no vomiting.  Had a normal BM yesterday that transiently helped his pain and was not loose or hard, no melena or bloody stool.  No dysuria/hematuria.  Has diverticulitis remotely and he is not sure if this feels similar.  No prior abdominal surgeries.  On plavix for afib hx and CAD.  No longer on Eliquis due to hematuria history per patient and his wife.  Has a history of bladder CA.  No chest pains.  Pain waxes/wanes.  Is feeling distended in the abd as well.    Of note the patient was admitted at the beginning of this month for hematuria with urinary retention, found to have stage III CKD, Eliquis was discontinued at that point, and he has had no recurrent issues with urinary retention since then and does not feel like that is his issue now.    No other complaints, modifying factors or associated symptoms.     I have reviewed the following from the nursing documentation.    Past Medical History:   Diagnosis Date    Arrhythmia     Arthritis     hands shoulders neck, R hip    Atrial fibrillation (HCC)     coumadin    Atrial tachycardia     Bladder cancer (HCC) 10/2023    CAD (coronary artery disease)     Cancer (HCC)     skin    Diabetes mellitus (HCC)     type 2    Diverticulitis     Enlarged prostate     History of blood transfusion     Hyperlipidemia     Hypertension     Neuropathy     Bilateral feet and lower legs    Pacemaker 04/20/2020

## 2024-01-31 NOTE — ED NOTES
Awaiting CXR read for NG suction setting  Pt AAO x 3  Only complaint at this time is the NG in his nare  Family at bedside  Updated on admission plan

## 2024-02-01 ENCOUNTER — APPOINTMENT (OUTPATIENT)
Dept: GENERAL RADIOLOGY | Age: 89
DRG: 336 | End: 2024-02-01
Payer: MEDICARE

## 2024-02-01 LAB
ANION GAP SERPL CALCULATED.3IONS-SCNC: 10 MMOL/L (ref 3–16)
BASOPHILS # BLD: 0 K/UL (ref 0–0.2)
BASOPHILS NFR BLD: 0.4 %
BUN SERPL-MCNC: 15 MG/DL (ref 7–20)
CALCIUM SERPL-MCNC: 9.7 MG/DL (ref 8.3–10.6)
CHLORIDE SERPL-SCNC: 107 MMOL/L (ref 99–110)
CO2 SERPL-SCNC: 25 MMOL/L (ref 21–32)
CREAT SERPL-MCNC: 1.3 MG/DL (ref 0.8–1.3)
DEPRECATED RDW RBC AUTO: 12.9 % (ref 12.4–15.4)
EKG ATRIAL RATE: 66 BPM
EKG DIAGNOSIS: NORMAL
EKG P AXIS: 95 DEGREES
EKG P-R INTERVAL: 236 MS
EKG Q-T INTERVAL: 412 MS
EKG QRS DURATION: 82 MS
EKG QTC CALCULATION (BAZETT): 431 MS
EKG R AXIS: 1 DEGREES
EKG T AXIS: -25 DEGREES
EKG VENTRICULAR RATE: 66 BPM
EOSINOPHIL # BLD: 0.1 K/UL (ref 0–0.6)
EOSINOPHIL NFR BLD: 0.6 %
GFR SERPLBLD CREATININE-BSD FMLA CKD-EPI: 53 ML/MIN/{1.73_M2}
GLUCOSE SERPL-MCNC: 98 MG/DL (ref 70–99)
HCT VFR BLD AUTO: 38.2 % (ref 40.5–52.5)
HGB BLD-MCNC: 13 G/DL (ref 13.5–17.5)
LYMPHOCYTES # BLD: 0.8 K/UL (ref 1–5.1)
LYMPHOCYTES NFR BLD: 8 %
MCH RBC QN AUTO: 31.4 PG (ref 26–34)
MCHC RBC AUTO-ENTMCNC: 33.9 G/DL (ref 31–36)
MCV RBC AUTO: 92.5 FL (ref 80–100)
MONOCYTES # BLD: 0.8 K/UL (ref 0–1.3)
MONOCYTES NFR BLD: 7.9 %
NEUTROPHILS # BLD: 8.4 K/UL (ref 1.7–7.7)
NEUTROPHILS NFR BLD: 83.1 %
PLATELET # BLD AUTO: 161 K/UL (ref 135–450)
PMV BLD AUTO: 8.3 FL (ref 5–10.5)
POTASSIUM SERPL-SCNC: 4.4 MMOL/L (ref 3.5–5.1)
RBC # BLD AUTO: 4.13 M/UL (ref 4.2–5.9)
SODIUM SERPL-SCNC: 142 MMOL/L (ref 136–145)
WBC # BLD AUTO: 10.1 K/UL (ref 4–11)

## 2024-02-01 PROCEDURE — C9460 INJECTION, CANGRELOR: HCPCS | Performed by: INTERNAL MEDICINE

## 2024-02-01 PROCEDURE — 36415 COLL VENOUS BLD VENIPUNCTURE: CPT

## 2024-02-01 PROCEDURE — 93010 ELECTROCARDIOGRAM REPORT: CPT | Performed by: INTERNAL MEDICINE

## 2024-02-01 PROCEDURE — 2580000003 HC RX 258: Performed by: INTERNAL MEDICINE

## 2024-02-01 PROCEDURE — 2580000003 HC RX 258: Performed by: STUDENT IN AN ORGANIZED HEALTH CARE EDUCATION/TRAINING PROGRAM

## 2024-02-01 PROCEDURE — 6360000002 HC RX W HCPCS: Performed by: INTERNAL MEDICINE

## 2024-02-01 PROCEDURE — 2060000000 HC ICU INTERMEDIATE R&B

## 2024-02-01 PROCEDURE — 99232 SBSQ HOSP IP/OBS MODERATE 35: CPT | Performed by: INTERNAL MEDICINE

## 2024-02-01 PROCEDURE — 80048 BASIC METABOLIC PNL TOTAL CA: CPT

## 2024-02-01 PROCEDURE — 93005 ELECTROCARDIOGRAM TRACING: CPT | Performed by: INTERNAL MEDICINE

## 2024-02-01 PROCEDURE — 99231 SBSQ HOSP IP/OBS SF/LOW 25: CPT | Performed by: SURGERY

## 2024-02-01 PROCEDURE — 6360000002 HC RX W HCPCS: Performed by: STUDENT IN AN ORGANIZED HEALTH CARE EDUCATION/TRAINING PROGRAM

## 2024-02-01 PROCEDURE — 85025 COMPLETE CBC W/AUTO DIFF WBC: CPT

## 2024-02-01 PROCEDURE — 74019 RADEX ABDOMEN 2 VIEWS: CPT

## 2024-02-01 RX ADMIN — CANGRELOR 0.75 MCG/KG/MIN: 50 INJECTION, POWDER, LYOPHILIZED, FOR SOLUTION INTRAVENOUS at 05:58

## 2024-02-01 RX ADMIN — PIPERACILLIN AND TAZOBACTAM 3375 MG: 3; .375 INJECTION, POWDER, FOR SOLUTION INTRAVENOUS at 02:53

## 2024-02-01 RX ADMIN — SODIUM CHLORIDE: 9 INJECTION, SOLUTION INTRAVENOUS at 18:07

## 2024-02-01 RX ADMIN — Medication 10 ML: at 20:24

## 2024-02-01 RX ADMIN — Medication 10 ML: at 11:13

## 2024-02-01 RX ADMIN — PIPERACILLIN AND TAZOBACTAM 3375 MG: 3; .375 INJECTION, POWDER, FOR SOLUTION INTRAVENOUS at 18:57

## 2024-02-01 RX ADMIN — HEPARIN SODIUM 5000 UNITS: 5000 INJECTION INTRAVENOUS; SUBCUTANEOUS at 21:47

## 2024-02-01 RX ADMIN — SODIUM CHLORIDE: 9 INJECTION, SOLUTION INTRAVENOUS at 03:42

## 2024-02-01 RX ADMIN — HEPARIN SODIUM 5000 UNITS: 5000 INJECTION INTRAVENOUS; SUBCUTANEOUS at 05:56

## 2024-02-01 RX ADMIN — PIPERACILLIN AND TAZOBACTAM 3375 MG: 3; .375 INJECTION, POWDER, FOR SOLUTION INTRAVENOUS at 11:13

## 2024-02-01 RX ADMIN — CANGRELOR 0.75 MCG/KG/MIN: 50 INJECTION, POWDER, LYOPHILIZED, FOR SOLUTION INTRAVENOUS at 22:04

## 2024-02-01 RX ADMIN — HEPARIN SODIUM 5000 UNITS: 5000 INJECTION INTRAVENOUS; SUBCUTANEOUS at 14:00

## 2024-02-01 NOTE — CARE COORDINATION
Discharge Planning Note:    Chart reviewed and it appears that patient has minimal needs for discharge at this time. Risk Score 18 %     Primary Care Physician is STEFANIE GARCIA   Primary insurance is [employer]    Please notify case management if any discharge needs are identified.      Case management will continue to follow progress and update discharge plan as needed.

## 2024-02-02 ENCOUNTER — APPOINTMENT (OUTPATIENT)
Dept: GENERAL RADIOLOGY | Age: 89
DRG: 336 | End: 2024-02-02
Payer: MEDICARE

## 2024-02-02 LAB
ANION GAP SERPL CALCULATED.3IONS-SCNC: 14 MMOL/L (ref 3–16)
BASOPHILS # BLD: 0.1 K/UL (ref 0–0.2)
BASOPHILS NFR BLD: 0.5 %
BUN SERPL-MCNC: 16 MG/DL (ref 7–20)
CALCIUM SERPL-MCNC: 10.1 MG/DL (ref 8.3–10.6)
CHLORIDE SERPL-SCNC: 108 MMOL/L (ref 99–110)
CO2 SERPL-SCNC: 22 MMOL/L (ref 21–32)
CREAT SERPL-MCNC: 1.4 MG/DL (ref 0.8–1.3)
DEPRECATED RDW RBC AUTO: 13.4 % (ref 12.4–15.4)
EOSINOPHIL # BLD: 0.1 K/UL (ref 0–0.6)
EOSINOPHIL NFR BLD: 0.9 %
GFR SERPLBLD CREATININE-BSD FMLA CKD-EPI: 49 ML/MIN/{1.73_M2}
GLUCOSE BLD-MCNC: 101 MG/DL (ref 70–99)
GLUCOSE BLD-MCNC: 96 MG/DL (ref 70–99)
GLUCOSE SERPL-MCNC: 101 MG/DL (ref 70–99)
HCT VFR BLD AUTO: 39.9 % (ref 40.5–52.5)
HGB BLD-MCNC: 13.2 G/DL (ref 13.5–17.5)
LYMPHOCYTES # BLD: 0.8 K/UL (ref 1–5.1)
LYMPHOCYTES NFR BLD: 7.7 %
MAGNESIUM SERPL-MCNC: 2 MG/DL (ref 1.8–2.4)
MCH RBC QN AUTO: 30.9 PG (ref 26–34)
MCHC RBC AUTO-ENTMCNC: 33.1 G/DL (ref 31–36)
MCV RBC AUTO: 93.5 FL (ref 80–100)
MONOCYTES # BLD: 0.8 K/UL (ref 0–1.3)
MONOCYTES NFR BLD: 7.8 %
NEUTROPHILS # BLD: 8.2 K/UL (ref 1.7–7.7)
NEUTROPHILS NFR BLD: 83.1 %
PERFORMED ON: ABNORMAL
PERFORMED ON: NORMAL
PLATELET # BLD AUTO: 175 K/UL (ref 135–450)
PMV BLD AUTO: 8.2 FL (ref 5–10.5)
POTASSIUM SERPL-SCNC: 4.5 MMOL/L (ref 3.5–5.1)
RBC # BLD AUTO: 4.27 M/UL (ref 4.2–5.9)
SODIUM SERPL-SCNC: 144 MMOL/L (ref 136–145)
WBC # BLD AUTO: 9.9 K/UL (ref 4–11)

## 2024-02-02 PROCEDURE — 80048 BASIC METABOLIC PNL TOTAL CA: CPT

## 2024-02-02 PROCEDURE — 36415 COLL VENOUS BLD VENIPUNCTURE: CPT

## 2024-02-02 PROCEDURE — 74250 X-RAY XM SM INT 1CNTRST STD: CPT

## 2024-02-02 PROCEDURE — 83735 ASSAY OF MAGNESIUM: CPT

## 2024-02-02 PROCEDURE — 2580000003 HC RX 258: Performed by: INTERNAL MEDICINE

## 2024-02-02 PROCEDURE — 2580000003 HC RX 258: Performed by: STUDENT IN AN ORGANIZED HEALTH CARE EDUCATION/TRAINING PROGRAM

## 2024-02-02 PROCEDURE — 6360000002 HC RX W HCPCS: Performed by: STUDENT IN AN ORGANIZED HEALTH CARE EDUCATION/TRAINING PROGRAM

## 2024-02-02 PROCEDURE — C9113 INJ PANTOPRAZOLE SODIUM, VIA: HCPCS | Performed by: NURSE PRACTITIONER

## 2024-02-02 PROCEDURE — C9460 INJECTION, CANGRELOR: HCPCS | Performed by: INTERNAL MEDICINE

## 2024-02-02 PROCEDURE — 6360000004 HC RX CONTRAST MEDICATION

## 2024-02-02 PROCEDURE — 6360000002 HC RX W HCPCS: Performed by: NURSE PRACTITIONER

## 2024-02-02 PROCEDURE — 74019 RADEX ABDOMEN 2 VIEWS: CPT

## 2024-02-02 PROCEDURE — 99231 SBSQ HOSP IP/OBS SF/LOW 25: CPT | Performed by: SURGERY

## 2024-02-02 PROCEDURE — APPSS15 APP SPLIT SHARED TIME 0-15 MINUTES: Performed by: NURSE PRACTITIONER

## 2024-02-02 PROCEDURE — 2060000000 HC ICU INTERMEDIATE R&B

## 2024-02-02 PROCEDURE — 85025 COMPLETE CBC W/AUTO DIFF WBC: CPT

## 2024-02-02 PROCEDURE — APPNB30 APP NON BILLABLE TIME 0-30 MINS: Performed by: NURSE PRACTITIONER

## 2024-02-02 PROCEDURE — 6360000002 HC RX W HCPCS: Performed by: INTERNAL MEDICINE

## 2024-02-02 RX ORDER — SODIUM CHLORIDE 9 MG/ML
INJECTION, SOLUTION INTRAVENOUS CONTINUOUS
Status: ACTIVE | OUTPATIENT
Start: 2024-02-02 | End: 2024-02-04

## 2024-02-02 RX ORDER — PANTOPRAZOLE SODIUM 40 MG/10ML
40 INJECTION, POWDER, LYOPHILIZED, FOR SOLUTION INTRAVENOUS DAILY
Status: DISCONTINUED | OUTPATIENT
Start: 2024-02-02 | End: 2024-02-11 | Stop reason: HOSPADM

## 2024-02-02 RX ORDER — LABETALOL HYDROCHLORIDE 5 MG/ML
10 INJECTION, SOLUTION INTRAVENOUS EVERY 6 HOURS PRN
Status: DISCONTINUED | OUTPATIENT
Start: 2024-02-02 | End: 2024-02-11 | Stop reason: HOSPADM

## 2024-02-02 RX ADMIN — MORPHINE SULFATE 2 MG: 2 INJECTION, SOLUTION INTRAMUSCULAR; INTRAVENOUS at 20:35

## 2024-02-02 RX ADMIN — PIPERACILLIN AND TAZOBACTAM 3375 MG: 3; .375 INJECTION, POWDER, FOR SOLUTION INTRAVENOUS at 11:50

## 2024-02-02 RX ADMIN — HEPARIN SODIUM 5000 UNITS: 5000 INJECTION INTRAVENOUS; SUBCUTANEOUS at 07:35

## 2024-02-02 RX ADMIN — HEPARIN SODIUM 5000 UNITS: 5000 INJECTION INTRAVENOUS; SUBCUTANEOUS at 20:35

## 2024-02-02 RX ADMIN — Medication 10 ML: at 20:50

## 2024-02-02 RX ADMIN — PIPERACILLIN AND TAZOBACTAM 3375 MG: 3; .375 INJECTION, POWDER, FOR SOLUTION INTRAVENOUS at 18:35

## 2024-02-02 RX ADMIN — PANTOPRAZOLE SODIUM 40 MG: 40 INJECTION, POWDER, FOR SOLUTION INTRAVENOUS at 16:39

## 2024-02-02 RX ADMIN — CANGRELOR 0.75 MCG/KG/MIN: 50 INJECTION, POWDER, LYOPHILIZED, FOR SOLUTION INTRAVENOUS at 12:55

## 2024-02-02 RX ADMIN — IOHEXOL 300 ML: 350 INJECTION, SOLUTION INTRAVENOUS at 11:10

## 2024-02-02 RX ADMIN — LABETALOL HYDROCHLORIDE 10 MG: 5 INJECTION INTRAVENOUS at 16:48

## 2024-02-02 RX ADMIN — MORPHINE SULFATE 2 MG: 2 INJECTION, SOLUTION INTRAMUSCULAR; INTRAVENOUS at 05:35

## 2024-02-02 RX ADMIN — SODIUM CHLORIDE: 9 INJECTION, SOLUTION INTRAVENOUS at 11:51

## 2024-02-02 RX ADMIN — LABETALOL HYDROCHLORIDE 10 MG: 5 INJECTION INTRAVENOUS at 08:35

## 2024-02-02 RX ADMIN — PIPERACILLIN AND TAZOBACTAM 3375 MG: 3; .375 INJECTION, POWDER, FOR SOLUTION INTRAVENOUS at 03:47

## 2024-02-02 NOTE — FLOWSHEET NOTE
02/02/24 1815   Vital Signs   BP (!) 179/79   MAP (Calculated) 112   BP Location Left upper arm   BP Method Automatic         BP at 185/90 at 1648, PRN labetalol given. BP rechecked at 1744, /77 in right arm. Messaged MD. MD recommended to check in other arm. /79 in left arm at 1815. Messaged MD with BP in left arm. No further orders at this time. Will continue to monitor.

## 2024-02-03 ENCOUNTER — APPOINTMENT (OUTPATIENT)
Dept: GENERAL RADIOLOGY | Age: 89
DRG: 336 | End: 2024-02-03
Payer: MEDICARE

## 2024-02-03 LAB
ANION GAP SERPL CALCULATED.3IONS-SCNC: 15 MMOL/L (ref 3–16)
BASOPHILS # BLD: 0 K/UL (ref 0–0.2)
BASOPHILS NFR BLD: 0.4 %
BUN SERPL-MCNC: 17 MG/DL (ref 7–20)
CALCIUM SERPL-MCNC: 9.8 MG/DL (ref 8.3–10.6)
CHLORIDE SERPL-SCNC: 109 MMOL/L (ref 99–110)
CO2 SERPL-SCNC: 21 MMOL/L (ref 21–32)
CREAT SERPL-MCNC: 1.3 MG/DL (ref 0.8–1.3)
DEPRECATED RDW RBC AUTO: 12.8 % (ref 12.4–15.4)
EOSINOPHIL # BLD: 0 K/UL (ref 0–0.6)
EOSINOPHIL NFR BLD: 0.5 %
GLUCOSE BLD-MCNC: 113 MG/DL (ref 70–99)
GLUCOSE BLD-MCNC: 113 MG/DL (ref 70–99)
GLUCOSE BLD-MCNC: 118 MG/DL (ref 70–99)
GLUCOSE BLD-MCNC: 118 MG/DL (ref 70–99)
GLUCOSE SERPL-MCNC: 104 MG/DL (ref 70–99)
HCT VFR BLD AUTO: 35.8 % (ref 40.5–52.5)
HGB BLD-MCNC: 12.3 G/DL (ref 13.5–17.5)
LYMPHOCYTES # BLD: 0.7 K/UL (ref 1–5.1)
LYMPHOCYTES NFR BLD: 8.2 %
MAGNESIUM SERPL-MCNC: 2 MG/DL (ref 1.8–2.4)
MCH RBC QN AUTO: 31.7 PG (ref 26–34)
MCHC RBC AUTO-ENTMCNC: 34.4 G/DL (ref 31–36)
MCV RBC AUTO: 92.2 FL (ref 80–100)
MONOCYTES # BLD: 0.8 K/UL (ref 0–1.3)
MONOCYTES NFR BLD: 9.5 %
NEUTROPHILS # BLD: 6.5 K/UL (ref 1.7–7.7)
NEUTROPHILS NFR BLD: 81.4 %
PERFORMED ON: ABNORMAL
PLATELET # BLD AUTO: 173 K/UL (ref 135–450)
PMV BLD AUTO: 7.9 FL (ref 5–10.5)
POTASSIUM SERPL-SCNC: 3.8 MMOL/L (ref 3.5–5.1)
RBC # BLD AUTO: 3.89 M/UL (ref 4.2–5.9)
SODIUM SERPL-SCNC: 145 MMOL/L (ref 136–145)
WBC # BLD AUTO: 8 K/UL (ref 4–11)

## 2024-02-03 PROCEDURE — 74019 RADEX ABDOMEN 2 VIEWS: CPT

## 2024-02-03 PROCEDURE — 99232 SBSQ HOSP IP/OBS MODERATE 35: CPT | Performed by: SURGERY

## 2024-02-03 PROCEDURE — 74018 RADEX ABDOMEN 1 VIEW: CPT

## 2024-02-03 PROCEDURE — 85025 COMPLETE CBC W/AUTO DIFF WBC: CPT

## 2024-02-03 PROCEDURE — 2580000003 HC RX 258: Performed by: STUDENT IN AN ORGANIZED HEALTH CARE EDUCATION/TRAINING PROGRAM

## 2024-02-03 PROCEDURE — 2060000000 HC ICU INTERMEDIATE R&B

## 2024-02-03 PROCEDURE — 6360000002 HC RX W HCPCS: Performed by: NURSE PRACTITIONER

## 2024-02-03 PROCEDURE — 6360000002 HC RX W HCPCS: Performed by: STUDENT IN AN ORGANIZED HEALTH CARE EDUCATION/TRAINING PROGRAM

## 2024-02-03 PROCEDURE — 6360000002 HC RX W HCPCS: Performed by: SURGERY

## 2024-02-03 PROCEDURE — 2580000003 HC RX 258: Performed by: SURGERY

## 2024-02-03 PROCEDURE — 80048 BASIC METABOLIC PNL TOTAL CA: CPT

## 2024-02-03 PROCEDURE — C9113 INJ PANTOPRAZOLE SODIUM, VIA: HCPCS | Performed by: NURSE PRACTITIONER

## 2024-02-03 PROCEDURE — 99232 SBSQ HOSP IP/OBS MODERATE 35: CPT | Performed by: NURSE PRACTITIONER

## 2024-02-03 PROCEDURE — 83735 ASSAY OF MAGNESIUM: CPT

## 2024-02-03 PROCEDURE — 36415 COLL VENOUS BLD VENIPUNCTURE: CPT

## 2024-02-03 RX ADMIN — PIPERACILLIN AND TAZOBACTAM 3375 MG: 3; .375 INJECTION, POWDER, FOR SOLUTION INTRAVENOUS at 20:34

## 2024-02-03 RX ADMIN — Medication 10 ML: at 20:35

## 2024-02-03 RX ADMIN — PIPERACILLIN AND TAZOBACTAM 3375 MG: 3; .375 INJECTION, POWDER, FOR SOLUTION INTRAVENOUS at 12:13

## 2024-02-03 RX ADMIN — HEPARIN SODIUM 5000 UNITS: 5000 INJECTION INTRAVENOUS; SUBCUTANEOUS at 21:43

## 2024-02-03 RX ADMIN — LABETALOL HYDROCHLORIDE 10 MG: 5 INJECTION INTRAVENOUS at 16:40

## 2024-02-03 RX ADMIN — HEPARIN SODIUM 5000 UNITS: 5000 INJECTION INTRAVENOUS; SUBCUTANEOUS at 16:41

## 2024-02-03 RX ADMIN — LABETALOL HYDROCHLORIDE 10 MG: 5 INJECTION INTRAVENOUS at 23:22

## 2024-02-03 RX ADMIN — PIPERACILLIN AND TAZOBACTAM 3375 MG: 3; .375 INJECTION, POWDER, FOR SOLUTION INTRAVENOUS at 02:37

## 2024-02-03 RX ADMIN — HEPARIN SODIUM 5000 UNITS: 5000 INJECTION INTRAVENOUS; SUBCUTANEOUS at 06:15

## 2024-02-03 RX ADMIN — SODIUM CHLORIDE: 9 INJECTION, SOLUTION INTRAVENOUS at 06:25

## 2024-02-03 RX ADMIN — SODIUM CHLORIDE, PRESERVATIVE FREE 10 ML: 5 INJECTION INTRAVENOUS at 23:22

## 2024-02-03 RX ADMIN — PANTOPRAZOLE SODIUM 40 MG: 40 INJECTION, POWDER, FOR SOLUTION INTRAVENOUS at 09:09

## 2024-02-04 ENCOUNTER — APPOINTMENT (OUTPATIENT)
Dept: GENERAL RADIOLOGY | Age: 89
DRG: 336 | End: 2024-02-04
Payer: MEDICARE

## 2024-02-04 LAB
ANION GAP SERPL CALCULATED.3IONS-SCNC: 12 MMOL/L (ref 3–16)
BACTERIA BLD CULT ORG #2: NORMAL
BACTERIA BLD CULT: NORMAL
BASOPHILS # BLD: 0 K/UL (ref 0–0.2)
BASOPHILS NFR BLD: 0.4 %
BUN SERPL-MCNC: 18 MG/DL (ref 7–20)
CALCIUM SERPL-MCNC: 9.6 MG/DL (ref 8.3–10.6)
CHLORIDE SERPL-SCNC: 112 MMOL/L (ref 99–110)
CO2 SERPL-SCNC: 22 MMOL/L (ref 21–32)
CREAT SERPL-MCNC: 1.1 MG/DL (ref 0.8–1.3)
DEPRECATED RDW RBC AUTO: 13 % (ref 12.4–15.4)
EOSINOPHIL # BLD: 0 K/UL (ref 0–0.6)
EOSINOPHIL NFR BLD: 0.6 %
GFR SERPLBLD CREATININE-BSD FMLA CKD-EPI: >60 ML/MIN/{1.73_M2}
GLUCOSE BLD-MCNC: 110 MG/DL (ref 70–99)
GLUCOSE BLD-MCNC: 111 MG/DL (ref 70–99)
GLUCOSE BLD-MCNC: 124 MG/DL (ref 70–99)
GLUCOSE SERPL-MCNC: 116 MG/DL (ref 70–99)
HCT VFR BLD AUTO: 34.6 % (ref 40.5–52.5)
HGB BLD-MCNC: 11.9 G/DL (ref 13.5–17.5)
LYMPHOCYTES # BLD: 0.5 K/UL (ref 1–5.1)
LYMPHOCYTES NFR BLD: 7.4 %
MAGNESIUM SERPL-MCNC: 1.8 MG/DL (ref 1.8–2.4)
MCH RBC QN AUTO: 31.5 PG (ref 26–34)
MCHC RBC AUTO-ENTMCNC: 34.3 G/DL (ref 31–36)
MCV RBC AUTO: 92 FL (ref 80–100)
MONOCYTES # BLD: 0.7 K/UL (ref 0–1.3)
MONOCYTES NFR BLD: 8.9 %
NEUTROPHILS # BLD: 6.1 K/UL (ref 1.7–7.7)
NEUTROPHILS NFR BLD: 82.7 %
PERFORMED ON: ABNORMAL
PHOSPHATE SERPL-MCNC: 2.5 MG/DL (ref 2.5–4.9)
PLATELET # BLD AUTO: 178 K/UL (ref 135–450)
PMV BLD AUTO: 8.2 FL (ref 5–10.5)
POTASSIUM SERPL-SCNC: 3.7 MMOL/L (ref 3.5–5.1)
RBC # BLD AUTO: 3.76 M/UL (ref 4.2–5.9)
SODIUM SERPL-SCNC: 146 MMOL/L (ref 136–145)
WBC # BLD AUTO: 7.4 K/UL (ref 4–11)

## 2024-02-04 PROCEDURE — 6360000002 HC RX W HCPCS: Performed by: STUDENT IN AN ORGANIZED HEALTH CARE EDUCATION/TRAINING PROGRAM

## 2024-02-04 PROCEDURE — 2580000003 HC RX 258: Performed by: SURGERY

## 2024-02-04 PROCEDURE — C9113 INJ PANTOPRAZOLE SODIUM, VIA: HCPCS | Performed by: NURSE PRACTITIONER

## 2024-02-04 PROCEDURE — 85025 COMPLETE CBC W/AUTO DIFF WBC: CPT

## 2024-02-04 PROCEDURE — 2580000003 HC RX 258: Performed by: INTERNAL MEDICINE

## 2024-02-04 PROCEDURE — 99232 SBSQ HOSP IP/OBS MODERATE 35: CPT | Performed by: SURGERY

## 2024-02-04 PROCEDURE — 36415 COLL VENOUS BLD VENIPUNCTURE: CPT

## 2024-02-04 PROCEDURE — 2060000000 HC ICU INTERMEDIATE R&B

## 2024-02-04 PROCEDURE — C9460 INJECTION, CANGRELOR: HCPCS | Performed by: INTERNAL MEDICINE

## 2024-02-04 PROCEDURE — 83735 ASSAY OF MAGNESIUM: CPT

## 2024-02-04 PROCEDURE — 6360000002 HC RX W HCPCS: Performed by: INTERNAL MEDICINE

## 2024-02-04 PROCEDURE — 6360000002 HC RX W HCPCS: Performed by: NURSE PRACTITIONER

## 2024-02-04 PROCEDURE — 6360000002 HC RX W HCPCS: Performed by: SURGERY

## 2024-02-04 PROCEDURE — 2580000003 HC RX 258: Performed by: STUDENT IN AN ORGANIZED HEALTH CARE EDUCATION/TRAINING PROGRAM

## 2024-02-04 PROCEDURE — 74019 RADEX ABDOMEN 2 VIEWS: CPT

## 2024-02-04 PROCEDURE — 99232 SBSQ HOSP IP/OBS MODERATE 35: CPT | Performed by: NURSE PRACTITIONER

## 2024-02-04 PROCEDURE — 80048 BASIC METABOLIC PNL TOTAL CA: CPT

## 2024-02-04 PROCEDURE — 84100 ASSAY OF PHOSPHORUS: CPT

## 2024-02-04 RX ADMIN — LABETALOL HYDROCHLORIDE 10 MG: 5 INJECTION INTRAVENOUS at 15:59

## 2024-02-04 RX ADMIN — PANTOPRAZOLE SODIUM 40 MG: 40 INJECTION, POWDER, FOR SOLUTION INTRAVENOUS at 07:51

## 2024-02-04 RX ADMIN — Medication 10 ML: at 07:51

## 2024-02-04 RX ADMIN — HEPARIN SODIUM 5000 UNITS: 5000 INJECTION INTRAVENOUS; SUBCUTANEOUS at 18:37

## 2024-02-04 RX ADMIN — PIPERACILLIN AND TAZOBACTAM 3375 MG: 3; .375 INJECTION, POWDER, FOR SOLUTION INTRAVENOUS at 13:00

## 2024-02-04 RX ADMIN — HEPARIN SODIUM 5000 UNITS: 5000 INJECTION INTRAVENOUS; SUBCUTANEOUS at 21:41

## 2024-02-04 RX ADMIN — HEPARIN SODIUM 5000 UNITS: 5000 INJECTION INTRAVENOUS; SUBCUTANEOUS at 07:01

## 2024-02-04 RX ADMIN — CANGRELOR 0.75 MCG/KG/MIN: 50 INJECTION, POWDER, LYOPHILIZED, FOR SOLUTION INTRAVENOUS at 05:33

## 2024-02-04 RX ADMIN — PIPERACILLIN AND TAZOBACTAM 3375 MG: 3; .375 INJECTION, POWDER, FOR SOLUTION INTRAVENOUS at 21:48

## 2024-02-04 RX ADMIN — PIPERACILLIN AND TAZOBACTAM 3375 MG: 3; .375 INJECTION, POWDER, FOR SOLUTION INTRAVENOUS at 04:29

## 2024-02-04 NOTE — FLOWSHEET NOTE
Messaged hospitalist Janine Young, NP via [employer] at 8:11 PM, \"Pt was admitted 1/31 with SBO, had NGT to WS, sneezed, and NGT suspected for malposition prior to shift change. Portable xray done but not resulted yet. Suction held at this time until NGT positioning confirmed. Pt up in halls now with wife tonight and just had an XXL soft brown BM after ambulating too. Pt asking if he still needs NGT or surgery, but careplan d/w pt & wife    NP read at 8:23 PM, and responded at 8:24 PM, \"Ok, please let me know when x-ray is resulted, thanks\"    RN messaged NP at 9:41 PM , \"resulted\"    NP read at 9:41 PM, and responded at 9:43 PM, \"please advance the NG tube 8 cm\"    RN messaged NP at 9:47 PM , \"thank you; I see order. do you want me to resume LCWS or LIWS after NGT advanced 8cm?\"    NP read at 9:49 PM and responded at 9:50 PM,\"resume previous orders\"          Pt had NG infusing at LCWS; see previous orders & all flowsheets.

## 2024-02-04 NOTE — FLOWSHEET NOTE
02/04/24 0124   NG/OG/NJ/NE Tube Nasogastric 16 fr   Placement Date/Time: 01/31/24 1144   Present on Admission/Arrival: No  Inserted by: [email or web address]  Insertion attempts: 2  Tube Type: Nasogastric  Tube Size (fr): 16 fr  External Catheter Length (cm): 70 cm   NG/OG/NJ/NE External Measurement (cm) 67 cm  (pt called out, states NGT moved with sneeze.RN@BS to eval.Pt sitting up in bed with securement device off but NGT@nares 67cm. Pt refuses mastisol/adhesive;changed device & NGT to WS)

## 2024-02-05 ENCOUNTER — ANESTHESIA EVENT (OUTPATIENT)
Dept: OPERATING ROOM | Age: 89
End: 2024-02-05
Payer: MEDICARE

## 2024-02-05 ENCOUNTER — APPOINTMENT (OUTPATIENT)
Dept: GENERAL RADIOLOGY | Age: 89
DRG: 336 | End: 2024-02-05
Payer: MEDICARE

## 2024-02-05 LAB
ANION GAP SERPL CALCULATED.3IONS-SCNC: 12 MMOL/L (ref 3–16)
BASOPHILS # BLD: 0 K/UL (ref 0–0.2)
BASOPHILS NFR BLD: 0.3 %
BUN SERPL-MCNC: 15 MG/DL (ref 7–20)
CALCIUM SERPL-MCNC: 9.8 MG/DL (ref 8.3–10.6)
CHLORIDE SERPL-SCNC: 114 MMOL/L (ref 99–110)
CO2 SERPL-SCNC: 23 MMOL/L (ref 21–32)
CREAT SERPL-MCNC: 1.3 MG/DL (ref 0.8–1.3)
DEPRECATED RDW RBC AUTO: 13.1 % (ref 12.4–15.4)
EOSINOPHIL # BLD: 0.1 K/UL (ref 0–0.6)
EOSINOPHIL NFR BLD: 1.2 %
GFR SERPLBLD CREATININE-BSD FMLA CKD-EPI: 53 ML/MIN/{1.73_M2}
GLUCOSE BLD-MCNC: 100 MG/DL (ref 70–99)
GLUCOSE BLD-MCNC: 104 MG/DL (ref 70–99)
GLUCOSE BLD-MCNC: 137 MG/DL (ref 70–99)
GLUCOSE BLD-MCNC: 184 MG/DL (ref 70–99)
GLUCOSE BLD-MCNC: 94 MG/DL (ref 70–99)
GLUCOSE SERPL-MCNC: 118 MG/DL (ref 70–99)
HCT VFR BLD AUTO: 35.9 % (ref 40.5–52.5)
HGB BLD-MCNC: 12.2 G/DL (ref 13.5–17.5)
LYMPHOCYTES # BLD: 0.7 K/UL (ref 1–5.1)
LYMPHOCYTES NFR BLD: 7.5 %
MAGNESIUM SERPL-MCNC: 1.6 MG/DL (ref 1.8–2.4)
MCH RBC QN AUTO: 31.5 PG (ref 26–34)
MCHC RBC AUTO-ENTMCNC: 34.1 G/DL (ref 31–36)
MCV RBC AUTO: 92.3 FL (ref 80–100)
MONOCYTES # BLD: 0.7 K/UL (ref 0–1.3)
MONOCYTES NFR BLD: 8.1 %
NEUTROPHILS # BLD: 7.4 K/UL (ref 1.7–7.7)
NEUTROPHILS NFR BLD: 82.9 %
PERFORMED ON: ABNORMAL
PERFORMED ON: NORMAL
PHOSPHATE SERPL-MCNC: 2.5 MG/DL (ref 2.5–4.9)
PLATELET # BLD AUTO: 183 K/UL (ref 135–450)
PMV BLD AUTO: 8.2 FL (ref 5–10.5)
POTASSIUM SERPL-SCNC: 3.7 MMOL/L (ref 3.5–5.1)
RBC # BLD AUTO: 3.89 M/UL (ref 4.2–5.9)
SODIUM SERPL-SCNC: 149 MMOL/L (ref 136–145)
WBC # BLD AUTO: 8.9 K/UL (ref 4–11)

## 2024-02-05 PROCEDURE — 6360000002 HC RX W HCPCS: Performed by: INTERNAL MEDICINE

## 2024-02-05 PROCEDURE — 2580000003 HC RX 258: Performed by: INTERNAL MEDICINE

## 2024-02-05 PROCEDURE — 2060000000 HC ICU INTERMEDIATE R&B

## 2024-02-05 PROCEDURE — 97530 THERAPEUTIC ACTIVITIES: CPT

## 2024-02-05 PROCEDURE — C9113 INJ PANTOPRAZOLE SODIUM, VIA: HCPCS | Performed by: NURSE PRACTITIONER

## 2024-02-05 PROCEDURE — 93296 REM INTERROG EVL PM/IDS: CPT | Performed by: INTERNAL MEDICINE

## 2024-02-05 PROCEDURE — 2580000003 HC RX 258: Performed by: STUDENT IN AN ORGANIZED HEALTH CARE EDUCATION/TRAINING PROGRAM

## 2024-02-05 PROCEDURE — 99232 SBSQ HOSP IP/OBS MODERATE 35: CPT | Performed by: SURGERY

## 2024-02-05 PROCEDURE — 36415 COLL VENOUS BLD VENIPUNCTURE: CPT

## 2024-02-05 PROCEDURE — 36556 INSERT NON-TUNNEL CV CATH: CPT

## 2024-02-05 PROCEDURE — 99233 SBSQ HOSP IP/OBS HIGH 50: CPT | Performed by: NURSE PRACTITIONER

## 2024-02-05 PROCEDURE — 6360000002 HC RX W HCPCS: Performed by: SURGERY

## 2024-02-05 PROCEDURE — 83735 ASSAY OF MAGNESIUM: CPT

## 2024-02-05 PROCEDURE — 6370000000 HC RX 637 (ALT 250 FOR IP): Performed by: NURSE PRACTITIONER

## 2024-02-05 PROCEDURE — C9460 INJECTION, CANGRELOR: HCPCS | Performed by: INTERNAL MEDICINE

## 2024-02-05 PROCEDURE — 97116 GAIT TRAINING THERAPY: CPT

## 2024-02-05 PROCEDURE — 93294 REM INTERROG EVL PM/LDLS PM: CPT | Performed by: INTERNAL MEDICINE

## 2024-02-05 PROCEDURE — 6360000002 HC RX W HCPCS: Performed by: NURSE PRACTITIONER

## 2024-02-05 PROCEDURE — 71045 X-RAY EXAM CHEST 1 VIEW: CPT

## 2024-02-05 PROCEDURE — 2580000003 HC RX 258: Performed by: SURGERY

## 2024-02-05 PROCEDURE — 97161 PT EVAL LOW COMPLEX 20 MIN: CPT

## 2024-02-05 PROCEDURE — 6360000002 HC RX W HCPCS: Performed by: STUDENT IN AN ORGANIZED HEALTH CARE EDUCATION/TRAINING PROGRAM

## 2024-02-05 PROCEDURE — APPNB30 APP NON BILLABLE TIME 0-30 MINS: Performed by: NURSE PRACTITIONER

## 2024-02-05 PROCEDURE — 80048 BASIC METABOLIC PNL TOTAL CA: CPT

## 2024-02-05 PROCEDURE — 02HV33Z INSERTION OF INFUSION DEVICE INTO SUPERIOR VENA CAVA, PERCUTANEOUS APPROACH: ICD-10-PCS | Performed by: STUDENT IN AN ORGANIZED HEALTH CARE EDUCATION/TRAINING PROGRAM

## 2024-02-05 PROCEDURE — 84100 ASSAY OF PHOSPHORUS: CPT

## 2024-02-05 PROCEDURE — 2500000003 HC RX 250 WO HCPCS: Performed by: SURGERY

## 2024-02-05 PROCEDURE — 97165 OT EVAL LOW COMPLEX 30 MIN: CPT

## 2024-02-05 PROCEDURE — 85025 COMPLETE CBC W/AUTO DIFF WBC: CPT

## 2024-02-05 PROCEDURE — APPSS15 APP SPLIT SHARED TIME 0-15 MINUTES: Performed by: NURSE PRACTITIONER

## 2024-02-05 RX ORDER — DIPHENHYDRAMINE HYDROCHLORIDE 50 MG/ML
25 INJECTION INTRAMUSCULAR; INTRAVENOUS NIGHTLY PRN
Status: DISCONTINUED | OUTPATIENT
Start: 2024-02-05 | End: 2024-02-11 | Stop reason: HOSPADM

## 2024-02-05 RX ORDER — GLUCAGON 1 MG/ML
1 KIT INJECTION PRN
Status: DISCONTINUED | OUTPATIENT
Start: 2024-02-05 | End: 2024-02-08 | Stop reason: SDUPTHER

## 2024-02-05 RX ORDER — MAGNESIUM SULFATE IN WATER 40 MG/ML
2000 INJECTION, SOLUTION INTRAVENOUS ONCE
Status: DISCONTINUED | OUTPATIENT
Start: 2024-02-05 | End: 2024-02-05 | Stop reason: SDUPTHER

## 2024-02-05 RX ORDER — MAGNESIUM SULFATE IN WATER 40 MG/ML
2000 INJECTION, SOLUTION INTRAVENOUS ONCE
Status: COMPLETED | OUTPATIENT
Start: 2024-02-05 | End: 2024-02-05

## 2024-02-05 RX ORDER — TRAZODONE HYDROCHLORIDE 50 MG/1
50 TABLET ORAL NIGHTLY PRN
Status: DISCONTINUED | OUTPATIENT
Start: 2024-02-05 | End: 2024-02-05

## 2024-02-05 RX ORDER — ACETAMINOPHEN 650 MG/1
650 SUPPOSITORY RECTAL EVERY 6 HOURS PRN
Status: DISCONTINUED | OUTPATIENT
Start: 2024-02-05 | End: 2024-02-11 | Stop reason: HOSPADM

## 2024-02-05 RX ORDER — DEXTROSE MONOHYDRATE 100 MG/ML
INJECTION, SOLUTION INTRAVENOUS CONTINUOUS PRN
Status: DISCONTINUED | OUTPATIENT
Start: 2024-02-05 | End: 2024-02-08 | Stop reason: SDUPTHER

## 2024-02-05 RX ORDER — ACETAMINOPHEN 325 MG/1
650 TABLET ORAL EVERY 6 HOURS PRN
Status: DISCONTINUED | OUTPATIENT
Start: 2024-02-05 | End: 2024-02-11 | Stop reason: HOSPADM

## 2024-02-05 RX ORDER — DEXTROSE MONOHYDRATE 50 MG/ML
INJECTION, SOLUTION INTRAVENOUS CONTINUOUS
Status: ACTIVE | OUTPATIENT
Start: 2024-02-05 | End: 2024-02-05

## 2024-02-05 RX ORDER — INSULIN LISPRO 100 [IU]/ML
0-4 INJECTION, SOLUTION INTRAVENOUS; SUBCUTANEOUS EVERY 6 HOURS
Status: DISCONTINUED | OUTPATIENT
Start: 2024-02-05 | End: 2024-02-06

## 2024-02-05 RX ADMIN — HEPARIN SODIUM 5000 UNITS: 5000 INJECTION INTRAVENOUS; SUBCUTANEOUS at 05:02

## 2024-02-05 RX ADMIN — ASCORBIC ACID, VITAMIN A PALMITATE, CHOLECALCIFEROL, THIAMINE HYDROCHLORIDE, RIBOFLAVIN-5 PHOSPHATE SODIUM, PYRIDOXINE HYDROCHLORIDE, NIACINAMIDE, DEXPANTHENOL, ALPHA-TOCOPHEROL ACETATE, VITAMIN K1, FOLIC ACID, BIOTIN, CYANOCOBALAMIN: 200; 3300; 200; 6; 3.6; 6; 40; 15; 10; 150; 600; 60; 5 INJECTION, SOLUTION INTRAVENOUS at 18:01

## 2024-02-05 RX ADMIN — ONDANSETRON 4 MG: 2 INJECTION INTRAMUSCULAR; INTRAVENOUS at 08:18

## 2024-02-05 RX ADMIN — ACETAMINOPHEN 650 MG: 325 TABLET ORAL at 03:29

## 2024-02-05 RX ADMIN — CANGRELOR 0.75 MCG/KG/MIN: 50 INJECTION, POWDER, LYOPHILIZED, FOR SOLUTION INTRAVENOUS at 09:57

## 2024-02-05 RX ADMIN — PIPERACILLIN AND TAZOBACTAM 3375 MG: 3; .375 INJECTION, POWDER, FOR SOLUTION INTRAVENOUS at 05:01

## 2024-02-05 RX ADMIN — DEXTROSE MONOHYDRATE: 50 INJECTION, SOLUTION INTRAVENOUS at 12:26

## 2024-02-05 RX ADMIN — PANTOPRAZOLE SODIUM 40 MG: 40 INJECTION, POWDER, FOR SOLUTION INTRAVENOUS at 08:18

## 2024-02-05 RX ADMIN — HEPARIN SODIUM 5000 UNITS: 5000 INJECTION INTRAVENOUS; SUBCUTANEOUS at 22:25

## 2024-02-05 RX ADMIN — PIPERACILLIN AND TAZOBACTAM 3375 MG: 3; .375 INJECTION, POWDER, FOR SOLUTION INTRAVENOUS at 12:23

## 2024-02-05 RX ADMIN — PIPERACILLIN AND TAZOBACTAM 3375 MG: 3; .375 INJECTION, POWDER, FOR SOLUTION INTRAVENOUS at 22:33

## 2024-02-05 RX ADMIN — MAGNESIUM SULFATE HEPTAHYDRATE 2000 MG: 40 INJECTION, SOLUTION INTRAVENOUS at 09:53

## 2024-02-05 RX ADMIN — DIPHENHYDRAMINE HYDROCHLORIDE 25 MG: 50 INJECTION INTRAMUSCULAR; INTRAVENOUS at 19:02

## 2024-02-05 RX ADMIN — Medication 10 ML: at 08:23

## 2024-02-05 ASSESSMENT — ENCOUNTER SYMPTOMS: SHORTNESS OF BREATH: 0

## 2024-02-05 NOTE — PROCEDURES
INTERNAL JUGULAR CENTRAL VENOUS CATHETER PROCEDURE                                                                   Name:  Troy Venegas   /Age/Sex: 1935  (88 y.o. male)  MRN: [id number]   Room/Bed: [facility]3368/3368-01   Admission Date/Time: 2024  3:47 AM       PRE-PROCEDURE    INDICATION: Central Venous Access  LATERALITY: Right  : Ramesh Hendrix DO  ASSIST: MORRIS Holliday   CONSENT: Patient provided written consent to procedure  TIME OUT: Immediately prior to procedure a \"time out\" was called to verify the correct patient, procedure, equipment, support staff and site/side marked as required.    PROCEDURE    Appropriate monitoring equipment such as telemetry and pulse oximetry was in place during the procedure.  The skin over the vein was prepped with chlorhexidine and draped in a sterile fashion.  Local anesthesia was obtained by infiltration using 1% Lidocaine without epinephrine.  The vessel was non-pulsatile and easily compressible on ultrasound.  A 7F 16cm triple lumen catheter was then inserted into the center of the vessel using a modified Seldinger technique and advanced to a depth of 15cm.  Return of non-pulsatile venous blood was observed.  Flow was easily aspirated from each of the catheter lumens and then filled with sterile normal saline.  The line was securely fastened to the skin with sutures.  Then the site was sterilely dressed using an antimicrobial Bio-patch and adhesive bandage.    POST-PROCEDURE    The patient tolerated the procedure Well.    CHEST X-RAY: Shows appropriate placement of catheter.  COMPLICATIONS: None  ESTIMATED BLOOD LOSS: Minimal.      Ramesh Hendrix DO 2024 3:00 PM

## 2024-02-05 NOTE — CONSULTS
Clinical Pharmacy Note    Pharmacy consulted by Dr. Gill / BP Dionisio to manage TPN    Current TPN rate: 0 ml/hr - new start today  Goal TPN rate: TBD ml/hr    Access: will need central line placed  Indication: SBO - inadequate nutrition    Labs:  General Labs:  BMP:    Lab Results   Component Value Date/Time     02/05/2024 04:42 AM    K 3.7 02/05/2024 04:42 AM    K 3.8 02/03/2024 05:00 AM     02/05/2024 04:42 AM    CO2 23 02/05/2024 04:42 AM    BUN 15 02/05/2024 04:42 AM    LABALBU 4.7 01/31/2024 03:59 AM    CREATININE 1.3 02/05/2024 04:42 AM    CALCIUM 9.8 02/05/2024 04:42 AM    GFRAA >60 05/05/2022 10:40 AM    GFRAA 54 05/22/2013 09:40 AM    LABGLOM 53 02/05/2024 04:42 AM    GLUCOSE 118 02/05/2024 04:42 AM     Magnesium:    Lab Results   Component Value Date/Time    MG 1.60 02/05/2024 04:42 AM     Phosphorus:    Lab Results   Component Value Date/Time    PHOS 2.5 02/05/2024 04:42 AM       Electrolyte replacement as follows:   Replace magnesium with 2 g of magnesium sulfate administered IV over 2 hours    Blood sugars over past 24 hours:     Blood sugar management:  Plan to start low dose insulin sliding scale q6h    Plan to initiate TPN at 40 ml/hr.    Na of 149 and D5W drip @ 75 mL/hr noted.    Thank you for allowing pharmacy to participate in the care of this patient.  Shaista Lezama, PharmD, BCPS  [id number]  2/5/2024 10:49 AM       
at goal rate 83 mL/hr to provide 2000 mL total volume, 1760 calories, 100 grams protein, dextrose load 3.43 mg/kg/min.    ANTHROPOMETRICS  Current Height: 185.4 cm (6' 1\")  Current Weight - Scale: 81.2 kg (179 lb)    Ideal Body Weight (IBW): 184 lbs  (84 kg)        BMI: 23.6    COMPARATIVE STANDARDS  Total Energy Requirements (kcals/day): 7552-6032     Protein (g):  101-126 grams       Fluid (mL/day):  8106-8849 mL    The patient will be monitored per nutrition standards of care. Consult dietitian if additional nutrition interventions are needed prior to RD reassessment.     Jerri Lagunas MS, RD, LD    Contact: [phone]   
  Abdominal:      General: There is distension.      Palpations: Abdomen is soft.      Tenderness: There is abdominal tenderness.   Musculoskeletal:         General: Normal range of motion.      Cervical back: Normal range of motion and neck supple.   Skin:     General: Skin is warm and dry.   Neurological:      Mental Status: He is alert and oriented to person, place, and time.   Psychiatric:         Behavior: Behavior normal.       White blood count-13.2    Assessment:  88-year-old male with no past abdominal surgical history who presented to the ED with complaints of severe periumbilical and epigastric abdominal pain which radiated through to the back.  Associated symptoms include nausea.  On physical examination he has mild abdominal distention with mild epigastric abdominal tenderness.  White blood count is mildly elevated at 13.2.  CAT scan of the abdomen and pelvis shows dilated proximal small bowel and stomach with collapsed appearing distal small bowel.  The overall clinical and CAT scan findings are consistent with a small bowel obstruction.  No findings to suggest compromised small bowel.  The patient had cardiac stents placed in December 2023 and is currently on Plavix.    Plan:  Will give trial of conservative management for SBO consisting of supportive care and NG tube decompression.  Will consult cardiology to see if Plavix can be held.  Repeat abdominal x-rays have been ordered for tomorrow.    Christiano Tay MD  1/31/2024

## 2024-02-06 ENCOUNTER — ANESTHESIA (OUTPATIENT)
Dept: OPERATING ROOM | Age: 89
End: 2024-02-06
Payer: MEDICARE

## 2024-02-06 LAB
ALBUMIN SERPL-MCNC: 3.7 G/DL (ref 3.4–5)
ALP SERPL-CCNC: 50 U/L (ref 40–129)
ALT SERPL-CCNC: 9 U/L (ref 10–40)
ANION GAP SERPL CALCULATED.3IONS-SCNC: 9 MMOL/L (ref 3–16)
AST SERPL-CCNC: 22 U/L (ref 15–37)
BASOPHILS # BLD: 0 K/UL (ref 0–0.2)
BASOPHILS NFR BLD: 0.1 %
BILIRUB DIRECT SERPL-MCNC: <0.2 MG/DL (ref 0–0.3)
BILIRUB INDIRECT SERPL-MCNC: ABNORMAL MG/DL (ref 0–1)
BILIRUB SERPL-MCNC: 0.6 MG/DL (ref 0–1)
BUN SERPL-MCNC: 17 MG/DL (ref 7–20)
CALCIUM SERPL-MCNC: 9.4 MG/DL (ref 8.3–10.6)
CHLORIDE SERPL-SCNC: 109 MMOL/L (ref 99–110)
CO2 SERPL-SCNC: 26 MMOL/L (ref 21–32)
CREAT SERPL-MCNC: 1.2 MG/DL (ref 0.8–1.3)
DEPRECATED RDW RBC AUTO: 13.4 % (ref 12.4–15.4)
EOSINOPHIL # BLD: 0.1 K/UL (ref 0–0.6)
EOSINOPHIL NFR BLD: 1.3 %
GFR SERPLBLD CREATININE-BSD FMLA CKD-EPI: 59 ML/MIN/{1.73_M2}
GLUCOSE BLD-MCNC: 140 MG/DL (ref 70–99)
GLUCOSE BLD-MCNC: 162 MG/DL (ref 70–99)
GLUCOSE BLD-MCNC: 171 MG/DL (ref 70–99)
GLUCOSE BLD-MCNC: 174 MG/DL (ref 70–99)
GLUCOSE BLD-MCNC: 175 MG/DL (ref 70–99)
GLUCOSE BLD-MCNC: 177 MG/DL (ref 70–99)
GLUCOSE BLD-MCNC: 188 MG/DL (ref 70–99)
GLUCOSE BLD-MCNC: 198 MG/DL (ref 70–99)
GLUCOSE SERPL-MCNC: 197 MG/DL (ref 70–99)
HCT VFR BLD AUTO: 39 % (ref 40.5–52.5)
HGB BLD-MCNC: 12.8 G/DL (ref 13.5–17.5)
LYMPHOCYTES # BLD: 0.7 K/UL (ref 1–5.1)
LYMPHOCYTES NFR BLD: 9.2 %
MAGNESIUM SERPL-MCNC: 1.9 MG/DL (ref 1.8–2.4)
MCH RBC QN AUTO: 30.6 PG (ref 26–34)
MCHC RBC AUTO-ENTMCNC: 32.9 G/DL (ref 31–36)
MCV RBC AUTO: 93 FL (ref 80–100)
MONOCYTES # BLD: 0.6 K/UL (ref 0–1.3)
MONOCYTES NFR BLD: 7.5 %
NEUTROPHILS # BLD: 6.4 K/UL (ref 1.7–7.7)
NEUTROPHILS NFR BLD: 81.9 %
PERFORMED ON: ABNORMAL
PHOSPHATE SERPL-MCNC: 2 MG/DL (ref 2.5–4.9)
PHOSPHATE SERPL-MCNC: 3.4 MG/DL (ref 2.5–4.9)
PLATELET # BLD AUTO: 191 K/UL (ref 135–450)
PMV BLD AUTO: 8.4 FL (ref 5–10.5)
POTASSIUM SERPL-SCNC: 2.9 MMOL/L (ref 3.5–5.1)
POTASSIUM SERPL-SCNC: 3.7 MMOL/L (ref 3.5–5.1)
PROT SERPL-MCNC: 5.6 G/DL (ref 6.4–8.2)
RBC # BLD AUTO: 4.19 M/UL (ref 4.2–5.9)
SODIUM SERPL-SCNC: 144 MMOL/L (ref 136–145)
TRIGL SERPL-MCNC: 114 MG/DL (ref 0–150)
WBC # BLD AUTO: 7.9 K/UL (ref 4–11)

## 2024-02-06 PROCEDURE — 2580000003 HC RX 258: Performed by: INTERNAL MEDICINE

## 2024-02-06 PROCEDURE — 2500000003 HC RX 250 WO HCPCS: Performed by: NURSE ANESTHETIST, CERTIFIED REGISTERED

## 2024-02-06 PROCEDURE — C9460 INJECTION, CANGRELOR: HCPCS | Performed by: INTERNAL MEDICINE

## 2024-02-06 PROCEDURE — 6360000002 HC RX W HCPCS: Performed by: INTERNAL MEDICINE

## 2024-02-06 PROCEDURE — 2720000010 HC SURG SUPPLY STERILE: Performed by: SURGERY

## 2024-02-06 PROCEDURE — 2580000003 HC RX 258: Performed by: STUDENT IN AN ORGANIZED HEALTH CARE EDUCATION/TRAINING PROGRAM

## 2024-02-06 PROCEDURE — 2580000003 HC RX 258: Performed by: SURGERY

## 2024-02-06 PROCEDURE — 6360000002 HC RX W HCPCS: Performed by: NURSE PRACTITIONER

## 2024-02-06 PROCEDURE — 83735 ASSAY OF MAGNESIUM: CPT

## 2024-02-06 PROCEDURE — C9113 INJ PANTOPRAZOLE SODIUM, VIA: HCPCS | Performed by: NURSE PRACTITIONER

## 2024-02-06 PROCEDURE — 2500000003 HC RX 250 WO HCPCS: Performed by: SURGERY

## 2024-02-06 PROCEDURE — 6360000002 HC RX W HCPCS: Performed by: STUDENT IN AN ORGANIZED HEALTH CARE EDUCATION/TRAINING PROGRAM

## 2024-02-06 PROCEDURE — 3600000014 HC SURGERY LEVEL 4 ADDTL 15MIN: Performed by: SURGERY

## 2024-02-06 PROCEDURE — 6360000002 HC RX W HCPCS: Performed by: NURSE ANESTHETIST, CERTIFIED REGISTERED

## 2024-02-06 PROCEDURE — 80076 HEPATIC FUNCTION PANEL: CPT

## 2024-02-06 PROCEDURE — 6360000002 HC RX W HCPCS: Performed by: SURGERY

## 2024-02-06 PROCEDURE — 7100000001 HC PACU RECOVERY - ADDTL 15 MIN: Performed by: SURGERY

## 2024-02-06 PROCEDURE — 84132 ASSAY OF SERUM POTASSIUM: CPT

## 2024-02-06 PROCEDURE — 85025 COMPLETE CBC W/AUTO DIFF WBC: CPT

## 2024-02-06 PROCEDURE — 1200000000 HC SEMI PRIVATE

## 2024-02-06 PROCEDURE — A4217 STERILE WATER/SALINE, 500 ML: HCPCS | Performed by: SURGERY

## 2024-02-06 PROCEDURE — 84478 ASSAY OF TRIGLYCERIDES: CPT

## 2024-02-06 PROCEDURE — 99232 SBSQ HOSP IP/OBS MODERATE 35: CPT | Performed by: INTERNAL MEDICINE

## 2024-02-06 PROCEDURE — 0DNB0ZZ RELEASE ILEUM, OPEN APPROACH: ICD-10-PCS | Performed by: SURGERY

## 2024-02-06 PROCEDURE — 2580000003 HC RX 258: Performed by: NURSE PRACTITIONER

## 2024-02-06 PROCEDURE — 3700000000 HC ANESTHESIA ATTENDED CARE: Performed by: SURGERY

## 2024-02-06 PROCEDURE — APPNB30 APP NON BILLABLE TIME 0-30 MINS: Performed by: NURSE PRACTITIONER

## 2024-02-06 PROCEDURE — 0WQF0ZZ REPAIR ABDOMINAL WALL, OPEN APPROACH: ICD-10-PCS | Performed by: SURGERY

## 2024-02-06 PROCEDURE — 80048 BASIC METABOLIC PNL TOTAL CA: CPT

## 2024-02-06 PROCEDURE — 2580000003 HC RX 258: Performed by: NURSE ANESTHETIST, CERTIFIED REGISTERED

## 2024-02-06 PROCEDURE — 6360000002 HC RX W HCPCS: Performed by: ANESTHESIOLOGY

## 2024-02-06 PROCEDURE — 7100000000 HC PACU RECOVERY - FIRST 15 MIN: Performed by: SURGERY

## 2024-02-06 PROCEDURE — 3700000001 HC ADD 15 MINUTES (ANESTHESIA): Performed by: SURGERY

## 2024-02-06 PROCEDURE — 36592 COLLECT BLOOD FROM PICC: CPT

## 2024-02-06 PROCEDURE — 3600000004 HC SURGERY LEVEL 4 BASE: Performed by: SURGERY

## 2024-02-06 PROCEDURE — 84100 ASSAY OF PHOSPHORUS: CPT

## 2024-02-06 PROCEDURE — 36415 COLL VENOUS BLD VENIPUNCTURE: CPT

## 2024-02-06 PROCEDURE — C9460 INJECTION, CANGRELOR: HCPCS | Performed by: SURGERY

## 2024-02-06 PROCEDURE — 2500000003 HC RX 250 WO HCPCS: Performed by: STUDENT IN AN ORGANIZED HEALTH CARE EDUCATION/TRAINING PROGRAM

## 2024-02-06 PROCEDURE — 2709999900 HC NON-CHARGEABLE SUPPLY: Performed by: SURGERY

## 2024-02-06 PROCEDURE — 44005 FREEING OF BOWEL ADHESION: CPT | Performed by: SURGERY

## 2024-02-06 RX ORDER — LACTOBACILLUS RHAMNOSUS GG 10B CELL
2 CAPSULE ORAL
Status: DISCONTINUED | OUTPATIENT
Start: 2024-02-07 | End: 2024-02-07

## 2024-02-06 RX ORDER — HYDRALAZINE HYDROCHLORIDE 20 MG/ML
10 INJECTION INTRAMUSCULAR; INTRAVENOUS
Status: DISCONTINUED | OUTPATIENT
Start: 2024-02-06 | End: 2024-02-06 | Stop reason: HOSPADM

## 2024-02-06 RX ORDER — POTASSIUM CHLORIDE 29.8 MG/ML
20 INJECTION INTRAVENOUS
Status: COMPLETED | OUTPATIENT
Start: 2024-02-06 | End: 2024-02-06

## 2024-02-06 RX ORDER — PROPOFOL 10 MG/ML
INJECTION, EMULSION INTRAVENOUS PRN
Status: DISCONTINUED | OUTPATIENT
Start: 2024-02-06 | End: 2024-02-06 | Stop reason: SDUPTHER

## 2024-02-06 RX ORDER — SODIUM CHLORIDE 9 MG/ML
INJECTION, SOLUTION INTRAVENOUS CONTINUOUS PRN
Status: DISCONTINUED | OUTPATIENT
Start: 2024-02-06 | End: 2024-02-06 | Stop reason: SDUPTHER

## 2024-02-06 RX ORDER — ONDANSETRON 2 MG/ML
INJECTION INTRAMUSCULAR; INTRAVENOUS PRN
Status: DISCONTINUED | OUTPATIENT
Start: 2024-02-06 | End: 2024-02-06 | Stop reason: SDUPTHER

## 2024-02-06 RX ORDER — HYDROMORPHONE HYDROCHLORIDE 1 MG/ML
0.5 INJECTION, SOLUTION INTRAMUSCULAR; INTRAVENOUS; SUBCUTANEOUS
Status: DISCONTINUED | OUTPATIENT
Start: 2024-02-06 | End: 2024-02-11

## 2024-02-06 RX ORDER — ROCURONIUM BROMIDE 10 MG/ML
INJECTION, SOLUTION INTRAVENOUS PRN
Status: DISCONTINUED | OUTPATIENT
Start: 2024-02-06 | End: 2024-02-06 | Stop reason: SDUPTHER

## 2024-02-06 RX ORDER — FENTANYL CITRATE 50 UG/ML
INJECTION, SOLUTION INTRAMUSCULAR; INTRAVENOUS PRN
Status: DISCONTINUED | OUTPATIENT
Start: 2024-02-06 | End: 2024-02-06 | Stop reason: SDUPTHER

## 2024-02-06 RX ORDER — LABETALOL HYDROCHLORIDE 5 MG/ML
10 INJECTION, SOLUTION INTRAVENOUS
Status: DISCONTINUED | OUTPATIENT
Start: 2024-02-06 | End: 2024-02-06 | Stop reason: HOSPADM

## 2024-02-06 RX ORDER — INSULIN LISPRO 100 [IU]/ML
0-8 INJECTION, SOLUTION INTRAVENOUS; SUBCUTANEOUS EVERY 6 HOURS
Status: DISCONTINUED | OUTPATIENT
Start: 2024-02-06 | End: 2024-02-09

## 2024-02-06 RX ORDER — LIDOCAINE HYDROCHLORIDE 20 MG/ML
INJECTION, SOLUTION EPIDURAL; INFILTRATION; INTRACAUDAL; PERINEURAL PRN
Status: DISCONTINUED | OUTPATIENT
Start: 2024-02-06 | End: 2024-02-06 | Stop reason: SDUPTHER

## 2024-02-06 RX ORDER — ONDANSETRON 2 MG/ML
4 INJECTION INTRAMUSCULAR; INTRAVENOUS
Status: DISCONTINUED | OUTPATIENT
Start: 2024-02-06 | End: 2024-02-06 | Stop reason: HOSPADM

## 2024-02-06 RX ORDER — DEXMEDETOMIDINE HYDROCHLORIDE 100 UG/ML
INJECTION, SOLUTION INTRAVENOUS PRN
Status: DISCONTINUED | OUTPATIENT
Start: 2024-02-06 | End: 2024-02-06 | Stop reason: SDUPTHER

## 2024-02-06 RX ORDER — HYDROMORPHONE HYDROCHLORIDE 2 MG/ML
0.5 INJECTION, SOLUTION INTRAMUSCULAR; INTRAVENOUS; SUBCUTANEOUS EVERY 5 MIN PRN
Status: DISCONTINUED | OUTPATIENT
Start: 2024-02-06 | End: 2024-02-06 | Stop reason: HOSPADM

## 2024-02-06 RX ORDER — DEXAMETHASONE SODIUM PHOSPHATE 4 MG/ML
INJECTION, SOLUTION INTRA-ARTICULAR; INTRALESIONAL; INTRAMUSCULAR; INTRAVENOUS; SOFT TISSUE PRN
Status: DISCONTINUED | OUTPATIENT
Start: 2024-02-06 | End: 2024-02-06 | Stop reason: SDUPTHER

## 2024-02-06 RX ORDER — MAGNESIUM HYDROXIDE 1200 MG/15ML
LIQUID ORAL CONTINUOUS PRN
Status: COMPLETED | OUTPATIENT
Start: 2024-02-06 | End: 2024-02-06

## 2024-02-06 RX ORDER — SUCCINYLCHOLINE/SOD CL,ISO/PF 200MG/10ML
SYRINGE (ML) INTRAVENOUS PRN
Status: DISCONTINUED | OUTPATIENT
Start: 2024-02-06 | End: 2024-02-06 | Stop reason: SDUPTHER

## 2024-02-06 RX ADMIN — FENTANYL CITRATE 50 MCG: 50 INJECTION, SOLUTION INTRAMUSCULAR; INTRAVENOUS at 12:46

## 2024-02-06 RX ADMIN — HEPARIN SODIUM 5000 UNITS: 5000 INJECTION INTRAVENOUS; SUBCUTANEOUS at 21:30

## 2024-02-06 RX ADMIN — DEXMEDETOMIDINE HYDROCHLORIDE 5 MCG: 100 INJECTION, SOLUTION INTRAVENOUS at 13:12

## 2024-02-06 RX ADMIN — DEXMEDETOMIDINE HYDROCHLORIDE 5 MCG: 100 INJECTION, SOLUTION INTRAVENOUS at 12:50

## 2024-02-06 RX ADMIN — HYDROMORPHONE HYDROCHLORIDE 0.5 MG: 2 INJECTION, SOLUTION INTRAMUSCULAR; INTRAVENOUS; SUBCUTANEOUS at 13:34

## 2024-02-06 RX ADMIN — HYDROMORPHONE HYDROCHLORIDE 0.5 MG: 2 INJECTION, SOLUTION INTRAMUSCULAR; INTRAVENOUS; SUBCUTANEOUS at 14:09

## 2024-02-06 RX ADMIN — SODIUM CHLORIDE 25 ML: 9 INJECTION, SOLUTION INTRAVENOUS at 05:38

## 2024-02-06 RX ADMIN — LEUCINE, PHENYLALANINE, LYSINE, METHIONINE, ISOLEUCINE, VALINE, HISTIDINE, THREONINE, TRYPTOPHAN, ALANINE, GLYCINE, ARGININE, PROLINE, SERINE, TYROSINE, SODIUM ACETATE, DIBASIC POTASSIUM PHOSPHATE, MAGNESIUM CHLORIDE, SODIUM CHLORIDE, CALCIUM CHLORIDE, DEXTROSE
365; 280; 290; 200; 300; 290; 240; 210; 90; 1035; 515; 575; 340; 250; 20; 340; 261; 51; 59; 33; 20 INJECTION INTRAVENOUS at 18:52

## 2024-02-06 RX ADMIN — CANGRELOR 0.75 MCG/KG/MIN: 50 INJECTION, POWDER, LYOPHILIZED, FOR SOLUTION INTRAVENOUS at 16:13

## 2024-02-06 RX ADMIN — POTASSIUM CHLORIDE 20 MEQ: 400 INJECTION, SOLUTION INTRAVENOUS at 08:32

## 2024-02-06 RX ADMIN — LABETALOL HYDROCHLORIDE 10 MG: 5 INJECTION INTRAVENOUS at 23:50

## 2024-02-06 RX ADMIN — CANGRELOR 0.75 MCG/KG/MIN: 50 INJECTION, POWDER, LYOPHILIZED, FOR SOLUTION INTRAVENOUS at 02:52

## 2024-02-06 RX ADMIN — HEPARIN SODIUM 5000 UNITS: 5000 INJECTION INTRAVENOUS; SUBCUTANEOUS at 05:39

## 2024-02-06 RX ADMIN — PANTOPRAZOLE SODIUM 40 MG: 40 INJECTION, POWDER, FOR SOLUTION INTRAVENOUS at 08:21

## 2024-02-06 RX ADMIN — POTASSIUM CHLORIDE 20 MEQ: 400 INJECTION, SOLUTION INTRAVENOUS at 10:15

## 2024-02-06 RX ADMIN — Medication 10 ML: at 20:51

## 2024-02-06 RX ADMIN — PIPERACILLIN AND TAZOBACTAM 3375 MG: 3; .375 INJECTION, POWDER, FOR SOLUTION INTRAVENOUS at 06:06

## 2024-02-06 RX ADMIN — ROCURONIUM BROMIDE 50 MG: 10 INJECTION, SOLUTION INTRAVENOUS at 12:41

## 2024-02-06 RX ADMIN — PROPOFOL 120 MG: 10 INJECTION, EMULSION INTRAVENOUS at 12:35

## 2024-02-06 RX ADMIN — Medication 140 MG: at 12:35

## 2024-02-06 RX ADMIN — ONDANSETRON 4 MG: 2 INJECTION INTRAMUSCULAR; INTRAVENOUS at 12:44

## 2024-02-06 RX ADMIN — DEXAMETHASONE SODIUM PHOSPHATE 4 MG: 4 INJECTION, SOLUTION INTRAMUSCULAR; INTRAVENOUS at 12:44

## 2024-02-06 RX ADMIN — PIPERACILLIN AND TAZOBACTAM 3375 MG: 3; .375 INJECTION, POWDER, FOR SOLUTION INTRAVENOUS at 18:08

## 2024-02-06 RX ADMIN — Medication 10 ML: at 05:39

## 2024-02-06 RX ADMIN — HYDROMORPHONE HYDROCHLORIDE 0.5 MG: 2 INJECTION, SOLUTION INTRAMUSCULAR; INTRAVENOUS; SUBCUTANEOUS at 13:50

## 2024-02-06 RX ADMIN — SUGAMMADEX 200 MG: 100 INJECTION, SOLUTION INTRAVENOUS at 13:03

## 2024-02-06 RX ADMIN — CANGRELOR 0.75 MCG/KG/MIN: 50 INJECTION, POWDER, LYOPHILIZED, FOR SOLUTION INTRAVENOUS at 22:00

## 2024-02-06 RX ADMIN — LIDOCAINE HYDROCHLORIDE 100 MG: 20 INJECTION, SOLUTION EPIDURAL; INFILTRATION; INTRACAUDAL; PERINEURAL at 12:35

## 2024-02-06 RX ADMIN — SODIUM CHLORIDE: 9 INJECTION, SOLUTION INTRAVENOUS at 12:26

## 2024-02-06 RX ADMIN — FENTANYL CITRATE 50 MCG: 50 INJECTION, SOLUTION INTRAMUSCULAR; INTRAVENOUS at 12:35

## 2024-02-06 RX ADMIN — POTASSIUM PHOSPHATES 20 MMOL: 236; 224 INJECTION, SOLUTION INTRAVENOUS at 10:25

## 2024-02-06 RX ADMIN — WATER 2.5 MG: 1 INJECTION INTRAMUSCULAR; INTRAVENOUS; SUBCUTANEOUS at 02:10

## 2024-02-06 ASSESSMENT — LIFESTYLE VARIABLES: SMOKING_STATUS: 0

## 2024-02-06 NOTE — BRIEF OP NOTE
Brief Postoperative Note      Patient: Troy Venegas  YOB: 1935  MRN: [id number]    Date of Procedure: 2/6/2024    Pre-Op Diagnosis Codes:     * Small bowel obstruction (HCC) [K56.609]    Post-Op Diagnosis: Same       Procedure(s):  EXPLORATORY LAPAROTOMY, LYSIS OF ADHESIONS    Surgeon(s):  Christiano Tay MD    Assistant:  Surgical Assistant: Kai Sainz RN    Anesthesia: General    Estimated Blood Loss (mL): Minimal    Complications: None    Specimens:   * No specimens in log *    Implants:  * No implants in log *      Drains:   NG/OG/NJ/NE Tube Nasogastric 16 fr (Active)   Surrounding Skin Clean, dry & intact 02/06/24 0818   Securement device Adhesive based naylor 02/06/24 0818   Status Suction-low continuous 02/06/24 0818   Placement Verified External Catheter Length 02/06/24 0818   NG/OG/NJ/NE External Measurement (cm) 67 cm 02/06/24 0818   Drainage Appearance Green;Brown 02/06/24 0818   Output (mL) 50 ml 02/06/24 0614   Action Taken Retaped 02/05/24 2119       Urinary Catheter 02/06/24 [address];[place] (Active)       [REMOVED] NG/OG/NJ/NE Tube Nasogastric 16 fr Right nostril (Removed)   Surrounding Skin Clean, dry & intact 01/31/24 0854   Securement device Adhesive based naylor 01/31/24 0854   Status Clamped 01/31/24 0854   Placement Verified X-Ray (Initial) 01/31/24 0854       [REMOVED] Urinary Catheter 01/03/24 [place] (Removed)   Catheter Indications Urinary retention (acute or chronic), continuous bladder irrigation or bladder outlet obstruction 01/08/24 0536   Site Assessment No urethral drainage 01/08/24 0536   Urine Color Yellow 01/08/24 0536   Urine Appearance Clear 01/08/24 0536   Urine Odor Other (Comment) 01/05/24 2033   Collection Container Standard 01/08/24 0536   Securement Method Securing device (Describe) 01/08/24 0536   Catheter Care  Perineal wipes 01/08/24 0536   Catheter Best Practices  Drainage tube clipped to bed;Catheter secured to thigh;Bag below bladder;Bag not on

## 2024-02-06 NOTE — FLOWSHEET NOTE
Phase 1 complete, pt seen by anesthesiologist. VSS, pt resting comfortably. Abd incision site x1 is CDI, ice applied. Will transfer to 4T when a bed becomes available, family updated.

## 2024-02-06 NOTE — ANESTHESIA POSTPROCEDURE EVALUATION
Department of Anesthesiology  Postprocedure Note    Patient: Troy Venegas  MRN: [id number]  YOB: 1935  Date of evaluation: 2/6/2024    Procedure Summary       Date: 02/06/24 Room / Location: [address]    Anesthesia Start: 1226 Anesthesia Stop: 1325    Procedure: EXPLORATORY LAPAROTOMY, LYSIS OF ADHESIONS (Abdomen) Diagnosis:       Small bowel obstruction (HCC)      (Small bowel obstruction (HCC) [K56.609])    Surgeons: Christiano Tay MD Responsible Provider: Chris Conklin MD    Anesthesia Type: general ASA Status: 4            Anesthesia Type: No value filed.    Osman Phase I: Osman Score: 10    Osman Phase II:      Anesthesia Post Evaluation    Patient location during evaluation: PACU  Patient participation: complete - patient participated  Level of consciousness: awake and alert  Airway patency: patent  Nausea & Vomiting: no vomiting and no nausea  Cardiovascular status: hemodynamically stable  Respiratory status: acceptable  Hydration status: stable  Pain management: adequate    No notable events documented.

## 2024-02-06 NOTE — ANESTHESIA PRE PROCEDURE
Department of Anesthesiology  Preprocedure Note       Name:  Troy Venegas   Age:  88 y.o.  :  1935                                          MRN:  [id number]         Date:  2024      Surgeon: Surgeon(s):  Christiano Tay MD    Procedure: Procedure(s):  EXPLORATORY LAPAROTOMY, LYSIS OF ADHESIONS  POSSIBLE BOWEL RESECTION    Medications prior to admission:   Prior to Admission medications    Medication Sig Start Date End Date Taking? Authorizing Provider   tamsulosin (FLOMAX) 0.4 MG capsule Take 1 capsule by mouth daily    Liz Aponte MD   clopidogrel (PLAVIX) 75 MG tablet Take 1 tablet by mouth daily 24   Rosalino Jung MD   aspirin 81 MG chewable tablet Take 1 tablet by mouth daily 24   Katy Isaac MD   atorvastatin (LIPITOR) 40 MG tablet Take 1 tablet by mouth nightly 23   Prince Donovan MD   nateglinide (STARLIX) 120 MG tablet Take 1 tablet by mouth Daily with supper Starting 2 a day on     Liz Aponte MD   psyllium (METAMUCIL) 28.3 % POWD powder Take 3.4 g by mouth at bedtime    Liz Aponte MD   lansoprazole (PREVACID) 30 MG delayed release capsule Take 1 capsule by mouth daily  Patient taking differently: Take 1 capsule by mouth every morning 23   Lois Kruger MD   valACYclovir (VALTREX) 500 MG tablet TAKE 1 TABLET BY MOUTH TWICE DAILY AT ONSET OF SYMPTOMS FOR 3 DAYS FOR RECURRENCE ON BUTTOCKS 23   Reinaldo Connolly MD   pregabalin (LYRICA) 75 MG capsule TAKE 1 CAPSULE IN THE MORNING AND TAKE 3 CAPSULES AT NIGHT 23  Lois Kruger MD   sotalol (BETAPACE) 80 MG tablet TAKE 1 TABLET BY MOUTH TWICE A DAY 3/2/23   Kam Ocampo MD   Insulin Pen Needle (B-D UF III MINI PEN NEEDLES) 31G X 5 MM MISC Use as directed 22   Lois Kruger MD   finasteride (PROSCAR) 5 MG tablet Take 1 tablet by mouth daily 22   Liz Aponte MD   glucose monitoring kit (FREESTYLE) monitoring kit 1 kit by 
Applicable): No results found for: \"COVID19\"        Anesthesia Evaluation  Patient summary reviewed and Nursing notes reviewed   no history of anesthetic complications:   Airway: Mallampati: III  TM distance: >3 FB   Neck ROM: full  Mouth opening: > = 3 FB   Dental: normal exam   (+) partials and implants      Pulmonary:normal exam  breath sounds clear to auscultation      (-) COPD, asthma, sleep apnea and not a current smoker (former)                           Cardiovascular:    (+) hypertension:, pacemaker (MDT PPM AAIR-DDDR ): pacemaker, past MI:, CAD (cangrelor bridge francois-operatively):, CABG/stent (NSTEMI s/p PCI to RCA/PAD 12/2023; s/p PCI to Lcx, LAD 2021):, dysrhythmias (afib w/ sss s/p ppm): atrial fibrillation, hyperlipidemia    (-)  angina and  CHF (echo 12/23 EF 55)    ECG reviewed  Rhythm: regular  Rate: normal  Echocardiogram reviewed         Beta Blocker:  Dose within 24 Hrs         Neuro/Psych:   (+) neuromuscular disease (diabetic neuropathy):, headaches:   (-) seizures, TIA and CVA           GI/Hepatic/Renal:   (+) GERD (SBO): poorly controlled, renal disease: CRI     (-) liver disease       Endo/Other:    (+) Diabetes (a1c 5.7)Type II DM, using insulin, : arthritis: OA..    (-) hypothyroidism, hyperthyroidism               Abdominal:             Vascular:   + PVD, aortic or cerebral (PVD s/p intervention 10/2023).       Other Findings:         Anesthesia Plan      general     ASA 4       Induction: intravenous.    MIPS: Postoperative opioids intended and Prophylactic antiemetics administered.  Anesthetic plan and risks discussed with patient.    Use of blood products discussed with patient whom consented to blood products.    Plan discussed with CRNA.                  Chris Conklin MD   2/6/2024

## 2024-02-07 LAB
ALBUMIN SERPL-MCNC: 3.3 G/DL (ref 3.4–5)
ALP SERPL-CCNC: 47 U/L (ref 40–129)
ALT SERPL-CCNC: 7 U/L (ref 10–40)
ANION GAP SERPL CALCULATED.3IONS-SCNC: 8 MMOL/L (ref 3–16)
AST SERPL-CCNC: 21 U/L (ref 15–37)
BASOPHILS # BLD: 0 K/UL (ref 0–0.2)
BASOPHILS NFR BLD: 0.2 %
BILIRUB DIRECT SERPL-MCNC: <0.2 MG/DL (ref 0–0.3)
BILIRUB INDIRECT SERPL-MCNC: ABNORMAL MG/DL (ref 0–1)
BILIRUB SERPL-MCNC: 0.4 MG/DL (ref 0–1)
BUN SERPL-MCNC: 18 MG/DL (ref 7–20)
CALCIUM SERPL-MCNC: 9 MG/DL (ref 8.3–10.6)
CHLORIDE SERPL-SCNC: 113 MMOL/L (ref 99–110)
CO2 SERPL-SCNC: 25 MMOL/L (ref 21–32)
CREAT SERPL-MCNC: 1.2 MG/DL (ref 0.8–1.3)
DEPRECATED RDW RBC AUTO: 13.6 % (ref 12.4–15.4)
EOSINOPHIL # BLD: 0 K/UL (ref 0–0.6)
EOSINOPHIL NFR BLD: 0.2 %
GFR SERPLBLD CREATININE-BSD FMLA CKD-EPI: 59 ML/MIN/{1.73_M2}
GLUCOSE BLD-MCNC: 175 MG/DL (ref 70–99)
GLUCOSE BLD-MCNC: 179 MG/DL (ref 70–99)
GLUCOSE BLD-MCNC: 188 MG/DL (ref 70–99)
GLUCOSE BLD-MCNC: 199 MG/DL (ref 70–99)
GLUCOSE SERPL-MCNC: 198 MG/DL (ref 70–99)
HCT VFR BLD AUTO: 36.7 % (ref 40.5–52.5)
HGB BLD-MCNC: 12.2 G/DL (ref 13.5–17.5)
LYMPHOCYTES # BLD: 0.5 K/UL (ref 1–5.1)
LYMPHOCYTES NFR BLD: 5.6 %
MAGNESIUM SERPL-MCNC: 1.9 MG/DL (ref 1.8–2.4)
MCH RBC QN AUTO: 31 PG (ref 26–34)
MCHC RBC AUTO-ENTMCNC: 33.2 G/DL (ref 31–36)
MCV RBC AUTO: 93.1 FL (ref 80–100)
MONOCYTES # BLD: 1 K/UL (ref 0–1.3)
MONOCYTES NFR BLD: 10.2 %
NEUTROPHILS # BLD: 7.9 K/UL (ref 1.7–7.7)
NEUTROPHILS NFR BLD: 83.8 %
PERFORMED ON: ABNORMAL
PHOSPHATE SERPL-MCNC: 2.9 MG/DL (ref 2.5–4.9)
PLATELET # BLD AUTO: 187 K/UL (ref 135–450)
PMV BLD AUTO: 8.5 FL (ref 5–10.5)
POTASSIUM SERPL-SCNC: 3.6 MMOL/L (ref 3.5–5.1)
PROT SERPL-MCNC: 5.5 G/DL (ref 6.4–8.2)
RBC # BLD AUTO: 3.94 M/UL (ref 4.2–5.9)
SODIUM SERPL-SCNC: 146 MMOL/L (ref 136–145)
WBC # BLD AUTO: 9.4 K/UL (ref 4–11)

## 2024-02-07 PROCEDURE — C9460 INJECTION, CANGRELOR: HCPCS | Performed by: SURGERY

## 2024-02-07 PROCEDURE — 36592 COLLECT BLOOD FROM PICC: CPT

## 2024-02-07 PROCEDURE — 2500000003 HC RX 250 WO HCPCS: Performed by: STUDENT IN AN ORGANIZED HEALTH CARE EDUCATION/TRAINING PROGRAM

## 2024-02-07 PROCEDURE — C9113 INJ PANTOPRAZOLE SODIUM, VIA: HCPCS | Performed by: SURGERY

## 2024-02-07 PROCEDURE — 99024 POSTOP FOLLOW-UP VISIT: CPT | Performed by: SURGERY

## 2024-02-07 PROCEDURE — 80076 HEPATIC FUNCTION PANEL: CPT

## 2024-02-07 PROCEDURE — 6360000002 HC RX W HCPCS: Performed by: SURGERY

## 2024-02-07 PROCEDURE — 1200000000 HC SEMI PRIVATE

## 2024-02-07 PROCEDURE — 2580000003 HC RX 258: Performed by: SURGERY

## 2024-02-07 PROCEDURE — 84100 ASSAY OF PHOSPHORUS: CPT

## 2024-02-07 PROCEDURE — 99232 SBSQ HOSP IP/OBS MODERATE 35: CPT | Performed by: INTERNAL MEDICINE

## 2024-02-07 PROCEDURE — 80048 BASIC METABOLIC PNL TOTAL CA: CPT

## 2024-02-07 PROCEDURE — 83735 ASSAY OF MAGNESIUM: CPT

## 2024-02-07 PROCEDURE — 85025 COMPLETE CBC W/AUTO DIFF WBC: CPT

## 2024-02-07 PROCEDURE — APPNB30 APP NON BILLABLE TIME 0-30 MINS: Performed by: NURSE PRACTITIONER

## 2024-02-07 PROCEDURE — APPSS15 APP SPLIT SHARED TIME 0-15 MINUTES: Performed by: NURSE PRACTITIONER

## 2024-02-07 RX ORDER — ENOXAPARIN SODIUM 100 MG/ML
40 INJECTION SUBCUTANEOUS DAILY
Status: DISCONTINUED | OUTPATIENT
Start: 2024-02-07 | End: 2024-02-11 | Stop reason: HOSPADM

## 2024-02-07 RX ADMIN — HYDROMORPHONE HYDROCHLORIDE 0.5 MG: 1 INJECTION, SOLUTION INTRAMUSCULAR; INTRAVENOUS; SUBCUTANEOUS at 16:22

## 2024-02-07 RX ADMIN — HYDROMORPHONE HYDROCHLORIDE 0.5 MG: 1 INJECTION, SOLUTION INTRAMUSCULAR; INTRAVENOUS; SUBCUTANEOUS at 23:10

## 2024-02-07 RX ADMIN — HYDROMORPHONE HYDROCHLORIDE 0.5 MG: 1 INJECTION, SOLUTION INTRAMUSCULAR; INTRAVENOUS; SUBCUTANEOUS at 10:17

## 2024-02-07 RX ADMIN — LABETALOL HYDROCHLORIDE 10 MG: 5 INJECTION INTRAVENOUS at 19:57

## 2024-02-07 RX ADMIN — HEPARIN SODIUM 5000 UNITS: 5000 INJECTION INTRAVENOUS; SUBCUTANEOUS at 05:54

## 2024-02-07 RX ADMIN — ASCORBIC ACID, VITAMIN A PALMITATE, CHOLECALCIFEROL, THIAMINE HYDROCHLORIDE, RIBOFLAVIN-5 PHOSPHATE SODIUM, PYRIDOXINE HYDROCHLORIDE, NIACINAMIDE, DEXPANTHENOL, ALPHA-TOCOPHEROL ACETATE, VITAMIN K1, FOLIC ACID, BIOTIN, CYANOCOBALAMIN: 200; 3300; 200; 6; 3.6; 6; 40; 15; 10; 150; 600; 60; 5 INJECTION, SOLUTION INTRAVENOUS at 19:04

## 2024-02-07 RX ADMIN — PANTOPRAZOLE SODIUM 40 MG: 40 INJECTION, POWDER, FOR SOLUTION INTRAVENOUS at 08:19

## 2024-02-07 RX ADMIN — CANGRELOR 0.75 MCG/KG/MIN: 50 INJECTION, POWDER, LYOPHILIZED, FOR SOLUTION INTRAVENOUS at 13:53

## 2024-02-07 NOTE — CARE COORDINATION
Chart reviewed for discharge planning.    Pt had exploratory lap yesterday and lysis of adhesions.    Pt has NG.    May need updated therapy evaluations. Previous evals pt had no therapy needs.    Cm will follow for discharge needs.    Kellee Jain RN, BSN  [phone]

## 2024-02-07 NOTE — OP NOTE
[address] [address]                                OPERATIVE REPORT    PATIENT NAME: ERIC GUADARRAMA                   :        1935  MED REC NO:   [id number]                          ROOM:  ACCOUNT NO:   [id number]                           ADMIT DATE: 2024  PROVIDER:     Christiano Tay MD    DATE OF PROCEDURE:  2024    PREOPERATIVE DIAGNOSIS:  Small bowel obstruction.    POSTOPERATIVE DIAGNOSIS:  Small bowel obstruction.    OPERATION PERFORMED:  Lysis of adhesions.    SURGEON:  Christiano Tay MD    ANESTHESIA:  General endotracheal.    ESTIMATED BLOOD LOSS:  Minimal.    COMPLICATIONS:  None.    SPECIMEN:  None.    OPERATIVE INDICATIONS AND CONSENT:  The patient is an 88-year-old male  who is status post recent cardiac stent placement.  He is admitted with  a small bowel obstruction.  A small bowel obstruction has not responded  to conservative management.  We did allow time for his Plavix to  metabolize.  He is on cangrelor, which was stopped two hours prior to  the procedure.  He was brought to the operating room today for  exploratory laparotomy with possible bowel resection.  He was explained  the risks, benefits, and possible complications.    DETAILS OF THE PROCEDURE:  The patient was brought to the operating  suite, placed in a supine position on the operating table.  After  general endotracheal anesthesia, he was prepped and draped in the usual  sterile fashion.  We next made a small midline periumbilical incision.   Dissection was carried down through the midline until the peritoneal  cavity was entered.  There was some serous fluid within the abdominal  cavity, which was suctioned.  There were proximal dilated small bowel  loops.  The patient was placed in Trendelenburg and then we were able to  eviscerate the distal small bowel loops.  There were adhesions likely  the proximal

## 2024-02-08 LAB
ANION GAP SERPL CALCULATED.3IONS-SCNC: 7 MMOL/L (ref 3–16)
BASOPHILS # BLD: 0.1 K/UL (ref 0–0.2)
BASOPHILS NFR BLD: 0.7 %
BUN SERPL-MCNC: 19 MG/DL (ref 7–20)
CALCIUM SERPL-MCNC: 9.4 MG/DL (ref 8.3–10.6)
CHLORIDE SERPL-SCNC: 111 MMOL/L (ref 99–110)
CO2 SERPL-SCNC: 25 MMOL/L (ref 21–32)
CREAT SERPL-MCNC: 1.1 MG/DL (ref 0.8–1.3)
DEPRECATED RDW RBC AUTO: 13.3 % (ref 12.4–15.4)
EOSINOPHIL # BLD: 0.2 K/UL (ref 0–0.6)
EOSINOPHIL NFR BLD: 2.2 %
GFR SERPLBLD CREATININE-BSD FMLA CKD-EPI: >60 ML/MIN/{1.73_M2}
GLUCOSE BLD-MCNC: 220 MG/DL (ref 70–99)
GLUCOSE BLD-MCNC: 222 MG/DL (ref 70–99)
GLUCOSE BLD-MCNC: 229 MG/DL (ref 70–99)
GLUCOSE SERPL-MCNC: 227 MG/DL (ref 70–99)
HCT VFR BLD AUTO: 33.6 % (ref 40.5–52.5)
HGB BLD-MCNC: 11.4 G/DL (ref 13.5–17.5)
LYMPHOCYTES # BLD: 0.5 K/UL (ref 1–5.1)
LYMPHOCYTES NFR BLD: 6.6 %
MAGNESIUM SERPL-MCNC: 1.8 MG/DL (ref 1.8–2.4)
MCH RBC QN AUTO: 31.5 PG (ref 26–34)
MCHC RBC AUTO-ENTMCNC: 33.9 G/DL (ref 31–36)
MCV RBC AUTO: 93 FL (ref 80–100)
MONOCYTES # BLD: 0.8 K/UL (ref 0–1.3)
MONOCYTES NFR BLD: 9.2 %
NEUTROPHILS # BLD: 6.8 K/UL (ref 1.7–7.7)
NEUTROPHILS NFR BLD: 81.3 %
PERFORMED ON: ABNORMAL
PHOSPHATE SERPL-MCNC: 2.3 MG/DL (ref 2.5–4.9)
PLATELET # BLD AUTO: 191 K/UL (ref 135–450)
PMV BLD AUTO: 8.6 FL (ref 5–10.5)
POTASSIUM SERPL-SCNC: 3.3 MMOL/L (ref 3.5–5.1)
RBC # BLD AUTO: 3.62 M/UL (ref 4.2–5.9)
SODIUM SERPL-SCNC: 143 MMOL/L (ref 136–145)
WBC # BLD AUTO: 8.3 K/UL (ref 4–11)

## 2024-02-08 PROCEDURE — 1200000000 HC SEMI PRIVATE

## 2024-02-08 PROCEDURE — 6360000002 HC RX W HCPCS: Performed by: SURGERY

## 2024-02-08 PROCEDURE — C9113 INJ PANTOPRAZOLE SODIUM, VIA: HCPCS | Performed by: SURGERY

## 2024-02-08 PROCEDURE — 6370000000 HC RX 637 (ALT 250 FOR IP): Performed by: SURGERY

## 2024-02-08 PROCEDURE — 2580000003 HC RX 258: Performed by: SURGERY

## 2024-02-08 PROCEDURE — 2500000003 HC RX 250 WO HCPCS: Performed by: SURGERY

## 2024-02-08 PROCEDURE — APPNB30 APP NON BILLABLE TIME 0-30 MINS: Performed by: NURSE PRACTITIONER

## 2024-02-08 PROCEDURE — 84100 ASSAY OF PHOSPHORUS: CPT

## 2024-02-08 PROCEDURE — APPSS15 APP SPLIT SHARED TIME 0-15 MINUTES: Performed by: NURSE PRACTITIONER

## 2024-02-08 PROCEDURE — 83735 ASSAY OF MAGNESIUM: CPT

## 2024-02-08 PROCEDURE — 99024 POSTOP FOLLOW-UP VISIT: CPT | Performed by: SURGERY

## 2024-02-08 PROCEDURE — 80048 BASIC METABOLIC PNL TOTAL CA: CPT

## 2024-02-08 PROCEDURE — 85025 COMPLETE CBC W/AUTO DIFF WBC: CPT

## 2024-02-08 PROCEDURE — 6360000002 HC RX W HCPCS: Performed by: STUDENT IN AN ORGANIZED HEALTH CARE EDUCATION/TRAINING PROGRAM

## 2024-02-08 PROCEDURE — C9460 INJECTION, CANGRELOR: HCPCS | Performed by: SURGERY

## 2024-02-08 RX ORDER — GLUCAGON 1 MG/ML
1 KIT INJECTION PRN
Status: DISCONTINUED | OUTPATIENT
Start: 2024-02-08 | End: 2024-02-11 | Stop reason: HOSPADM

## 2024-02-08 RX ORDER — DEXTROSE MONOHYDRATE 100 MG/ML
INJECTION, SOLUTION INTRAVENOUS CONTINUOUS PRN
Status: DISCONTINUED | OUTPATIENT
Start: 2024-02-08 | End: 2024-02-11 | Stop reason: HOSPADM

## 2024-02-08 RX ORDER — POTASSIUM CHLORIDE 29.8 MG/ML
20 INJECTION INTRAVENOUS ONCE
Status: COMPLETED | OUTPATIENT
Start: 2024-02-08 | End: 2024-02-08

## 2024-02-08 RX ADMIN — ENOXAPARIN SODIUM 40 MG: 100 INJECTION SUBCUTANEOUS at 09:09

## 2024-02-08 RX ADMIN — LEUCINE, PHENYLALANINE, LYSINE, METHIONINE, ISOLEUCINE, VALINE, HISTIDINE, THREONINE, TRYPTOPHAN, ALANINE, GLYCINE, ARGININE, PROLINE, SERINE, TYROSINE, SODIUM ACETATE, DIBASIC POTASSIUM PHOSPHATE, MAGNESIUM CHLORIDE, SODIUM CHLORIDE, CALCIUM CHLORIDE, DEXTROSE
365; 280; 290; 200; 300; 290; 240; 210; 90; 1035; 515; 575; 340; 250; 20; 340; 261; 51; 59; 33; 20 INJECTION INTRAVENOUS at 18:40

## 2024-02-08 RX ADMIN — POTASSIUM CHLORIDE 20 MEQ: 29.8 INJECTION, SOLUTION INTRAVENOUS at 09:17

## 2024-02-08 RX ADMIN — PANTOPRAZOLE SODIUM 40 MG: 40 INJECTION, POWDER, FOR SOLUTION INTRAVENOUS at 09:08

## 2024-02-08 RX ADMIN — INSULIN LISPRO 2 UNITS: 100 INJECTION, SOLUTION INTRAVENOUS; SUBCUTANEOUS at 12:41

## 2024-02-08 RX ADMIN — I.V. FAT EMULSION 250 ML: 20 EMULSION INTRAVENOUS at 18:40

## 2024-02-08 RX ADMIN — HYDROMORPHONE HYDROCHLORIDE 0.5 MG: 1 INJECTION, SOLUTION INTRAMUSCULAR; INTRAVENOUS; SUBCUTANEOUS at 21:36

## 2024-02-08 RX ADMIN — CANGRELOR 0.75 MCG/KG/MIN: 50 INJECTION, POWDER, LYOPHILIZED, FOR SOLUTION INTRAVENOUS at 17:11

## 2024-02-08 RX ADMIN — LABETALOL HYDROCHLORIDE 10 MG: 5 INJECTION INTRAVENOUS at 10:24

## 2024-02-08 RX ADMIN — CANGRELOR 0.75 MCG/KG/MIN: 50 INJECTION, POWDER, LYOPHILIZED, FOR SOLUTION INTRAVENOUS at 03:03

## 2024-02-08 RX ADMIN — HYDROMORPHONE HYDROCHLORIDE 0.5 MG: 1 INJECTION, SOLUTION INTRAMUSCULAR; INTRAVENOUS; SUBCUTANEOUS at 09:04

## 2024-02-08 RX ADMIN — INSULIN LISPRO 2 UNITS: 100 INJECTION, SOLUTION INTRAVENOUS; SUBCUTANEOUS at 05:37

## 2024-02-08 RX ADMIN — LABETALOL HYDROCHLORIDE 10 MG: 5 INJECTION INTRAVENOUS at 21:32

## 2024-02-08 RX ADMIN — POTASSIUM PHOSPHATES 15 MMOL: 236; 224 INJECTION, SOLUTION INTRAVENOUS at 17:14

## 2024-02-08 NOTE — CARE COORDINATION
Patient has wound care consult regarding purple spots and redness to buttocks.  At this time patient sitting in chair.  His wife is at his side.  Patient deferred to be assessed at this time.  Verbalized to nurse Naina he thinks he has the beginnings of a pressure injury.  Per wife and patient he has had the purple discoloration and redness for sometime.  Patient did not want a pressure relieving mattress.  Will place wound care orders for skin care. Will continue to follow this admission. STEVE DEUTSCHN, RN, CWOCN  Inpatient  Wound/Ostomy Care  [phone]

## 2024-02-08 NOTE — ACP (ADVANCE CARE PLANNING)
Advanced Care Planning Note    Purpose of Encounter: Advanced care planning in light of SBO  Parties In Attendance: Patient, Denise (POA)  Decisional Capacity: Yes  Subjective: Patient has abdominal pain  Objective: Cr 1.1  Goals of Care Determination: Patient wants full support  Plan: S/p ex-lap, Pain control, TPN, Gen Sx following  Code Status: Full   Time spent on Advanced care Plannin minutes  Advanced Care Planning Documents: Completed advanced directives on chart, Denise, wife, is the POA.    Mkie Valderrama MD  2024 10:42 AM

## 2024-02-09 LAB
ANION GAP SERPL CALCULATED.3IONS-SCNC: 7 MMOL/L (ref 3–16)
BASOPHILS # BLD: 0 K/UL (ref 0–0.2)
BASOPHILS NFR BLD: 0.4 %
BUN SERPL-MCNC: 20 MG/DL (ref 7–20)
CALCIUM SERPL-MCNC: 9.4 MG/DL (ref 8.3–10.6)
CHLORIDE SERPL-SCNC: 113 MMOL/L (ref 99–110)
CO2 SERPL-SCNC: 26 MMOL/L (ref 21–32)
CREAT SERPL-MCNC: 1 MG/DL (ref 0.8–1.3)
DEPRECATED RDW RBC AUTO: 13.5 % (ref 12.4–15.4)
EOSINOPHIL # BLD: 0.5 K/UL (ref 0–0.6)
EOSINOPHIL NFR BLD: 5.4 %
GFR SERPLBLD CREATININE-BSD FMLA CKD-EPI: >60 ML/MIN/{1.73_M2}
GLUCOSE BLD-MCNC: 190 MG/DL (ref 70–99)
GLUCOSE BLD-MCNC: 205 MG/DL (ref 70–99)
GLUCOSE BLD-MCNC: 233 MG/DL (ref 70–99)
GLUCOSE BLD-MCNC: 236 MG/DL (ref 70–99)
GLUCOSE SERPL-MCNC: 213 MG/DL (ref 70–99)
HCT VFR BLD AUTO: 31.5 % (ref 40.5–52.5)
HGB BLD-MCNC: 10.6 G/DL (ref 13.5–17.5)
LYMPHOCYTES # BLD: 0.6 K/UL (ref 1–5.1)
LYMPHOCYTES NFR BLD: 6.3 %
MAGNESIUM SERPL-MCNC: 1.7 MG/DL (ref 1.8–2.4)
MCH RBC QN AUTO: 31.1 PG (ref 26–34)
MCHC RBC AUTO-ENTMCNC: 33.7 G/DL (ref 31–36)
MCV RBC AUTO: 92.4 FL (ref 80–100)
MONOCYTES # BLD: 0.7 K/UL (ref 0–1.3)
MONOCYTES NFR BLD: 7.5 %
NEUTROPHILS # BLD: 7.1 K/UL (ref 1.7–7.7)
NEUTROPHILS NFR BLD: 80.4 %
PERFORMED ON: ABNORMAL
PHOSPHATE SERPL-MCNC: 2.6 MG/DL (ref 2.5–4.9)
PLATELET # BLD AUTO: 213 K/UL (ref 135–450)
PMV BLD AUTO: 8.4 FL (ref 5–10.5)
POTASSIUM SERPL-SCNC: 3.2 MMOL/L (ref 3.5–5.1)
POTASSIUM SERPL-SCNC: 3.8 MMOL/L (ref 3.5–5.1)
RBC # BLD AUTO: 3.41 M/UL (ref 4.2–5.9)
WBC # BLD AUTO: 8.8 K/UL (ref 4–11)

## 2024-02-09 PROCEDURE — 2500000003 HC RX 250 WO HCPCS: Performed by: SURGERY

## 2024-02-09 PROCEDURE — 6370000000 HC RX 637 (ALT 250 FOR IP): Performed by: SURGERY

## 2024-02-09 PROCEDURE — 84100 ASSAY OF PHOSPHORUS: CPT

## 2024-02-09 PROCEDURE — 2580000003 HC RX 258: Performed by: SURGERY

## 2024-02-09 PROCEDURE — 6360000002 HC RX W HCPCS: Performed by: SURGERY

## 2024-02-09 PROCEDURE — 84132 ASSAY OF SERUM POTASSIUM: CPT

## 2024-02-09 PROCEDURE — APPNB30 APP NON BILLABLE TIME 0-30 MINS: Performed by: NURSE PRACTITIONER

## 2024-02-09 PROCEDURE — 99024 POSTOP FOLLOW-UP VISIT: CPT | Performed by: SURGERY

## 2024-02-09 PROCEDURE — 85025 COMPLETE CBC W/AUTO DIFF WBC: CPT

## 2024-02-09 PROCEDURE — APPSS15 APP SPLIT SHARED TIME 0-15 MINUTES: Performed by: NURSE PRACTITIONER

## 2024-02-09 PROCEDURE — C9113 INJ PANTOPRAZOLE SODIUM, VIA: HCPCS | Performed by: SURGERY

## 2024-02-09 PROCEDURE — 6360000002 HC RX W HCPCS: Performed by: STUDENT IN AN ORGANIZED HEALTH CARE EDUCATION/TRAINING PROGRAM

## 2024-02-09 PROCEDURE — 83735 ASSAY OF MAGNESIUM: CPT

## 2024-02-09 PROCEDURE — 80048 BASIC METABOLIC PNL TOTAL CA: CPT

## 2024-02-09 PROCEDURE — 6370000000 HC RX 637 (ALT 250 FOR IP): Performed by: STUDENT IN AN ORGANIZED HEALTH CARE EDUCATION/TRAINING PROGRAM

## 2024-02-09 PROCEDURE — 1200000000 HC SEMI PRIVATE

## 2024-02-09 PROCEDURE — C9460 INJECTION, CANGRELOR: HCPCS | Performed by: SURGERY

## 2024-02-09 RX ORDER — MAGNESIUM SULFATE IN WATER 40 MG/ML
2000 INJECTION, SOLUTION INTRAVENOUS ONCE
Status: COMPLETED | OUTPATIENT
Start: 2024-02-09 | End: 2024-02-09

## 2024-02-09 RX ORDER — POTASSIUM CHLORIDE 29.8 MG/ML
20 INJECTION INTRAVENOUS ONCE
Status: COMPLETED | OUTPATIENT
Start: 2024-02-09 | End: 2024-02-09

## 2024-02-09 RX ORDER — POTASSIUM CHLORIDE 29.8 MG/ML
20 INJECTION INTRAVENOUS
Status: DISCONTINUED | OUTPATIENT
Start: 2024-02-09 | End: 2024-02-09

## 2024-02-09 RX ORDER — INSULIN LISPRO 100 [IU]/ML
0-4 INJECTION, SOLUTION INTRAVENOUS; SUBCUTANEOUS NIGHTLY
Status: DISCONTINUED | OUTPATIENT
Start: 2024-02-09 | End: 2024-02-11 | Stop reason: HOSPADM

## 2024-02-09 RX ORDER — POTASSIUM CHLORIDE 29.8 MG/ML
20 INJECTION INTRAVENOUS
Status: DISPENSED | OUTPATIENT
Start: 2024-02-09 | End: 2024-02-09

## 2024-02-09 RX ORDER — INSULIN GLARGINE 100 [IU]/ML
5 INJECTION, SOLUTION SUBCUTANEOUS NIGHTLY
Status: DISCONTINUED | OUTPATIENT
Start: 2024-02-09 | End: 2024-02-09

## 2024-02-09 RX ORDER — INSULIN LISPRO 100 [IU]/ML
0-16 INJECTION, SOLUTION INTRAVENOUS; SUBCUTANEOUS
Status: DISCONTINUED | OUTPATIENT
Start: 2024-02-09 | End: 2024-02-10

## 2024-02-09 RX ORDER — CLOPIDOGREL BISULFATE 75 MG/1
75 TABLET ORAL DAILY
Status: DISCONTINUED | OUTPATIENT
Start: 2024-02-09 | End: 2024-02-11 | Stop reason: HOSPADM

## 2024-02-09 RX ORDER — INSULIN GLARGINE 100 [IU]/ML
8 INJECTION, SOLUTION SUBCUTANEOUS NIGHTLY
Status: DISCONTINUED | OUTPATIENT
Start: 2024-02-09 | End: 2024-02-10

## 2024-02-09 RX ADMIN — ENOXAPARIN SODIUM 40 MG: 100 INJECTION SUBCUTANEOUS at 08:51

## 2024-02-09 RX ADMIN — CANGRELOR 0.75 MCG/KG/MIN: 50 INJECTION, POWDER, LYOPHILIZED, FOR SOLUTION INTRAVENOUS at 09:42

## 2024-02-09 RX ADMIN — CLOPIDOGREL BISULFATE 75 MG: 75 TABLET ORAL at 15:37

## 2024-02-09 RX ADMIN — BENZOCAINE AND MENTHOL 5 LOZENGE: 15; 3.6 LOZENGE ORAL at 11:59

## 2024-02-09 RX ADMIN — INSULIN LISPRO 2 UNITS: 100 INJECTION, SOLUTION INTRAVENOUS; SUBCUTANEOUS at 03:57

## 2024-02-09 RX ADMIN — INSULIN GLARGINE 8 UNITS: 100 INJECTION, SOLUTION SUBCUTANEOUS at 20:20

## 2024-02-09 RX ADMIN — ASCORBIC ACID, VITAMIN A PALMITATE, CHOLECALCIFEROL, THIAMINE HYDROCHLORIDE, RIBOFLAVIN-5 PHOSPHATE SODIUM, PYRIDOXINE HYDROCHLORIDE, NIACINAMIDE, DEXPANTHENOL, ALPHA-TOCOPHEROL ACETATE, VITAMIN K1, FOLIC ACID, BIOTIN, CYANOCOBALAMIN: 200; 3300; 200; 6; 3.6; 6; 40; 15; 10; 150; 600; 60; 5 INJECTION, SOLUTION INTRAVENOUS at 18:02

## 2024-02-09 RX ADMIN — PANTOPRAZOLE SODIUM 40 MG: 40 INJECTION, POWDER, FOR SOLUTION INTRAVENOUS at 08:51

## 2024-02-09 RX ADMIN — Medication 10 ML: at 20:23

## 2024-02-09 RX ADMIN — MAGNESIUM SULFATE HEPTAHYDRATE 2000 MG: 40 INJECTION, SOLUTION INTRAVENOUS at 09:06

## 2024-02-09 RX ADMIN — Medication 10 ML: at 08:52

## 2024-02-09 RX ADMIN — POTASSIUM CHLORIDE 20 MEQ: 29.8 INJECTION, SOLUTION INTRAVENOUS at 18:05

## 2024-02-09 RX ADMIN — POTASSIUM CHLORIDE 20 MEQ: 400 INJECTION, SOLUTION INTRAVENOUS at 12:00

## 2024-02-09 RX ADMIN — HYDROMORPHONE HYDROCHLORIDE 0.5 MG: 1 INJECTION, SOLUTION INTRAMUSCULAR; INTRAVENOUS; SUBCUTANEOUS at 22:46

## 2024-02-09 RX ADMIN — INSULIN LISPRO 2 UNITS: 100 INJECTION, SOLUTION INTRAVENOUS; SUBCUTANEOUS at 11:59

## 2024-02-10 LAB
ANION GAP SERPL CALCULATED.3IONS-SCNC: 8 MMOL/L (ref 3–16)
BASOPHILS # BLD: 0 K/UL (ref 0–0.2)
BASOPHILS NFR BLD: 0.4 %
BUN SERPL-MCNC: 23 MG/DL (ref 7–20)
CALCIUM SERPL-MCNC: 9.4 MG/DL (ref 8.3–10.6)
CHLORIDE SERPL-SCNC: 109 MMOL/L (ref 99–110)
CO2 SERPL-SCNC: 26 MMOL/L (ref 21–32)
CREAT SERPL-MCNC: 1.1 MG/DL (ref 0.8–1.3)
DEPRECATED RDW RBC AUTO: 13.5 % (ref 12.4–15.4)
EOSINOPHIL # BLD: 0.3 K/UL (ref 0–0.6)
EOSINOPHIL NFR BLD: 3.5 %
GFR SERPLBLD CREATININE-BSD FMLA CKD-EPI: >60 ML/MIN/{1.73_M2}
GLUCOSE BLD-MCNC: 214 MG/DL (ref 70–99)
GLUCOSE BLD-MCNC: 225 MG/DL (ref 70–99)
GLUCOSE BLD-MCNC: 266 MG/DL (ref 70–99)
GLUCOSE SERPL-MCNC: 234 MG/DL (ref 70–99)
HCT VFR BLD AUTO: 31.3 % (ref 40.5–52.5)
HGB BLD-MCNC: 10.4 G/DL (ref 13.5–17.5)
LYMPHOCYTES # BLD: 0.3 K/UL (ref 1–5.1)
LYMPHOCYTES NFR BLD: 3.1 %
MAGNESIUM SERPL-MCNC: 1.9 MG/DL (ref 1.8–2.4)
MCH RBC QN AUTO: 31.2 PG (ref 26–34)
MCHC RBC AUTO-ENTMCNC: 33.4 G/DL (ref 31–36)
MCV RBC AUTO: 93.5 FL (ref 80–100)
MONOCYTES # BLD: 0.6 K/UL (ref 0–1.3)
MONOCYTES NFR BLD: 6.9 %
NEUTROPHILS # BLD: 7.6 K/UL (ref 1.7–7.7)
NEUTROPHILS NFR BLD: 86.1 %
PERFORMED ON: ABNORMAL
PHOSPHATE SERPL-MCNC: 2.7 MG/DL (ref 2.5–4.9)
PLATELET # BLD AUTO: 199 K/UL (ref 135–450)
PMV BLD AUTO: 8.5 FL (ref 5–10.5)
RBC # BLD AUTO: 3.35 M/UL (ref 4.2–5.9)
SODIUM SERPL-SCNC: 143 MMOL/L (ref 136–145)
WBC # BLD AUTO: 8.8 K/UL (ref 4–11)

## 2024-02-10 PROCEDURE — 99024 POSTOP FOLLOW-UP VISIT: CPT | Performed by: SURGERY

## 2024-02-10 PROCEDURE — 6360000002 HC RX W HCPCS: Performed by: STUDENT IN AN ORGANIZED HEALTH CARE EDUCATION/TRAINING PROGRAM

## 2024-02-10 PROCEDURE — C9113 INJ PANTOPRAZOLE SODIUM, VIA: HCPCS | Performed by: SURGERY

## 2024-02-10 PROCEDURE — 6360000002 HC RX W HCPCS: Performed by: SURGERY

## 2024-02-10 PROCEDURE — 84100 ASSAY OF PHOSPHORUS: CPT

## 2024-02-10 PROCEDURE — 80048 BASIC METABOLIC PNL TOTAL CA: CPT

## 2024-02-10 PROCEDURE — 1200000000 HC SEMI PRIVATE

## 2024-02-10 PROCEDURE — 83735 ASSAY OF MAGNESIUM: CPT

## 2024-02-10 PROCEDURE — 85025 COMPLETE CBC W/AUTO DIFF WBC: CPT

## 2024-02-10 PROCEDURE — 2500000003 HC RX 250 WO HCPCS: Performed by: STUDENT IN AN ORGANIZED HEALTH CARE EDUCATION/TRAINING PROGRAM

## 2024-02-10 PROCEDURE — 6370000000 HC RX 637 (ALT 250 FOR IP): Performed by: STUDENT IN AN ORGANIZED HEALTH CARE EDUCATION/TRAINING PROGRAM

## 2024-02-10 RX ORDER — INSULIN GLARGINE 100 [IU]/ML
10 INJECTION, SOLUTION SUBCUTANEOUS NIGHTLY
Status: DISCONTINUED | OUTPATIENT
Start: 2024-02-10 | End: 2024-02-11

## 2024-02-10 RX ORDER — INSULIN LISPRO 100 [IU]/ML
0-16 INJECTION, SOLUTION INTRAVENOUS; SUBCUTANEOUS EVERY 4 HOURS
Status: DISCONTINUED | OUTPATIENT
Start: 2024-02-10 | End: 2024-02-11 | Stop reason: HOSPADM

## 2024-02-10 RX ADMIN — ENOXAPARIN SODIUM 40 MG: 100 INJECTION SUBCUTANEOUS at 10:42

## 2024-02-10 RX ADMIN — INSULIN LISPRO 4 UNITS: 100 INJECTION, SOLUTION INTRAVENOUS; SUBCUTANEOUS at 20:52

## 2024-02-10 RX ADMIN — PANTOPRAZOLE SODIUM 40 MG: 40 INJECTION, POWDER, FOR SOLUTION INTRAVENOUS at 10:43

## 2024-02-10 RX ADMIN — INSULIN LISPRO 8 UNITS: 100 INJECTION, SOLUTION INTRAVENOUS; SUBCUTANEOUS at 16:59

## 2024-02-10 RX ADMIN — LABETALOL HYDROCHLORIDE 10 MG: 5 INJECTION INTRAVENOUS at 11:36

## 2024-02-10 RX ADMIN — INSULIN GLARGINE 10 UNITS: 100 INJECTION, SOLUTION SUBCUTANEOUS at 20:37

## 2024-02-10 RX ADMIN — LEUCINE, PHENYLALANINE, LYSINE, METHIONINE, ISOLEUCINE, VALINE, HISTIDINE, THREONINE, TRYPTOPHAN, ALANINE, GLYCINE, ARGININE, PROLINE, SERINE, TYROSINE, SODIUM ACETATE, DIBASIC POTASSIUM PHOSPHATE, MAGNESIUM CHLORIDE, SODIUM CHLORIDE, CALCIUM CHLORIDE, DEXTROSE
365; 280; 290; 200; 300; 290; 240; 210; 90; 1035; 515; 575; 340; 250; 20; 340; 261; 51; 59; 33; 20 INJECTION INTRAVENOUS at 18:35

## 2024-02-10 RX ADMIN — HYDROMORPHONE HYDROCHLORIDE 0.5 MG: 1 INJECTION, SOLUTION INTRAMUSCULAR; INTRAVENOUS; SUBCUTANEOUS at 20:53

## 2024-02-10 RX ADMIN — INSULIN LISPRO 4 UNITS: 100 INJECTION, SOLUTION INTRAVENOUS; SUBCUTANEOUS at 10:43

## 2024-02-10 RX ADMIN — HYDROMORPHONE HYDROCHLORIDE 0.5 MG: 1 INJECTION, SOLUTION INTRAMUSCULAR; INTRAVENOUS; SUBCUTANEOUS at 18:41

## 2024-02-10 RX ADMIN — CLOPIDOGREL BISULFATE 75 MG: 75 TABLET ORAL at 10:43

## 2024-02-11 VITALS
DIASTOLIC BLOOD PRESSURE: 71 MMHG | SYSTOLIC BLOOD PRESSURE: 151 MMHG | HEART RATE: 78 BPM | BODY MASS INDEX: 24.7 KG/M2 | OXYGEN SATURATION: 92 % | WEIGHT: 186.4 LBS | TEMPERATURE: 98.9 F | RESPIRATION RATE: 16 BRPM | HEIGHT: 73 IN

## 2024-02-11 LAB
ALBUMIN SERPL-MCNC: 3.1 G/DL (ref 3.4–5)
ALP SERPL-CCNC: 62 U/L (ref 40–129)
ALT SERPL-CCNC: 42 U/L (ref 10–40)
ANION GAP SERPL CALCULATED.3IONS-SCNC: 9 MMOL/L (ref 3–16)
AST SERPL-CCNC: 46 U/L (ref 15–37)
BILIRUB DIRECT SERPL-MCNC: <0.2 MG/DL (ref 0–0.3)
BILIRUB INDIRECT SERPL-MCNC: ABNORMAL MG/DL (ref 0–1)
BILIRUB SERPL-MCNC: 0.6 MG/DL (ref 0–1)
BUN SERPL-MCNC: 27 MG/DL (ref 7–20)
CALCIUM SERPL-MCNC: 9.5 MG/DL (ref 8.3–10.6)
CHLORIDE SERPL-SCNC: 105 MMOL/L (ref 99–110)
CO2 SERPL-SCNC: 24 MMOL/L (ref 21–32)
CREAT SERPL-MCNC: 1.2 MG/DL (ref 0.8–1.3)
GFR SERPLBLD CREATININE-BSD FMLA CKD-EPI: 59 ML/MIN/{1.73_M2}
GLUCOSE BLD-MCNC: 196 MG/DL (ref 70–99)
GLUCOSE BLD-MCNC: 247 MG/DL (ref 70–99)
GLUCOSE BLD-MCNC: 254 MG/DL (ref 70–99)
GLUCOSE BLD-MCNC: 276 MG/DL (ref 70–99)
GLUCOSE SERPL-MCNC: 271 MG/DL (ref 70–99)
MAGNESIUM SERPL-MCNC: 1.8 MG/DL (ref 1.8–2.4)
PERFORMED ON: ABNORMAL
PHOSPHATE SERPL-MCNC: 2.5 MG/DL (ref 2.5–4.9)
POTASSIUM SERPL-SCNC: 4 MMOL/L (ref 3.5–5.1)
PROT SERPL-MCNC: 5.3 G/DL (ref 6.4–8.2)
SODIUM SERPL-SCNC: 138 MMOL/L (ref 136–145)

## 2024-02-11 PROCEDURE — 84100 ASSAY OF PHOSPHORUS: CPT

## 2024-02-11 PROCEDURE — 6360000002 HC RX W HCPCS: Performed by: SURGERY

## 2024-02-11 PROCEDURE — 99024 POSTOP FOLLOW-UP VISIT: CPT | Performed by: SURGERY

## 2024-02-11 PROCEDURE — 2580000003 HC RX 258: Performed by: SURGERY

## 2024-02-11 PROCEDURE — C9113 INJ PANTOPRAZOLE SODIUM, VIA: HCPCS | Performed by: SURGERY

## 2024-02-11 PROCEDURE — 80048 BASIC METABOLIC PNL TOTAL CA: CPT

## 2024-02-11 PROCEDURE — 6370000000 HC RX 637 (ALT 250 FOR IP): Performed by: STUDENT IN AN ORGANIZED HEALTH CARE EDUCATION/TRAINING PROGRAM

## 2024-02-11 PROCEDURE — 83735 ASSAY OF MAGNESIUM: CPT

## 2024-02-11 PROCEDURE — 80076 HEPATIC FUNCTION PANEL: CPT

## 2024-02-11 RX ORDER — OXYCODONE HYDROCHLORIDE AND ACETAMINOPHEN 5; 325 MG/1; MG/1
1 TABLET ORAL EVERY 6 HOURS PRN
Qty: 24 TABLET | Refills: 0 | Status: SHIPPED | OUTPATIENT
Start: 2024-02-11 | End: 2024-02-15

## 2024-02-11 RX ORDER — SENNA AND DOCUSATE SODIUM 50; 8.6 MG/1; MG/1
1 TABLET, FILM COATED ORAL DAILY
Qty: 7 TABLET | Refills: 0 | Status: SHIPPED | OUTPATIENT
Start: 2024-02-11 | End: 2024-02-18

## 2024-02-11 RX ORDER — INSULIN LISPRO 100 [IU]/ML
3 INJECTION, SOLUTION INTRAVENOUS; SUBCUTANEOUS
Status: DISCONTINUED | OUTPATIENT
Start: 2024-02-11 | End: 2024-02-11 | Stop reason: HOSPADM

## 2024-02-11 RX ORDER — INSULIN GLARGINE 100 [IU]/ML
15 INJECTION, SOLUTION SUBCUTANEOUS NIGHTLY
Status: DISCONTINUED | OUTPATIENT
Start: 2024-02-11 | End: 2024-02-11 | Stop reason: HOSPADM

## 2024-02-11 RX ADMIN — INSULIN LISPRO 8 UNITS: 100 INJECTION, SOLUTION INTRAVENOUS; SUBCUTANEOUS at 12:14

## 2024-02-11 RX ADMIN — INSULIN LISPRO 3 UNITS: 100 INJECTION, SOLUTION INTRAVENOUS; SUBCUTANEOUS at 08:55

## 2024-02-11 RX ADMIN — CLOPIDOGREL BISULFATE 75 MG: 75 TABLET ORAL at 08:44

## 2024-02-11 RX ADMIN — INSULIN LISPRO 8 UNITS: 100 INJECTION, SOLUTION INTRAVENOUS; SUBCUTANEOUS at 08:43

## 2024-02-11 RX ADMIN — Medication 10 ML: at 08:59

## 2024-02-11 RX ADMIN — INSULIN LISPRO 4 UNITS: 100 INJECTION, SOLUTION INTRAVENOUS; SUBCUTANEOUS at 05:37

## 2024-02-11 RX ADMIN — INSULIN LISPRO 3 UNITS: 100 INJECTION, SOLUTION INTRAVENOUS; SUBCUTANEOUS at 12:14

## 2024-02-11 RX ADMIN — PANTOPRAZOLE SODIUM 40 MG: 40 INJECTION, POWDER, FOR SOLUTION INTRAVENOUS at 08:45

## 2024-02-11 NOTE — PLAN OF CARE
[facility]        Assessment & Plan of Care    History of Present Illness: Mr. Venegas is a 88 y.o. male who presented to the ED early this morning with constant, midline abdominal pain that began around 11:00 am yesterday morning.  The pain radiates into the back and is severe (10 out of 10 at times) but the patient is unable to characterize the pain.  Pain level is currently at 6-7 out of 10.  No alleviating or aggravating factors.  Associated nausea and bloating.  He reports constipation at baseline for which he takes laxatives regularly but reports a normal stool yesterday.  He denies fevers, chills, anorexia, vomiting, significant weight loss, diarrhea, hematochezia, melena, urinary symptoms, chest pain, SOB, or similar symptoms in the past.  No prior bowel obstructions.  No prior abdominal surgery.  Medical history includes bladder cancer (has not started treatment yet--following with Dr. 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 He had been on Eliquis but this was stopped about 1 month ago.  Last colonoscopy was about 6 years ago.  Former smoker.    Physical Exam:  CONSTITUTIONAL:  alert, no apparent distress and normal weight  NEUROLOGIC:  Mental Status Exam:  Level of Alertness:   awake  Orientation:   person, place, time  EYES:  sclera clear  ENT:  normocepalic, without obvious abnormality  NECK:  supple, symmetrical, trachea midline  LUNGS:  clear to auscultation  CARDIOVASCULAR:  regular rate and rhythm and no murmur noted  ABDOMEN: soft, distended, tenderness noted along midline, voluntary guarding present, no masses palpated, normal bowel sounds,  no scars, and hernia absent  Extremities: no edema  SKIN:  no bruising or bleeding and normal skin color, texture, 
  Problem: Discharge Planning  Goal: Discharge to home or other facility with appropriate resources  2/10/2024 1511 by Elise Osuna RN  Outcome: Progressing     Problem: Pain  Goal: Verbalizes/displays adequate comfort level or baseline comfort level  2/10/2024 1511 by Elise Osuna RN  Outcome: Progressing     Problem: Chronic Conditions and Co-morbidities  Goal: Patient's chronic conditions and co-morbidity symptoms are monitored and maintained or improved  2/10/2024 1511 by Elise Osuna RN  Outcome: Progressing     Problem: Safety - Adult  Goal: Free from fall injury  2/10/2024 1511 by Elise Osuna RN  Outcome: Progressing     Problem: ABCDS Injury Assessment  Goal: Absence of physical injury  2/10/2024 1511 by Elise Osuna RN  Outcome: Progressing     Problem: Skin/Tissue Integrity  Goal: Absence of new skin breakdown  Description: 1.  Monitor for areas of redness and/or skin breakdown  2.  Assess vascular access sites hourly  3.  Every 4-6 hours minimum:  Change oxygen saturation probe site  4.  Every 4-6 hours:  If on nasal continuous positive airway pressure, respiratory therapy assess nares and determine need for appliance change or resting period.  2/10/2024 1511 by Elise Osuna RN  Outcome: Progressing     Problem: Hematologic - Adult  Goal: Maintains hematologic stability  2/10/2024 1511 by Elise sOuna RN  Outcome: Progressing     Problem: Nutrition Deficit:  Goal: Optimize nutritional status  2/10/2024 1511 by Elise Osuna RN  Outcome: Progressing     
  Problem: Discharge Planning  Goal: Discharge to home or other facility with appropriate resources  2/11/2024 0219 by Sandrita Fierro RN  Outcome: Progressing  2/10/2024 1511 by Elise Osuna RN  Outcome: Progressing     Problem: Pain  Goal: Verbalizes/displays adequate comfort level or baseline comfort level  2/11/2024 0219 by Sandrita Fierro RN  Outcome: Progressing  2/10/2024 1511 by Elise Osuna RN  Outcome: Progressing     Problem: Chronic Conditions and Co-morbidities  Goal: Patient's chronic conditions and co-morbidity symptoms are monitored and maintained or improved  2/11/2024 0219 by Sandrita Fierro RN  Outcome: Progressing  2/10/2024 1511 by Elise Osuna RN  Outcome: Progressing     Problem: Safety - Adult  Goal: Free from fall injury  2/11/2024 0219 by Sandrita Fierro RN  Outcome: Progressing  2/10/2024 1511 by Elise Osuna RN  Outcome: Progressing     Problem: ABCDS Injury Assessment  Goal: Absence of physical injury  2/11/2024 0219 by Sandrita Fierro RN  Outcome: Progressing  2/10/2024 1511 by Elise Osuna RN  Outcome: Progressing     Problem: Skin/Tissue Integrity  Goal: Absence of new skin breakdown  Description: 1.  Monitor for areas of redness and/or skin breakdown  2.  Assess vascular access sites hourly  3.  Every 4-6 hours minimum:  Change oxygen saturation probe site  4.  Every 4-6 hours:  If on nasal continuous positive airway pressure, respiratory therapy assess nares and determine need for appliance change or resting period.  2/11/2024 0219 by Sandrita Fierro RN  Outcome: Progressing  2/10/2024 1511 by Elise Osuna RN  Outcome: Progressing     Problem: Hematologic - Adult  Goal: Maintains hematologic stability  2/11/2024 0219 by Sandrita Fierro RN  Outcome: Progressing  2/10/2024 1511 by Elise Osuna RN  Outcome: Progressing     Problem: Nutrition Deficit:  Goal: Optimize nutritional status  2/11/2024 0219 by Vadim 
  Problem: Discharge Planning  Goal: Discharge to home or other facility with appropriate resources  2/11/2024 0859 by Susan Wahl RN  Outcome: Progressing  2/11/2024 0219 by Sandrita Fierro RN  Outcome: Progressing     Problem: Pain  Goal: Verbalizes/displays adequate comfort level or baseline comfort level  2/11/2024 0859 by Susan Wahl RN  Outcome: Progressing  2/11/2024 0219 by Sandrita Fierro RN  Outcome: Progressing     Problem: Chronic Conditions and Co-morbidities  Goal: Patient's chronic conditions and co-morbidity symptoms are monitored and maintained or improved  2/11/2024 0859 by Susan Wahl RN  Outcome: Progressing  2/11/2024 0219 by Sandrita Fierro RN  Outcome: Progressing     Problem: Safety - Adult  Goal: Free from fall injury  2/11/2024 0859 by Susan Wahl RN  Outcome: Progressing  2/11/2024 0219 by Sandrita Fierro RN  Outcome: Progressing     Problem: ABCDS Injury Assessment  Goal: Absence of physical injury  2/11/2024 0859 by Susan Wahl RN  Outcome: Progressing  2/11/2024 0219 by Sandrita Fierro RN  Outcome: Progressing     Problem: Skin/Tissue Integrity  Goal: Absence of new skin breakdown  Description: 1.  Monitor for areas of redness and/or skin breakdown  2.  Assess vascular access sites hourly  3.  Every 4-6 hours minimum:  Change oxygen saturation probe site  4.  Every 4-6 hours:  If on nasal continuous positive airway pressure, respiratory therapy assess nares and determine need for appliance change or resting period.  2/11/2024 0859 by Susan Wahl RN  Outcome: Progressing  2/11/2024 0219 by Sandrita Fierro RN  Outcome: Progressing     Problem: Hematologic - Adult  Goal: Maintains hematologic stability  2/11/2024 0859 by Susan Wahl RN  Outcome: Progressing  2/11/2024 0219 by Sandrita Fierro RN  Outcome: Progressing     Problem: Nutrition Deficit:  Goal: Optimize nutritional status  2/11/2024 0859 by Susan Wahl RN  Outcome: 
  Problem: Discharge Planning  Goal: Discharge to home or other facility with appropriate resources  2/2/2024 2239 by Tonja Rowland RN  Outcome: Progressing  2/2/2024 1801 by Kaylee Salinas  Outcome: Progressing     Problem: Pain  Goal: Verbalizes/displays adequate comfort level or baseline comfort level  2/2/2024 2239 by Tonja Rowland RN  Outcome: Progressing  2/2/2024 1801 by Kaylee Salinas  Outcome: Progressing     Problem: Chronic Conditions and Co-morbidities  Goal: Patient's chronic conditions and co-morbidity symptoms are monitored and maintained or improved  2/2/2024 2239 by Tonja Rowland RN  Outcome: Progressing  2/2/2024 1801 by Kaylee Salinas  Outcome: Progressing     Problem: Safety - Adult  Goal: Free from fall injury  2/2/2024 2239 by Tonja Rowland RN  Outcome: Progressing  2/2/2024 1801 by Kaylee Salinas  Outcome: Progressing, pt remains free of falls     Problem: ABCDS Injury Assessment  Goal: Absence of physical injury  2/2/2024 2239 by Tonja Rowland RN  Outcome: Progressing  2/2/2024 1801 by Kaylee Salinas  Outcome: Progressing     
  Problem: Discharge Planning  Goal: Discharge to home or other facility with appropriate resources  2/5/2024 0917 by Miya Bean RN  Outcome: Progressing  Flowsheets (Taken 2/5/2024 0837)  Discharge to home or other facility with appropriate resources: Identify barriers to discharge with patient and caregiver  2/5/2024 0734 by Eliz Arteaga RN  Outcome: Progressing  Flowsheets (Taken 2/4/2024 1945)  Discharge to home or other facility with appropriate resources:   Identify barriers to discharge with patient and caregiver   Arrange for needed discharge resources and transportation as appropriate   Identify discharge learning needs (meds, wound care, etc)   Refer to discharge planning if patient needs post-hospital services based on physician order or complex needs related to functional status, cognitive ability or social support system     Problem: Pain  Goal: Verbalizes/displays adequate comfort level or baseline comfort level  2/5/2024 0917 by Miya Bean RN  Outcome: Progressing  2/5/2024 0734 by Eliz Arteaga RN  Outcome: Progressing  Flowsheets (Taken 2/4/2024 1945)  Verbalizes/displays adequate comfort level or baseline comfort level:   Encourage patient to monitor pain and request assistance   Assess pain using appropriate pain scale   Administer analgesics based on type and severity of pain and evaluate response   Implement non-pharmacological measures as appropriate and evaluate response   Consider cultural and social influences on pain and pain management   Notify Licensed Independent Practitioner if interventions unsuccessful or patient reports new pain     Problem: Chronic Conditions and Co-morbidities  Goal: Patient's chronic conditions and co-morbidity symptoms are monitored and maintained or improved  2/5/2024 0917 by Miya Bean RN  Outcome: Progressing  Flowsheets (Taken 2/5/2024 0837)  Care Plan - Patient's Chronic Conditions and Co-Morbidity Symptoms are Monitored and 
  Problem: Discharge Planning  Goal: Discharge to home or other facility with appropriate resources  2/8/2024 0808 by Naina Qureshi, RN  Outcome: Progressing  Flowsheets (Taken 2/8/2024 0805)  Discharge to home or other facility with appropriate resources: Identify barriers to discharge with patient and caregiver     Problem: Pain  Goal: Verbalizes/displays adequate comfort level or baseline comfort level  2/8/2024 0808 by Naina Qureshi RN  Outcome: Progressing     Problem: Chronic Conditions and Co-morbidities  Goal: Patient's chronic conditions and co-morbidity symptoms are monitored and maintained or improved  2/8/2024 0808 by Naina Qureshi, RN  Outcome: Progressing  Flowsheets (Taken 2/8/2024 0805)  Care Plan - Patient's Chronic Conditions and Co-Morbidity Symptoms are Monitored and Maintained or Improved: Monitor and assess patient's chronic conditions and comorbid symptoms for stability, deterioration, or improvement     Problem: Safety - Adult  Goal: Free from fall injury  2/8/2024 0808 by Naina Qureshi RN  Outcome: Progressing     Problem: Skin/Tissue Integrity  Goal: Absence of new skin breakdown  Description: 1.  Monitor for areas of redness and/or skin breakdown  2.  Assess vascular access sites hourly  3.  Every 4-6 hours minimum:  Change oxygen saturation probe site  4.  Every 4-6 hours:  If on nasal continuous positive airway pressure, respiratory therapy assess nares and determine need for appliance change or resting period.  2/8/2024 0808 by Naina Qureshi, RN  Outcome: Progressing     
  Problem: Discharge Planning  Goal: Discharge to home or other facility with appropriate resources  Outcome: Progressing     Problem: Pain  Goal: Verbalizes/displays adequate comfort level or baseline comfort level  Outcome: Progressing     Problem: Chronic Conditions and Co-morbidities  Goal: Patient's chronic conditions and co-morbidity symptoms are monitored and maintained or improved  Outcome: Progressing     
  Problem: Discharge Planning  Goal: Discharge to home or other facility with appropriate resources  Outcome: Progressing     Problem: Pain  Goal: Verbalizes/displays adequate comfort level or baseline comfort level  Outcome: Progressing     Problem: Chronic Conditions and Co-morbidities  Goal: Patient's chronic conditions and co-morbidity symptoms are monitored and maintained or improved  Outcome: Progressing     Problem: Safety - Adult  Goal: Free from fall injury  Outcome: Progressing     Problem: ABCDS Injury Assessment  Goal: Absence of physical injury  Outcome: Progressing     
  Problem: Discharge Planning  Goal: Discharge to home or other facility with appropriate resources  Outcome: Progressing     Problem: Pain  Goal: Verbalizes/displays adequate comfort level or baseline comfort level  Outcome: Progressing     Problem: Chronic Conditions and Co-morbidities  Goal: Patient's chronic conditions and co-morbidity symptoms are monitored and maintained or improved  Outcome: Progressing     Problem: Safety - Adult  Goal: Free from fall injury  Outcome: Progressing     Problem: ABCDS Injury Assessment  Goal: Absence of physical injury  Outcome: Progressing     Problem: Skin/Tissue Integrity  Goal: Absence of new skin breakdown  Description: 1.  Monitor for areas of redness and/or skin breakdown  2.  Assess vascular access sites hourly  3.  Every 4-6 hours minimum:  Change oxygen saturation probe site  4.  Every 4-6 hours:  If on nasal continuous positive airway pressure, respiratory therapy assess nares and determine need for appliance change or resting period.  Outcome: Progressing     Problem: Hematologic - Adult  Goal: Maintains hematologic stability  Outcome: Progressing     Problem: Nutrition Deficit:  Goal: Optimize nutritional status  Outcome: Progressing     
  Problem: Discharge Planning  Goal: Discharge to home or other facility with appropriate resources  Outcome: Progressing  Flowsheets (Taken 2/3/2024 1816)  Discharge to home or other facility with appropriate resources:   Identify barriers to discharge with patient and caregiver   Identify discharge learning needs (meds, wound care, etc)   Refer to discharge planning if patient needs post-hospital services based on physician order or complex needs related to functional status, cognitive ability or social support system   Arrange for needed discharge resources and transportation as appropriate     Problem: Pain  Goal: Verbalizes/displays adequate comfort level or baseline comfort level  Outcome: Progressing  Flowsheets (Taken 2/3/2024 1816)  Verbalizes/displays adequate comfort level or baseline comfort level:   Encourage patient to monitor pain and request assistance   Administer analgesics based on type and severity of pain and evaluate response   Consider cultural and social influences on pain and pain management     Problem: Chronic Conditions and Co-morbidities  Goal: Patient's chronic conditions and co-morbidity symptoms are monitored and maintained or improved  Outcome: Progressing  Flowsheets (Taken 2/3/2024 1816)  Care Plan - Patient's Chronic Conditions and Co-Morbidity Symptoms are Monitored and Maintained or Improved:   Monitor and assess patient's chronic conditions and comorbid symptoms for stability, deterioration, or improvement   Collaborate with multidisciplinary team to address chronic and comorbid conditions and prevent exacerbation or deterioration     Problem: Safety - Adult  Goal: Free from fall injury  Outcome: Progressing  Flowsheets (Taken 2/3/2024 1816)  Free From Fall Injury: Instruct family/caregiver on patient safety     
  Problem: Discharge Planning  Goal: Discharge to home or other facility with appropriate resources  Outcome: Progressing  Flowsheets (Taken 2/4/2024 1526)  Discharge to home or other facility with appropriate resources:   Identify barriers to discharge with patient and caregiver   Refer to discharge planning if patient needs post-hospital services based on physician order or complex needs related to functional status, cognitive ability or social support system   Identify discharge learning needs (meds, wound care, etc)   Arrange for needed discharge resources and transportation as appropriate     Problem: Pain  Goal: Verbalizes/displays adequate comfort level or baseline comfort level  2/4/2024 1526 by Laly Ruiz, RN  Outcome: Progressing  Flowsheets (Taken 2/4/2024 1526)  Verbalizes/displays adequate comfort level or baseline comfort level:   Encourage patient to monitor pain and request assistance   Administer analgesics based on type and severity of pain and evaluate response   Consider cultural and social influences on pain and pain management   Assess pain using appropriate pain scale     Problem: Chronic Conditions and Co-morbidities  Goal: Patient's chronic conditions and co-morbidity symptoms are monitored and maintained or improved  Outcome: Progressing  Flowsheets (Taken 2/4/2024 1526)  Care Plan - Patient's Chronic Conditions and Co-Morbidity Symptoms are Monitored and Maintained or Improved:   Monitor and assess patient's chronic conditions and comorbid symptoms for stability, deterioration, or improvement   Collaborate with multidisciplinary team to address chronic and comorbid conditions and prevent exacerbation or deterioration     Problem: Safety - Adult  Goal: Free from fall injury  Outcome: Progressing  Flowsheets (Taken 2/4/2024 1526)  Free From Fall Injury: Instruct family/caregiver on patient safety     
  Problem: Discharge Planning  Goal: Discharge to home or other facility with appropriate resources  Outcome: Progressing  Flowsheets (Taken 2/4/2024 1945)  Discharge to home or other facility with appropriate resources:   Identify barriers to discharge with patient and caregiver   Arrange for needed discharge resources and transportation as appropriate   Identify discharge learning needs (meds, wound care, etc)   Refer to discharge planning if patient needs post-hospital services based on physician order or complex needs related to functional status, cognitive ability or social support system     Problem: Pain  Goal: Verbalizes/displays adequate comfort level or baseline comfort level  Outcome: Progressing  Flowsheets (Taken 2/4/2024 1945)  Verbalizes/displays adequate comfort level or baseline comfort level:   Encourage patient to monitor pain and request assistance   Assess pain using appropriate pain scale   Administer analgesics based on type and severity of pain and evaluate response   Implement non-pharmacological measures as appropriate and evaluate response   Consider cultural and social influences on pain and pain management   Notify Licensed Independent Practitioner if interventions unsuccessful or patient reports new pain     Problem: Chronic Conditions and Co-morbidities  Goal: Patient's chronic conditions and co-morbidity symptoms are monitored and maintained or improved  Outcome: Progressing  Flowsheets (Taken 2/4/2024 1945)  Care Plan - Patient's Chronic Conditions and Co-Morbidity Symptoms are Monitored and Maintained or Improved:   Monitor and assess patient's chronic conditions and comorbid symptoms for stability, deterioration, or improvement   Collaborate with multidisciplinary team to address chronic and comorbid conditions and prevent exacerbation or deterioration   Update acute care plan with appropriate goals if chronic or comorbid symptoms are exacerbated and prevent overall improvement and 
  Problem: Discharge Planning  Goal: Discharge to home or other facility with appropriate resources  Outcome: Progressing  Flowsheets (Taken 2/6/2024 2328)  Discharge to home or other facility with appropriate resources: Identify barriers to discharge with patient and caregiver     Problem: Pain  Goal: Verbalizes/displays adequate comfort level or baseline comfort level  Outcome: Progressing     Problem: Chronic Conditions and Co-morbidities  Goal: Patient's chronic conditions and co-morbidity symptoms are monitored and maintained or improved  Outcome: Progressing  Flowsheets (Taken 2/6/2024 2328)  Care Plan - Patient's Chronic Conditions and Co-Morbidity Symptoms are Monitored and Maintained or Improved: Monitor and assess patient's chronic conditions and comorbid symptoms for stability, deterioration, or improvement     Problem: Safety - Adult  Goal: Free from fall injury  Outcome: Progressing     Problem: ABCDS Injury Assessment  Goal: Absence of physical injury  Outcome: Progressing  Flowsheets (Taken 2/7/2024 0407)  Absence of Physical Injury: Implement safety measures based on patient assessment     Problem: Skin/Tissue Integrity  Goal: Absence of new skin breakdown  Description: 1.  Monitor for areas of redness and/or skin breakdown  2.  Assess vascular access sites hourly  3.  Every 4-6 hours minimum:  Change oxygen saturation probe site  4.  Every 4-6 hours:  If on nasal continuous positive airway pressure, respiratory therapy assess nares and determine need for appliance change or resting period.  Outcome: Progressing     Problem: Hematologic - Adult  Goal: Maintains hematologic stability  Outcome: Progressing  Flowsheets (Taken 2/6/2024 2328)  Maintains hematologic stability: Assess for signs and symptoms of bleeding or hemorrhage     
  Problem: Discharge Planning  Goal: Discharge to home or other facility with appropriate resources  Outcome: Progressing  Flowsheets (Taken 2/8/2024 2128)  Discharge to home or other facility with appropriate resources:   Identify barriers to discharge with patient and caregiver   Identify discharge learning needs (meds, wound care, etc)     Problem: Pain  Goal: Verbalizes/displays adequate comfort level or baseline comfort level  Outcome: Progressing     Problem: Chronic Conditions and Co-morbidities  Goal: Patient's chronic conditions and co-morbidity symptoms are monitored and maintained or improved  Outcome: Progressing  Flowsheets (Taken 2/8/2024 2128)  Care Plan - Patient's Chronic Conditions and Co-Morbidity Symptoms are Monitored and Maintained or Improved:   Monitor and assess patient's chronic conditions and comorbid symptoms for stability, deterioration, or improvement   Collaborate with multidisciplinary team to address chronic and comorbid conditions and prevent exacerbation or deterioration   Update acute care plan with appropriate goals if chronic or comorbid symptoms are exacerbated and prevent overall improvement and discharge     Problem: Safety - Adult  Goal: Free from fall injury  Outcome: Progressing     Problem: ABCDS Injury Assessment  Goal: Absence of physical injury  Outcome: Progressing  Flowsheets (Taken 2/8/2024 2128)  Absence of Physical Injury: Implement safety measures based on patient assessment     Problem: Skin/Tissue Integrity  Goal: Absence of new skin breakdown  Description: 1.  Monitor for areas of redness and/or skin breakdown  2.  Assess vascular access sites hourly  3.  Every 4-6 hours minimum:  Change oxygen saturation probe site  4.  Every 4-6 hours:  If on nasal continuous positive airway pressure, respiratory therapy assess nares and determine need for appliance change or resting period.  Outcome: Progressing     Problem: Hematologic - Adult  Goal: Maintains hematologic 
  Problem: Hematologic - Adult  Goal: Maintains hematologic stability  Outcome: Progressing     Problem: Pain  Goal: Verbalizes/displays adequate comfort level or baseline comfort level  Outcome: Progressing      02/04/24 0429   Vitals   Temp 98.9 °F (37.2 °C)   Temp Source Oral   Pulse 71   Heart Rate Source Brachial;Radial   Respirations 18   BP (!) 161/68   MAP (Calculated) 99   BP Location Left upper arm   BP Upper/Lower Upper   BP Method Automatic   Patient Position Turns self   Cardiac Rhythm Sinus rhythm;1° AV Block   Pain Assessment   Pain Assessment None - Denies Pain   Pain Level 0   Oxygen Therapy   SpO2 93 %   Pulse Oximetry Type Intermittent   Pulse Oximeter Device Location Finger   O2 Device None (Room air)     
  Problem: Hematologic - Adult  Goal: Maintains hematologic stability  Outcome: Progressing     Problem: Pain  Goal: Verbalizes/displays adequate comfort level or baseline comfort level  Outcome: Progressing  Verbalizes/displays adequate comfort level or baseline comfort level:   Encourage patient to monitor pain and request assistance   Administer analgesics based on type and severity of pain and evaluate response   Consider cultural and social influences on pain and pain management      02/03/24 1929   Vitals   Temp 97.5 °F (36.4 °C)   Temp Source Oral   Pulse 77   Heart Rate Source Radial;Brachial   Respirations 20   BP (!) 175/79   MAP (Calculated) 111   BP Location Left upper arm   BP Upper/Lower Upper   BP Method Automatic   Patient Position Up in chair   Cardiac Rhythm Sinus rhythm with PVC;1° AV Block  (paced at times)   Pain Assessment   Pain Assessment 0-10   Pain Level 4   Pain Location Abdomen   Pain Orientation Mid   Non-Pharmaceutical Pain Intervention(s) Declines   Oxygen Therapy   SpO2 97 %   Pulse Oximetry Type Intermittent   Pulse Oximeter Device Location Finger   O2 Device None (Room air)     
  Problem: Hematologic - Adult  Goal: Maintains hematologic stability  Outcome: Progressing     Pt was asleep, awake with staff at bedside. VS obtained. Gave labetalol IV at 2322 for elevated BP.   Wife remains at bedside and reports that pt walked more today & made 7 total \"loops\" around the unit. See MAR & all flowsheets. Will continue to monitor.     02/03/24 2321   Vitals   Temp 98.2 °F (36.8 °C)   Temp Source Oral   Pulse 78   Heart Rate Source Monitor;Brachial   Respirations 20   BP (!) 181/70   MAP (Calculated) 107   BP Location Left upper arm   BP Upper/Lower Upper   BP Method Automatic   Patient Position Supine   Oxygen Therapy   SpO2 94 %   Pulse Oximetry Type Intermittent   Pulse Oximeter Device Location Finger   O2 Device None (Room air)     
  Problem: Pain  Goal: Verbalizes/displays adequate comfort level or baseline comfort level  1/31/2024 2110 by Shelly Greer RN  Outcome: Progressing     Problem: Chronic Conditions and Co-morbidities  Goal: Patient's chronic conditions and co-morbidity symptoms are monitored and maintained or improved  1/31/2024 2110 by Shelly Greer RN  Outcome: Progressing     Problem: Safety - Adult  Goal: Free from fall injury  Outcome: Progressing     Problem: Discharge Planning  Goal: Discharge to home or other facility with appropriate resources  1/31/2024 2110 by Shelly Greer RN  Outcome: Progressing  Flowsheets (Taken 1/31/2024 2000)  Discharge to home or other facility with appropriate resources:   Identify barriers to discharge with patient and caregiver   Arrange for needed discharge resources and transportation as appropriate   Identify discharge learning needs (meds, wound care, etc)   Refer to discharge planning if patient needs post-hospital services based on physician order or complex needs related to functional status, cognitive ability or social support system   Arrange for interpreters to assist at discharge as needed     
  Problem: Pain  Goal: Verbalizes/displays adequate comfort level or baseline comfort level  Outcome: Progressing     Problem: Chronic Conditions and Co-morbidities  Goal: Patient's chronic conditions and co-morbidity symptoms are monitored and maintained or improved  Outcome: Progressing     Problem: Safety - Adult  Goal: Free from fall injury  2/1/2024 2212 by Shelly Greer RN  Outcome: Progressing     Problem: ABCDS Injury Assessment  Goal: Absence of physical injury  Outcome: Progressing     Problem: Discharge Planning  Goal: Discharge to home or other facility with appropriate resources  Outcome: Progressing  Flowsheets (Taken 2/1/2024 2000)  Discharge to home or other facility with appropriate resources:   Identify barriers to discharge with patient and caregiver   Arrange for needed discharge resources and transportation as appropriate   Identify discharge learning needs (meds, wound care, etc)   Arrange for interpreters to assist at discharge as needed   Refer to discharge planning if patient needs post-hospital services based on physician order or complex needs related to functional status, cognitive ability or social support system     
  Problem: Pain  Goal: Verbalizes/displays adequate comfort level or baseline comfort level  Outcome: Progressing     Problem: Chronic Conditions and Co-morbidities  Goal: Patient's chronic conditions and co-morbidity symptoms are monitored and maintained or improved  Outcome: Progressing     Problem: Safety - Adult  Goal: Free from fall injury  Outcome: Progressing     Problem: ABCDS Injury Assessment  Goal: Absence of physical injury  Outcome: Progressing     Problem: Skin/Tissue Integrity  Goal: Absence of new skin breakdown  Description: 1.  Monitor for areas of redness and/or skin breakdown  2.  Assess vascular access sites hourly  3.  Every 4-6 hours minimum:  Change oxygen saturation probe site  4.  Every 4-6 hours:  If on nasal continuous positive airway pressure, respiratory therapy assess nares and determine need for appliance change or resting period.  Outcome: Progressing     Problem: Hematologic - Adult  Goal: Maintains hematologic stability  Outcome: Progressing     Problem: Discharge Planning  Goal: Discharge to home or other facility with appropriate resources  Outcome: Progressing     
  Problem: Safety - Adult  Goal: Free from fall injury  Outcome: Progressing  Note: Fall risk band on patient. Non skid footwear in place. Alarms used appropriately. Patient instructed to call and wait for staff before getting up. Rounding done to anticipate needs. Appropriate safety devices used for transfers. Call light within reach. Bedside table within reach. Pt denies further needs at this time. Will continue to monitor for changes.        
improvement and discharge     Problem: Safety - Adult  Goal: Free from fall injury  Outcome: Progressing  Flowsheets (Taken 2/5/2024 2000)  Free From Fall Injury: Instruct family/caregiver on patient safety     Problem: ABCDS Injury Assessment  Goal: Absence of physical injury  Outcome: Progressing  Flowsheets (Taken 2/5/2024 2000)  Absence of Physical Injury: Implement safety measures based on patient assessment     Problem: Skin/Tissue Integrity  Goal: Absence of new skin breakdown  Description: 1.  Monitor for areas of redness and/or skin breakdown  2.  Assess vascular access sites   Outcome: Progressing     Problem: Hematologic - Adult  Goal: Maintains hematologic stability  Outcome: Progressing  Flowsheets (Taken 2/5/2024 2000)  Maintains hematologic stability: Assess for signs and symptoms of bleeding or hemorrhage

## 2024-02-11 NOTE — DISCHARGE SUMMARY
V2.0  Discharge Summary    Name:  Troy Venegas /Age/Sex: 1935 (88 y.o. male)   Admit Date: 2024  Discharging Provider: Mike Valderrama MD   MRN & CSN:  [id number] & [id number] Attending:  Mike Valderrama MD       Brief HPI: Troy Venegas is a 88 y.o. male with PMH of bladder cancer; s/p TURP on 10/23, cystoscopy with biopsy and urethral dilatation on 2023, CAD; s/p PCI & stent to RCA on 2023, A-fib (not on AC) and urinary retention who was admitted with SBO. Patient underwent exploratory laparotomy, lysis of adhesions for small bowel obstruction on 24. Patient was on TPN and had NGT in place. He improved with return of bowel function and NGT was discontinued along with TPN. Patient was then found fit for discharge.     Brief Problem Focused Hospital Course:     Small bowel obstruction:  -Patient presented with chief complaint of abdominal pain.  Reports his abdominal pain started yesterday morning.  Endorses nausea but no vomiting.  Patient reports having a small bowel movement yesterday afternoon.  -In the ED, patient was hemodynamically stable.  -Labs were significant for WBC 13.2.  -CT of abdomen and pelvis was done and showed Dilated loops of jejunum from left upper abdomen down into the lower abdomen.  The transition point appears to be in the anterior midline upper abdomen and suggests a moderate small bowel obstruction.  The ileal loops are collapsed.  - Abdominal X ray this morning showed Persistence of dilated small bowel loops due to partial obstruction with no discrete visualization of the transition point.  However, no high-grade  obstruction is present given presence of oral contrast within the colon by approximately 19.5 hours.  -IV fluid  -NG tube  -Pain control  -General surgery consult: Continue IV antibiotic, planning for exploratory laparotomy tomorrow afternoon with Dr. Tay      A-fib:  - On sotalol and Eliquis.  Eliquis DC'd 2/2 hematuria  - Per note,

## 2024-02-11 NOTE — PROGRESS NOTES
[facility]        Progress Note    Patient Name: Troy Venegas  MRN: [id number]  YOB: 1935  Date of Evaluation: 2024    Postoperative Day # 5    Subjective:  No acute events overnight  Pain controlled  Pt up this am with walker  Passing flatus and stool  Ate regular diet        Vital Signs:  Patient Vitals for the past 24 hrs:   BP Temp Temp src Pulse Resp SpO2   24 0845 (!) 159/76 97.5 °F (36.4 °C) Oral 89 16 96 %   24 0409 (!) 148/64 98.7 °F (37.1 °C) Oral 83 16 --   24 0009 136/62 98.4 °F (36.9 °C) Oral 81 16 --   02/10/24 2030 (!) 144/65 98.6 °F (37 °C) Oral 72 16 93 %   02/10/24 1841 -- -- -- -- 16 --   02/10/24 1540 132/63 98.8 °F (37.1 °C) Oral 65 16 92 %        TEMPERATURE HISTORY 24H: Temp (24hrs), Av.4 °F (36.9 °C), Min:97.5 °F (36.4 °C), Max:98.8 °F (37.1 °C)    BLOOD PRESSURE HISTORY: Systolic (36hrs), Av , Min:132 , Max:176    Diastolic (36hrs), Av, Min:62, Max:76      Intake/Output:  No intake/output data recorded.  I/O this shift:  In: -   Out: 175 [Urine:175]  Drain/tube Output:       Physical Exam:  General: awake, alert, oriented to person, place, time  Lungs: unlabored respirations  Abdomen: soft, nondistended, nontender, bowel sounds active  Skin/Wound: healing well, no drainage, no erythema, well approximated scar    Labs:  CBC:    Recent Labs     24  0505 02/10/24  0421   WBC 8.8 8.8   HGB 10.6* 10.4*   HCT 31.5* 31.3*    199       BMP:    Recent Labs     24  0505 02/09/24  2027 02/10/24  0421 24  0448   *  --  143 138   K 3.2* 3.8 3.9 4.0   *  --  109 105   CO2 26  --  26 24   BUN 20  --  23* 27*   CREATININE 1.0  --  1.1 1.2   GLUCOSE 213*  --  234* 271*       Hepatic:    No results for input(s): \"AST\", \"ALT\", \"ALB\", \"BILITOT\", \"ALKPHOS\" in the last 72 hours.    Amylase:  No results found for: \"AMYLASE\"  Lipase:    Lab Results   Component Value Date/Time    
               [facility]        Progress Note    Patient Name: Troy Venegas  MRN: [id number]  YOB: 1935  Date of Evaluation: 2024    Postoperative Day #3    Subjective:  No acute events overnight  Pain controlled  No nausea or vomiting, NGT in place with lower output, feels hungry  Passing flatus and loose stool  Resting in bed at this time      Vital Signs:  Patient Vitals for the past 24 hrs:   BP Temp Temp src Pulse Resp SpO2   24 0830 (!) 165/73 98.1 °F (36.7 °C) Oral 95 20 96 %   24 0347 (!) 143/67 98.1 °F (36.7 °C) Oral 86 18 93 %   24 2241 (!) 155/68 -- -- -- -- --   24 2140 (!) 162/69 -- -- -- -- --   24 2128 (!) 176/71 98.2 °F (36.8 °C) Oral 70 18 95 %   24 1233 (!) 166/62 98.4 °F (36.9 °C) Oral 70 18 95 %        TEMPERATURE HISTORY 24H: Temp (24hrs), Av.2 °F (36.8 °C), Min:98.1 °F (36.7 °C), Max:98.4 °F (36.9 °C)    BLOOD PRESSURE HISTORY: Systolic (36hrs), Av , Min:143 , Max:180    Diastolic (36hrs), Av, Min:62, Max:78      Intake/Output:  I/O last 3 completed shifts:  In: 1268.6 [P.O.:120]  Out: 2200 [Urine:800; Emesis/NG output:1400]  No intake/output data recorded.  Drain/tube Output:       Physical Exam:  General: awake, alert, oriented to person, place, time  Lungs: unlabored respirations  Abdomen: soft, non-distended, incisional tenderness only, bowel sounds active  Skin/Wound: healing well, no drainage, no erythema, well approximated     Labs:  CBC:    Recent Labs     24  0604 24  0520 24  0505   WBC 9.4 8.3 8.8   HGB 12.2* 11.4* 10.6*   HCT 36.7* 33.6* 31.5*    191 213       BMP:    Recent Labs     24  0604 24  0520 24  0505   * 143 146*   K 3.6 3.3* 3.2*   * 111* 113*   CO2 25 25 26   BUN 18 19 20   CREATININE 1.2 1.1 1.0   GLUCOSE 198* 227* 213*       Hepatic:    Recent Labs     24  0604   AST 21   ALT 7*   BILITOT 0.4   ALKPHOS 47 
               [facility]        Progress Note    Patient Name: Troy Venegas  MRN: [id number]  YOB: 1935  Date of Evaluation: 2024    Postoperative Day #1    Subjective:  No acute events overnight  Pain controlled  No nausea or vomiting, NGT in place  No flatus or stool since OR  Sitting up on edge of bed at this time      Vital Signs:  Patient Vitals for the past 24 hrs:   BP Temp Temp src Pulse Resp SpO2 Weight   24 0800 (!) 157/74 98.3 °F (36.8 °C) Oral 76 16 95 % --   24 0541 (!) 143/76 97.9 °F (36.6 °C) Oral 62 14 97 % 84.6 kg (186 lb 6.4 oz)   24 0045 129/72 -- -- 71 -- 97 % --   24 2328 (!) 172/77 97.5 °F (36.4 °C) Oral 66 14 98 % --   24 2326 -- -- -- -- -- 99 % --   24 (!) 147/78 97.4 °F (36.3 °C) Oral 67 18 -- --   24 1845 (!) 144/78 -- -- 69 16 99 % --   24 1745 (!) 165/76 97.9 °F (36.6 °C) Oral 70 14 99 % --   24 1715 (!) 148/63 -- -- 65 14 100 % --   24 1645 -- -- -- 69 15 98 % --   24 1630 -- -- -- 77 13 98 % --   24 1615 -- -- -- 66 15 97 % --   24 1600 (!) 145/70 -- -- 66 15 92 % --   24 1545 -- -- -- 67 14 92 % --   24 1530 -- -- -- 66 14 99 % --   24 1515 -- -- -- 61 12 99 % --   24 1500 126/64 -- -- 67 12 (!) 88 % --   24 1445 -- -- -- 64 13 91 % --   24 1430 -- -- -- 67 13 (!) 89 % --   24 1415 (!) 140/56 -- -- 61 12 96 % --   24 1400 (!) 130/103 -- -- 81 16 91 % --   24 1345 (!) 133/59 -- -- 63 14 100 % --   24 1330 139/66 -- -- 60 15 100 % --   24 1201 (!) 166/69 97 °F (36.1 °C) Temporal 71 16 95 % --   24 1115 (!) 148/68 98.4 °F (36.9 °C) Oral 68 18 92 % --        TEMPERATURE HISTORY 24H: Temp (24hrs), Av.8 °F (36.6 °C), Min:97 °F (36.1 °C), Max:98.4 °F (36.9 °C)    BLOOD PRESSURE HISTORY: Systolic (36hrs), Av , Min:126 , Max:172    Diastolic (36hrs), Av, Min:55, 
               [facility]        Progress Note    Patient Name: Troy Venegas  MRN: [id number]  YOB: 1935  Date of Evaluation: 2/10/2024    Postoperative Day #4    Subjective:  No acute events overnight  Pain controlled  No nausea or vomiting, up to BR this am with walker  Passing flatus and loose stool        Vital Signs:  Patient Vitals for the past 24 hrs:   BP Temp Temp src Pulse Resp SpO2   02/10/24 1030 (!) 176/71 97.5 °F (36.4 °C) Oral 80 16 95 %   02/10/24 0437 (!) 147/64 98.9 °F (37.2 °C) Oral 92 16 92 %   24 1929 (!) 164/68 98.2 °F (36.8 °C) Oral 84 16 --   24 1315 (!) 168/66 97.7 °F (36.5 °C) Oral 84 18 95 %        TEMPERATURE HISTORY 24H: Temp (24hrs), Av.1 °F (36.7 °C), Min:97.5 °F (36.4 °C), Max:98.9 °F (37.2 °C)    BLOOD PRESSURE HISTORY: Systolic (36hrs), Av , Min:143 , Max:176    Diastolic (36hrs), Av, Min:64, Max:73      Intake/Output:  I/O last 3 completed shifts:  In: 2453.3 [IV Piggyback:298.7]  Out: 500 [Emesis/NG output:500]  No intake/output data recorded.  Drain/tube Output:       Physical Exam:  General: awake, alert, oriented to person, place, time  Lungs: unlabored respirations  Abdomen: soft, mildly distended, incisional tenderness only, bowel sounds active  Skin/Wound: healing well, no drainage, no erythema, well approximated     Labs:  CBC:    Recent Labs     24  0520 24  0505 02/10/24  0421   WBC 8.3 8.8 8.8   HGB 11.4* 10.6* 10.4*   HCT 33.6* 31.5* 31.3*    213 199       BMP:    Recent Labs     24  0520 24  0505 02/09/24  2027 02/10/24  0421    146*  --  143   K 3.3* 3.2* 3.8 3.9   * 113*  --  109   CO2 25 26  --  26   BUN 19 20  --  23*   CREATININE 1.1 1.0  --  1.1   GLUCOSE 227* 213*  --  234*       Hepatic:    No results for input(s): \"AST\", \"ALT\", \"ALB\", \"BILITOT\", \"ALKPHOS\" in the last 72 hours.    Amylase:  No results found for: \"AMYLASE\"  Lipase:    Lab Results 
               [facility]        Progress Note    Patient Name: Tryo Venegas  MRN: [id number]  YOB: 1935  Date of Evaluation: 2/3/2024    Chief Complaint: Abdominal pain    Subjective:  No acute events overnight  Pain unchanged  No nausea with NG  Passing small flatus, no stool    Vital Signs:  Patient Vitals for the past 24 hrs:   BP Temp Temp src Pulse Resp SpO2 Weight   24 1119 (!) 169/71 97.5 °F (36.4 °C) Oral 71 20 96 % --   24 0930 (!) 163/68 97.6 °F (36.4 °C) Oral 70 19 94 % --   24 0722 -- -- -- -- -- -- 81.6 kg (180 lb)   24 0406 (!) 155/75 97.5 °F (36.4 °C) Oral 74 18 93 % --   24 2338 (!) 163/78 97.9 °F (36.6 °C) Oral 78 18 92 % --   24 2105 -- -- -- -- 18 -- --   24 1915 (!) 185/70 98.2 °F (36.8 °C) Oral 81 -- 93 % --   24 1815 (!) 179/79 -- -- -- -- -- --   24 1744 (!) 175/77 -- -- 77 -- -- --   24 1648 (!) 185/90 97 °F (36.1 °C) Oral 77 16 95 % --      TEMPERATURE HISTORY 24H: Temp (24hrs), Av.6 °F (36.4 °C), Min:97 °F (36.1 °C), Max:98.2 °F (36.8 °C)    BLOOD PRESSURE HISTORY: Systolic (36hrs), Av , Min:155 , Max:188    Diastolic (36hrs), Av, Min:60, Max:90      Intake/Output:  I/O last 3 completed shifts:  In: 3714.9 [P.O.:60; I.V.:3523.2; IV Piggyback:131.7]  Out: 2200 [Urine:800; Emesis/NG output:1400]  No intake/output data recorded.  Drain/tube Output:       Physical Exam:  General: awake, alert, oriented to person, place, time  Cardiovascular:  regular rate and rhythm and no murmur noted  Lungs: clear to auscultation  Abdomen: soft, distended, mild diffuse tenderness without peritonitis     Labs:  CBC:    Recent Labs     24  0500 24  0500   WBC 10.1 9.9 8.0   HGB 13.0* 13.2* 12.3*   HCT 38.2* 39.9* 35.8*    175 173     BMP:    Recent Labs     247 24  0500 24  0500    144 145   K 4.4 4.5 3.8    108 109   CO2 
      Admit Date: 1/31/2024  Diet: Diet NPO Exceptions are: Ice Chips, Sips of Water with Meds, Sips of Clear Liquids, Popsicles  PN-Adult Premix 5/20 - Standard Electrolytes - Central Line    CC: SBO    Interval history:   Overnight, there were no acute events. Patient's vitals remained stable    An attempt was made to see the patient but he was not in his room.  Will attempt to see the patient again later this afternoon.    Plan:     -Ex lap with general surgery today for SBO  -Continue IV Zosyn  -Continue IV Kangrelor  -Continue IV PPI  -Probiotics    Assessment:   Troy Venegas is a 88 y.o. male with PMH of bladder cancer; s/p TURP on 10/23, cystoscopy with biopsy and urethral dilatation on 12/19/2023, CAD; s/p PCI& stent to RCA on 12/29/2023, A-fib (not on AC) and urinary retention who was admitted with SBO    # Small bowel obstruction:  -Patient presented with chief complaint of abdominal pain.  Reports his abdominal pain started yesterday morning.  Endorses nausea but no vomiting.  Patient reports having a small bowel movement yesterday afternoon.  -In the ED, patient was hemodynamically stable.  -Labs were significant for WBC 13.2.  -CT of abdomen and pelvis was done and showed Dilated loops of jejunum from left upper abdomen down into the lower abdomen.  The transition point appears to be in the anterior midline upper abdomen and suggests a moderate small bowel obstruction.  The ileal loops are collapsed.  - Abdominal X ray this morning showed Persistence of dilated small bowel loops due to partial obstruction with no discrete visualization of the transition point.  However, no high-grade  obstruction is present given presence of oral contrast within the colon by approximately 19.5 hours.  -IV fluid  -NG tube  -Pain control  -General surgery consult: Continue IV antibiotic, planning for exploratory laparotomy tomorrow afternoon with Dr. Tay      # Proctitis:  -On IV antibiotic     # 
      Admit Date: 1/31/2024  Diet: Diet NPO Exceptions are: Ice Chips, Sips of Water with Meds, Sips of Clear Liquids, Popsicles  PN-Adult Premix 5/20 - Standard Electrolytes - Central Line    CC: SBO    Interval history:   Overnight, there were no acute events. Patient's vitals remained stable    Patient was seen this morning in bed. He claims that he walked down the [place] but thinks he pulled his stomach a little bit and therefore was in pain. He claims the pain meds are very effective. Patient denies any fever, chills, nausea, vomiting, chest pain, shortness of breath, abdominal pain, dysuria, urine urgency or frequency.    Plan:     -Await return of bowel function  -NGT decompression  -Continue IV Kangrelor  -Continue IV PPI  -Continue TPN  -Monitor glucose closely and adjust insulin appropriately    Assessment:   Troy Venegas is a 88 y.o. male with PMH of bladder cancer; s/p TURP on 10/23, cystoscopy with biopsy and urethral dilatation on 12/19/2023, CAD; s/p PCI& stent to RCA on 12/29/2023, A-fib (not on AC) and urinary retention who was admitted with SBO    Small bowel obstruction:  -Patient presented with chief complaint of abdominal pain.  Reports his abdominal pain started yesterday morning.  Endorses nausea but no vomiting.  Patient reports having a small bowel movement yesterday afternoon.  -In the ED, patient was hemodynamically stable.  -Labs were significant for WBC 13.2.  -CT of abdomen and pelvis was done and showed Dilated loops of jejunum from left upper abdomen down into the lower abdomen.  The transition point appears to be in the anterior midline upper abdomen and suggests a moderate small bowel obstruction.  The ileal loops are collapsed.  - Abdominal X ray this morning showed Persistence of dilated small bowel loops due to partial obstruction with no discrete visualization of the transition point.  However, no high-grade  obstruction is present given presence of oral contrast within the 
      Admit Date: 1/31/2024  Diet: PN-Adult Premix 5/20 - Standard Electrolytes - Central Line  ADULT DIET; Regular; Low Fiber    CC: SBO    Interval history:   Overnight, there were no acute events. Patient's vitals remained stable    Attempt was made to see the patient this morning but he was not in his room. Patient was likely working with PT/OT.     Plan:     -NGT d/c'ed  -Advance diet as tolerated  -Continue TPN per Gen Sx  -Continue IV PPI    Assessment:   Troy Venegas is a 88 y.o. male with PMH of bladder cancer; s/p TURP on 10/23, cystoscopy with biopsy and urethral dilatation on 12/19/2023, CAD; s/p PCI& stent to RCA on 12/29/2023, A-fib (not on AC) and urinary retention who was admitted with SBO    Small bowel obstruction:  -Patient presented with chief complaint of abdominal pain.  Reports his abdominal pain started yesterday morning.  Endorses nausea but no vomiting.  Patient reports having a small bowel movement yesterday afternoon.  -In the ED, patient was hemodynamically stable.  -Labs were significant for WBC 13.2.  -CT of abdomen and pelvis was done and showed Dilated loops of jejunum from left upper abdomen down into the lower abdomen.  The transition point appears to be in the anterior midline upper abdomen and suggests a moderate small bowel obstruction.  The ileal loops are collapsed.  - Abdominal X ray this morning showed Persistence of dilated small bowel loops due to partial obstruction with no discrete visualization of the transition point.  However, no high-grade  obstruction is present given presence of oral contrast within the colon by approximately 19.5 hours.  -IV fluid  -NG tube  -Pain control  -General surgery consult: Continue IV antibiotic, planning for exploratory laparotomy tomorrow afternoon with Dr. Tay     A-fib:  - On sotalol and Eliquis.  Eliquis DC'd 2/2 hematuria  - Per note, refused to restart Eliquis after having hematuria during last admission. Patient was 
    V2.0    [facility] Progress Note      Name:  Troy Venegas /Age/Sex: 1935  (88 y.o. male)   MRN & CSN:  [id number] & [id number] Encounter Date/Time: 2/3/2024 8:43 AM EST   Location:  [facility]3368/3368-01 PCP: Lois Kruger MD     Attending:Helga Costa MD       Hospital Day: 4    Assessment and Recommendations   Troy Venegas is a 88 y.o. male with pmh of  bladder cancer; s/p TURP on 10/23, cystoscopy with biopsy and urethral dilatation on 2023, CAD; s/p PCI& stent to RCA on 2023, A-fib (not on AC) and urinary retention  who presents with SBO (small bowel obstruction) ([facility])      Plan:   # Small bowel obstruction:  -Patient presented with chief complaint of abdominal pain.  Reports his abdominal pain started yesterday morning.  Endorses nausea but no vomiting.  Patient reports having a small bowel movement yesterday afternoon.  -In the ED, patient was hemodynamically stable.  -Labs were significant for WBC 13.2.  -CT of abdomen and pelvis was done and showed Dilated loops of jejunum from left upper abdomen down into the lower abdomen.  The transition point appears to be in the anterior midline upper abdomen and suggests a moderate small bowel obstruction.  The ileal loops are collapsed.  - Abdominal X ray this morning showed Persistence of dilated small bowel loops due to partial obstruction with no discrete visualization of the transition point.  However, no high-grade  obstruction is present given presence of oral contrast within the colon by approximately 19.5 hours.  -IV fluid  -NG tube  -Pain control  -General surgery consult:         # Hx of A-fib:  - On sotalol and Eliquis.  Eliquis DC'd 2/2 hematuria  - Per note, refused to restart Eliquis after having hematuria during last admission. Patient was started on aspirin and was instructed to continue Plavix. Watchman as outpatient.     # Hx of CAD;   - s/p PCI and stent to RCA x 2 on 23; on statin, Plavix   -Patient is on aspirin and 
    V2.0    [facility] [place]      Name:  Troy Venegas /Age/Sex: 1935  (88 y.o. male)   MRN & CSN:  [id number] & [id number] Encounter Date/Time: 2024 8:43 AM EST   [facility]  [facility]3368/3368-01 PCP: Lois Kruger MD     Attending:Helga Costa MD       Hospital Day: 6    Assessment and Recommendations   Troy Venegas is a 88 y.o. male with pmh of  bladder cancer; s/p TURP on 10/23, cystoscopy with biopsy and urethral dilatation on 2023, CAD; s/p PCI& stent to RCA on 2023, A-fib (not on AC) and urinary retention  who presents with SBO (small bowel obstruction) ([facility])      Plan:   # Small bowel obstruction:  -Patient presented with chief complaint of abdominal pain.  Reports his abdominal pain started yesterday morning.  Endorses nausea but no vomiting.  Patient reports having a small bowel movement yesterday afternoon.  -In the ED, patient was hemodynamically stable.  -Labs were significant for WBC 13.2.  -CT of abdomen and pelvis was done and showed Dilated loops of jejunum from left upper abdomen down into the lower abdomen.  The transition point appears to be in the anterior midline upper abdomen and suggests a moderate small bowel obstruction.  The ileal loops are collapsed.  - Abdominal X ray this morning showed Persistence of dilated small bowel loops due to partial obstruction with no discrete visualization of the transition point.  However, no high-grade  obstruction is present given presence of oral contrast within the colon by approximately 19.5 hours.  -IV fluid  -NG tube  -Pain control  -General surgery consult: Continue IV antibiotic, planning for exploratory laparotomy tomorrow afternoon with Dr. Tay      # Proctitis:  -On IV antibiotic    # Hypernatremia:  -D5    # Hx of A-fib:  - On sotalol and Eliquis.  Eliquis DC'd 2/2 hematuria  - Per note, refused to restart Eliquis after having hematuria during last admission. Patient was started on aspirin and was 
  [facility]  H+P  Consult  OP Visit  FU Visit   CC/HPI   CC New patient visit for antiplatelet assistance, hx cad and af.   HPI 88 y.o., male  presents with SBO. Consulted for assistance with AC and AP.  EKG a-paced.  EKG unchanged from prior.     Cardiac Hx PCI   Troponin Lab Results   Component Value Date    TROPHS 219 (H) 12/28/2023    TROPHS 145 (H) 12/28/2023    TROPHS 129 (H) 12/28/2023      HISTORY/ALLERGY/ROS   MEDHx  has a past medical history of Arrhythmia, Arthritis, Atrial fibrillation (HCC), Atrial tachycardia, Bladder cancer (HCC), CAD (coronary artery disease), Cancer (HCC), Diabetes mellitus (HCC), Diverticulitis, Enlarged prostate, History of blood transfusion, Hyperlipidemia, Hypertension, Neuropathy, Pacemaker, Pneumonia, and Vasodepressor syncope.   SURGHx  has a past surgical history that includes shoulder surgery (Bilateral); Finger surgery; Coronary angioplasty with stent (2005); Cataract removal (Bilateral); ablation of dysrhythmic focus; Cystoscopy (09/26/2012); TURP (03/07/2013); other surgical history (Left, 02/25/2014); Carpal tunnel release; Finger trigger release; Cystocopy (10/08/2015); Spinal fusion (N/A); Colonoscopy; pacemaker placement; Cystoscopy (N/A, 10/17/2023); Cystoscopy (N/A, 12/19/2023); Skin cancer excision; and other surgical history.   SOCHx  reports that he quit smoking about 50 years ago. His smoking use included cigarettes. He has never used smokeless tobacco. He reports current alcohol use of about 2.0 standard drinks of alcohol per week. He reports that he does not use drugs.   FAMx family history includes Cancer in his father and mother; Diabetes in his sister; No Known Problems in his brother.   ALLERG Patient has no known allergies.   ROS Full ROS obtained and negative except as mentioned in HPI   MEDICATIONS   Medications reviewed in Epic.   PHYSICAL EXAM   Vitals /60   Pulse 60   Temp 97.7 °F (36.5 °C) (Temporal)   Resp 20   Ht 1.854 m 
  Clinical Pharmacy Note    Pharmacy consulted by Dr. Gill/Stephanie Stein to manage TPN    Current TPN rate: 40ml/hr  Goal TPN rate: 83ml/hr    Access: CVC  Indication: SBO    Labs:  General Labs:  BMP:    Lab Results   Component Value Date/Time     02/06/2024 06:00 AM    K 2.9 02/06/2024 06:00 AM    K 3.8 02/03/2024 05:00 AM     02/06/2024 06:00 AM    CO2 26 02/06/2024 06:00 AM    BUN 17 02/06/2024 06:00 AM    LABALBU 3.7 02/06/2024 06:00 AM    CREATININE 1.2 02/06/2024 06:00 AM    CALCIUM 9.4 02/06/2024 06:00 AM    GFRAA >60 05/05/2022 10:40 AM    GFRAA 54 05/22/2013 09:40 AM    LABGLOM 59 02/06/2024 06:00 AM    GLUCOSE 197 02/06/2024 06:00 AM       Electrolyte replacement as follows:   Replace potassium with 40 mEq of potassium chloride administered IV over 2hours  Replace phosphorous with 20 mmol of potassium phosphate administered IV over 4hours    Blood sugars over past 24 hours: 118-197    Blood sugar management:  Plan to Increase Humalog q4 hour sliding scale at medium dose.    Plan to continue TPN at 40 ml/hr.    Thank you for allowing pharmacy to participate in the care of this patient.    Christiano Carmona [facility], PharmD 2/6/2024   
  [facility] - Inpatient Rehabilitation Department   Phone: [phone]    Physical Therapy    [x] Initial Evaluation            [] Daily Treatment Note         [x] Discharge Summary      Patient: Troy Venegas   : 1935   MRN: [id number]   Date of Service:  2024  Admitting Diagnosis: SBO (small bowel obstruction) ([facility])  Current Admission Summary: Troy Venegas is a 88 y.o. male  who presents to the ED complaining of onset yesterday around 11am of abdominal pains.  Abd pain started lower and now is diffuse throughout the abd and radiates to the back.  No fevers.  +nausea, but no vomiting.  Had a normal BM yesterday that transiently helped his pain and was not loose or hard, no melena or bloody stool.  No dysuria/hematuria.  Has diverticulitis remotely and he is not sure if this feels similar.  No prior abdominal surgeries.  On plavix for afib hx and CAD.  No longer on Eliquis due to hematuria history per patient and his wife.  Has a history of bladder CA.  No chest pains.  Pain waxes/wanes.  Is feeling distended in the abd as well.     Of note the patient was admitted at the beginning of this month for hematuria with urinary retention, found to have stage III CKD, Eliquis was discontinued at that point, and he has had no recurrent issues with urinary retention since then and does not feel like that is his issue now.  Past Medical History:  has a past medical history of Arrhythmia, Arthritis, Atrial fibrillation (HCC), Atrial tachycardia, Bladder cancer (HCC), CAD (coronary artery disease), Cancer (HCC), Diabetes mellitus (HCC), Diverticulitis, Enlarged prostate, History of blood transfusion, Hyperlipidemia, Hypertension, Neuropathy, Pacemaker, Pneumonia, and Vasodepressor syncope.  Past Surgical History:  has a past surgical history that includes shoulder surgery (Bilateral); Finger surgery; Coronary angioplasty with stent (); Cataract removal (Bilateral); ablation of dysrhythmic 
  [facility] - Inpatient Rehabilitation Department   Phone: [phone]    Occupational Therapy    [x] Initial Evaluation            [] Daily Treatment Note         [x] Discharge Summary      Patient: Troy Venegas   : 1935   MRN: [id number]   Date of Service:  2024    Admitting Diagnosis:  SBO (small bowel obstruction) (HCC)  Current Admission Summary: Troy Venegas is a 88 y.o. male  who presents to the ED complaining of onset yesterday around 11am of abdominal pains.  Abd pain started lower and now is diffuse throughout the abd and radiates to the back.  No fevers.  +nausea, but no vomiting.  Had a normal BM yesterday that transiently helped his pain and was not loose or hard, no melena or bloody stool.  No dysuria/hematuria.  Has diverticulitis remotely and he is not sure if this feels similar.  No prior abdominal surgeries.  On plavix for afib hx and CAD.  No longer on Eliquis due to hematuria history per patient and his wife.  Has a history of bladder CA.  No chest pains.  Pain waxes/wanes.  Is feeling distended in the abd as well.     Of note the patient was admitted at the beginning of this month for hematuria with urinary retention, found to have stage III CKD, Eliquis was discontinued at that point, and he has had no recurrent issues with urinary retention since then and does not feel like that is his issue now.     No other complaints, modifying factors or associated symptoms.   Past Medical History:  has a past medical history of Arrhythmia, Arthritis, Atrial fibrillation (HCC), Atrial tachycardia, Bladder cancer (HCC), CAD (coronary artery disease), Cancer (HCC), Diabetes mellitus (HCC), Diverticulitis, Enlarged prostate, History of blood transfusion, Hyperlipidemia, Hypertension, Neuropathy, Pacemaker, Pneumonia, and Vasodepressor syncope.  Past Surgical History:  has a past surgical history that includes shoulder surgery (Bilateral); Finger surgery; Coronary angioplasty 
  Patient discharge home at this time. Discharge education and medications reviewed. All questions  answered.  Discharged to home as passenger in private vehicle.    
.Shift assessment completed. Routine vitals stable. Scheduled medications given. Patient is awake, alert and oriented. Respirations are easy and unlabored. Patient c/o feeling sick to his stomach, PRN Zofran given  Call light within reach. Wife at bedside, about to help patient clean up in the bathroom.    
4 Eyes Skin Assessment     NAME:  Troy Venegas  YOB: 1935  MEDICAL RECORD NUMBER:  [id number]    The patient is being assessed for  Admission    I agree that at least one RN has performed a thorough Head to Toe Skin Assessment on the patient. ALL assessment sites listed below have been assessed.      Areas assessed by both nurses:    Head, Face, Ears, Shoulders, Back, Chest, Arms, Elbows, Hands, Sacrum. Buttock, Coccyx, Ischium, and Legs. Feet and Heels        Does the Patient have a Wound? No noted wound(s)       Michael Prevention initiated by RN: No  Wound Care Orders initiated by RN: No    Pressure Injury (Stage 3,4, Unstageable, DTI, NWPT, and Complex wounds) if present, place Wound referral order by RN under : No    New Ostomies, if present place, Ostomy referral order under : No     Nurse 1 eSignature: Electronically signed by Nancy Frankenfield, RN on 1/31/24 at 4:43 PM EST    **SHARE this note so that the co-signing nurse can place an eSignature**    Nurse 2 eSignature: Electronically signed by Ritesh Camargo RN on 1/31/24 at 4:45 PM EST     
APRN notified by RN of patient c/o insomnia despite benadryl IV  -RN requesting IV Valium  -given pt age and in consideration of BEERS criteria, will avoid benzos or repeat doses of anticholinergic  -unable to use po meds secondary to SBO  -will give one time Zyprexa 2.5mg  -recommend non-pharm measures to encourage sleep/relaxation    Janine Almendarez, APRN - NP   
Attempted to see pt on three separate occasions today. Off the floor for testing. Tele and CBC appears stable. Will see pt tomorrow.     Lydia Johnson, APRN - CNP    
Central line inserted per Dr Hendrix to right neck. CXR STAT order for placement verification.   
Contacted XR to determine if ordered imaging completed this AM. Stated that imaging not complete; stated that they would send a tech to ICU room 3917 to complete orders. Awaiting response at this time. Pt aware; denies needs at present. Call light within reach. Will monitor.   
Continuous suction reapplied to NG tube per MD order.  NG noted at the [address] [place].   
EKG completed per order. Results on pt chart.   
IV access lost to RFA where the Keng[place] was running, waiting for central line placement and will continue infusion. Dr Combs made aware. Dr Hendrix is going to insert central line.   
Imaging called and stated that pt was not done with X-Ray series. MD wants to restart suction for tonight and obtain a KUB at 1300 on 02/03/24.   
Messaged pharmacy about missing potassium dose at 1508, pharmacy sending dose now.   
Nutrition Note    RECOMMENDATIONS  PO Diet: NPO; adv as medically appropriate  Nutrition Support:      Advance TPN to goal rate of 83 ml/hr.    Physician/LIP to monitor closely and correct lytes (Phos,Mg,K+) d/t risk of refeeding syndrome  Recommend 250 mL 20% lipids two times per week  Recommend FSBS, monitor glucose, need for insulin  Pharmacy to adjust MVI and Trace Elements as needed   When PN to be discontinued, cut rate by 50% and let current bag run out.       NUTRITION ASSESSMENT   Pt remains NPO with TPN @ 60 ml/hr.  Recommend advancing rate to goal of 83 ml/hr with pharmacy replacing lytes as needed.  NGT remains in place; no N/V identified.  LBM 2/5.  Will monitor for con't tolerance of TPN & ability to advance po diet.     Nutrition Related Findings: Gluc 227, Phos 2.3, k+ 3.3; LBM 2/5  Wounds: Surgical Incision  Nutrition Education:  Education not indicated   Nutrition Goals: Tolerate nutrition support at goal rate, by next RD assessment     MALNUTRITION ASSESSMENT      Malnutrition Status: No malnutrition    NUTRITION DIAGNOSIS   Inadequate oral intake related to altered GI function as evidenced by NPO or clear liquid status due to medical condition, nutrition support - parenteral nutrition      CURRENT NUTRITION THERAPIES  Diet NPO Exceptions are: Ice Chips, Sips of Water with Meds, Sips of Clear Liquids, Popsicles  PN-Adult Premix 5/20 - Standard Electrolytes - Central Line     PO Intake: NPO   PO Supplement Intake:NPO    Current Parenteral Nutrition Orders:  Type and Formula: Premix Central   Lipids: 250ml, Daily  Duration: Continuous  Goal PN Orders Provides: Clinimix 5/20 at goal rate 83 mL/hr to provide 2000 mL total volume, 1760 calories, 100 grams protein, dextrose load 3.43 mg/kg/min.    ANTHROPOMETRICS  Current Height: 185.4 cm (6' 1\")  Current Weight - Scale: 84.6 kg (186 lb 6.4 oz)    Ideal Body Weight (IBW): 184 lbs  (84 kg)      BMI: 24.5      COMPARATIVE STANDARDS  [employer] 
Patient ambulated once in the unit hallway this morning with his wife, tolerated well. Patient had BM once, Passing gas.   At around 2 Pm, CVC catheter removed. Tolerated well. Gauze and Tegaderm in place.   
Patient refused glucose test for 1600pm   
Patient requesting something to sleep because he hasn't slept in days. Secure message to Dr Costa, awaiting an order. Message left with Dr Riddle because patient and wife want to know the plan for the dx of bladder CA  
Patients Wife called out, stating that patient sneezed and NG came out a bit. This RN went in, turned suction off, and advanced NG tube back to 58cm. Ordered stat X-ray to see placement. Awaiting X-ray results at this time.   
Perfect serve message to covering NP;    Janine Young, MAGALIE   Patient:  ERIC GUADARRAMA   YOB: 1935  MRN:  [id number]  Location: [facility]    3368-01 2/5/24 3:06 AM   [phone] Hospital or Facility: [facility] From: Eliz Arteaga RE: ERIC GUADARRAMA 1935 RM: [id number] SBO, LG soft BM today, NGT, with decreased o/p per spouse. Abdominal cramping too difficult to tolerate at this time. Diet was advanced to ice chips,clears,popsicles. GI wants to avoid oipoids for pain, can we try something else? Need Callback: NO CALLBACK REQ 3T ROUTINE  Unread   
Pt arrived to PACU from OR, VSS, pt arouses to voice. Mid-abd incision site is CDI, ice applied. Manning catheter notes nilda, clear urine. NG in R nare measures at 66 cm, green drainage noted when hooked up to continuous, low wall suction. Will continue to monitor.   
Pt complains of severe mid abdominal pain 8/10, PRN 2 mg IV Morphine administer per MAR.     0606: Perfect served Hospitalist about Pt's high BP readings. New order received to give 10 mg IV Labetalol if SBP> 170 mmHg.   
Pt tolerating clears, states \" I fell REALLY full.\". no n/v reported   
Pt went down for small bowel follow through; pt to not be connected to NG suction d/t need for xrays. Will continue to monitor.   
Shift assessment complete. VS obtained. Pt complains of abdominal pain 8/10, PRN 2 mg IV Morphine given per MAR. Pt is A&O x 4. On RA, respirations regular/unlabored. Lung sounds clear. NSR on monitor.     Abdomen soft/rounded with active bowel sounds. NG tube @ 70 cm to low-continuous wall suctioning with green/bile output. Pt turn self/remains comfortable at this time. Denies further needs. Scheduled medications given per MAR. Call light within reach/standard safety measures remains in place. Wife @ bedside. Will continue to monitor.   
Shift assessment completed at this time, pt A&O x 4, wife at bedside.  C/O pain 3/10. BS hyperactive. Care plan and education agreed upon with patient.  Call light and bedside table within reach.  Will continue to monitor.   
Shift assessment completed at this time, pt resting in bed with eyes closed. A&O x 4, pt's wife at bedside.  Care plan and education agreed upon with patient. Call light and bed side table within reach.  Will continue to monitor.    
Shift assessment done. Patient noted to have purple spots and redness on buttocks. Refused mepilex border. Wound care orders initiated. All fall precautions implemented. All needs attended. Electronically signed by Amber Sarmiento RN on 2/7/2024 at 10:38 PM   
Teaching / education initiated regarding perioperative experience, expectations, and pain management during stay. Patient verbalized understanding.    
To 3T 365 per w/c  All belongings sent with pt  
Troy Venegas is a 88 y.o. male patient.    Chief complaint-abdominal distention    LHB-76-yaob-old male seen in follow-up for small bowel obstruction.  Doing about the same today.  Pain better.  No bowel function.  Current Facility-Administered Medications   Medication Dose Route Frequency Provider Last Rate Last Admin    sodium chloride flush 0.9 % injection 5-40 mL  5-40 mL IntraVENous 2 times per day Helga Costa MD   10 mL at 02/01/24 1113    sodium chloride flush 0.9 % injection 5-40 mL  5-40 mL IntraVENous PRN Helga Costa MD        0.9 % sodium chloride infusion   IntraVENous PRN Helga Costa MD        potassium chloride (KLOR-CON M) extended release tablet 40 mEq  40 mEq Oral PRN Helga Costa MD        Or    potassium bicarb-citric acid (EFFER-K) effervescent tablet 40 mEq  40 mEq Oral PRN Helga Costa MD        Or    potassium chloride 10 mEq/100 mL IVPB (Peripheral Line)  10 mEq IntraVENous PRN Helga Costa MD        magnesium sulfate 2000 mg in 50 mL IVPB premix  2,000 mg IntraVENous PRN Helga Costa MD        ondansetron (ZOFRAN-ODT) disintegrating tablet 4 mg  4 mg Oral Q8H PRN Helga Costa MD        Or    ondansetron (ZOFRAN) injection 4 mg  4 mg IntraVENous Q6H PRN Helga Costa MD        polyethylene glycol (GLYCOLAX) packet 17 g  17 g Oral Daily PRN Helga Costa MD        acetaminophen (TYLENOL) tablet 650 mg  650 mg Oral Q6H PRN Helga Costa MD        Or    acetaminophen (TYLENOL) suppository 650 mg  650 mg Rectal Q6H PRN Helga Costa MD        0.9 % sodium chloride infusion   IntraVENous Continuous Helga Costa MD 75 mL/hr at 02/01/24 0600 Rate Verify at 02/01/24 0600    heparin (porcine) injection 5,000 Units  5,000 Units SubCUTAneous 3 times per day Helga Costa MD   5,000 Units at 02/01/24 1400    morphine (PF) injection 2 mg  2 mg IntraVENous Q4H PRN Helga Costa MD   2 mg at 01/31/24 6819    piperacillin-tazobactam (ZOSYN) 3,375 
[facility]  Cardiology Note  [phone]      Chief Complaint   Patient presents with    Abdominal Pain     Pt from home c/o all over abdominal pain x 1 day. Pt rates pain 8/10. Pt states he has a hx of diverticulitis.        History of Present Illness:  Troy Venegas is a 88 y.o. patient PMHx NSTEMI s/p PCI to RCA 12/2023, CKD-III, pAF who presented with abdominal pain found to have SBO    Surgery 2/6. On TPN and NPO with NG to LIS. Patient reports abdominal pain.    Past Medical History:   has a past medical history of Arrhythmia, Arthritis, Atrial fibrillation (HCC), Atrial tachycardia, Bladder cancer (HCC), CAD (coronary artery disease), Cancer (HCC), Diabetes mellitus (HCC), Diverticulitis, Enlarged prostate, History of blood transfusion, Hyperlipidemia, Hypertension, Neuropathy, Pacemaker, Pneumonia, and Vasodepressor syncope.    Surgical History:   has a past surgical history that includes shoulder surgery (Bilateral); Finger surgery; Coronary angioplasty with stent (2005); Cataract removal (Bilateral); ablation of dysrhythmic focus; Cystoscopy (09/26/2012); TURP (03/07/2013); other surgical history (Left, 02/25/2014); Carpal tunnel release; Finger trigger release; Cystocopy (10/08/2015); Spinal fusion (N/A); Colonoscopy; pacemaker placement; Cystoscopy (N/A, 10/17/2023); Cystoscopy (N/A, 12/19/2023); Skin cancer excision; other surgical history; and laparotomy (N/A, 2/6/2024).     Social History:   reports that he quit smoking about 50 years ago. His smoking use included cigarettes. He has never used smokeless tobacco. He reports current alcohol use of about 2.0 standard drinks of alcohol per week. He reports that he does not use drugs.     Family History:  Family History   Problem Relation Age of Onset    Cancer Mother         Brain    Cancer Father         age 61 esophageal    Diabetes Sister     No Known Problems Brother     Heart Disease Neg Hx     High Blood Pressure Neg Hx     High 
[facility]  DAILY PROGRESS NOTE    Admit Date: 1/31/2024       Chief Complaint:Abd pain    Interval History:   Patient seen and examined and notes reviewed. Patient is being followed for CAD, anit-platelet management. Today Mr. Venegas denies any pain, SOB or palpitations.     In: 2136 [I.V.:1855.1]  Out: 120    Wt Readings from Last 2 Encounters:   02/03/24 81.6 kg (180 lb)   01/23/24 85.1 kg (187 lb 9.6 oz)         Data:   Scheduled Meds:   Scheduled Meds:   piperacillin-tazobactam  3,375 mg IntraVENous Q8H    pantoprazole  40 mg IntraVENous Daily    sodium chloride flush  5-40 mL IntraVENous 2 times per day    heparin (porcine)  5,000 Units SubCUTAneous 3 times per day     Continuous Infusions:   sodium chloride 100 mL/hr at 02/03/24 0625    sodium chloride      cangrelor (KENGREAL) 50 mg in sodium chloride 0.9 % 250 mL infusion 0.75 mcg/kg/min (02/04/24 0533)     PRN Meds:.labetalol, sodium chloride flush, sodium chloride, potassium chloride **OR** potassium alternative oral replacement **OR** potassium chloride, magnesium sulfate, ondansetron **OR** ondansetron, polyethylene glycol, acetaminophen **OR** acetaminophen, morphine, phenol  Continuous Infusions:   sodium chloride 100 mL/hr at 02/03/24 0625    sodium chloride      cangrelor (KENGREAL) 50 mg in sodium chloride 0.9 % 250 mL infusion 0.75 mcg/kg/min (02/04/24 0533)       Intake/Output Summary (Last 24 hours) at 2/4/2024 0758  Last data filed at 2/4/2024 0750  Gross per 24 hour   Intake 2136.02 ml   Output 120 ml   Net 2016.02 ml     Lab Data:  CBC:   Lab Results   Component Value Date/Time    WBC 7.4 02/04/2024 04:59 AM    HGB 11.9 02/04/2024 04:59 AM     02/04/2024 04:59 AM     BMP:  Lab Results   Component Value Date/Time     02/04/2024 04:59 AM    K 3.7 02/04/2024 04:59 AM    K 3.8 02/03/2024 05:00 AM     02/04/2024 04:59 AM    CO2 22 02/04/2024 04:59 AM    BUN 18 02/04/2024 04:59 AM    CREATININE 1.1 02/04/2024 04:59 AM 
    --  146* 143   K 2.9* 3.7 3.6 3.3*     --  113* 111*   CO2 26  --  25 25   BUN 17  --  18 19   CREATININE 1.2  --  1.2 1.1   GLUCOSE 197*  --  198* 227*       Hepatic:    Recent Labs     02/06/24  0600 02/07/24  0604   AST 22 21   ALT 9* 7*   BILITOT 0.6 0.4   ALKPHOS 50 47       Amylase:  No results found for: \"AMYLASE\"  Lipase:    Lab Results   Component Value Date/Time    LIPASE 42.0 01/31/2024 03:59 AM    LIPASE 61.0 12/28/2023 12:36 PM      Mag:    Lab Results   Component Value Date/Time    MG 1.80 02/08/2024 05:20 AM    MG 1.90 02/07/2024 06:04 AM     Phos:     Lab Results   Component Value Date/Time    PHOS 2.3 02/08/2024 05:20 AM    PHOS 2.9 02/07/2024 06:04 AM      Coags:   Lab Results   Component Value Date/Time    PROTIME 13.4 01/31/2024 03:59 AM    PROTIME 23.7 06/23/2015 10:25 AM    INR 1.02 01/31/2024 03:59 AM    APTT 33.2 01/03/2024 08:59 PM       Cultures:  Anaerobic culture  No results found for: \"LABANAE\"  Fungus stain  No results found for requested labs within last 30 days.     Gram stain  No results found for requested labs within last 30 days.     Organism  No results found for: \"ORG\"  Surgical culture  No results found for: \"CXSURG\"  Blood culture 1  Results in Past 30 Days  Result Component Current Result Ref Range Previous Result Ref Range   Blood Culture, Routine No Growth after 4 days of incubation. (1/31/2024)  Not in Time Range      Blood culture 2  Results in Past 30 Days  Result Component Current Result Ref Range Previous Result Ref Range   Culture, Blood 2 No Growth after 4 days of incubation. (1/31/2024)  Not in Time Range      Fecal occult  No results found for requested labs within last 30 days.     GI bacterial pathogens by PCR  No results found for requested labs within last 30 days.     C. difficile  No results found for requested labs within last 30 days.     Urine culture  Lab Results   Component Value Date/Time    LABURIN No growth at 18 to 36 hours 01/03/2024 
  GLUCOSE 101* 104* 116*     Hepatic:    No results for input(s): \"AST\", \"ALT\", \"ALB\", \"BILITOT\", \"ALKPHOS\" in the last 72 hours.    Amylase:  No results found for: \"AMYLASE\"  Lipase:    Lab Results   Component Value Date/Time    LIPASE 42.0 01/31/2024 03:59 AM    LIPASE 61.0 12/28/2023 12:36 PM      Mag:    Lab Results   Component Value Date/Time    MG 1.80 02/04/2024 04:59 AM    MG 2.00 02/03/2024 05:00 AM     Phos:     Lab Results   Component Value Date/Time    PHOS 2.5 02/04/2024 04:59 AM    PHOS 3.4 06/10/2020 10:40 AM      Coags:   Lab Results   Component Value Date/Time    PROTIME 13.4 01/31/2024 03:59 AM    PROTIME 23.7 06/23/2015 10:25 AM    INR 1.02 01/31/2024 03:59 AM    APTT 33.2 01/03/2024 08:59 PM       Cultures:  Anaerobic culture  No results found for: \"LABANAE\"  Fungus stain  No results found for requested labs within last 30 days.     Gram stain  No results found for requested labs within last 30 days.     Organism  No results found for: \"ORG\"  Surgical culture  No results found for: \"CXSURG\"  Blood culture 1  Results in Past 30 Days  Result Component Current Result Ref Range Previous Result Ref Range   Blood Culture, Routine No Growth after 4 days of incubation. (1/31/2024)  Not in Time Range      Blood culture 2  Results in Past 30 Days  Result Component Current Result Ref Range Previous Result Ref Range   Culture, Blood 2 No Growth after 4 days of incubation. (1/31/2024)  Not in Time Range      Fecal occult  No results found for requested labs within last 30 days.     GI bacterial pathogens by PCR  No results found for requested labs within last 30 days.     C. difficile  No results found for requested labs within last 30 days.     Urine culture  Lab Results   Component Value Date/Time    LABURIN No growth at 18 to 36 hours 01/03/2024 08:57 PM       Pathology:  No relevant pathology     Imaging:  I have personally reviewed the following films:  XR ABDOMEN (2 VIEWS) [[id number]]    Collected: 
  Recent Labs     02/03/24  0500 02/04/24  0459 02/05/24  0442    146* 149*   K 3.8 3.7 3.7    112* 114*   CO2 21 22 23   BUN 17 18 15   CREATININE 1.3 1.1 1.3   GLUCOSE 104* 116* 118*       Hepatic:    No results for input(s): \"AST\", \"ALT\", \"ALB\", \"BILITOT\", \"ALKPHOS\" in the last 72 hours.    Amylase:  No results found for: \"AMYLASE\"  Lipase:    Lab Results   Component Value Date/Time    LIPASE 42.0 01/31/2024 03:59 AM    LIPASE 61.0 12/28/2023 12:36 PM      Mag:    Lab Results   Component Value Date/Time    MG 1.60 02/05/2024 04:42 AM    MG 1.80 02/04/2024 04:59 AM     Phos:     Lab Results   Component Value Date/Time    PHOS 2.5 02/05/2024 04:42 AM    PHOS 2.5 02/04/2024 04:59 AM      Coags:   Lab Results   Component Value Date/Time    PROTIME 13.4 01/31/2024 03:59 AM    PROTIME 23.7 06/23/2015 10:25 AM    INR 1.02 01/31/2024 03:59 AM    APTT 33.2 01/03/2024 08:59 PM       Cultures:  Anaerobic culture  No results found for: \"LABANAE\"  Fungus stain  No results found for requested labs within last 30 days.     Gram stain  No results found for requested labs within last 30 days.     Organism  No results found for: \"ORG\"  Surgical culture  No results found for: \"CXSURG\"  Blood culture 1  Results in Past 30 Days  Result Component Current Result Ref Range Previous Result Ref Range   Blood Culture, Routine No Growth after 4 days of incubation. (1/31/2024)  Not in Time Range      Blood culture 2  Results in Past 30 Days  Result Component Current Result Ref Range Previous Result Ref Range   Culture, Blood 2 No Growth after 4 days of incubation. (1/31/2024)  Not in Time Range      Fecal occult  No results found for requested labs within last 30 days.     GI bacterial pathogens by PCR  No results found for requested labs within last 30 days.     C. difficile  No results found for requested labs within last 30 days.     Urine culture  Lab Results   Component Value Date/Time    LABURIN No growth at 18 to 36 hours 
requested labs within last 30 days.     GI bacterial pathogens by PCR  No results found for requested labs within last 30 days.     C. difficile  No results found for requested labs within last 30 days.     Urine culture  Lab Results   Component Value Date/Time    LABURIN No growth at 18 to 36 hours 01/03/2024 08:57 PM       Pathology:  No relevant pathology     Imaging:  I have personally reviewed the following films:    XR ABDOMEN (2 VIEWS)    Result Date: 2/2/2024  EXAMINATION: TWO XRAY VIEWS OF THE ABDOMEN 2/2/2024 10:10 am COMPARISON: Prior study at 12:59 a.m. HISTORY: ORDERING SYSTEM PROVIDED HISTORY: sbo TECHNOLOGIST PROVIDED HISTORY: Reason for exam:->sbo Reason for Exam: sbo FINDINGS: Enteric tube in the lumen of the stomach.  Minimal dilation of air-filled loops of distal small bowel.  Nondilated air-filled colon.  Air is seen to the level of the rectum.  No abnormal soft tissue mass.  Calcification in the right upper quadrant representing gallstone.  No skeletal abnormality.     No evidence of bowel obstruction.  Air within nondistended distal small bowel and colon that may indicate ileus or enteritis.     XR ABDOMEN (2 VIEWS)    Result Date: 2/2/2024  EXAMINATION: TWO XRAY VIEWS OF THE ABDOMEN 2/2/2024 12:55 am COMPARISON: 02/01/2024 at 8:58 a.m. HISTORY: ORDERING SYSTEM PROVIDED HISTORY: sbo TECHNOLOGIST PROVIDED HISTORY: Reason for exam:->sbo Reason for Exam: sbo FINDINGS: NG tube is coursing into the stomach, looping over the fundus, and has the tip coursing back near the cardia.  The overall position is not significantly changed.  External and internal cardiac leads present. There are some dilated small bowel loops in the central abdomen.  Scattered gas and fecal material in the nondilated colon from the cecum through the descending colon.  Small amount a gas and fecal material in the rectum.  No significant air-fluid levels are seen at the upper abdomen or sign of pneumoperitoneum. Bones appear 
warm, dry  Neuro: Alert.  Psych: Mood appropriate.     Review of Systems  -Negative except interval history    LABS:    CBC:   Recent Labs     02/07/24  0604 02/08/24  0520 02/09/24  0505   WBC 9.4 8.3 8.8   HGB 12.2* 11.4* 10.6*   HCT 36.7* 33.6* 31.5*    191 213   MCV 93.1 93.0 92.4       Renal:    Recent Labs     02/07/24  0604 02/08/24  0520 02/09/24  0505   * 143 146*   K 3.6 3.3* 3.2*   * 111* 113*   CO2 25 25 26   BUN 18 19 20   CREATININE 1.2 1.1 1.0   GLUCOSE 198* 227* 213*   CALCIUM 9.0 9.4 9.4   MG 1.90 1.80 1.70*   PHOS 2.9 2.3* 2.6   ANIONGAP 8 7 7       Hepatic:   Recent Labs     02/07/24  0604   AST 21   ALT 7*   BILITOT 0.4   BILIDIR <0.2   PROT 5.5*   LABALBU 3.3*   ALKPHOS 47       Troponin: No results for input(s): \"TROPONINI\" in the last 72 hours.  BNP: No results for input(s): \"BNP\" in the last 72 hours.  Lipids: No results for input(s): \"CHOL\", \"HDL\" in the last 72 hours.    Invalid input(s): \"LDLCALCU\", \"TRIGLYCERIDE\"  ABGs:  No results for input(s): \"PHART\", \"YYQ2UDS\", \"PO2ART\", \"AHF4IJZ\", \"BEART\", \"THGBART\", \"S2MRWCIG\", \"XDL7TBK\" in the last 72 hours.    INR: No results for input(s): \"INR\" in the last 72 hours.  Lactate: No results for input(s): \"LACTATE\" in the last 72 hours.  Cultures:  -----------------------------------------------------------------  RAD:   XR CHEST PORTABLE   Final Result   Right-sided jugular line appears to be in appropriate position.  No   pneumothorax.      Enteric tube extends into the stomach.      No acute cardiopulmonary process.         XR ABDOMEN (2 VIEWS)   Final Result   1. No significant change in dilated small bowel loops due to partial   obstruction again with node discrete visualization of a transition point.   2. Slight advancement of the gastric tube, which is in appropriate position   as above.         XR ABDOMEN FOR NG/OG/NE TUBE PLACEMENT   Final Result   Side hole of the NG tube is in the distal esophagus         XR ABDOMEN (2 
after surgery determination  2. HTN:   ~Uncontrolled  ~Plan: pain control  3. Atrial Fib   ~stable   ~Plan: continue Sotolol, no AC per pt choice, continue ASA, watchman eval                                  KAREN PARHAM, APRN - CNP, ACNP, AGPCNP  Heart Failure  [facility]         
as described above.     XR CHEST PORTABLE    Result Date: 1/31/2024  EXAMINATION: ONE XRAY VIEW OF THE CHEST 1/31/2024 6:47 am COMPARISON: Chest radiograph dated 12/28 243 HISTORY: ORDERING SYSTEM PROVIDED HISTORY: NG placement TECHNOLOGIST PROVIDED HISTORY: Reason for exam:->NG placement Reason for Exam: Abdominal Pain (Pt from home c/o all over abdominal pain x 1 day. Pt rates pain 8/10. Pt states he has a hx of diverticulitis.) FINDINGS: Medical devices: An intracardiac conduction device is present, in appropriate position.  Enteric tube is coiled within the upper to mid esophagus. Mediastinum/Heart: The mediastinal contours are unchanged compared to prior exam. Lungs: The lungs are clear. Pleura: No findings to suggest pneumothorax or large pleural effusion. Bones/Soft tissues: Nothing acute.     Coiled enteric tube within the upper to mid esophagus.  Removal and replacement is recommended. No acute cardiopulmonary abnormality detected. The findings were sent to the Radiology Results Communication Center at 7:55 am on 1/31/2024 to be communicated to a licensed caregiver.     CT ABDOMEN PELVIS W IV CONTRAST Additional Contrast? None    Result Date: 1/31/2024  EXAMINATION: CT OF THE ABDOMEN AND PELVIS WITH CONTRAST 1/31/2024 4:42 am TECHNIQUE: CT of the abdomen and pelvis was performed with the administration of intravenous contrast. Multiplanar reformatted images are provided for review. Automated exposure control, iterative reconstruction, and/or weight based adjustment of the mA/kV was utilized to reduce the radiation dose to as low as reasonably achievable. COMPARISON: 01/03/2024 HISTORY: ORDERING SYSTEM PROVIDED HISTORY: abd pain divertic hx TECHNOLOGIST PROVIDED HISTORY: Additional Contrast?->None Reason for exam:->abd pain divertic hx Decision Support Exception - unselect if not a suspected or confirmed emergency medical condition->Emergency Medical Condition (MA) Reason for Exam: abd pain divertic hx 
injection 5,000 Units  5,000 Units SubCUTAneous 3 times per day Helga Costa MD   5,000 Units at 02/01/24 0556    morphine (PF) injection 2 mg  2 mg IntraVENous Q4H PRN Helga Costa MD   2 mg at 01/31/24 1929    piperacillin-tazobactam (ZOSYN) 3,375 mg in sodium chloride 0.9 % 50 mL IVPB (mini-bag)  3,375 mg IntraVENous Q8H Helga Costa MD 12.5 mL/hr at 02/01/24 1113 3,375 mg at 02/01/24 1113    phenol 1.4 % mouth spray 1 spray  1 spray Mouth/Throat Q2H PRN Stephanie Boyer, ELISHA - CNP   1 spray at 01/31/24 1618    cangrelor (KENGREAL) 50 mg in sodium chloride 0.9 % 250 mL infusion  0.75 mcg/kg/min IntraVENous Continuous Rosalino Jung MD 19.1 mL/hr at 02/01/24 0600 0.75 mcg/kg/min at 02/01/24 0600        Allergies:  Patient has no known allergies.     Review of Systems:   14 point ROS negative unless otherwise noted in HPI    Physical Examination:    Vitals:    02/01/24 1210   BP: (!) 149/69   Pulse: 72   Resp:    Temp:    SpO2:     Weight - Scale: 85.5 kg (188 lb 7.9 oz)       General Appearance:  Alert, cooperative, no distress   Head:  Normocephalic, without obvious abnormality   Eyes:  Normal ocular range of motion   Nose: Nares normal, no drainage   Neck: Symmetrical, trachea midline   Lungs:   Clear to auscultation bilaterally   Chest Wall:  No tenderness or deformity   Cardiovascular:  Regular rate and rhythm, S1, S2 normal, no murmur, rub or gallop, No edema, No JVD, peripheral pulses 2+   Abdomen:    Tender, distended   Extremities: Extremities normal, atraumatic   Pulses: 2+ and symmetric   Skin: Skin color, texture, turgor normal   Pysch: Normal mood and affect   Neurologic: Normal gross motor and sensory exam.       Labs  CBC:   Lab Results   Component Value Date/Time    WBC 10.1 02/01/2024 04:17 AM    RBC 4.13 02/01/2024 04:17 AM    HGB 13.0 02/01/2024 04:17 AM    HCT 38.2 02/01/2024 04:17 AM    MCV 92.5 02/01/2024 04:17 AM    RDW 12.9 02/01/2024 04:17 AM     02/01/2024 04:17 
nasogastric tube with distal portion located in the   region of the gastric fundus as described above.         XR CHEST PORTABLE   Final Result   Coiled enteric tube within the upper to mid esophagus.  Removal and   replacement is recommended.      No acute cardiopulmonary abnormality detected.      The findings were sent to the Radiology Results Communication Center at 7:55   am on 1/31/2024 to be communicated to a licensed caregiver.         CT ABDOMEN PELVIS W IV CONTRAST Additional Contrast? None   Final Result   1.  Dilated loops of jejunum from left upper abdomen down into the lower   abdomen.  The transition point appears to be in the anterior midline upper   abdomen and suggests a moderate small bowel obstruction.  The ileal loops are   collapsed.      2.  Diverticulosis of the colon without convincing area of acute   diverticulitis.  However there is wall thickening and mural enhancement of   the rectum suggesting proctitis.      3.  The stomach is dilated with fluid and NG tube may be beneficial.  Some   reflux in the distal esophagus is also noted.      4.  Cholelithiasis and small hepatic cysts are redemonstrated.             Medications:     Scheduled Meds:   enoxaparin  40 mg SubCUTAneous Daily    insulin lispro  0-8 Units SubCUTAneous Q6H    lactobacillus  2 capsule Oral Daily with breakfast    [START ON 2/8/2024] fat emulsion  250 mL IntraVENous Once per day on Mon Thu    pantoprazole  40 mg IntraVENous Daily    sodium chloride flush  5-40 mL IntraVENous 2 times per day     Continuous Infusions:   PN-Adult Premix 5/20 - Standard Electrolytes - Central Line      PN-Adult Premix 5/20 - Standard Electrolytes - Central Line 40 mL/hr at 02/06/24 1852    dextrose      sodium chloride Stopped (02/06/24 0606)    cangrelor (KENGREAL) 50 mg in sodium chloride 0.9 % 250 mL infusion 0.75 mcg/kg/min (02/06/24 2200)     PRN Meds:HYDROmorphone, acetaminophen **OR** acetaminophen, dextrose bolus **OR** dextrose 
06/29/2012    Coronary artery disease involving native coronary artery of native heart without angina pectoris 06/27/2011    Atrial fibrillation (HCC) 06/27/2011    Palpitations 06/27/2011        Assessment and Plan:     SBO  ~ management per general surgery. Planning surgery soon.    Coronary artery disease   ~ recent PCI of RPDA and RCA x2 12/29/23  ~ higher risk for stent thrombosis d/t recent PCI  ~ may need surgery for SBO. On cangrelor bridge.      Atrial fibrillation   ~ tele PAF on tele, rate controlled   ~ not on AC d/t hematuria. Shared decision making has been initiated for Watchman.  ~ on sotalol PTA, currently not on d/t NPO status     SSS- s/p PPM   Hx bladder cancer   PVD- s/p intervention 10/2023    Multiple medical conditions with risk of decompensation.   All pertinent information and plan of care discussed with the physician.  All questions and concerns were addressed to the patient. Alternatives to my treatment were discussed. I have discussed the above stated plan with patient and spouse and the nurse. The patient and spouse verbalized understanding and agreed with the plan.    Thank you for allowing to us to participate in the care of Troy Venegas.    Total visit time > 55 minutes; > 50% spend counseling / coordinating care. I reviewed interval history, physical exam, review of data including labs, imaging, development and implementation of treatment plan and coordination of complex care. Counseled on risk factor modifications.     Lydia TELLO-[facility]   Office: [phone]    NOTE:  This report was transcribed using voice recognition software.  Every effort was made to ensure accuracy; however, inadvertent computerized transcription errors may be present.    
22 21 22   BUN 16 17 18   CREATININE 1.4* 1.3 1.1   GLUCOSE 101* 104* 116*       Hepatic:   No results for input(s): \"AST\", \"ALT\", \"ALB\", \"BILITOT\", \"ALKPHOS\" in the last 72 hours.    Lipids:   Lab Results   Component Value Date/Time    CHOL 125 10/17/2023 06:40 AM    HDL 51 10/17/2023 06:40 AM    HDL 42 05/17/2012 09:25 AM    TRIG 123 10/17/2023 06:40 AM     Hemoglobin A1C:   Lab Results   Component Value Date/Time    LABA1C 5.7 10/17/2023 06:40 AM     TSH:   Lab Results   Component Value Date/Time    TSH 2.48 05/13/2019 10:19 AM     Troponin: No results found for: \"TROPONINT\"  Lactic Acid: No results for input(s): \"LACTA\" in the last 72 hours.  BNP: No results for input(s): \"PROBNP\" in the last 72 hours.  UA:  Lab Results   Component Value Date/Time    NITRU Negative 01/31/2024 03:59 AM    COLORU Yellow 01/31/2024 03:59 AM    PHUR 6.0 01/31/2024 03:59 AM    WBCUA 15 01/03/2024 08:59 PM    RBCUA 5734 01/03/2024 08:59 PM    YEAST 1+ Yeast, Rare Budding Yeast 09/11/2012 09:30 PM    BACTERIA None Seen 01/03/2024 08:59 PM    CLARITYU Clear 01/31/2024 03:59 AM    SPECGRAV 1.025 01/31/2024 03:59 AM    LEUKOCYTESUR Negative 01/31/2024 03:59 AM    UROBILINOGEN 1.0 01/31/2024 03:59 AM    BILIRUBINUR Negative 01/31/2024 03:59 AM    BLOODU Negative 01/31/2024 03:59 AM    GLUCOSEU Negative 01/31/2024 03:59 AM    KETUA Negative 01/31/2024 03:59 AM     Urine Cultures:   Lab Results   Component Value Date/Time    LABURIN No growth at 18 to 36 hours 01/03/2024 08:57 PM     Blood Cultures:   Lab Results   Component Value Date/Time    BC  01/31/2024 09:19 AM     No Growth to date.  Any change in status will be called.     Lab Results   Component Value Date/Time    BLOODCULT2  01/31/2024 09:19 AM     No Growth to date.  Any change in status will be called.     Organism: No results found for: \"ORG\"      Electronically signed by Helga Costa MD on 2/4/2024 at 9:32 AM   
Date/Time    CHOL 125 10/17/2023 06:40 AM    HDL 51 10/17/2023 06:40 AM    HDL 42 05/17/2012 09:25 AM    TRIG 123 10/17/2023 06:40 AM     Hemoglobin A1C:   Lab Results   Component Value Date/Time    LABA1C 5.7 10/17/2023 06:40 AM     TSH:   Lab Results   Component Value Date/Time    TSH 2.48 05/13/2019 10:19 AM     Troponin: No results found for: \"TROPONINT\"  Lactic Acid: No results for input(s): \"LACTA\" in the last 72 hours.  BNP: No results for input(s): \"PROBNP\" in the last 72 hours.  UA:  Lab Results   Component Value Date/Time    NITRU Negative 01/31/2024 03:59 AM    COLORU Yellow 01/31/2024 03:59 AM    PHUR 6.0 01/31/2024 03:59 AM    WBCUA 15 01/03/2024 08:59 PM    RBCUA 5734 01/03/2024 08:59 PM    YEAST 1+ Yeast, Rare Budding Yeast 09/11/2012 09:30 PM    BACTERIA None Seen 01/03/2024 08:59 PM    CLARITYU Clear 01/31/2024 03:59 AM    SPECGRAV 1.025 01/31/2024 03:59 AM    LEUKOCYTESUR Negative 01/31/2024 03:59 AM    UROBILINOGEN 1.0 01/31/2024 03:59 AM    BILIRUBINUR Negative 01/31/2024 03:59 AM    BLOODU Negative 01/31/2024 03:59 AM    GLUCOSEU Negative 01/31/2024 03:59 AM    KETUA Negative 01/31/2024 03:59 AM     Urine Cultures:   Lab Results   Component Value Date/Time    LABURIN No growth at 18 to 36 hours 01/03/2024 08:57 PM     Blood Cultures:   Lab Results   Component Value Date/Time    BC  01/31/2024 09:19 AM     No Growth to date.  Any change in status will be called.     Lab Results   Component Value Date/Time    BLOODCULT2  01/31/2024 09:19 AM     No Growth to date.  Any change in status will be called.     Organism: No results found for: \"ORG\"      Electronically signed by Helga Costa MD on 2/2/2024 at 3:02 PM   
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 May be stopped 1 hour prior to OR and restarted ASAP thereafter. Following 24 hours of stable blood counts on cangrelor will reload with plavix and restart maintenance dosing therafter.  - continue ASA, lipitor     pAF  - sotalol  - not on AC due to hematuria  - outpatient watchman eval     All questions and concerns were addressed to the patient/family. Alternatives to my treatment were discussed. The note was completed using EMR. Every effort was made to ensure accuracy; however, inadvertent computerized transcription errors may be present.  Jovani Winters MD, MD 2/6/2024 8:29 AM

## 2024-02-12 ENCOUNTER — TELEPHONE (OUTPATIENT)
Dept: INTERNAL MEDICINE CLINIC | Age: 89
End: 2024-02-12

## 2024-02-15 ENCOUNTER — OFFICE VISIT (OUTPATIENT)
Dept: INTERNAL MEDICINE CLINIC | Age: 89
End: 2024-02-15
Payer: MEDICARE

## 2024-02-15 VITALS — BODY MASS INDEX: 25.33 KG/M2 | DIASTOLIC BLOOD PRESSURE: 70 MMHG | WEIGHT: 192 LBS | SYSTOLIC BLOOD PRESSURE: 128 MMHG

## 2024-02-15 DIAGNOSIS — Z09 HOSPITAL DISCHARGE FOLLOW-UP: Primary | ICD-10-CM

## 2024-02-15 DIAGNOSIS — K56.609 SBO (SMALL BOWEL OBSTRUCTION) (HCC): ICD-10-CM

## 2024-02-15 PROCEDURE — G8427 DOCREV CUR MEDS BY ELIG CLIN: HCPCS | Performed by: INTERNAL MEDICINE

## 2024-02-15 PROCEDURE — 1111F DSCHRG MED/CURRENT MED MERGE: CPT | Performed by: INTERNAL MEDICINE

## 2024-02-15 PROCEDURE — 1036F TOBACCO NON-USER: CPT | Performed by: INTERNAL MEDICINE

## 2024-02-15 PROCEDURE — 99213 OFFICE O/P EST LOW 20 MIN: CPT | Performed by: INTERNAL MEDICINE

## 2024-02-15 PROCEDURE — G8484 FLU IMMUNIZE NO ADMIN: HCPCS | Performed by: INTERNAL MEDICINE

## 2024-02-15 PROCEDURE — G8417 CALC BMI ABV UP PARAM F/U: HCPCS | Performed by: INTERNAL MEDICINE

## 2024-02-15 PROCEDURE — 1123F ACP DISCUSS/DSCN MKR DOCD: CPT | Performed by: INTERNAL MEDICINE

## 2024-02-15 NOTE — PROGRESS NOTES
carboxymethylcellulose (REFRESH PLUS) 0.5 % SOLN ophthalmic solution Apply 2 drops to eye 3 times daily as needed      triamcinolone (KENALOG) 0.1 % cream Apply to itchy areas on the arms and legs twice daily for up to 2 weeks or until improved. (Patient taking differently: Apply to itchy areas on the arms and legs twice daily for up to 2 weeks or until improved.    As needed) 80 g 2    fluorouracil (EFUDEX) 5 % cream Apply twice daily to affected area on the right cheek for 2 weeks. (Patient taking differently: Apply topically as needed) 40 g 0        Medications patient taking as of now reconciled against medications ordered at time of hospital discharge: Yes    Review of Systems    Objective:    /70 (Site: Left Upper Arm)   Wt 87.1 kg (192 lb)   BMI 25.33 kg/m²   Physical Exam  Vitals reviewed.   Constitutional:       General: He is not in acute distress.     Appearance: Normal appearance. He is well-developed.   HENT:      Head: Normocephalic and atraumatic.   Cardiovascular:      Rate and Rhythm: Normal rate and regular rhythm.      Heart sounds: Normal heart sounds.   Pulmonary:      Effort: Pulmonary effort is normal. No respiratory distress.      Breath sounds: Normal breath sounds.   Abdominal:      Comments: Well-healing midline incision   Skin:     General: Skin is warm and dry.   Neurological:      Mental Status: He is alert.   Psychiatric:         Behavior: Behavior normal.         Thought Content: Thought content normal.         Judgment: Judgment normal.         An electronic signature was used to authenticate this note.  --Lois Kruger MD

## 2024-02-19 ENCOUNTER — OFFICE VISIT (OUTPATIENT)
Dept: SURGERY | Age: 89
End: 2024-02-19

## 2024-02-19 VITALS — WEIGHT: 185 LBS | BODY MASS INDEX: 24.41 KG/M2 | SYSTOLIC BLOOD PRESSURE: 142 MMHG | DIASTOLIC BLOOD PRESSURE: 78 MMHG

## 2024-02-19 DIAGNOSIS — K56.609 SBO (SMALL BOWEL OBSTRUCTION) (HCC): Primary | ICD-10-CM

## 2024-02-19 PROCEDURE — 99024 POSTOP FOLLOW-UP VISIT: CPT | Performed by: SURGERY

## 2024-02-19 RX ORDER — CHLORHEXIDINE GLUCONATE 213 G/1000ML
SOLUTION TOPICAL
Qty: 1 EACH | Refills: 0 | Status: SHIPPED | OUTPATIENT
Start: 2024-02-19 | End: 2024-05-23

## 2024-02-19 NOTE — TELEPHONE ENCOUNTER
Spoke with patient and informed him of the procedure that is being scheduled for 5/20/2024 at 8:30 AM arrival.  Lab work has been ordered and instructed to have done at [facility] 5/13/2024.  Also informed to get Hibiclens bath at their Pharmacy.  All procedure instructions were e-mailed to patient.  Instructed to contact me with any questions prior to procedure.

## 2024-02-19 NOTE — PROGRESS NOTES
Subjective:      Patient ID: Troy Venegas is a 88 y.o. male.    HPI    Review of Systems    Objective:   Physical Exam  Incision healing well  Assessment:      88-year-old male status post lysis of adhesions for a small bowel obstruction.  Doing well postoperatively.      Plan:      Follow-up as needed.        Christiano Tay MD

## 2024-02-20 ENCOUNTER — TELEPHONE (OUTPATIENT)
Dept: CARDIOLOGY CLINIC | Age: 89
End: 2024-02-20

## 2024-02-20 NOTE — TELEPHONE ENCOUNTER
Dr. Jeffrey 1/4/2024 referred for Watchman procedure.  Dr. Quesada consulted on 1/4/2023.  Dr. Jeffrey shared decision on 1/6/2024.  JUANA to be done the day of the procedure.  Insurance to be approved per Dipika Muñiz

## 2024-02-20 NOTE — TELEPHONE ENCOUNTER
Pt's wife is checking to see if pt needs 4/2 appointment since he is scheduled for watchman on 5/20.  Pt is scheduled for cancer treatment that day and will need to reschedule unless it can be cancelled.    Please advise.

## 2024-02-26 ENCOUNTER — TELEPHONE (OUTPATIENT)
Dept: CARDIOLOGY CLINIC | Age: 89
End: 2024-02-26

## 2024-02-26 NOTE — TELEPHONE ENCOUNTER
Spoke to Pt & his wife they stated he was very tired and weak during those episode, current /75  pulse 74, did have surgery 02/06 for a bowel obstruction.     Please advise.

## 2024-02-26 NOTE — TELEPHONE ENCOUNTER
Pt called stating DCE placed stents in the pt, since then has had couple episodes.  2/20 pt BP 82/47, 2/25 BP 79/47 HR 78 SpO2 91%. Pt would like to know what they should do?    Pls advise call back number is wife's phone   [phone]

## 2024-02-26 NOTE — TELEPHONE ENCOUNTER
Called patient to discuss. Twice in the past two weeks the patient has woken up had had low BP in the 80s systolic. He starts hydrating with water and electrolytes and BP recovers by early afternoon to 110s systolic. Patient confirmed there have been no additional medication changes since d/c and he is not on any BP meds. He feels very fatigued during these episodes. Will discuss with DCE

## 2024-02-28 ENCOUNTER — HOSPITAL ENCOUNTER (OUTPATIENT)
Age: 89
Discharge: HOME | End: 2024-02-28
Payer: OTHER GOVERNMENT

## 2024-02-28 DIAGNOSIS — E11.42 TYPE 2 DIABETES MELLITUS WITH DIABETIC POLYNEUROPATHY, WITH LONG-TERM CURRENT USE OF INSULIN (HCC): ICD-10-CM

## 2024-02-28 DIAGNOSIS — Z79.4 TYPE 2 DIABETES MELLITUS WITH DIABETIC POLYNEUROPATHY, WITH LONG-TERM CURRENT USE OF INSULIN (HCC): ICD-10-CM

## 2024-02-28 LAB
ALBUMIN SERPL-MCNC: 4.2 G/DL (ref 3.4–5)
ALBUMIN/GLOB SERPL: 1.9 {RATIO} (ref 1.1–2.2)
ALP SERPL-CCNC: 85 U/L (ref 40–129)
ALT SERPL-CCNC: 12 U/L (ref 10–40)
ANION GAP SERPL CALCULATED.3IONS-SCNC: 8 MMOL/L (ref 3–16)
AST SERPL-CCNC: 20 U/L (ref 15–37)
BASOPHILS # BLD: 0.1 K/UL (ref 0–0.2)
BASOPHILS NFR BLD: 0.9 %
BILIRUB SERPL-MCNC: 0.5 MG/DL (ref 0–1)
BUN SERPL-MCNC: 14 MG/DL (ref 7–20)
CALCIUM SERPL-MCNC: 10.2 MG/DL (ref 8.3–10.6)
CHLORIDE SERPL-SCNC: 107 MMOL/L (ref 99–110)
CHOLEST SERPL-MCNC: 118 MG/DL (ref 0–199)
CO2 SERPL-SCNC: 27 MMOL/L (ref 21–32)
CREAT SERPL-MCNC: 1.3 MG/DL (ref 0.8–1.3)
DEPRECATED RDW RBC AUTO: 13.5 % (ref 12.4–15.4)
EOSINOPHIL # BLD: 0.2 K/UL (ref 0–0.6)
EOSINOPHIL NFR BLD: 2.8 %
GFR SERPLBLD CREATININE-BSD FMLA CKD-EPI: 53 ML/MIN/{1.73_M2}
GLUCOSE SERPL-MCNC: 168 MG/DL (ref 70–99)
HCT VFR BLD AUTO: 37.5 % (ref 40.5–52.5)
HDLC SERPL-MCNC: 47 MG/DL (ref 40–60)
HGB BLD-MCNC: 12.7 G/DL (ref 13.5–17.5)
LDLC SERPL CALC-MCNC: 45 MG/DL
LYMPHOCYTES # BLD: 1.1 K/UL (ref 1–5.1)
LYMPHOCYTES NFR BLD: 12.6 %
MCH RBC QN AUTO: 31 PG (ref 26–34)
MCHC RBC AUTO-ENTMCNC: 33.9 G/DL (ref 31–36)
MCV RBC AUTO: 91.6 FL (ref 80–100)
MONOCYTES # BLD: 0.6 K/UL (ref 0–1.3)
MONOCYTES NFR BLD: 7.4 %
NEUTROPHILS # BLD: 6.5 K/UL (ref 1.7–7.7)
NEUTROPHILS NFR BLD: 76.3 %
PLATELET # BLD AUTO: 204 K/UL (ref 135–450)
PMV BLD AUTO: 8.9 FL (ref 5–10.5)
POTASSIUM SERPL-SCNC: 4.7 MMOL/L (ref 3.5–5.1)
PROT SERPL-MCNC: 6.4 G/DL (ref 6.4–8.2)
RBC # BLD AUTO: 4.1 M/UL (ref 4.2–5.9)
SODIUM SERPL-SCNC: 142 MMOL/L (ref 136–145)
TRIGL SERPL-MCNC: 132 MG/DL (ref 0–150)
VLDLC SERPL CALC-MCNC: 26 MG/DL
WBC # BLD AUTO: 8.5 K/UL (ref 4–11)

## 2024-02-28 PROCEDURE — 36415 COLL VENOUS BLD VENIPUNCTURE: CPT

## 2024-02-28 PROCEDURE — 83036 HEMOGLOBIN GLYCOSYLATED A1C: CPT

## 2024-02-28 PROCEDURE — 85025 COMPLETE CBC W/AUTO DIFF WBC: CPT

## 2024-02-28 PROCEDURE — 80061 LIPID PANEL: CPT

## 2024-02-28 PROCEDURE — 80053 COMPREHEN METABOLIC PANEL: CPT

## 2024-02-29 LAB
EST. AVERAGE GLUCOSE BLD GHB EST-MCNC: 137 MG/DL
HBA1C MFR BLD: 6.4 %

## 2024-03-08 ENCOUNTER — OFFICE VISIT (OUTPATIENT)
Dept: INTERNAL MEDICINE CLINIC | Age: 89
End: 2024-03-08
Payer: MEDICARE

## 2024-03-08 VITALS
BODY MASS INDEX: 25.79 KG/M2 | HEIGHT: 71 IN | SYSTOLIC BLOOD PRESSURE: 110 MMHG | TEMPERATURE: 97.1 F | OXYGEN SATURATION: 98 % | WEIGHT: 184.2 LBS | DIASTOLIC BLOOD PRESSURE: 64 MMHG | HEART RATE: 64 BPM

## 2024-03-08 DIAGNOSIS — Z79.4 TYPE 2 DIABETES MELLITUS WITH DIABETIC POLYNEUROPATHY, WITH LONG-TERM CURRENT USE OF INSULIN (HCC): ICD-10-CM

## 2024-03-08 DIAGNOSIS — I48.0 PAROXYSMAL ATRIAL FIBRILLATION (HCC): ICD-10-CM

## 2024-03-08 DIAGNOSIS — E11.42 TYPE 2 DIABETES MELLITUS WITH DIABETIC POLYNEUROPATHY, WITH LONG-TERM CURRENT USE OF INSULIN (HCC): ICD-10-CM

## 2024-03-08 DIAGNOSIS — I95.9 HYPOTENSION, UNSPECIFIED HYPOTENSION TYPE: ICD-10-CM

## 2024-03-08 DIAGNOSIS — K56.609 SBO (SMALL BOWEL OBSTRUCTION) (HCC): ICD-10-CM

## 2024-03-08 DIAGNOSIS — Z00.00 MEDICARE ANNUAL WELLNESS VISIT, SUBSEQUENT: Primary | ICD-10-CM

## 2024-03-08 DIAGNOSIS — C67.9 MALIGNANT NEOPLASM OF URINARY BLADDER, UNSPECIFIED SITE (HCC): ICD-10-CM

## 2024-03-08 PROCEDURE — G8484 FLU IMMUNIZE NO ADMIN: HCPCS | Performed by: INTERNAL MEDICINE

## 2024-03-08 PROCEDURE — 3044F HG A1C LEVEL LT 7.0%: CPT | Performed by: INTERNAL MEDICINE

## 2024-03-08 PROCEDURE — 1123F ACP DISCUSS/DSCN MKR DOCD: CPT | Performed by: INTERNAL MEDICINE

## 2024-03-08 PROCEDURE — G0439 PPPS, SUBSEQ VISIT: HCPCS | Performed by: INTERNAL MEDICINE

## 2024-03-08 RX ORDER — LANCETS
EACH MISCELLANEOUS
Qty: 100 EACH | Refills: 3 | Status: SHIPPED | OUTPATIENT
Start: 2024-03-08

## 2024-03-08 RX ORDER — LANCETS
EACH MISCELLANEOUS
COMMUNITY
End: 2024-03-08 | Stop reason: SDUPTHER

## 2024-03-08 RX ORDER — SENNOSIDES A AND B 8.6 MG/1
1 TABLET, FILM COATED ORAL DAILY
COMMUNITY

## 2024-03-08 RX ORDER — FINASTERIDE 5 MG/1
5 TABLET, FILM COATED ORAL DAILY
Qty: 90 TABLET | Refills: 3 | Status: SHIPPED | OUTPATIENT
Start: 2024-03-08

## 2024-03-08 SDOH — ECONOMIC STABILITY: FOOD INSECURITY: WITHIN THE PAST 12 MONTHS, YOU WORRIED THAT YOUR FOOD WOULD RUN OUT BEFORE YOU GOT MONEY TO BUY MORE.: NEVER TRUE

## 2024-03-08 SDOH — ECONOMIC STABILITY: FOOD INSECURITY: WITHIN THE PAST 12 MONTHS, THE FOOD YOU BOUGHT JUST DIDN'T LAST AND YOU DIDN'T HAVE MONEY TO GET MORE.: NEVER TRUE

## 2024-03-08 SDOH — ECONOMIC STABILITY: INCOME INSECURITY: HOW HARD IS IT FOR YOU TO PAY FOR THE VERY BASICS LIKE FOOD, HOUSING, MEDICAL CARE, AND HEATING?: NOT HARD AT ALL

## 2024-03-08 ASSESSMENT — PATIENT HEALTH QUESTIONNAIRE - PHQ9
SUM OF ALL RESPONSES TO PHQ QUESTIONS 1-9: 0
1. LITTLE INTEREST OR PLEASURE IN DOING THINGS: 0
SUM OF ALL RESPONSES TO PHQ QUESTIONS 1-9: 0
SUM OF ALL RESPONSES TO PHQ QUESTIONS 1-9: 0
2. FEELING DOWN, DEPRESSED OR HOPELESS: 0
SUM OF ALL RESPONSES TO PHQ QUESTIONS 1-9: 0
SUM OF ALL RESPONSES TO PHQ9 QUESTIONS 1 & 2: 0

## 2024-03-08 NOTE — PATIENT INSTRUCTIONS
your family, and various assessments and screenings as appropriate.    After reviewing your medical record and screening and assessments performed today your provider may have ordered immunizations, labs, imaging, and/or referrals for you.  A list of these orders (if applicable) as well as your Preventive Care list are included within your After Visit Summary for your review.    Other Preventive Recommendations:    A preventive eye exam performed by an eye specialist is recommended every 1-2 years to screen for glaucoma; cataracts, macular degeneration, and other eye disorders.  A preventive dental visit is recommended every 6 months.  Try to get at least 150 minutes of exercise per week or 10,000 steps per day on a pedometer .  Order or download the FREE \"Exercise & Physical Activity: Your Everyday Guide\" from [employer]. Call 1-771.618.1291 or search [employer] online.  You need 1693-2970 mg of calcium and 4058-9625 IU of vitamin D per day. It is possible to meet your calcium requirement with diet alone, but a vitamin D supplement is usually necessary to meet this goal.  When exposed to the sun, use a sunscreen that protects against both UVA and UVB radiation with an SPF of 30 or greater. Reapply every 2 to 3 hours or after sweating, drying off with a towel, or swimming.  Always wear a seat belt when traveling in a car. Always wear a helmet when riding a bicycle or motorcycle.

## 2024-03-08 NOTE — PROGRESS NOTES
Medicare Annual Wellness Visit    Troy Venegas is here for Medicare AW and Discuss Labs    Assessment & Plan   Medicare annual wellness visit, subsequent  Type 2 diabetes mellitus with diabetic polyneuropathy, with long-term current use of insulin (HCC)  - since he is about to start treatment for bladder cancer, will hold off on making any changes to medication. However if fasting sugar is remaining high, may need to add back a low dose of insulin  -     Accu-Chek FastClix Lancets MISC; Disp-100 each, R-3, NormalTests once a day  Malignant neoplasm of urinary bladder, unspecified site (HCC)   - scheduled to start BCG treatment  Hypotension, unspecified hypotension type   - improved with electrolyte drinks, discussed trying to hydrate the night before then getting some in 3 hours before the procedure  Paroxysmal atrial fibrillation (HCC)   - stable, plan to get Watchman due to recurrent bleeding issues  SBO (small bowel obstruction) (HCC)   - doing well post-op, incision continuing to heal. Difficult to tell if the spot is just eschar or if there is a dissolvable suture but would apply aquaphor and monitor    Recommendations for Preventive Services Due: see orders and patient instructions/AVS.  Recommended screening schedule for the next 5-10 years is provided to the patient in written form: see Patient Instructions/AVS.     Return in about 2 months (around 5/8/2024) for DM follow up.     Subjective   The following acute and/or chronic problems were also addressed today:    Sugar running higher. Usually 150 and above, occasionally over 200. Taking nateglinide twice a day    Will be starting BCG soon, after the course of BCG he will have the Watchman procedure in May.  His wife is concerned about his blood pressure because it had been dropping very low, cardiologist thought it was dehydration and it has improved since they have worked on hydration.  He is not able to drink fluids 2 hours prior to the BCG

## 2024-03-14 ENCOUNTER — TELEPHONE (OUTPATIENT)
Dept: CARDIOLOGY CLINIC | Age: 89
End: 2024-03-14

## 2024-03-14 DIAGNOSIS — I48.0 PAROXYSMAL A-FIB (HCC): Primary | ICD-10-CM

## 2024-03-14 NOTE — TELEPHONE ENCOUNTER
Spoke with patient and informed him of the procedure that is being scheduled for 5/20/2024 at 8:30 AM arrival.

## 2024-03-20 ENCOUNTER — OFFICE VISIT (OUTPATIENT)
Dept: DERMATOLOGY | Age: 89
End: 2024-03-20
Payer: MEDICARE

## 2024-03-20 DIAGNOSIS — L57.0 AK (ACTINIC KERATOSIS): Primary | ICD-10-CM

## 2024-03-20 DIAGNOSIS — Z85.828 HISTORY OF NONMELANOMA SKIN CANCER: ICD-10-CM

## 2024-03-20 DIAGNOSIS — L82.1 SK (SEBORRHEIC KERATOSIS): ICD-10-CM

## 2024-03-20 DIAGNOSIS — Z85.820 HISTORY OF MELANOMA: ICD-10-CM

## 2024-03-20 PROCEDURE — G8484 FLU IMMUNIZE NO ADMIN: HCPCS | Performed by: DERMATOLOGY

## 2024-03-20 PROCEDURE — G8417 CALC BMI ABV UP PARAM F/U: HCPCS | Performed by: DERMATOLOGY

## 2024-03-20 PROCEDURE — 1123F ACP DISCUSS/DSCN MKR DOCD: CPT | Performed by: DERMATOLOGY

## 2024-03-20 PROCEDURE — 17003 DESTRUCT PREMALG LES 2-14: CPT | Performed by: DERMATOLOGY

## 2024-03-20 PROCEDURE — 17000 DESTRUCT PREMALG LESION: CPT | Performed by: DERMATOLOGY

## 2024-03-20 PROCEDURE — 1036F TOBACCO NON-USER: CPT | Performed by: DERMATOLOGY

## 2024-03-20 PROCEDURE — G8427 DOCREV CUR MEDS BY ELIG CLIN: HCPCS | Performed by: DERMATOLOGY

## 2024-03-20 PROCEDURE — 99213 OFFICE O/P EST LOW 20 MIN: CPT | Performed by: DERMATOLOGY

## 2024-03-20 NOTE — PROGRESS NOTES
and Nails/digits.    The following were examined and determined to be abnormal: Head/face.     Well appearing.    1.  Right superior antihelix - 1, right preauricular cheek - 1, left preauricular cheek - 2: ill defined keratotic pink macules.     2.  Left superior lateral neck with a stuck on appearing verrucous pink tan plaque.      3.  Clear.     4.  Clear.          Assessment and Plan     1. AK (actinic keratosis) -     Cryotherapy was discussed and patient agreed to proceed.  Consent was obtained.  4 lesions were treated cryotherapy: Right superior antihelix - 1, right preauricular cheek - 1, left preauricular cheek - 2. 2 cycles of liquid nitrogen applied to each lesion for 5 seconds using a Cry-Ac cryo spray gun.  Patient was educated regarding the potential risks of blister formation and discomfort.  Wound care was discussed.  The patient tolerated the procedure well and there were no immediate complications.      2. SK (seborrheic keratosis)     Reassurance.      3. History of nonmelanoma skin cancers - clear    Sun protective behaviors, including use of at least SPF 30 sunscreen, and self skin examinations were encouraged.  Call for any new or concerning lesions.       4. History of melanoma x 2- no signs of recurrence.     Sun protective behaviors, including use of at least SPF 30 sunscreen, and self skin examinations were encouraged.  Call for any new or concerning lesions.            Return in about 3 months (around 6/20/2024).    --Reinaldo Connolly MD

## 2024-03-25 NOTE — PROGRESS NOTES
Date    Arrhythmia     Arthritis     hands shoulders neck, R hip    Atrial fibrillation (HCC)     coumadin    Atrial tachycardia (HCC)     Bladder cancer (HCC) 10/2023    CAD (coronary artery disease)     Cancer (HCC)     skin    Diabetes mellitus (HCC)     type 2    Diverticulitis     Enlarged prostate     History of blood transfusion     Hyperlipidemia     Hypertension     Neuropathy     Bilateral feet and lower legs    Pacemaker 04/20/2020    Pneumonia     ABOUT 5 YEARS AGO    Vasodepressor syncope         Past Surgical History:   Procedure Laterality Date    ABLATION OF DYSRHYTHMIC FOCUS      AVNRT and Atrial tachycardia    CARPAL TUNNEL RELEASE      CATARACT REMOVAL Bilateral     COLONOSCOPY      CORONARY ANGIOPLASTY WITH STENT PLACEMENT  2005    CYSTOSCOPY  09/26/2012    coagulation prostatic urethra    CYSTOSCOPY  10/08/2015    TURP    CYSTOSCOPY N/A 10/17/2023    CYSTOSCOPY WITH TRANSURETHRAL RESECTION OF BLADDER TUMOR performed by Rosalino Riddle MD at [facility] OR    CYSTOSCOPY N/A 12/19/2023    CYSTOSCOPY WITH BLADDER BIOPSY performed by Rosalino Riddle MD at [facility] OR    FINGER SURGERY      X7    FINGER TRIGGER RELEASE      LAPAROTOMY N/A 2/6/2024    EXPLORATORY LAPAROTOMY, LYSIS OF ADHESIONS performed by Christiano Tay MD at [facility] OR    OTHER SURGICAL HISTORY Left 02/25/2014    WIDE LOCAL EXCISION LEFT ARM MELANOMA, WIDE LOCAL EXCISION OF    OTHER SURGICAL HISTORY      cardiac stents recently Dec 2023 had 5 stents placed    PACEMAKER PLACEMENT      SHOULDER SURGERY Bilateral     SKIN CANCER EXCISION      left ear - melanoma    SPINAL FUSION N/A     TURP  03/07/2013       Allergies:  No Known Allergies    Medication:   Prior to Admission medications    Medication Sig Start Date End Date Taking? Authorizing Provider   valACYclovir (VALTREX) 500 MG tablet TAKE 1 TABLET BY MOUTH TWICE DAILY AT ONSET OF SYMPTOMS FOR 3 DAYS FOR RECURRENCE ON BUTTOCKS 4/10/24  Yes Reinaldo Connolly MD   atorvastatin

## 2024-03-28 RX ORDER — ATORVASTATIN CALCIUM 40 MG/1
40 TABLET, FILM COATED ORAL NIGHTLY
Qty: 90 TABLET | Refills: 3 | Status: SHIPPED | OUTPATIENT
Start: 2024-03-28

## 2024-03-28 NOTE — TELEPHONE ENCOUNTER
Medication Refill    Medication needing refilled:  atorvastatin (LIPITOR) 40 MG     Dosage of the medication:    How are you taking this medication (QD, BID, TID, QID, PRN): 1 tablet by mouth nightly     30 or 90 day supply called in: 90 day with refills    When will you run out of your medication:    Which Pharmacy are we sending the medication to?:[facility]/pharmacy #[id number] [facility] [address] [phone] -  [phone]

## 2024-04-10 RX ORDER — VALACYCLOVIR HYDROCHLORIDE 500 MG/1
TABLET, FILM COATED ORAL
Qty: 24 TABLET | Refills: 0 | Status: SHIPPED | OUTPATIENT
Start: 2024-04-10

## 2024-04-11 ENCOUNTER — OFFICE VISIT (OUTPATIENT)
Dept: CARDIOLOGY CLINIC | Age: 89
End: 2024-04-11
Payer: MEDICARE

## 2024-04-11 ENCOUNTER — NURSE ONLY (OUTPATIENT)
Dept: CARDIOLOGY CLINIC | Age: 89
End: 2024-04-11

## 2024-04-11 VITALS
BODY MASS INDEX: 26.25 KG/M2 | DIASTOLIC BLOOD PRESSURE: 70 MMHG | WEIGHT: 188.2 LBS | SYSTOLIC BLOOD PRESSURE: 122 MMHG | HEART RATE: 63 BPM

## 2024-04-11 DIAGNOSIS — I47.19 ATRIAL TACHYCARDIA (HCC): ICD-10-CM

## 2024-04-11 DIAGNOSIS — I49.5 SICK SINUS SYNDROME (HCC): ICD-10-CM

## 2024-04-11 DIAGNOSIS — I25.10 CORONARY ARTERY DISEASE INVOLVING NATIVE CORONARY ARTERY OF NATIVE HEART WITHOUT ANGINA PECTORIS: ICD-10-CM

## 2024-04-11 DIAGNOSIS — Z95.0 PACEMAKER: Primary | ICD-10-CM

## 2024-04-11 DIAGNOSIS — Z95.0 PACEMAKER: ICD-10-CM

## 2024-04-11 DIAGNOSIS — I10 ESSENTIAL HYPERTENSION: ICD-10-CM

## 2024-04-11 DIAGNOSIS — I47.19 AVNRT (AV NODAL RE-ENTRY TACHYCARDIA) (HCC): ICD-10-CM

## 2024-04-11 DIAGNOSIS — E78.00 HYPERCHOLESTEROLEMIA: ICD-10-CM

## 2024-04-11 DIAGNOSIS — I48.0 PAROXYSMAL ATRIAL FIBRILLATION (HCC): ICD-10-CM

## 2024-04-11 DIAGNOSIS — I48.0 PAROXYSMAL ATRIAL FIBRILLATION (HCC): Primary | ICD-10-CM

## 2024-04-11 PROCEDURE — 1036F TOBACCO NON-USER: CPT | Performed by: INTERNAL MEDICINE

## 2024-04-11 PROCEDURE — G8427 DOCREV CUR MEDS BY ELIG CLIN: HCPCS | Performed by: INTERNAL MEDICINE

## 2024-04-11 PROCEDURE — G8417 CALC BMI ABV UP PARAM F/U: HCPCS | Performed by: INTERNAL MEDICINE

## 2024-04-11 PROCEDURE — 1123F ACP DISCUSS/DSCN MKR DOCD: CPT | Performed by: INTERNAL MEDICINE

## 2024-04-11 PROCEDURE — 93000 ELECTROCARDIOGRAM COMPLETE: CPT | Performed by: INTERNAL MEDICINE

## 2024-04-11 PROCEDURE — 99214 OFFICE O/P EST MOD 30 MIN: CPT | Performed by: INTERNAL MEDICINE

## 2024-04-19 DIAGNOSIS — E11.42 TYPE 2 DIABETES MELLITUS WITH DIABETIC POLYNEUROPATHY, WITH LONG-TERM CURRENT USE OF INSULIN (HCC): ICD-10-CM

## 2024-04-19 DIAGNOSIS — Z79.4 TYPE 2 DIABETES MELLITUS WITH DIABETIC POLYNEUROPATHY, WITH LONG-TERM CURRENT USE OF INSULIN (HCC): ICD-10-CM

## 2024-04-19 RX ORDER — NATEGLINIDE 120 MG/1
120 TABLET ORAL 2 TIMES DAILY
Qty: 180 TABLET | Refills: 1 | Status: SHIPPED | OUTPATIENT
Start: 2024-04-19

## 2024-04-19 NOTE — TELEPHONE ENCOUNTER
Refill reqnateglinide (STARLIX) 120 MG tablet [[id number]]    Pharm [facility] - [address] - [address] - P [phone] - F [phone]

## 2024-04-21 DIAGNOSIS — I49.5 SICK SINUS SYNDROME (HCC): ICD-10-CM

## 2024-04-21 DIAGNOSIS — I48.0 PAROXYSMAL ATRIAL FIBRILLATION (HCC): ICD-10-CM

## 2024-04-21 DIAGNOSIS — I47.19 ATRIAL TACHYCARDIA (HCC): ICD-10-CM

## 2024-04-21 DIAGNOSIS — R00.2 PALPITATIONS: ICD-10-CM

## 2024-04-22 RX ORDER — PREGABALIN 75 MG/1
CAPSULE ORAL
Qty: 360 CAPSULE | Refills: 1 | Status: SHIPPED | OUTPATIENT
Start: 2024-04-22 | End: 2024-10-19

## 2024-04-22 RX ORDER — LANSOPRAZOLE 30 MG/1
30 CAPSULE, DELAYED RELEASE ORAL DAILY
Qty: 90 CAPSULE | Refills: 1 | Status: SHIPPED | OUTPATIENT
Start: 2024-04-22

## 2024-04-22 RX ORDER — SOTALOL HYDROCHLORIDE 80 MG/1
TABLET ORAL
Qty: 180 TABLET | Refills: 3 | Status: SHIPPED | OUTPATIENT
Start: 2024-04-22

## 2024-04-26 ENCOUNTER — TELEPHONE (OUTPATIENT)
Dept: INTERNAL MEDICINE CLINIC | Age: 89
End: 2024-04-26

## 2024-04-26 NOTE — TELEPHONE ENCOUNTER
Pt states his sore throat is back and would like something sent in     Pharm- [facility]pharmacy #[id number] - [facility] [address] - P [phone] - F [phone]

## 2024-05-08 NOTE — TELEPHONE ENCOUNTER
Called left message reminding patient to go get pre-op lab work done for upcoming Watchman procedure.

## 2024-05-10 ENCOUNTER — OFFICE VISIT (OUTPATIENT)
Dept: INTERNAL MEDICINE CLINIC | Age: 89
End: 2024-05-10
Payer: MEDICARE

## 2024-05-10 VITALS
WEIGHT: 192 LBS | DIASTOLIC BLOOD PRESSURE: 68 MMHG | TEMPERATURE: 97.5 F | SYSTOLIC BLOOD PRESSURE: 122 MMHG | BODY MASS INDEX: 26.78 KG/M2

## 2024-05-10 DIAGNOSIS — Z79.4 TYPE 2 DIABETES MELLITUS WITH DIABETIC POLYNEUROPATHY, WITH LONG-TERM CURRENT USE OF INSULIN (HCC): Primary | ICD-10-CM

## 2024-05-10 DIAGNOSIS — I48.0 PAROXYSMAL ATRIAL FIBRILLATION (HCC): ICD-10-CM

## 2024-05-10 DIAGNOSIS — C67.9 MALIGNANT NEOPLASM OF URINARY BLADDER, UNSPECIFIED SITE (HCC): ICD-10-CM

## 2024-05-10 DIAGNOSIS — E11.42 TYPE 2 DIABETES MELLITUS WITH DIABETIC POLYNEUROPATHY, WITH LONG-TERM CURRENT USE OF INSULIN (HCC): Primary | ICD-10-CM

## 2024-05-10 PROCEDURE — 99214 OFFICE O/P EST MOD 30 MIN: CPT | Performed by: INTERNAL MEDICINE

## 2024-05-10 PROCEDURE — 3044F HG A1C LEVEL LT 7.0%: CPT | Performed by: INTERNAL MEDICINE

## 2024-05-10 PROCEDURE — G8417 CALC BMI ABV UP PARAM F/U: HCPCS | Performed by: INTERNAL MEDICINE

## 2024-05-10 PROCEDURE — 1036F TOBACCO NON-USER: CPT | Performed by: INTERNAL MEDICINE

## 2024-05-10 PROCEDURE — G8427 DOCREV CUR MEDS BY ELIG CLIN: HCPCS | Performed by: INTERNAL MEDICINE

## 2024-05-10 PROCEDURE — 1123F ACP DISCUSS/DSCN MKR DOCD: CPT | Performed by: INTERNAL MEDICINE

## 2024-05-10 RX ORDER — INSULIN GLARGINE 100 [IU]/ML
6 INJECTION, SOLUTION SUBCUTANEOUS NIGHTLY
Qty: 5 ADJUSTABLE DOSE PRE-FILLED PEN SYRINGE | Refills: 1 | Status: SHIPPED | OUTPATIENT
Start: 2024-05-10

## 2024-05-10 ASSESSMENT — PATIENT HEALTH QUESTIONNAIRE - PHQ9
SUM OF ALL RESPONSES TO PHQ QUESTIONS 1-9: 0
2. FEELING DOWN, DEPRESSED OR HOPELESS: NOT AT ALL
1. LITTLE INTEREST OR PLEASURE IN DOING THINGS: NOT AT ALL
SUM OF ALL RESPONSES TO PHQ9 QUESTIONS 1 & 2: 0
SUM OF ALL RESPONSES TO PHQ QUESTIONS 1-9: 0

## 2024-05-10 NOTE — PROGRESS NOTES
Troy Venegas (:  1935) is a 88 y.o. male,Established patient, here for evaluation of the following chief complaint(s):  Diabetes      Assessment & Plan   1. Type 2 diabetes mellitus with diabetic polyneuropathy, with long-term current use of insulin (HCC)  -Will repeat A1c but based on his fasting sugars we do need to resume some dose of insulin.  Will start with Basaglar 60 units nightly, goal fasting sugar is 140.  Continue to take the night.  Discussed Ozempic as well, we will hold off on restarting this.  He had a bowel obstruction which was likely due to adhesions, but it does make me hesitate to resume the medication  -     Hemoglobin A1C; Future  2. Paroxysmal atrial fibrillation (HCC)   -Controlled on sotalol, occasionally having symptoms.  He will have watchman due to recurrent bleeding  3. Malignant neoplasm of urinary bladder, unspecified site (HCC)   -Posttreatment with BCG, he has follow-up scheduled with his urologist    Return in about 3 months (around 8/10/2024) for establish care Dr. Engel.       Subjective   HPI    Sugars have been running higher, some of the fasting sugars are in the 200s or high 180s.  No hypoglycemia.  No change in medication.  He is eating more, not as adherent to diabetic diet per his wife.  He is taking the nateglinide.    He is scheduled to have a watchman in 10 days.  He completed the first round of treatment for bladder cancer.  Tolerated the treatment well.  He will have a follow-up cystoscopy once the watchman is done to determine the next step in treatment.    Review of Systems       Objective   Physical Exam  Vitals reviewed.   Constitutional:       General: He is not in acute distress.     Appearance: Normal appearance. He is well-developed.   HENT:      Head: Normocephalic and atraumatic.   Cardiovascular:      Rate and Rhythm: Normal rate and regular rhythm.      Heart sounds: Normal heart sounds.   Pulmonary:      Effort: Pulmonary effort is

## 2024-05-13 ENCOUNTER — HOSPITAL ENCOUNTER (OUTPATIENT)
Age: 89
Discharge: HOME | End: 2024-05-13
Payer: MEDICARE

## 2024-05-13 DIAGNOSIS — I48.0 PAROXYSMAL A-FIB (HCC): ICD-10-CM

## 2024-05-13 DIAGNOSIS — Z01.818 PRE-OP TESTING: ICD-10-CM

## 2024-05-13 LAB
ABO + RH BLD: NORMAL
ANION GAP SERPL CALCULATED.3IONS-SCNC: 13 MMOL/L (ref 3–16)
BACTERIA URNS QL MICRO: ABNORMAL /HPF
BILIRUB UR QL STRIP.AUTO: NEGATIVE
BLD GP AB SCN SERPL QL: NORMAL
BUN SERPL-MCNC: 18 MG/DL (ref 7–20)
CALCIUM SERPL-MCNC: 10.3 MG/DL (ref 8.3–10.6)
CHLORIDE SERPL-SCNC: 104 MMOL/L (ref 99–110)
CLARITY UR: CLEAR
CO2 SERPL-SCNC: 21 MMOL/L (ref 21–32)
COLOR UR: YELLOW
CREAT SERPL-MCNC: 1.2 MG/DL (ref 0.8–1.3)
DEPRECATED RDW RBC AUTO: 13.4 % (ref 12.4–15.4)
EPI CELLS #/AREA URNS AUTO: 1 /HPF (ref 0–5)
GFR SERPLBLD CREATININE-BSD FMLA CKD-EPI: 59 ML/MIN/{1.73_M2}
GLUCOSE SERPL-MCNC: 158 MG/DL (ref 70–99)
GLUCOSE UR STRIP.AUTO-MCNC: NEGATIVE MG/DL
HCT VFR BLD AUTO: 39.2 % (ref 40.5–52.5)
HGB BLD-MCNC: 13.2 G/DL (ref 13.5–17.5)
HGB UR QL STRIP.AUTO: ABNORMAL
HYALINE CASTS #/AREA URNS AUTO: 1 /LPF (ref 0–8)
INR PPP: 1.09 (ref 0.85–1.15)
KETONES UR STRIP.AUTO-MCNC: NEGATIVE MG/DL
LEUKOCYTE ESTERASE UR QL STRIP.AUTO: ABNORMAL
MCH RBC QN AUTO: 30.3 PG (ref 26–34)
MCHC RBC AUTO-ENTMCNC: 33.6 G/DL (ref 31–36)
MCV RBC AUTO: 90.1 FL (ref 80–100)
NITRITE UR QL STRIP.AUTO: NEGATIVE
PH UR STRIP.AUTO: 6 [PH] (ref 5–8)
PLATELET # BLD AUTO: 255 K/UL (ref 135–450)
PMV BLD AUTO: 8.5 FL (ref 5–10.5)
POTASSIUM SERPL-SCNC: 4.7 MMOL/L (ref 3.5–5.1)
PROT UR STRIP.AUTO-MCNC: NEGATIVE MG/DL
PROTHROMBIN TIME: 14.4 SEC (ref 11.9–14.9)
RBC # BLD AUTO: 4.35 M/UL (ref 4.2–5.9)
RBC CLUMPS #/AREA URNS AUTO: 2 /HPF (ref 0–4)
SODIUM SERPL-SCNC: 138 MMOL/L (ref 136–145)
SP GR UR STRIP.AUTO: 1.02 (ref 1–1.03)
UA DIPSTICK W REFLEX MICRO PNL UR: YES
URN SPEC COLLECT METH UR: ABNORMAL
UROBILINOGEN UR STRIP-ACNC: 0.2 E.U./DL
WBC # BLD AUTO: 9.8 K/UL (ref 4–11)
WBC #/AREA URNS AUTO: 319 /HPF (ref 0–5)

## 2024-05-13 PROCEDURE — 85610 PROTHROMBIN TIME: CPT

## 2024-05-13 PROCEDURE — 81001 URINALYSIS AUTO W/SCOPE: CPT

## 2024-05-13 PROCEDURE — 86900 BLOOD TYPING SEROLOGIC ABO: CPT

## 2024-05-13 PROCEDURE — 86901 BLOOD TYPING SEROLOGIC RH(D): CPT

## 2024-05-13 PROCEDURE — 80048 BASIC METABOLIC PNL TOTAL CA: CPT

## 2024-05-13 PROCEDURE — 36415 COLL VENOUS BLD VENIPUNCTURE: CPT

## 2024-05-13 PROCEDURE — 83036 HEMOGLOBIN GLYCOSYLATED A1C: CPT

## 2024-05-13 PROCEDURE — 86850 RBC ANTIBODY SCREEN: CPT

## 2024-05-13 PROCEDURE — 85027 COMPLETE CBC AUTOMATED: CPT

## 2024-05-14 LAB
EST. AVERAGE GLUCOSE BLD GHB EST-MCNC: 151.3 MG/DL
HBA1C MFR BLD: 6.9 %

## 2024-05-15 ENCOUNTER — TELEPHONE (OUTPATIENT)
Dept: CARDIOLOGY CLINIC | Age: 89
End: 2024-05-15

## 2024-05-15 DIAGNOSIS — I48.0 PAROXYSMAL A-FIB (HCC): Primary | ICD-10-CM

## 2024-05-15 DIAGNOSIS — N39.0 URINARY TRACT INFECTION WITH HEMATURIA, SITE UNSPECIFIED: ICD-10-CM

## 2024-05-15 DIAGNOSIS — Z95.818 PRESENCE OF WATCHMAN LEFT ATRIAL APPENDAGE CLOSURE DEVICE: ICD-10-CM

## 2024-05-15 DIAGNOSIS — R31.9 URINARY TRACT INFECTION WITH HEMATURIA, SITE UNSPECIFIED: ICD-10-CM

## 2024-05-16 RX ORDER — SULFAMETHOXAZOLE AND TRIMETHOPRIM 800; 160 MG/1; MG/1
1 TABLET ORAL 2 TIMES DAILY
Qty: 10 TABLET | Refills: 0 | Status: SHIPPED | OUTPATIENT
Start: 2024-05-16 | End: 2024-05-21

## 2024-05-16 NOTE — TELEPHONE ENCOUNTER
8:30 AM Troy Theo 1935 [facility]    Spoke with patient and went over all instructions.     Creatinine 1.2  Glucose 158  Hgb 13.2  Plt 255  U/A UTI placed on Antibiotics. Bactrim  PT/INR 14.4 / 1.09    CHADSvasc is at least 5 (Age, HTN, CAD, DM)   HASbled is at least 3.     Currently on Plavix and BASA. He is a poor candidate for chronic anticoagulation with his history of Hematuria.     Dr. Jeffrey 1/4/2024 referred for Watchman procedure.  Dr. Quesada consulted on 1/4/2023.  Dr. Jeffrey shared decision on 1/6/2024.  JUANA to be done the day of the procedure.  Insurance to be approved per Dipika Muñiz    Call patient on Friday to review medication/problems. YES  UTI (Antibiotic taken)?  Placed on Bactrim  Any groin issues? look for any type of rash, open skin, etc NO  On any blood thinners & when to stop taking?  Do not stop taking Plavix and take a BASA the day of the procedure.  Lab work addressed prior to admission. YES  Allergies addressed? YES  Pre-medication necessary? NO  Pt. staying overnight? YES  PT/INR, T&C & Glucose ordered for day of procedure on every patient. YES  \"Electrophysiology Testing\" order placed on every patient? YES

## 2024-05-16 NOTE — TELEPHONE ENCOUNTER
Patient has a UTI.  I was given an order from Dr. Quesada to place patient on Bactrim to treat the UTI.  Patient has upcoming Watchman procedure on 5/20/2024.

## 2024-05-20 ENCOUNTER — APPOINTMENT (OUTPATIENT)
Age: 89
DRG: 274 | End: 2024-05-20
Attending: INTERNAL MEDICINE
Payer: MEDICARE

## 2024-05-20 ENCOUNTER — ANESTHESIA EVENT (OUTPATIENT)
Age: 89
DRG: 274 | End: 2024-05-20
Payer: MEDICARE

## 2024-05-20 ENCOUNTER — ANESTHESIA (OUTPATIENT)
Age: 89
DRG: 274 | End: 2024-05-20
Payer: MEDICARE

## 2024-05-20 ENCOUNTER — HOSPITAL ENCOUNTER (INPATIENT)
Age: 89
LOS: 1 days | Discharge: HOME | DRG: 274 | End: 2024-05-20
Attending: INTERNAL MEDICINE | Admitting: INTERNAL MEDICINE
Payer: MEDICARE

## 2024-05-20 VITALS
WEIGHT: 188 LBS | BODY MASS INDEX: 26.32 KG/M2 | TEMPERATURE: 98 F | SYSTOLIC BLOOD PRESSURE: 99 MMHG | HEIGHT: 71 IN | OXYGEN SATURATION: 97 % | HEART RATE: 62 BPM | RESPIRATION RATE: 10 BRPM | DIASTOLIC BLOOD PRESSURE: 55 MMHG

## 2024-05-20 DIAGNOSIS — I48.0 PAROXYSMAL A-FIB (HCC): ICD-10-CM

## 2024-05-20 DIAGNOSIS — I48.0 PAROXYSMAL ATRIAL FIBRILLATION (HCC): ICD-10-CM

## 2024-05-20 LAB
ABO + RH BLD: NORMAL
BLD GP AB SCN SERPL QL: NORMAL
ECHO BSA: 2.07 M2
EKG ATRIAL RATE: 78 BPM
EKG DIAGNOSIS: NORMAL
EKG Q-T INTERVAL: 414 MS
EKG QRS DURATION: 132 MS
EKG QTC CALCULATION (BAZETT): 477 MS
EKG R AXIS: -7 DEGREES
EKG T AXIS: -11 DEGREES
EKG VENTRICULAR RATE: 80 BPM
GLUCOSE BLD-MCNC: 120 MG/DL (ref 70–99)
GLUCOSE BLD-MCNC: 127 MG/DL (ref 70–99)
PERFORMED ON: ABNORMAL
PERFORMED ON: ABNORMAL
POC ACT LR: 367 SEC

## 2024-05-20 PROCEDURE — 6360000002 HC RX W HCPCS

## 2024-05-20 PROCEDURE — 93355 ECHO TRANSESOPHAGEAL (TEE): CPT

## 2024-05-20 PROCEDURE — 7100000000 HC PACU RECOVERY - FIRST 15 MIN: Performed by: INTERNAL MEDICINE

## 2024-05-20 PROCEDURE — 7100000001 HC PACU RECOVERY - ADDTL 15 MIN: Performed by: INTERNAL MEDICINE

## 2024-05-20 PROCEDURE — C1894 INTRO/SHEATH, NON-LASER: HCPCS | Performed by: INTERNAL MEDICINE

## 2024-05-20 PROCEDURE — B2111ZZ FLUOROSCOPY OF MULTIPLE CORONARY ARTERIES USING LOW OSMOLAR CONTRAST: ICD-10-PCS | Performed by: INTERNAL MEDICINE

## 2024-05-20 PROCEDURE — 2500000003 HC RX 250 WO HCPCS: Performed by: NURSE ANESTHETIST, CERTIFIED REGISTERED

## 2024-05-20 PROCEDURE — 1200000000 HC SEMI PRIVATE

## 2024-05-20 PROCEDURE — 93010 ELECTROCARDIOGRAM REPORT: CPT | Performed by: INTERNAL MEDICINE

## 2024-05-20 PROCEDURE — 2500000003 HC RX 250 WO HCPCS: Performed by: INTERNAL MEDICINE

## 2024-05-20 PROCEDURE — 86850 RBC ANTIBODY SCREEN: CPT

## 2024-05-20 PROCEDURE — 3700000000 HC ANESTHESIA ATTENDED CARE: Performed by: INTERNAL MEDICINE

## 2024-05-20 PROCEDURE — 86901 BLOOD TYPING SEROLOGIC RH(D): CPT

## 2024-05-20 PROCEDURE — 33340 PERQ CLSR TCAT L ATR APNDGE: CPT | Performed by: INTERNAL MEDICINE

## 2024-05-20 PROCEDURE — 93005 ELECTROCARDIOGRAM TRACING: CPT | Performed by: INTERNAL MEDICINE

## 2024-05-20 PROCEDURE — 2709999900 HC NON-CHARGEABLE SUPPLY: Performed by: INTERNAL MEDICINE

## 2024-05-20 PROCEDURE — 86900 BLOOD TYPING SEROLOGIC ABO: CPT

## 2024-05-20 PROCEDURE — C1769 GUIDE WIRE: HCPCS | Performed by: INTERNAL MEDICINE

## 2024-05-20 PROCEDURE — 7100000011 HC PHASE II RECOVERY - ADDTL 15 MIN: Performed by: INTERNAL MEDICINE

## 2024-05-20 PROCEDURE — 6360000002 HC RX W HCPCS: Performed by: INTERNAL MEDICINE

## 2024-05-20 PROCEDURE — 6360000002 HC RX W HCPCS: Performed by: NURSE ANESTHETIST, CERTIFIED REGISTERED

## 2024-05-20 PROCEDURE — 33340 PERQ CLSR TCAT L ATR APNDGE: CPT

## 2024-05-20 PROCEDURE — 85347 COAGULATION TIME ACTIVATED: CPT

## 2024-05-20 PROCEDURE — 2500000003 HC RX 250 WO HCPCS

## 2024-05-20 PROCEDURE — 93355 ECHO TRANSESOPHAGEAL (TEE): CPT | Performed by: INTERNAL MEDICINE

## 2024-05-20 PROCEDURE — C1889 IMPLANT/INSERT DEVICE, NOC: HCPCS | Performed by: INTERNAL MEDICINE

## 2024-05-20 PROCEDURE — C1760 CLOSURE DEV, VASC: HCPCS | Performed by: INTERNAL MEDICINE

## 2024-05-20 PROCEDURE — 7100000010 HC PHASE II RECOVERY - FIRST 15 MIN: Performed by: INTERNAL MEDICINE

## 2024-05-20 PROCEDURE — 3700000001 HC ADD 15 MINUTES (ANESTHESIA): Performed by: INTERNAL MEDICINE

## 2024-05-20 PROCEDURE — B24BZZ4 ULTRASONOGRAPHY OF HEART WITH AORTA, TRANSESOPHAGEAL: ICD-10-PCS | Performed by: INTERNAL MEDICINE

## 2024-05-20 PROCEDURE — APPNB45 APP NON BILLABLE 31-45 MINUTES: Performed by: NURSE PRACTITIONER

## 2024-05-20 PROCEDURE — 02L73DK OCCLUSION OF LEFT ATRIAL APPENDAGE WITH INTRALUMINAL DEVICE, PERCUTANEOUS APPROACH: ICD-10-PCS | Performed by: INTERNAL MEDICINE

## 2024-05-20 PROCEDURE — 36415 COLL VENOUS BLD VENIPUNCTURE: CPT

## 2024-05-20 PROCEDURE — 2580000003 HC RX 258: Performed by: NURSE ANESTHETIST, CERTIFIED REGISTERED

## 2024-05-20 DEVICE — LEFT ATRIAL APPENDAGE CLOSURE DEVICE WITH DELIVERY SYSTEM
Type: IMPLANTABLE DEVICE | Status: FUNCTIONAL
Brand: WATCHMAN FLX™ PRO

## 2024-05-20 RX ORDER — PROPOFOL 10 MG/ML
INJECTION, EMULSION INTRAVENOUS PRN
Status: DISCONTINUED | OUTPATIENT
Start: 2024-05-20 | End: 2024-05-20 | Stop reason: SDUPTHER

## 2024-05-20 RX ORDER — HYDRALAZINE HYDROCHLORIDE 20 MG/ML
10 INJECTION INTRAMUSCULAR; INTRAVENOUS
Status: DISCONTINUED | OUTPATIENT
Start: 2024-05-20 | End: 2024-05-20 | Stop reason: HOSPADM

## 2024-05-20 RX ORDER — ONDANSETRON 2 MG/ML
INJECTION INTRAMUSCULAR; INTRAVENOUS PRN
Status: DISCONTINUED | OUTPATIENT
Start: 2024-05-20 | End: 2024-05-20 | Stop reason: SDUPTHER

## 2024-05-20 RX ORDER — ONDANSETRON 2 MG/ML
4 INJECTION INTRAMUSCULAR; INTRAVENOUS EVERY 6 HOURS PRN
Status: DISCONTINUED | OUTPATIENT
Start: 2024-05-20 | End: 2024-05-23 | Stop reason: HOSPADM

## 2024-05-20 RX ORDER — FAMOTIDINE 10 MG/ML
INJECTION, SOLUTION INTRAVENOUS PRN
Status: DISCONTINUED | OUTPATIENT
Start: 2024-05-20 | End: 2024-05-20 | Stop reason: SDUPTHER

## 2024-05-20 RX ORDER — SODIUM CHLORIDE 0.9 % (FLUSH) 0.9 %
5-40 SYRINGE (ML) INJECTION EVERY 12 HOURS SCHEDULED
Status: DISCONTINUED | OUTPATIENT
Start: 2024-05-20 | End: 2024-05-23 | Stop reason: HOSPADM

## 2024-05-20 RX ORDER — SODIUM CHLORIDE 0.9 % (FLUSH) 0.9 %
5-40 SYRINGE (ML) INJECTION PRN
Status: DISCONTINUED | OUTPATIENT
Start: 2024-05-20 | End: 2024-05-23 | Stop reason: HOSPADM

## 2024-05-20 RX ORDER — HEPARIN SODIUM 1000 [USP'U]/ML
INJECTION, SOLUTION INTRAVENOUS; SUBCUTANEOUS PRN
Status: DISCONTINUED | OUTPATIENT
Start: 2024-05-20 | End: 2024-05-20 | Stop reason: SDUPTHER

## 2024-05-20 RX ORDER — DEXMEDETOMIDINE HYDROCHLORIDE 100 UG/ML
INJECTION, SOLUTION INTRAVENOUS PRN
Status: DISCONTINUED | OUTPATIENT
Start: 2024-05-20 | End: 2024-05-20 | Stop reason: SDUPTHER

## 2024-05-20 RX ORDER — ONDANSETRON 2 MG/ML
4 INJECTION INTRAMUSCULAR; INTRAVENOUS
Status: DISCONTINUED | OUTPATIENT
Start: 2024-05-20 | End: 2024-05-20 | Stop reason: HOSPADM

## 2024-05-20 RX ORDER — SODIUM CHLORIDE 9 MG/ML
INJECTION, SOLUTION INTRAVENOUS CONTINUOUS PRN
Status: DISCONTINUED | OUTPATIENT
Start: 2024-05-20 | End: 2024-05-20 | Stop reason: SDUPTHER

## 2024-05-20 RX ORDER — IPRATROPIUM BROMIDE AND ALBUTEROL SULFATE 2.5; .5 MG/3ML; MG/3ML
1 SOLUTION RESPIRATORY (INHALATION)
Status: DISCONTINUED | OUTPATIENT
Start: 2024-05-20 | End: 2024-05-20 | Stop reason: HOSPADM

## 2024-05-20 RX ORDER — PHENYLEPHRINE HCL IN 0.9% NACL 1 MG/10 ML
SYRINGE (ML) INTRAVENOUS PRN
Status: DISCONTINUED | OUTPATIENT
Start: 2024-05-20 | End: 2024-05-20 | Stop reason: SDUPTHER

## 2024-05-20 RX ORDER — SODIUM CHLORIDE 9 MG/ML
INJECTION, SOLUTION INTRAVENOUS CONTINUOUS
Status: DISCONTINUED | OUTPATIENT
Start: 2024-05-20 | End: 2024-05-20 | Stop reason: HOSPADM

## 2024-05-20 RX ORDER — DIPHENHYDRAMINE HYDROCHLORIDE 50 MG/ML
12.5 INJECTION INTRAMUSCULAR; INTRAVENOUS
Status: DISCONTINUED | OUTPATIENT
Start: 2024-05-20 | End: 2024-05-20 | Stop reason: HOSPADM

## 2024-05-20 RX ORDER — ROCURONIUM BROMIDE 10 MG/ML
INJECTION, SOLUTION INTRAVENOUS PRN
Status: DISCONTINUED | OUTPATIENT
Start: 2024-05-20 | End: 2024-05-20 | Stop reason: SDUPTHER

## 2024-05-20 RX ORDER — FENTANYL CITRATE 50 UG/ML
INJECTION, SOLUTION INTRAMUSCULAR; INTRAVENOUS PRN
Status: DISCONTINUED | OUTPATIENT
Start: 2024-05-20 | End: 2024-05-20 | Stop reason: SDUPTHER

## 2024-05-20 RX ORDER — SODIUM CHLORIDE 0.9 % (FLUSH) 0.9 %
5-40 SYRINGE (ML) INJECTION EVERY 12 HOURS SCHEDULED
Status: DISCONTINUED | OUTPATIENT
Start: 2024-05-20 | End: 2024-05-20 | Stop reason: HOSPADM

## 2024-05-20 RX ORDER — SODIUM CHLORIDE 9 MG/ML
INJECTION, SOLUTION INTRAVENOUS CONTINUOUS
Status: DISCONTINUED | OUTPATIENT
Start: 2024-05-20 | End: 2024-05-23 | Stop reason: HOSPADM

## 2024-05-20 RX ORDER — NALOXONE HYDROCHLORIDE 0.4 MG/ML
INJECTION, SOLUTION INTRAMUSCULAR; INTRAVENOUS; SUBCUTANEOUS PRN
Status: DISCONTINUED | OUTPATIENT
Start: 2024-05-20 | End: 2024-05-20 | Stop reason: HOSPADM

## 2024-05-20 RX ORDER — HALOPERIDOL 5 MG/ML
1 INJECTION INTRAMUSCULAR
Status: DISCONTINUED | OUTPATIENT
Start: 2024-05-20 | End: 2024-05-20 | Stop reason: HOSPADM

## 2024-05-20 RX ORDER — HYDROMORPHONE HYDROCHLORIDE 2 MG/ML
0.25 INJECTION, SOLUTION INTRAMUSCULAR; INTRAVENOUS; SUBCUTANEOUS EVERY 5 MIN PRN
Status: DISCONTINUED | OUTPATIENT
Start: 2024-05-20 | End: 2024-05-20 | Stop reason: HOSPADM

## 2024-05-20 RX ORDER — SUCCINYLCHOLINE/SOD CL,ISO/PF 200MG/10ML
SYRINGE (ML) INTRAVENOUS PRN
Status: DISCONTINUED | OUTPATIENT
Start: 2024-05-20 | End: 2024-05-20 | Stop reason: SDUPTHER

## 2024-05-20 RX ORDER — DEXAMETHASONE SODIUM PHOSPHATE 4 MG/ML
INJECTION, SOLUTION INTRA-ARTICULAR; INTRALESIONAL; INTRAMUSCULAR; INTRAVENOUS; SOFT TISSUE PRN
Status: DISCONTINUED | OUTPATIENT
Start: 2024-05-20 | End: 2024-05-20 | Stop reason: SDUPTHER

## 2024-05-20 RX ORDER — LIDOCAINE HYDROCHLORIDE 20 MG/ML
INJECTION, SOLUTION EPIDURAL; INFILTRATION; INTRACAUDAL; PERINEURAL PRN
Status: DISCONTINUED | OUTPATIENT
Start: 2024-05-20 | End: 2024-05-20 | Stop reason: SDUPTHER

## 2024-05-20 RX ORDER — OXYCODONE HYDROCHLORIDE 5 MG/1
10 TABLET ORAL PRN
Status: DISCONTINUED | OUTPATIENT
Start: 2024-05-20 | End: 2024-05-20 | Stop reason: HOSPADM

## 2024-05-20 RX ORDER — OXYCODONE HYDROCHLORIDE 5 MG/1
5 TABLET ORAL PRN
Status: DISCONTINUED | OUTPATIENT
Start: 2024-05-20 | End: 2024-05-20 | Stop reason: HOSPADM

## 2024-05-20 RX ORDER — LABETALOL HYDROCHLORIDE 5 MG/ML
10 INJECTION, SOLUTION INTRAVENOUS
Status: DISCONTINUED | OUTPATIENT
Start: 2024-05-20 | End: 2024-05-20 | Stop reason: HOSPADM

## 2024-05-20 RX ORDER — HYDROMORPHONE HYDROCHLORIDE 2 MG/ML
0.5 INJECTION, SOLUTION INTRAMUSCULAR; INTRAVENOUS; SUBCUTANEOUS EVERY 5 MIN PRN
Status: DISCONTINUED | OUTPATIENT
Start: 2024-05-20 | End: 2024-05-20 | Stop reason: HOSPADM

## 2024-05-20 RX ORDER — SODIUM CHLORIDE 9 MG/ML
INJECTION, SOLUTION INTRAVENOUS PRN
Status: DISCONTINUED | OUTPATIENT
Start: 2024-05-20 | End: 2024-05-20 | Stop reason: HOSPADM

## 2024-05-20 RX ORDER — PROTAMINE SULFATE 10 MG/ML
INJECTION, SOLUTION INTRAVENOUS PRN
Status: DISCONTINUED | OUTPATIENT
Start: 2024-05-20 | End: 2024-05-20 | Stop reason: SDUPTHER

## 2024-05-20 RX ORDER — SODIUM CHLORIDE 0.9 % (FLUSH) 0.9 %
5-40 SYRINGE (ML) INJECTION PRN
Status: DISCONTINUED | OUTPATIENT
Start: 2024-05-20 | End: 2024-05-20 | Stop reason: HOSPADM

## 2024-05-20 RX ORDER — ACETAMINOPHEN 325 MG/1
650 TABLET ORAL EVERY 4 HOURS PRN
Status: DISCONTINUED | OUTPATIENT
Start: 2024-05-20 | End: 2024-05-23 | Stop reason: HOSPADM

## 2024-05-20 RX ORDER — SODIUM CHLORIDE 9 MG/ML
INJECTION, SOLUTION INTRAVENOUS PRN
Status: DISCONTINUED | OUTPATIENT
Start: 2024-05-20 | End: 2024-05-23 | Stop reason: HOSPADM

## 2024-05-20 RX ADMIN — Medication 2000 MG: at 09:50

## 2024-05-20 RX ADMIN — PHENYLEPHRINE HYDROCHLORIDE 30 MCG/MIN: 10 INJECTION INTRAVENOUS at 09:49

## 2024-05-20 RX ADMIN — FENTANYL CITRATE 50 MCG: 50 INJECTION, SOLUTION INTRAMUSCULAR; INTRAVENOUS at 09:53

## 2024-05-20 RX ADMIN — DEXMEDETOMIDINE HYDROCHLORIDE 5 MCG: 100 INJECTION, SOLUTION INTRAVENOUS at 10:12

## 2024-05-20 RX ADMIN — LIDOCAINE HYDROCHLORIDE 50 MG: 20 INJECTION, SOLUTION EPIDURAL; INFILTRATION; INTRACAUDAL; PERINEURAL at 09:44

## 2024-05-20 RX ADMIN — ONDANSETRON 4 MG: 2 INJECTION INTRAMUSCULAR; INTRAVENOUS at 09:48

## 2024-05-20 RX ADMIN — ROCURONIUM BROMIDE 30 MG: 10 INJECTION, SOLUTION INTRAVENOUS at 09:50

## 2024-05-20 RX ADMIN — Medication 100 MG: at 09:45

## 2024-05-20 RX ADMIN — SUGAMMADEX 200 MG: 100 INJECTION, SOLUTION INTRAVENOUS at 10:17

## 2024-05-20 RX ADMIN — DEXAMETHASONE SODIUM PHOSPHATE 4 MG: 4 INJECTION, SOLUTION INTRAMUSCULAR; INTRAVENOUS at 09:48

## 2024-05-20 RX ADMIN — Medication 100 MCG: at 09:48

## 2024-05-20 RX ADMIN — FAMOTIDINE 20 MG: 10 INJECTION INTRAVENOUS at 09:39

## 2024-05-20 RX ADMIN — HEPARIN SODIUM 5000 UNITS: 1000 INJECTION INTRAVENOUS; SUBCUTANEOUS at 10:06

## 2024-05-20 RX ADMIN — SODIUM CHLORIDE: 9 INJECTION, SOLUTION INTRAVENOUS at 09:00

## 2024-05-20 RX ADMIN — PROTAMINE SULFATE 30 MG: 10 INJECTION, SOLUTION INTRAVENOUS at 10:17

## 2024-05-20 RX ADMIN — FENTANYL CITRATE 50 MCG: 50 INJECTION, SOLUTION INTRAMUSCULAR; INTRAVENOUS at 09:44

## 2024-05-20 RX ADMIN — ROCURONIUM BROMIDE 10 MG: 10 INJECTION, SOLUTION INTRAVENOUS at 09:44

## 2024-05-20 RX ADMIN — PROPOFOL 100 MG: 10 INJECTION, EMULSION INTRAVENOUS at 09:44

## 2024-05-20 RX ADMIN — HEPARIN SODIUM 5000 UNITS: 1000 INJECTION INTRAVENOUS; SUBCUTANEOUS at 10:03

## 2024-05-20 ASSESSMENT — ENCOUNTER SYMPTOMS: SHORTNESS OF BREATH: 1

## 2024-05-20 ASSESSMENT — PAIN SCALES - GENERAL
PAINLEVEL_OUTOF10: 0

## 2024-05-20 NOTE — DISCHARGE SUMMARY
[facility]    DISCHARGE SUMMARY      Patient ID:  Troy Venegas  [id number] 88 y.o. 1935    Discharge date:  05/20/24    Admitting Physician: Abisai De Santiago MD     Discharge NP: ELISHA Hutchison - CNP    Admission Diagnoses: Paroxysmal atrial fibrillation (HCC) [I48.0]  Paroxysmal A-fib (HCC) [I48.0]    Discharge Diagnoses:   Patient Active Problem List   Diagnosis    Coronary artery disease involving native coronary artery of native heart without angina pectoris    Atrial fibrillation (HCC)    Palpitations    Atrial tachycardia (HCC)    Type 2 diabetes mellitus with diabetic polyneuropathy, with long-term current use of insulin (HCC)    BPH (benign prostatic hyperplasia)    CKD (chronic kidney disease), stage III (HCC)    Headache    History of melanoma    Basal cell carcinoma    Vasodepressor syncope    Hematuria    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 male patient   with a PMH significant for AFOB, CAD presented with complaints of hematuria.   (per H&P)    Patient presented today for Watchman Device implantation.     Impression     1. Atrial fibrillation (non-valvular) s/p successful percutaneous left atrial appendage occlusion    Recommendations     Treatment with dual antiplatelet and therapy   2.   JUANA at 45 days         -  Anticoagulation recommendations:   Anticoagulation with for 6 weeks with aspirin and Plavix.   JUANA after 45 days.   If Watchman device is well seated with adequate seal and residual leak < 5 mm, then: cont ASA 81 mg + Plavix for total of 6 months post implantation date.                After 6

## 2024-05-20 NOTE — DISCHARGE INSTRUCTIONS
Watchman Discharge Instruction    Care of your puncture site:  Remove bandage 24 hours after the procedure.  May shower in 24 hours but do not sit in a bathtub/pool of water for 5 days or until the wound is healed.  Gently clean groin using soap and water.  Dry thoroughly and apply a Band-Aid that covers the entire site. Use Band-Aid until skin heals over in about 3-5 days.   Do not apply powder or lotion.    Limit walking and stair climbing today.    Normal Observations:  Soreness or tenderness which may last one week.  Mild oozing from the incision site.  Possible bruising that could last 2 weeks.    Activity:  You may resume normal activity in 5 days or after the wound heals.  Avoid lifting more than 10 pounds for 5 days or until the wound heals.  Avoid strenuous exercise or activity for 1 week.  You may return to work in 1 week  without restrictions       Call your doctor immediately if your condition worsens, for any other concerns, for a follow-up appointment or if you experience any of the following:  Increased swelling on the groin or leg.  Unusual pain, numbness, or tingling of the groin or down the leg.  Any signs of infection such as: redness, yellow drainage at the site, swelling or pain.    IF GROIN STARTS BLEEDING SIGNIFICANTLY:   LAY FLAT, HOLD FIRM DIRECT PRESSURE, AND CALL 911     antibiotic prophylaxis (amoxicillin 2 g once 60 minutes prior to procedure) for 6 months for:  a.     Dental procedures including cleanings  b.     Gastrointestinal procedures  c.     Genitourinary procedures  d.     Respiratory procedures involving incision or biopsy  e.     Procedures on infected skin or muscle.     SEDATION DISCHARGE INSTRUCTIONS    5/20/2024     Wear your seatbelt home.  You are under the influence of drugs. Do not drink alcohol, drive, operate machinery, or make any important decisions or sign any legal documents for 24 hours  A responsible adult needs to be with you for 24 hours.  You may experience

## 2024-05-20 NOTE — H&P
presented reasonable alternatives to the patient's proposed care, treatment, and services. The discussion I have done encompassed risks, benefits, and side effects related to the alternatives and the risks related to not receiving the proposed care, treatment, and services.    The patient and the family members understood the risk and benefits and the details of procedure and would like to go ahead with the procedure. The patient gave informed consent.    -CAD: Status post PCI      Thank you for allowing us to participate in the care of Troy Venegas.  Please do not hesitate to contact me if you have any questions.    Abisai De Santiago MD, MPH    [facility]  [address]   [place] [address]  Ph: [phone]  Fax: [phone]    5/20/2024 10:28 AM

## 2024-05-20 NOTE — ANESTHESIA PRE PROCEDURE
artery pressure secondary to incomplete TR jet   envelope.   No pericardial effusion.      Signature      ------------------------------------------------------------------   Electronically signed by Troy Wu MD (Interpreting   physician) on 12/28/2023 at 04:27 PM       Neuro/Psych:   (+) neuromuscular disease:, headaches:            GI/Hepatic/Renal:   (+) renal disease: CRI          Endo/Other:    (+) DiabetesType II DM, using insulin, malignancy/cancer.                 Abdominal: normal exam            Vascular:          Other Findings:         Anesthesia Plan      general     ASA 4       Induction: intravenous.    MIPS: Postoperative opioids intended.  Anesthetic plan and risks discussed with patient.    Use of blood products discussed with patient whom consented to blood products.    Plan discussed with CRNA.    Attending anesthesiologist reviewed and agrees with Preprocedure content              Len Louise MD   5/20/2024

## 2024-05-20 NOTE — ANESTHESIA POSTPROCEDURE EVALUATION
Department of Anesthesiology  Postprocedure Note    Patient: Troy Venegas  MRN: [id number]  YOB: 1935  Date of evaluation: 5/20/2024    Procedure Summary       Date: 05/20/24 Room / Location: [facility] EP LAB 3 / [facility] CARDIAC CATH LAB    Anesthesia Start: 0940 Anesthesia Stop: 1039    Procedure: JUANA WATCHMAN IMPLANT W ANESTHESIA - ASHA GUTIERREZ Diagnosis:       Paroxysmal atrial fibrillation (HCC)      (Paroxysmal atrial fibrillation (HCC) [I48.0])    Providers: Abisai De Santiago MD Responsible Provider: Len Louise MD    Anesthesia Type: general ASA Status: 4            Anesthesia Type: No value filed.    Osman Phase I: Osman Score: 8    Osman Phase II: Osman Score: 10    Anesthesia Post Evaluation    Patient location during evaluation: PACU  Patient participation: complete - patient participated  Level of consciousness: awake and alert  Airway patency: patent  Nausea & Vomiting: no nausea and no vomiting  Cardiovascular status: hemodynamically stable  Respiratory status: acceptable  Hydration status: stable  Multimodal analgesia pain management approach  Pain management: adequate    There were no known notable events for this encounter.

## 2024-05-20 NOTE — PROGRESS NOTES
Pt discharged home per MD orders. R groin site completely clean dry and intact after ambulation. Pt and wife given discharge instructions and state no questions. Pt peripheral IV removed, intact, bleeding controlled. Pt safely transported off unit with all belongings.

## 2024-05-20 NOTE — PROGRESS NOTES
From Cath lab awakening, VSS, respirations easy, right groin soft, pedal pulses confirmed with doppler, denies pain

## 2024-05-21 NOTE — TELEPHONE ENCOUNTER
5/20/2024 s/p SUCCESSFUL WATCHMAN LAAC PROCEDURE PER DR. Abisai De Santiago of left atrial appendage occlusion device with no complication, WATCHMAN device used 31 mm size.     --6-month f/u Post Watchman.  --1-year f/u Post Watchman.  --2-year f/u Post Watchman.    Molly, Schedule at [facility]  Post procedure JUANA to be completed 45-59 days post procedure (7/4/2024-7/18/2024) Order placed.     Thanks.   Beto Cotter RN, BSN   Watchman Coordinator

## 2024-05-22 LAB — ECHO BSA: 2.07 M2

## 2024-05-23 NOTE — TELEPHONE ENCOUNTER
Spoke with pt, reviewed all preps with full understanding and also placed in [place].     JUANA - 7-16-24 - MFF - MXA - 10:30 AM    6 MO - 11-20-24 - MFF - NPSR - 2:30 PM     Postpone set for 1 yr f/u - Recall set for 2 yr f/u

## 2024-06-07 ENCOUNTER — TELEPHONE (OUTPATIENT)
Dept: CARDIOLOGY CLINIC | Age: 89
End: 2024-06-07

## 2024-06-07 NOTE — TELEPHONE ENCOUNTER
Tara from Dr. Baker office is asking about Watchman procedure. They don't know what you all would think about requesting to hold blood thinners for their procedure. Tara would like to talk to Elsie and get her idea of the situation. Her number is [phone]. Please advise. Thank you.

## 2024-06-07 NOTE — TELEPHONE ENCOUNTER
Pt just had Watchman done. He cannot stop anti-coagulation for atleast 6 weeks, unless major surgery is needed

## 2024-06-10 NOTE — TELEPHONE ENCOUNTER
Spoke with Tara and she stated pt had bladder cancer and has had his treatments and they are wanting to check to see how treatment is working and wanting to do some test that may include cutting into the patient. He will need to be under total sedation.     Tara's questions are what is the soonest she can schedule him for the procedure? Does MXA need to see him for cardiac clearance?

## 2024-06-10 NOTE — TELEPHONE ENCOUNTER
Per Dr. Quesada, recommend waiting until after his JUANA on July 16th to hold any anti-coagulation if procedure is not urgent as he is high risk for embolization following recent Watchman implant and we only recommend holding AC if absolutely necessary    ELISHA Duenas-CNP     No.

## 2024-06-11 NOTE — TELEPHONE ENCOUNTER
Recommend they schedule it 10-14 days after the JUANA so we can ensure everything is stable from our end and Dr. Quesada can then give holding instructions on the ASA/plavix for the urologic procedure

## 2024-06-11 NOTE — TELEPHONE ENCOUNTER
Spoke with Tara from Dr. Baker office and I informed he of the message below she wanted to know how long after the JUANA is it recommend she wait to make an appointment for the procedure as the pt will need to hold AC medications.

## 2024-07-01 ENCOUNTER — OFFICE VISIT (OUTPATIENT)
Dept: DERMATOLOGY | Age: 89
End: 2024-07-01
Payer: MEDICARE

## 2024-07-01 DIAGNOSIS — L57.0 AK (ACTINIC KERATOSIS): Primary | ICD-10-CM

## 2024-07-01 DIAGNOSIS — D48.5 NEOPLASM OF UNCERTAIN BEHAVIOR OF SKIN: ICD-10-CM

## 2024-07-01 PROCEDURE — 11102 TANGNTL BX SKIN SINGLE LES: CPT | Performed by: DERMATOLOGY

## 2024-07-01 PROCEDURE — 17000 DESTRUCT PREMALG LESION: CPT | Performed by: DERMATOLOGY

## 2024-07-01 PROCEDURE — 11103 TANGNTL BX SKIN EA SEP/ADDL: CPT | Performed by: DERMATOLOGY

## 2024-07-01 PROCEDURE — 17003 DESTRUCT PREMALG LES 2-14: CPT | Performed by: DERMATOLOGY

## 2024-07-01 RX ORDER — TRIAMCINOLONE ACETONIDE 1 MG/G
CREAM TOPICAL
Qty: 80 G | Refills: 2 | Status: SHIPPED | OUTPATIENT
Start: 2024-07-01

## 2024-07-01 NOTE — PATIENT INSTRUCTIONS
Biopsy Wound Care Instructions    Keep the bandage in place for 24 hours.   Cleanse the wound with mild soapy water daily  Gently dry the area.  Apply Vaseline or petroleum jelly to the wound using a cotton tipped applicator.  Cover with a clean bandage.  Repeat this process until the biopsy site is healed.  If you had stitches placed, continue treating the site until the stitches are removed. Remember to make an appointment to return to have your stitches removed by our staff.  You may shower and bathe as usual.       ** Biopsy results generally take around 7 business days to come back.  If you have not heard from us by then, please call the office at [phone].    *Please note that biopsy results are released to both the patient and physician at the same time in [place].  Please allow time for your physician to review the results.  One of our staff members will reach out to you with the results and plan.

## 2024-07-01 NOTE — PROGRESS NOTES
lesions were treated cryotherapy: Right forearm-2, left forearm-2, central upper chest-2, left antitragus-1, left central cheek-1. 2 cycles of liquid nitrogen applied to each lesion for 5 seconds using a Cry-Ac cryo spray gun.  Patient was educated regarding the potential risks of blister formation and discomfort.  Wound care was discussed.  The patient tolerated the procedure well and there were no immediate complications.      2. Neoplasm of uncertain behavior of skin,   A.  Left temple-rule out BCC  B.  Left angle of the jaw-rule out SCC in situ versus inflamed seborrheic keratosis  C.  Central upper back-hypertrophic AK versus inflamed seborrheic keratosis versus SCC    Discussed possible diagnosis; patient agreeable to proceed with skin biopsies (written consent obtained).  Risks of the procedure were reviewed including discomfort, bleeding, scar and infection.      The areas to be biopsied were marked with a surgical pen.  Alcohol was used to cleanse the sites. Local anesthesia was acheived with 1% lidocaine with epinephrine. Shave biopsy was performed x 3 using a razor blade.  Hemostasis was achieved with aluminum chloride. The wounds were dressed with petrolatum and covered with a bandage.  Wound care instructions were reviewed.  3 Specimens sent to pathology.  The specimen bottles were appropriately labeled.      The patient tolerated the procedures well and there were no immediate complications.          Return in about 3 months (around 10/1/2024).    --Reinaldo Connolly MD

## 2024-07-08 DIAGNOSIS — D04.39 SQUAMOUS CELL CARCINOMA IN SITU (SCCIS) OF SKIN OF JAWLINE: Primary | ICD-10-CM

## 2024-07-11 ENCOUNTER — OFFICE VISIT (OUTPATIENT)
Dept: CARDIOLOGY CLINIC | Age: 89
End: 2024-07-11
Payer: MEDICARE

## 2024-07-11 VITALS
OXYGEN SATURATION: 98 % | BODY MASS INDEX: 27.02 KG/M2 | HEIGHT: 71 IN | WEIGHT: 193 LBS | DIASTOLIC BLOOD PRESSURE: 62 MMHG | HEART RATE: 60 BPM | SYSTOLIC BLOOD PRESSURE: 120 MMHG

## 2024-07-11 DIAGNOSIS — I25.83 CORONARY ARTERY DISEASE DUE TO LIPID RICH PLAQUE: Primary | ICD-10-CM

## 2024-07-11 DIAGNOSIS — I10 PRIMARY HYPERTENSION: ICD-10-CM

## 2024-07-11 DIAGNOSIS — I25.10 CORONARY ARTERY DISEASE DUE TO LIPID RICH PLAQUE: Primary | ICD-10-CM

## 2024-07-11 DIAGNOSIS — I10 ESSENTIAL HYPERTENSION: ICD-10-CM

## 2024-07-11 DIAGNOSIS — I48.0 PAROXYSMAL A-FIB (HCC): ICD-10-CM

## 2024-07-11 PROCEDURE — 99214 OFFICE O/P EST MOD 30 MIN: CPT | Performed by: NURSE PRACTITIONER

## 2024-07-11 PROCEDURE — G8427 DOCREV CUR MEDS BY ELIG CLIN: HCPCS | Performed by: NURSE PRACTITIONER

## 2024-07-11 PROCEDURE — 93000 ELECTROCARDIOGRAM COMPLETE: CPT | Performed by: NURSE PRACTITIONER

## 2024-07-11 PROCEDURE — 1123F ACP DISCUSS/DSCN MKR DOCD: CPT | Performed by: NURSE PRACTITIONER

## 2024-07-11 PROCEDURE — G8417 CALC BMI ABV UP PARAM F/U: HCPCS | Performed by: NURSE PRACTITIONER

## 2024-07-11 PROCEDURE — 1036F TOBACCO NON-USER: CPT | Performed by: NURSE PRACTITIONER

## 2024-07-11 RX ORDER — ISOSORBIDE MONONITRATE 30 MG/1
30 TABLET, EXTENDED RELEASE ORAL DAILY
Qty: 30 TABLET | Refills: 3 | Status: SHIPPED | OUTPATIENT
Start: 2024-07-11

## 2024-07-11 RX ORDER — NITROGLYCERIN 0.4 MG/1
0.4 TABLET SUBLINGUAL EVERY 5 MIN PRN
Qty: 25 TABLET | Refills: 3 | Status: SHIPPED | OUTPATIENT
Start: 2024-07-11

## 2024-07-11 NOTE — PROGRESS NOTES
[facility]     Outpatient Follow Up Note  Acute       Troy Venegas is 88 y.o. male who presents today with a history of CAD s/p PTCA PD RCA ; s/p PTCA RPDA & RCA Dec '23.    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 Walking fast brings it on. It has radiated to his arm at times. It last until he stops to rest. He does not take NTG SL    It feels similar to how he felt prior to his stent. It seemed to have started around th time of this watchman's.   He stopped taking Ozempic around this time too.     The patient denies orthopnea/PND. The patient does not have swelling. The patients weight is stable . The patient is not experiencing palpitations or dizziness.     His BP runs ~ 90/53 (72), SaPO2 96%    These symptoms are worsening since the last OV.   With regard to medication therapy the patient has been compliant with prescribed regimen. They have tolerated therapy to date.     Past Medical History:   Diagnosis Date    Arrhythmia     Arthritis     hands shoulders neck, R hip    Atrial fibrillation (HCC)     coumadin    Atrial tachycardia (HCC)     Bladder cancer (HCC) 10/2023    CAD (coronary artery disease)     Cancer (HCC)     skin    Diabetes mellitus (HCC)     type 2    Diverticulitis     Enlarged prostate     History of blood transfusion     Hyperlipidemia     Hypertension     Neuropathy     Bilateral feet and lower legs    Pacemaker 2020    Pneumonia     ABOUT 5 YEARS AGO    Vasodepressor syncope      Social History:    Social History     Tobacco Use   Smoking Status Former    Current packs/day: 0.00    Types: Cigarettes    Quit date: 1974    Years since quittin.5   Smokeless Tobacco Never   Tobacco Comments

## 2024-07-11 NOTE — PATIENT INSTRUCTIONS
Begin isosorbide mononitrate 30 mg daily : long acting nitroglycerin    Cardiac cath with Dr. Jung    Appt 2 weeks after your procedure

## 2024-07-12 RX ORDER — ISOSORBIDE MONONITRATE 30 MG/1
30 TABLET, EXTENDED RELEASE ORAL DAILY
Qty: 90 TABLET | OUTPATIENT
Start: 2024-07-12

## 2024-07-15 ENCOUNTER — TELEPHONE (OUTPATIENT)
Dept: CARDIOLOGY CLINIC | Age: 89
End: 2024-07-15

## 2024-07-15 NOTE — TELEPHONE ENCOUNTER
Wife asking when pt should see DCE and when or if heart cath is needed.     Asking if pt needs to see NPTS on 8/2/24? Please call with clarification.    Pt has an upcoming procedure with Dr Riddle on 8/6/24.

## 2024-07-16 ENCOUNTER — HOSPITAL ENCOUNTER (OUTPATIENT)
Age: 89
Discharge: HOME | End: 2024-07-18
Attending: INTERNAL MEDICINE
Payer: MEDICARE

## 2024-07-16 ENCOUNTER — TELEPHONE (OUTPATIENT)
Dept: CARDIOLOGY CLINIC | Age: 89
End: 2024-07-16

## 2024-07-16 VITALS
TEMPERATURE: 98.1 F | BODY MASS INDEX: 26.32 KG/M2 | WEIGHT: 188 LBS | RESPIRATION RATE: 16 BRPM | HEIGHT: 71 IN | SYSTOLIC BLOOD PRESSURE: 113 MMHG | DIASTOLIC BLOOD PRESSURE: 60 MMHG | HEART RATE: 63 BPM | OXYGEN SATURATION: 97 %

## 2024-07-16 DIAGNOSIS — Z95.818 PRESENCE OF WATCHMAN LEFT ATRIAL APPENDAGE CLOSURE DEVICE: ICD-10-CM

## 2024-07-16 DIAGNOSIS — I25.10 CORONARY ARTERY DISEASE DUE TO LIPID RICH PLAQUE: Primary | ICD-10-CM

## 2024-07-16 DIAGNOSIS — I25.83 CORONARY ARTERY DISEASE DUE TO LIPID RICH PLAQUE: Primary | ICD-10-CM

## 2024-07-16 DIAGNOSIS — I25.10 ARTERIOSCLEROTIC CORONARY ARTERY DISEASE: ICD-10-CM

## 2024-07-16 DIAGNOSIS — I48.0 PAROXYSMAL A-FIB (HCC): ICD-10-CM

## 2024-07-16 PROCEDURE — 93319 3D ECHO IMG CGEN CAR ANOMAL: CPT | Performed by: INTERNAL MEDICINE

## 2024-07-16 PROCEDURE — 93325 DOPPLER ECHO COLOR FLOW MAPG: CPT

## 2024-07-16 PROCEDURE — 93312 ECHO TRANSESOPHAGEAL: CPT | Performed by: INTERNAL MEDICINE

## 2024-07-16 PROCEDURE — 99152 MOD SED SAME PHYS/QHP 5/>YRS: CPT | Performed by: INTERNAL MEDICINE

## 2024-07-16 PROCEDURE — 93320 DOPPLER ECHO COMPLETE: CPT | Performed by: INTERNAL MEDICINE

## 2024-07-16 PROCEDURE — 6360000002 HC RX W HCPCS: Performed by: INTERNAL MEDICINE

## 2024-07-16 RX ORDER — MIDAZOLAM HYDROCHLORIDE 1 MG/ML
INJECTION INTRAMUSCULAR; INTRAVENOUS PRN
Status: COMPLETED | OUTPATIENT
Start: 2024-07-16 | End: 2024-07-16

## 2024-07-16 RX ORDER — FENTANYL CITRATE 50 UG/ML
INJECTION, SOLUTION INTRAMUSCULAR; INTRAVENOUS PRN
Status: COMPLETED | OUTPATIENT
Start: 2024-07-16 | End: 2024-07-16

## 2024-07-16 RX ADMIN — FENTANYL CITRATE 50 MCG: 50 INJECTION, SOLUTION INTRAMUSCULAR; INTRAVENOUS at 10:39

## 2024-07-16 RX ADMIN — MIDAZOLAM 2 MG: 1 INJECTION INTRAMUSCULAR; INTRAVENOUS at 10:39

## 2024-07-16 RX ADMIN — MIDAZOLAM 1 MG: 1 INJECTION INTRAMUSCULAR; INTRAVENOUS at 10:43

## 2024-07-16 NOTE — DISCHARGE INSTRUCTIONS
JUANA DISCHARGE INSTRUCTIONS    No driving for 24 hours. We strongly recommend that a responsible adult stay with you for the next 24 hours.    Continue Medications.    Advance diet as tolerated    Contact physician office  for following symptoms:  Fever  Difficulty swallowing  Chest pain  Difficulty breathing  Bleeding

## 2024-07-16 NOTE — TELEPHONE ENCOUNTER
Date of Procedure: Monday 7/22/24 @ [facility] with Dr. Jung     Time of arrival: 8:30 am     Procedure time: 10:00 am    Called and spoke to Max's wife and she is agreeable to date and time. Reviewed nothing to eat or drink and bring someone to drive him home instructions and she verbalized understanding. Encouraged to call with any questions or concerns.     Please reach out to Max and his wife to review medication instructions prior to procedure.     Published on [employer], scheduled in [facility] and e-mailed to cath lab.

## 2024-07-16 NOTE — TELEPHONE ENCOUNTER
Per MARCEL, patient does need LHC. I relayed this to the patient while they were in the cath lab for their JUANA.   Lydia, please call patient to schedule LHC with DCE.  Maddison, please call patient with instructions for LHC

## 2024-07-16 NOTE — H&P
no joint complaints.  Integumentary: No rash or pruritis.  Neurological: No headache, diplopia, change in muscle strength, numbness or tingling.   Psychiatric: No anxiety or depression.  Endocrine: No temperature intolerance. No excessive thirst, fluid intake, or urination. No tremor.  Hematologic/Lymphatic: No abnormal bruising or bleeding, blood clots or swollen lymph nodes.  Allergic/Immunologic: No nasal congestion or hives.      Objective:     PHYSICAL EXAM:      Vitals:    05/20/24 0832   BP: (!) 156/64   Pulse: 80   Resp: 18   Temp: 98 °F (36.7 °C)   SpO2: 98%    Weight - Scale: 85.3 kg (188 lb)       General Appearance:  Alert, cooperative, no distress, appears stated age.   Head:  Normocephalic, without obvious abnormality, atraumatic.   Eyes:  Pupils equal and round. No scleral icterus.   Mouth: Moist mucosa, no pharyngeal erythema.   Nose: Nares normal. No drainage or sinus tenderness.   Neck: Supple, symmetrical, trachea midline. No adenopathy. No tenderness/mass/nodules. No carotid bruit or elevated JVD.   Lungs:   Respiratory Effort: Normal   Auscultation: Clear to auscultation bilaterally, respirations unlabored. No wheeze, rales   Chest Wall:  No tenderness or deformity.   Cardiovascular:    Pulses  Palpation: normal   Ascultation: Regular rate, S1/ S2 normal. No murmur, rub, or gallop.  2+ radial and pedal pulses, symmetric  Carotid  Femoral   Abdomen and Gastrointestinal:   Soft, non-tender, bowel sounds active.  Liver and Spleen  Masses   Musculoskeletal: No muscle wasting  Back  Gait   Extremities: Extremities normal, atraumatic. No cyanosis or edema. No cyanosis clubbing       Skin: Inspection and palpation performed, no rashes or lesions.   Pysch: Normal mood and affect. Alert and oriented to time place person   Neurologic: Normal gross motor and sensory exam.       Labs     All labs have been reviewed    Lab Results   Component Value Date/Time    WBC 9.8 05/13/2024 11:40 AM    RBC 4.35

## 2024-07-16 NOTE — PROCEDURES
PROCEDURE NOTE  Date: 7/16/2024   Name: Troy Venegas  YOB: 1935    Procedures        [facility]     Electrophysiology Procedure Note       Date of Procedure: 7/16/2024  Patient's Name: Troy Venegas  YOB: 1935   Medical Record Number: [id number]  Procedure Performed by: Casey Quesada MD    Procedures performed:  IV sedation.   Trans-esophageal echocardiography     Mallampati3  ASA 3    Indication of the procedure: post Watchman    Details of procedure:   The patient was brought to the cath lab area in a fasting and non-sedated state. The risks, benefits and alternatives of the procedure were discussed with the patient. The patient opted to proceed with the procedure. Written informed consent was signed and placed in the chart.  A timeout protocol was completed to identify the patient and the procedure being performed.    IV sedation was provided with IV Versed, Fentanyl initially and JUANA was performed which did not show any  /LA clot/thrombus. Full JUANA reports will be dictated.     An independent trained observer pushed medications  at my direction.   We monitored the patient's level of consciousness and vital signs/physiologic status throughout the procedure duration (see start and stop times below).  Sedation:  3 mg Versed, 50 mcg Fentanyl   Sedation start: 1030  Sedation stop: 1050    The patient tolerated the procedure well and there were no complications.     Conclusion:   Watchman well seated. Possible small leak less than 3 mm    Plan:   The patient can be discharged if remains stable. Will continue with medical therapy.   Continue ASA and plavix

## 2024-07-17 LAB — ECHO BSA: 2.07 M2

## 2024-07-17 NOTE — TELEPHONE ENCOUNTER
Called and discussed with wife. Vu    Left Heart Cath Patient Pre-procedure Instructions    Do NOT eat or drink anything after midnight morning of procedure    MEDICATIONS:  Your medications have been reviewed. Please follow medication instructions below  It is okay to drink a sip of water with meds morning of procedure  Metformin/Insulin-Consider holding the night before and morning of procedure if your sugars might run low because of not eating any breakfast.    Transportation: Bring someone to drive you home.     Scheduling: Lydia DUFF is our full time procedure . She will call you to schedule your procedure.    Allergies: Do you have an allergy to dye or contrast? No.

## 2024-07-19 NOTE — H&P
[facility]  H+P  Consult  OP Visit  FU Visit   CC/HPI   CC Followup visit for cardiac conditions detailed in assessment and plan below.   HPI 88 y.o., male admitted  presents to cath lab for LHC due to  exertional pain radiating to his neck. This is similar to pre-stent angina .    Cardiac Hx PCI, PPM, watchman   Troponin Lab Results   Component Value Date    TROPHS 219 (H) 12/28/2023    TROPHS 145 (H) 12/28/2023    TROPHS 129 (H) 12/28/2023      HISTORY/ALLERGY/ROS   MEDHx  has a past medical history of Arrhythmia, Arthritis, Atrial fibrillation (HCC), Atrial tachycardia (HCC), Bladder cancer (HCC), CAD (coronary artery disease), Cancer (HCC), Diabetes mellitus (HCC), Diverticulitis, Enlarged prostate, History of blood transfusion, Hyperlipidemia, Hypertension, Neuropathy, Pacemaker, Pneumonia, and Vasodepressor syncope.   SURGHx  has a past surgical history that includes shoulder surgery (Bilateral); Finger surgery; Coronary angioplasty with stent (2005); Cataract removal (Bilateral); ablation of dysrhythmic focus; Cystoscopy (09/26/2012); TURP (03/07/2013); other surgical history (Left, 02/25/2014); Carpal tunnel release; Finger trigger release; Cystocopy (10/08/2015); Spinal fusion (N/A); Colonoscopy; pacemaker placement; Cystoscopy (N/A, 10/17/2023); Cystoscopy (N/A, 12/19/2023); Skin cancer excision; other surgical history; laparotomy (N/A, 2/6/2024); and ep device procedure (N/A, 5/20/2024).   SOCHx  reports that he quit smoking about 50 years ago. His smoking use included cigarettes. He has never used smokeless tobacco. He reports current alcohol use of about 2.0 standard drinks of alcohol per week. He reports that he does not use drugs.   FAMHx family history includes Cancer in his father and mother; Diabetes in his sister; No Known Problems in his brother.   ALLERG Patient has no known allergies.   ROS Full ROS obtained and negative except as mentioned in HPI   MEDICATIONS   Medications 
no abrasions, no jaundice, no lesions, no pruritis, and no rashes.

## 2024-07-19 NOTE — PRE SEDATION
[facility]  Pre-sedation Assessment   PROCEDURE   Planned Procedure Cardiac catheterization   Consent I have discussed with the patient and/or the patient representative the indication, alternatives, and the possible risks and/or complications of the planned procedure and the anesthesia methods. The patient and/or patient representative appear to understand and agree to proceed.   INDICATION   Chief Complaint Chest Pain/Pressure  Anginal Equivalent   Presentation Worsening Angina and Stable Known CAD   Anginal Class (2 wks) CCS III - Symptoms with everyday living activities, i.e., moderate limitation   Anginal Symptoms Typical chest pain or anginal equivalent   CHF Class (2 wks) [] I     [x]II     []III     []IV    CV Instability Yes   ISCHEMIC WORKUP/MANAGEMENT/EVALUTION   Stress Test No   Anginal Meds Yes: Antiarrhythmic Agent Other, Aspirin, and STATIN   UPCOMING SURGERY   Valve surgery No   MEDICAL HISTORY   Vital Signs There were no vitals taken for this visit.   Allergies Reviewed in EMR   Medications Reviewed in EMR   Medical History Reviewed in EMR   Surgical History Reviewed in EMR   PRE-SEDATION   Exam I have personally completed a history, physical exam & review of systems for this patient (see notes).   Hx Anesthesia Issue None   Mod Mallampati II   ASA Class Class 2 - A normal healthy patient with mild systemic disease   ANASTASIIA SCALE   Activity 2 - Able to move 4 extrem on command   Respiration 2 - Able to breath deeply and cough freely   Circulation 2 - BP +/- 20mmHg of normal   Consciousness 2 - Fully awake   Oxygen Saturation 2 - Able to maintain oxygen sat >92% on room air   SEDATION/ANESTHESIA PLAN   Goal Guard patient safety and welfare. Minimize physical discomfort and pain. Minimize negative psychological responses to treatment by providing sedation and analgesia and maximize the potential amnesia.  Patient to meet pre-procedure discharge plan.   Medications Midazolam intravenously

## 2024-07-22 ENCOUNTER — HOSPITAL ENCOUNTER (OUTPATIENT)
Age: 89
Setting detail: OUTPATIENT SURGERY
Discharge: HOME | End: 2024-07-22
Attending: INTERNAL MEDICINE | Admitting: INTERNAL MEDICINE
Payer: MEDICARE

## 2024-07-22 VITALS
DIASTOLIC BLOOD PRESSURE: 51 MMHG | TEMPERATURE: 98.5 F | BODY MASS INDEX: 26.32 KG/M2 | SYSTOLIC BLOOD PRESSURE: 102 MMHG | RESPIRATION RATE: 13 BRPM | WEIGHT: 188 LBS | HEART RATE: 61 BPM | HEIGHT: 71 IN | OXYGEN SATURATION: 96 %

## 2024-07-22 DIAGNOSIS — I25.10 ARTERIOSCLEROTIC CORONARY ARTERY DISEASE: ICD-10-CM

## 2024-07-22 LAB
ANION GAP SERPL CALCULATED.3IONS-SCNC: 12 MMOL/L (ref 3–16)
BUN SERPL-MCNC: 20 MG/DL (ref 7–20)
CALCIUM SERPL-MCNC: 9.7 MG/DL (ref 8.3–10.6)
CHLORIDE SERPL-SCNC: 104 MMOL/L (ref 99–110)
CO2 SERPL-SCNC: 24 MMOL/L (ref 21–32)
CREAT SERPL-MCNC: 1.4 MG/DL (ref 0.8–1.3)
DEPRECATED RDW RBC AUTO: 14.6 % (ref 12.4–15.4)
ECHO BSA: 2.07 M2
EKG ATRIAL RATE: 79 BPM
EKG DIAGNOSIS: NORMAL
EKG Q-T INTERVAL: 478 MS
EKG QRS DURATION: 182 MS
EKG QTC CALCULATION (BAZETT): 558 MS
EKG R AXIS: -55 DEGREES
EKG T AXIS: 81 DEGREES
EKG VENTRICULAR RATE: 82 BPM
GFR SERPLBLD CREATININE-BSD FMLA CKD-EPI: 49 ML/MIN/{1.73_M2}
GLUCOSE SERPL-MCNC: 156 MG/DL (ref 70–99)
HCT VFR BLD AUTO: 37 % (ref 40.5–52.5)
HGB BLD-MCNC: 12.4 G/DL (ref 13.5–17.5)
MCH RBC QN AUTO: 29.8 PG (ref 26–34)
MCHC RBC AUTO-ENTMCNC: 33.5 G/DL (ref 31–36)
MCV RBC AUTO: 89.1 FL (ref 80–100)
PLATELET # BLD AUTO: 222 K/UL (ref 135–450)
PMV BLD AUTO: 8.2 FL (ref 5–10.5)
POTASSIUM SERPL-SCNC: 4.1 MMOL/L (ref 3.5–5.1)
RBC # BLD AUTO: 4.15 M/UL (ref 4.2–5.9)
SODIUM SERPL-SCNC: 140 MMOL/L (ref 136–145)
WBC # BLD AUTO: 10 K/UL (ref 4–11)

## 2024-07-22 PROCEDURE — 93458 L HRT ARTERY/VENTRICLE ANGIO: CPT | Performed by: INTERNAL MEDICINE

## 2024-07-22 PROCEDURE — 6360000002 HC RX W HCPCS: Performed by: INTERNAL MEDICINE

## 2024-07-22 PROCEDURE — 99152 MOD SED SAME PHYS/QHP 5/>YRS: CPT | Performed by: INTERNAL MEDICINE

## 2024-07-22 PROCEDURE — C1887 CATHETER, GUIDING: HCPCS | Performed by: INTERNAL MEDICINE

## 2024-07-22 PROCEDURE — C1769 GUIDE WIRE: HCPCS | Performed by: INTERNAL MEDICINE

## 2024-07-22 PROCEDURE — 2500000003 HC RX 250 WO HCPCS: Performed by: INTERNAL MEDICINE

## 2024-07-22 PROCEDURE — C1894 INTRO/SHEATH, NON-LASER: HCPCS | Performed by: INTERNAL MEDICINE

## 2024-07-22 PROCEDURE — 7100000011 HC PHASE II RECOVERY - ADDTL 15 MIN: Performed by: INTERNAL MEDICINE

## 2024-07-22 PROCEDURE — 2709999900 HC NON-CHARGEABLE SUPPLY: Performed by: INTERNAL MEDICINE

## 2024-07-22 PROCEDURE — 93571 IV DOP VEL&/PRESS C FLO 1ST: CPT | Performed by: INTERNAL MEDICINE

## 2024-07-22 PROCEDURE — 93010 ELECTROCARDIOGRAM REPORT: CPT | Performed by: INTERNAL MEDICINE

## 2024-07-22 PROCEDURE — 85347 COAGULATION TIME ACTIVATED: CPT

## 2024-07-22 PROCEDURE — 7100000010 HC PHASE II RECOVERY - FIRST 15 MIN: Performed by: INTERNAL MEDICINE

## 2024-07-22 PROCEDURE — 93005 ELECTROCARDIOGRAM TRACING: CPT | Performed by: INTERNAL MEDICINE

## 2024-07-22 PROCEDURE — 80048 BASIC METABOLIC PNL TOTAL CA: CPT

## 2024-07-22 PROCEDURE — 85027 COMPLETE CBC AUTOMATED: CPT

## 2024-07-22 PROCEDURE — 99153 MOD SED SAME PHYS/QHP EA: CPT | Performed by: INTERNAL MEDICINE

## 2024-07-22 PROCEDURE — 6360000004 HC RX CONTRAST MEDICATION: Performed by: INTERNAL MEDICINE

## 2024-07-22 PROCEDURE — 36415 COLL VENOUS BLD VENIPUNCTURE: CPT

## 2024-07-22 RX ORDER — ADENOSINE 3 MG/ML
INJECTION, SOLUTION INTRAVENOUS CONTINUOUS PRN
Status: DISCONTINUED | OUTPATIENT
Start: 2024-07-22 | End: 2024-07-22 | Stop reason: HOSPADM

## 2024-07-22 RX ORDER — HEPARIN SODIUM 1000 [USP'U]/ML
INJECTION, SOLUTION INTRAVENOUS; SUBCUTANEOUS PRN
Status: DISCONTINUED | OUTPATIENT
Start: 2024-07-22 | End: 2024-07-22 | Stop reason: HOSPADM

## 2024-07-22 RX ORDER — MIDAZOLAM HYDROCHLORIDE 1 MG/ML
INJECTION INTRAMUSCULAR; INTRAVENOUS PRN
Status: DISCONTINUED | OUTPATIENT
Start: 2024-07-22 | End: 2024-07-22 | Stop reason: HOSPADM

## 2024-07-22 RX ORDER — FENTANYL CITRATE 50 UG/ML
INJECTION, SOLUTION INTRAMUSCULAR; INTRAVENOUS PRN
Status: DISCONTINUED | OUTPATIENT
Start: 2024-07-22 | End: 2024-07-22 | Stop reason: HOSPADM

## 2024-07-22 NOTE — DISCHARGE INSTRUCTIONS
Radial Angiogram      Care of your puncture site:  Remove clear bandage 24 hours after the procedure.  May shower in 24 hours  Inspect the site daily and gently clean using soap and water.  Dry thoroughly and apply a Band-Aid.    Normal Observations:  Soreness or tenderness which may last one week.  Mild oozing from the incision site.  Possible bruising that could last 2 weeks.    Activity:  You may resume driving 24 hours following the procedure.  You may resume normal activity in 3 days or after the wound heals.  Avoid lifting more than 10 pounds for 3 days with affected arm.    Nutrition:  Regular diet .  Drink at least 8 to 10 glasses of decaffeinated, non-alcoholic fluid for the next 24 hours to flush the x-ray dye used for your angiogram out of your body.    Call your doctor immediately if your condition worsens, for any other concerns, for a follow-up appointment or if you experience any of the following:  Significant bleeding that does not stop after 10 minutes of applying firm pressure on the puncture site.  Increased swelling of the wrist.  Unusual pain, numbness, or tingling of the wrist/arm.   Any signs of infection such as: redness, yellow drainage at the site, swelling or pain.

## 2024-07-24 LAB — POC ACT LR: 250 SEC

## 2024-08-02 ENCOUNTER — TELEPHONE (OUTPATIENT)
Dept: CARDIOLOGY CLINIC | Age: 89
End: 2024-08-02

## 2024-08-02 ENCOUNTER — OFFICE VISIT (OUTPATIENT)
Dept: CARDIOLOGY CLINIC | Age: 89
End: 2024-08-02
Payer: MEDICARE

## 2024-08-02 VITALS
WEIGHT: 192 LBS | SYSTOLIC BLOOD PRESSURE: 120 MMHG | OXYGEN SATURATION: 97 % | BODY MASS INDEX: 26.88 KG/M2 | HEIGHT: 71 IN | HEART RATE: 75 BPM | DIASTOLIC BLOOD PRESSURE: 60 MMHG

## 2024-08-02 DIAGNOSIS — E78.2 MIXED HYPERLIPIDEMIA: ICD-10-CM

## 2024-08-02 DIAGNOSIS — I48.0 PAROXYSMAL ATRIAL FIBRILLATION (HCC): Primary | ICD-10-CM

## 2024-08-02 DIAGNOSIS — I25.83 CORONARY ARTERY DISEASE DUE TO LIPID RICH PLAQUE: Primary | ICD-10-CM

## 2024-08-02 DIAGNOSIS — I25.10 CORONARY ARTERY DISEASE DUE TO LIPID RICH PLAQUE: Primary | ICD-10-CM

## 2024-08-02 DIAGNOSIS — I48.0 PAROXYSMAL A-FIB (HCC): ICD-10-CM

## 2024-08-02 DIAGNOSIS — I10 PRIMARY HYPERTENSION: ICD-10-CM

## 2024-08-02 PROCEDURE — G8427 DOCREV CUR MEDS BY ELIG CLIN: HCPCS | Performed by: NURSE PRACTITIONER

## 2024-08-02 PROCEDURE — G8417 CALC BMI ABV UP PARAM F/U: HCPCS | Performed by: NURSE PRACTITIONER

## 2024-08-02 PROCEDURE — 1123F ACP DISCUSS/DSCN MKR DOCD: CPT | Performed by: NURSE PRACTITIONER

## 2024-08-02 PROCEDURE — 1036F TOBACCO NON-USER: CPT | Performed by: NURSE PRACTITIONER

## 2024-08-02 PROCEDURE — 99214 OFFICE O/P EST MOD 30 MIN: CPT | Performed by: NURSE PRACTITIONER

## 2024-08-02 NOTE — PATIENT INSTRUCTIONS
Ok to stop isosorbide    Keep appt with Kristian in November    Appt with either Dr. Jung or myself in six months

## 2024-08-02 NOTE — PROGRESS NOTES
Residual   RCA Mid Xience 4.0X38 opw 4.0X23 (All PD 4.0NC to 18atm) No Residual          Procedure: 05/20/24  Percutaneous transcatheter closure of the left atrial appendage with endocardial implant   Transeptal Puncture  JUANA      JUANA : 7/16/24:    Left Ventricle: Normal left ventricular systolic function with a visually estimated EF of 55 - 60%. Left ventricle size is normal. Normal wall thickness.    Aortic Valve: Mild regurgitation with an anteromedial eccentrically directed jet and may underestimate severity.    Mitral Valve: Mild regurgitation with a centrally directed jet.    Tricuspid Valve: Mild regurgitation with a centrally directed jet.    Left Atrium: Device closure in the appendage with a Watchman. Possible Francois-device leak present. The francois-device leak is not significant (<3 mm).    Image quality is good.       Cardiac cath: 7/22/24:         FINDINGS   Artery Findings   LM Distal 20%   LAD 40% mid   Cx OM1 60%   RCA Patent proximal stent, patent distal stents, prox 50%, Mid 60%  Dominant   LVEDP 6mmHg Normal 3-12mmHg   LVG NA Normal >/= 55%   AVG NA      INTERVENTION(S)      Artery Location Intervention NEVIN-Pre NEVIN-Post Residual   OM1 Prox FFR = 0.93 = not significant 3 3 None      POST CATH  RECOMMENDATIONS   Diffuse moderate CAD  Nonobstructive FFR OM1        Assessment:      Diagnosis Orders   1. Coronary artery disease due to lipid rich plaque   ~stable by cath 7/22/24 : denies recurrence of angina yet limiting activities. OM-1 with FFR 0.93 ; stents patent  ~s/p PTCA RPDA & RCA Dec '23   ~CX small vessel at prox 50%, prox-OM 1 50%, mid-LAD 50% also noted on cath Dec '23  ~hx PTCA prox & mid-PD RCA July '05  ~DAPT / statin        2. Primary hypertension   ~controlled   ~normal LV wall thickness  ~BB for PAF       3. Mixed hyperlipidemia   ~controlled on atorvastatin 40 mg daily       4. Paroxysmal A-fib (HCC)   ~stable : denies palpitations  ~s/p Watchman's May '24  ~JUANA 7/16/24 : francois-device

## 2024-08-02 NOTE — TELEPHONE ENCOUNTER
Pt notified of MXA's recommendation. I called Tara at Dr. Riddle's office.     She will relay the recommendation and notify the pt of plans : proceed vs postpone until Dec

## 2024-08-02 NOTE — TELEPHONE ENCOUNTER
----- Message from Casey Quesada MD sent at 8/2/2024  2:33 PM EDT -----  For emergency they can stop it but if it is not emergency procedure we would like him to be on it for 6 months before it can be stopped for procedures.  They have to decide whether it is an emergency or not.  ----- Message -----  From: Radha Herrera APRN - CNP  Sent: 8/2/2024  11:08 AM EDT  To: Casey Quesada MD    Urology inst pt to stop DAPT for an impending cysto on 8/6 of which he did on 7/30/24. He's a recent Watchman's 5/20/24.     He's had hematuria for months. Are you ok with letting him hold his meds so he can get his cysto ?

## 2024-08-06 ENCOUNTER — HOSPITAL ENCOUNTER (OUTPATIENT)
Age: 89
Discharge: HOME | End: 2024-08-06
Payer: MEDICARE

## 2024-08-06 DIAGNOSIS — Z79.4 TYPE 2 DIABETES MELLITUS WITH DIABETIC POLYNEUROPATHY, WITH LONG-TERM CURRENT USE OF INSULIN (HCC): ICD-10-CM

## 2024-08-06 DIAGNOSIS — E11.42 TYPE 2 DIABETES MELLITUS WITH DIABETIC POLYNEUROPATHY, WITH LONG-TERM CURRENT USE OF INSULIN (HCC): ICD-10-CM

## 2024-08-06 PROCEDURE — 36415 COLL VENOUS BLD VENIPUNCTURE: CPT

## 2024-08-06 PROCEDURE — 83036 HEMOGLOBIN GLYCOSYLATED A1C: CPT

## 2024-08-07 LAB
EST. AVERAGE GLUCOSE BLD GHB EST-MCNC: 145.6 MG/DL
HBA1C MFR BLD: 6.7 %

## 2024-08-09 ENCOUNTER — HOSPITAL ENCOUNTER (OUTPATIENT)
Dept: GENERAL RADIOLOGY | Age: 89
Discharge: HOME | End: 2024-08-09
Payer: MEDICARE

## 2024-08-09 ENCOUNTER — OFFICE VISIT (OUTPATIENT)
Dept: INTERNAL MEDICINE CLINIC | Age: 89
End: 2024-08-09

## 2024-08-09 VITALS
DIASTOLIC BLOOD PRESSURE: 74 MMHG | WEIGHT: 192 LBS | HEIGHT: 71 IN | BODY MASS INDEX: 26.88 KG/M2 | SYSTOLIC BLOOD PRESSURE: 128 MMHG

## 2024-08-09 DIAGNOSIS — I48.0 PAROXYSMAL ATRIAL FIBRILLATION (HCC): ICD-10-CM

## 2024-08-09 DIAGNOSIS — G62.9 NEUROPATHY: ICD-10-CM

## 2024-08-09 DIAGNOSIS — Z79.4 TYPE 2 DIABETES MELLITUS WITH DIABETIC POLYNEUROPATHY, WITH LONG-TERM CURRENT USE OF INSULIN (HCC): Primary | ICD-10-CM

## 2024-08-09 DIAGNOSIS — E11.42 TYPE 2 DIABETES MELLITUS WITH DIABETIC POLYNEUROPATHY, WITH LONG-TERM CURRENT USE OF INSULIN (HCC): Primary | ICD-10-CM

## 2024-08-09 DIAGNOSIS — I25.10 CORONARY ARTERY DISEASE INVOLVING NATIVE CORONARY ARTERY OF NATIVE HEART WITHOUT ANGINA PECTORIS: ICD-10-CM

## 2024-08-09 DIAGNOSIS — I25.10 CORONARY ARTERY DISEASE DUE TO LIPID RICH PLAQUE: ICD-10-CM

## 2024-08-09 DIAGNOSIS — R07.89 CHEST PRESSURE: ICD-10-CM

## 2024-08-09 DIAGNOSIS — I25.83 CORONARY ARTERY DISEASE DUE TO LIPID RICH PLAQUE: ICD-10-CM

## 2024-08-09 PROCEDURE — 71046 X-RAY EXAM CHEST 2 VIEWS: CPT

## 2024-08-09 RX ORDER — ISOSORBIDE MONONITRATE 30 MG/1
30 TABLET, EXTENDED RELEASE ORAL DAILY
Qty: 90 TABLET | Refills: 1 | Status: CANCELLED | OUTPATIENT
Start: 2024-08-09

## 2024-08-09 RX ORDER — ALPHA LIPOIC ACID 600 MG
1 TABLET ORAL DAILY
Qty: 90 TABLET | Refills: 1 | Status: SHIPPED | OUTPATIENT
Start: 2024-08-09

## 2024-08-09 ASSESSMENT — PATIENT HEALTH QUESTIONNAIRE - PHQ9
SUM OF ALL RESPONSES TO PHQ9 QUESTIONS 1 & 2: 0
2. FEELING DOWN, DEPRESSED OR HOPELESS: NOT AT ALL
1. LITTLE INTEREST OR PLEASURE IN DOING THINGS: NOT AT ALL
SUM OF ALL RESPONSES TO PHQ QUESTIONS 1-9: 0

## 2024-08-09 NOTE — ASSESSMENT & PLAN NOTE
Chest pressure noted a few weeks ago with exertion.  Patient underwent angiogram which was OK.  Per patient, his cardiologist deemed his chest pressure to be noncardiac in nature.  No tenderness to palpation of the area.  - Will rule out rule out possibility of atrial fibrillation-patient is to touch base with EP  - Chest x-ray  - If this remains a concern, will refer to pulmonology for evaluation

## 2024-08-09 NOTE — PROGRESS NOTES
2024    Troy Venegas (:  1935) is a 88 y.o. male, here for evaluation of the following medical concerns:    Chief Complaint   Patient presents with    Establish Care        HPI    Patient here today to establish care. Prior patient of Dr. Kruger.     Went to ER 24 for chest pressure on exertion. He went to ER, they did angiogram which was unrevealing. No wheezing, SOB. Has some chest pressure sensation with exertion ever since.  Per patient, his cardiologist told him this is not cardiac chest pain.    He also complains of neuropathy. Very bothersome.  He is on pregabalin.      Prior to Visit Medications    Medication Sig Taking? Authorizing Provider   Alpha-Lipoic Acid 600 MG TABS Take 1 tablet by mouth daily Take on an empty stomach. If desired effect is not reached, OK to increase dose to 600mg TWICE daily Yes Zayda Engel MD   isosorbide mononitrate (IMDUR) 30 MG extended release tablet Take 1 tablet by mouth daily Yes Radha Herrera APRN - CNP   nitroGLYCERIN (NITROSTAT) 0.4 MG SL tablet Place 1 tablet under the tongue every 5 minutes as needed for Chest pain Yes Radha Herrera APRN - CNP   triamcinolone (KENALOG) 0.1 % cream Apply to itchy areas on the arms and legs twice daily for up to 2 weeks or until improved. Yes Reinaldo Connolly MD   lansoprazole (PREVACID) 30 MG delayed release capsule TAKE 1 CAPSULE DAILY Yes Lois Kruger MD   pregabalin (LYRICA) 75 MG capsule TAKE 1 CAPSULE EVERY       MORNING AND 3 CAPSULES AT  NIGHT  Patient taking differently: TAKE 2 CAPSULE EVERY       MORNING AND 2 CAPSULES AT  NIGHT Yes Lois Kruger MD   sotalol (BETAPACE) 80 MG tablet TAKE 1 TABLET BY MOUTH TWICE A DAY Yes Yenifer Holloway APRN - NP   nateglinide (STARLIX) 120 MG tablet Take 1 tablet by mouth 2 times daily Yes Lois Kruger MD   valACYclovir (VALTREX) 500 MG tablet TAKE 1 TABLET BY MOUTH TWICE DAILY AT ONSET OF SYMPTOMS FOR 3 DAYS FOR RECURRENCE ON BUTTOCKS Yes Mohsen

## 2024-08-09 NOTE — ASSESSMENT & PLAN NOTE
AM fasting glucose has been 150-180 on average. He has not been taking insulin for the last 8 months. A1c a few days ago was 6.7%, however I suspect this is falsely low 2/2 anemia.  - start 6 basaglar nightly  - Patient to follow-up in 3 months, will repeat A1c

## 2024-08-09 NOTE — ASSESSMENT & PLAN NOTE
This is a chronic problem.  Symptoms are very bothersome for the patient.  - Continue pregabalin  - Try alpha lipoic acid 600 mg daily, can increase to 600 twice daily if needed  - Refer to neurology we have exhausted our treatment options in primary care clinic, however we did discuss that this is not a reversible condition and symptom relief may be difficult to achieve

## 2024-08-12 ENCOUNTER — TELEPHONE (OUTPATIENT)
Dept: CARDIOLOGY CLINIC | Age: 89
End: 2024-08-12

## 2024-08-12 DIAGNOSIS — R05.9 COUGH, UNSPECIFIED TYPE: ICD-10-CM

## 2024-08-12 DIAGNOSIS — I25.10 CORONARY ARTERY DISEASE INVOLVING NATIVE CORONARY ARTERY OF NATIVE HEART WITHOUT ANGINA PECTORIS: Primary | ICD-10-CM

## 2024-08-12 NOTE — TELEPHONE ENCOUNTER
Pt called stated he spoke with his PCP and was recommended to call his cardiologist, pt stated when he walks short distances he has upper chest pain, neck and jaw pain. Pt is requesting a call back to see if he can be seen as soon as possible. Can reach pt at [phone]

## 2024-08-12 NOTE — TELEPHONE ENCOUNTER
Called to advise pt to go to the ED. Pt and wife declined. Offered an appt with Vale at 1 in [place], pt declined. Really pushed for pt go to ED, but he wanted next KW appt. Scheduled to see JM on 8/20.     Pt said it only happens upon exertion.I suggested ED. Declined once again

## 2024-08-12 NOTE — TELEPHONE ENCOUNTER
Spoke with pt. He is experiencing exertional CP/pressure that radiates to his neck/jaw. Denies associated SOB, palpitations, or dizziness. Occurs with exertion and resolves with rest. Advised that he should go to the ED. Pt emphatically said that he would not go to the ED.    S/p LHC (7/22/24) per Dr. Jung showed diffuse moderate CAD. Pt has NTG tabs PRN, but he said he never got instructions on how to take them. Advised to place 1 tab under the tongue for CP, may repeat every 5 min as needed for CP up to 2 more times. Pt verbalized understanding. Will keep OV with JM on 8/20.

## 2024-08-19 ENCOUNTER — PROCEDURE VISIT (OUTPATIENT)
Dept: SURGERY | Age: 89
End: 2024-08-19

## 2024-08-19 VITALS — SYSTOLIC BLOOD PRESSURE: 126 MMHG | HEART RATE: 66 BPM | DIASTOLIC BLOOD PRESSURE: 74 MMHG

## 2024-08-19 DIAGNOSIS — D04.39 SQUAMOUS CELL CARCINOMA IN SITU (SCCIS) OF SKIN OF JAWLINE: Primary | ICD-10-CM

## 2024-08-19 NOTE — PROGRESS NOTES
PRE-PROCEDURE SCREENING    Pacemaker/ICD: Yes, pacemaker  Difficulty with numbing in the past: No  Local Anesthesia Reaction/passing out: No  Latex or adhesive allergy:  No  Any history of reaction to suture or skin glue:  no  Bleeding/Clotting Disorders: No  Anticoagulant Therapy: Yes, plavix and asa 81 mg- stents  Joint prosthesis: No  Artificial Heart Valve: No  Stroke or Seizures: No  Organ Transplant or Lymphoma: No  Immunosuppression: No  Respiratory Problems: No

## 2024-08-19 NOTE — PROGRESS NOTES
MOHS PROCEDURE NOTE    PHYSICIAN:  Angelina Cole MD, Who operated in two distinct and integrated capacities as the surgeon removing the tissue and as the pathologist examining the tissue.    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     EBL:  MINIMAL    DURATION OF PROCEDURE:  3 HOURS    POSTOPERATIVE OBSERVATION: 1 HOUR    SPECIMENS:  SEE MOHS MAP    COMPLICATIONS:  NONE    DESCRIPTION OF PROCEDURE:  The patient was given a mirror, as appropriate, and the biopsy site was identified, marked with a surgical marking pen, and verified by the patient.   Options for treatment were discussed and the patient was informed that Mohs surgery was the selected treatment based on its lower recurrence rate, given the features listed above, as compared to other treatment modalities such as excision, radiation, or curettage, and agreed with this treatment plan.  Risks and benefits including bruising, swelling, bleeding, infection, nerve injury, recurrence, and scarring were discussed with the patient prior to the procedure and a written consent detailing these and other risks was reviewed with the patient and signed.    There was a time out for person and procedure verification.  The surgical site was prepped with an antiseptic solution.  Application of an antiseptic solution was repeated before each surgical stage.      Stage I:  The

## 2024-08-20 ENCOUNTER — TELEPHONE (OUTPATIENT)
Dept: CARDIOLOGY CLINIC | Age: 89
End: 2024-08-20

## 2024-08-20 ENCOUNTER — OFFICE VISIT (OUTPATIENT)
Dept: CARDIOLOGY CLINIC | Age: 89
End: 2024-08-20
Payer: MEDICARE

## 2024-08-20 ENCOUNTER — TELEPHONE (OUTPATIENT)
Dept: SURGERY | Age: 89
End: 2024-08-20

## 2024-08-20 VITALS
WEIGHT: 193.4 LBS | DIASTOLIC BLOOD PRESSURE: 60 MMHG | OXYGEN SATURATION: 97 % | HEART RATE: 84 BPM | SYSTOLIC BLOOD PRESSURE: 128 MMHG | BODY MASS INDEX: 26.97 KG/M2

## 2024-08-20 DIAGNOSIS — R55 VASODEPRESSOR SYNCOPE: Chronic | ICD-10-CM

## 2024-08-20 DIAGNOSIS — R00.2 PALPITATIONS: ICD-10-CM

## 2024-08-20 DIAGNOSIS — Z79.4 TYPE 2 DIABETES MELLITUS WITH DIABETIC POLYNEUROPATHY, WITH LONG-TERM CURRENT USE OF INSULIN (HCC): Primary | ICD-10-CM

## 2024-08-20 DIAGNOSIS — E11.42 TYPE 2 DIABETES MELLITUS WITH DIABETIC POLYNEUROPATHY, WITH LONG-TERM CURRENT USE OF INSULIN (HCC): Primary | ICD-10-CM

## 2024-08-20 DIAGNOSIS — Z95.0 PACEMAKER: ICD-10-CM

## 2024-08-20 DIAGNOSIS — I10 PRIMARY HYPERTENSION: ICD-10-CM

## 2024-08-20 DIAGNOSIS — I48.0 PAROXYSMAL ATRIAL FIBRILLATION (HCC): ICD-10-CM

## 2024-08-20 DIAGNOSIS — N18.30 STAGE 3 CHRONIC KIDNEY DISEASE, UNSPECIFIED WHETHER STAGE 3A OR 3B CKD (HCC): ICD-10-CM

## 2024-08-20 DIAGNOSIS — C67.0 MALIGNANT NEOPLASM OF TRIGONE OF URINARY BLADDER (HCC): ICD-10-CM

## 2024-08-20 PROCEDURE — 1123F ACP DISCUSS/DSCN MKR DOCD: CPT | Performed by: NURSE PRACTITIONER

## 2024-08-20 PROCEDURE — 3044F HG A1C LEVEL LT 7.0%: CPT | Performed by: NURSE PRACTITIONER

## 2024-08-20 PROCEDURE — 99215 OFFICE O/P EST HI 40 MIN: CPT | Performed by: NURSE PRACTITIONER

## 2024-08-20 PROCEDURE — G8427 DOCREV CUR MEDS BY ELIG CLIN: HCPCS | Performed by: NURSE PRACTITIONER

## 2024-08-20 PROCEDURE — G8417 CALC BMI ABV UP PARAM F/U: HCPCS | Performed by: NURSE PRACTITIONER

## 2024-08-20 PROCEDURE — 1036F TOBACCO NON-USER: CPT | Performed by: NURSE PRACTITIONER

## 2024-08-20 RX ORDER — RANOLAZINE 500 MG/1
500 TABLET, EXTENDED RELEASE ORAL 2 TIMES DAILY
Qty: 60 TABLET | Refills: 3 | Status: SHIPPED | OUTPATIENT
Start: 2024-08-20

## 2024-08-20 NOTE — TELEPHONE ENCOUNTER
Pt's wife asked on how to send in a transmission when pt is symptomatic.  They were in for an appt with romelia today and she showed me pts el and how she is unable to figure out how to send in a transmission. Please advise [phone]

## 2024-08-20 NOTE — PROGRESS NOTES
(FLOMAX) 0.4 MG capsule Take 1 capsule by mouth daily      clopidogrel (PLAVIX) 75 MG tablet Take 1 tablet by mouth daily 90 tablet 3    aspirin 81 MG chewable tablet Take 1 tablet by mouth daily 30 tablet 1    Insulin Pen Needle (B-D UF III MINI PEN NEEDLES) 31G X 5 MM MISC Use as directed 200 each 2    carboxymethylcellulose (REFRESH PLUS) 0.5 % SOLN ophthalmic solution Apply 2 drops to eye 3 times daily as needed      fluorouracil (EFUDEX) 5 % cream Apply twice daily to affected area on the right cheek for 2 weeks. 40 g 0     No current facility-administered medications for this visit.     REVIEW OF SYSTEMS:    CONSTITUTIONAL: No major weight gain or loss, night sweats, fever, fatigue, or weakness.  HEENT: No new vision difficulties or ringing in the ears.  RESPIRATORY: No new SOB, PND, orthopnea or cough.   CARDIOVASCULAR: See HPI  GI: No N/V/D, constipation, or abdominal pain. No black/tarry stools  : No urinary urgency, incontinence, or hematuria.  SKIN: No cyanosis or skin lesions.  MUSCULOSKELETAL: No new muscle or joint pain.  NEUROLOGICAL: No syncope or TIA-like symptoms.  PSYCHIATRIC: No anxiety, pain, insomnia or depression      Vitals:    08/20/24 1002   BP: 128/60   Pulse: 84   SpO2: 97%   Weight: 87.7 kg (193 lb 6.4 oz)      VITALS:  /60   Pulse 84   Wt 87.7 kg (193 lb 6.4 oz)   SpO2 97%   BMI 26.97 kg/m²   CONSTITUTIONAL: Cooperative, no apparent distress, and appears well nourished / developed  NEUROLOGIC:  Awake and orientated to person, place, and time.  PSYCH: Calm affect.  SKIN: Warm and dry.  HEENT: Sclera non-icteric, normocephalic, neck supple.  RESPIRATORY:  No increased work of breathing and clear to auscultation, no crackles or wheezing.  CARDIOVASCULAR:  Regular rate and rhythm without murmur. Normal S1 and S2. No gallops or rubs. Normal PMI. No elevation of JVP. Normal carotid pulses with no bruits.    GI:  Normal bowel sounds. Non-distended. Non-tender to palpation  EXT: No

## 2024-08-20 NOTE — TELEPHONE ENCOUNTER
The patient was in the office 8/19/2024 for mohs located on the left angle of jaw with ILC repair.  The patient tolerated the procedure well and left the office in good condition.    A post-operative telephone call was placed at 11:11am on 8/20/2024 in order to check on the patient's recovery process.  The patient was not able to be reached and a phone message was left.

## 2024-08-20 NOTE — TELEPHONE ENCOUNTER
After opening the [employer] mobile el, the home screen will have a box selection for \"My Transmissions.\" After selecting \"My Transmissions,\" the next screen will show a button to \"Send Transmission.\" Once selected, the pacemaker information will download and transmit via the el to the clinic. The el will show if the transmission is successful as well as the date of the transmission. If he has additional questions, he can reach a geolad Monitor  directly at [phone]. Please inform patient. Thank you!

## 2024-08-21 NOTE — TELEPHONE ENCOUNTER
Called patient and his wife they do not have My transmission on their el so I gave them the number to [employer].

## 2024-08-21 NOTE — TELEPHONE ENCOUNTER
Vale the patient is worried about the side affects of the Ranexa, is there any thing else we could try?

## 2024-08-26 NOTE — TELEPHONE ENCOUNTER
Called and spoke to the patient to let him know that per Vale he does not have to take the medication if he is not comfortable with the side affects.

## 2024-08-31 PROCEDURE — 93296 REM INTERROG EVL PM/IDS: CPT | Performed by: INTERNAL MEDICINE

## 2024-08-31 PROCEDURE — 93294 REM INTERROG EVL PM/LDLS PM: CPT | Performed by: INTERNAL MEDICINE

## 2024-09-02 DIAGNOSIS — I25.10 CORONARY ARTERY DISEASE DUE TO LIPID RICH PLAQUE: ICD-10-CM

## 2024-09-02 DIAGNOSIS — I25.83 CORONARY ARTERY DISEASE DUE TO LIPID RICH PLAQUE: ICD-10-CM

## 2024-09-04 ENCOUNTER — TELEPHONE (OUTPATIENT)
Dept: VASCULAR SURGERY | Age: 89
End: 2024-09-04

## 2024-09-04 DIAGNOSIS — I65.23 ATHEROSCLEROSIS OF BOTH CAROTID ARTERIES: Primary | ICD-10-CM

## 2024-09-04 RX ORDER — ISOSORBIDE MONONITRATE 30 MG/1
30 TABLET, EXTENDED RELEASE ORAL DAILY
Qty: 90 TABLET | Refills: 3 | Status: SHIPPED | OUTPATIENT
Start: 2024-09-04

## 2024-09-04 NOTE — TELEPHONE ENCOUNTER
Discussed results of carotid duplex with patient.  Slight increase in left ICA stenosis (50-69%).  Will plan to repeat carotid duplex in 6 months.

## 2024-09-12 ENCOUNTER — HOSPITAL ENCOUNTER (OUTPATIENT)
Age: 89
Discharge: HOME | End: 2024-09-12
Payer: MEDICARE

## 2024-09-12 DIAGNOSIS — C67.0 MALIGNANT NEOPLASM OF TRIGONE OF URINARY BLADDER (HCC): ICD-10-CM

## 2024-09-12 DIAGNOSIS — Z95.0 PACEMAKER: ICD-10-CM

## 2024-09-12 DIAGNOSIS — Z79.4 TYPE 2 DIABETES MELLITUS WITH DIABETIC POLYNEUROPATHY, WITH LONG-TERM CURRENT USE OF INSULIN (HCC): ICD-10-CM

## 2024-09-12 DIAGNOSIS — R00.2 PALPITATIONS: ICD-10-CM

## 2024-09-12 DIAGNOSIS — I48.0 PAROXYSMAL ATRIAL FIBRILLATION (HCC): ICD-10-CM

## 2024-09-12 DIAGNOSIS — R55 VASODEPRESSOR SYNCOPE: Chronic | ICD-10-CM

## 2024-09-12 DIAGNOSIS — E11.42 TYPE 2 DIABETES MELLITUS WITH DIABETIC POLYNEUROPATHY, WITH LONG-TERM CURRENT USE OF INSULIN (HCC): ICD-10-CM

## 2024-09-12 DIAGNOSIS — N18.30 STAGE 3 CHRONIC KIDNEY DISEASE, UNSPECIFIED WHETHER STAGE 3A OR 3B CKD (HCC): ICD-10-CM

## 2024-09-12 DIAGNOSIS — I10 PRIMARY HYPERTENSION: ICD-10-CM

## 2024-09-12 LAB
25(OH)D3 SERPL-MCNC: 32.7 NG/ML
ALBUMIN SERPL-MCNC: 3.9 G/DL (ref 3.4–5)
ALBUMIN/GLOB SERPL: 1.6 {RATIO} (ref 1.1–2.2)
ALP SERPL-CCNC: 80 U/L (ref 40–129)
ALT SERPL-CCNC: <5 U/L (ref 10–40)
ANION GAP SERPL CALCULATED.3IONS-SCNC: 11 MMOL/L (ref 3–16)
AST SERPL-CCNC: 18 U/L (ref 15–37)
BILIRUB DIRECT SERPL-MCNC: 0.3 MG/DL (ref 0–0.3)
BILIRUB INDIRECT SERPL-MCNC: 0.2 MG/DL (ref 0–1)
BILIRUB SERPL-MCNC: 0.5 MG/DL (ref 0–1)
BUN SERPL-MCNC: 18 MG/DL (ref 7–20)
CALCIUM SERPL-MCNC: 10.7 MG/DL (ref 8.3–10.6)
CHLORIDE SERPL-SCNC: 107 MMOL/L (ref 99–110)
CHOLEST SERPL-MCNC: 102 MG/DL (ref 0–199)
CO2 SERPL-SCNC: 24 MMOL/L (ref 21–32)
CREAT SERPL-MCNC: 1.3 MG/DL (ref 0.8–1.3)
DEPRECATED RDW RBC AUTO: 13.9 % (ref 12.4–15.4)
GFR SERPLBLD CREATININE-BSD FMLA CKD-EPI: 53 ML/MIN/{1.73_M2}
GLUCOSE SERPL-MCNC: 107 MG/DL (ref 70–99)
HCT VFR BLD AUTO: 35.8 % (ref 40.5–52.5)
HDLC SERPL-MCNC: 43 MG/DL (ref 40–60)
HGB BLD-MCNC: 11.9 G/DL (ref 13.5–17.5)
LDLC SERPL CALC-MCNC: 44 MG/DL
MAGNESIUM SERPL-MCNC: 2.31 MG/DL (ref 1.8–2.4)
MCH RBC QN AUTO: 29 PG (ref 26–34)
MCHC RBC AUTO-ENTMCNC: 33.2 G/DL (ref 31–36)
MCV RBC AUTO: 87.5 FL (ref 80–100)
PLATELET # BLD AUTO: 230 K/UL (ref 135–450)
PMV BLD AUTO: 8.4 FL (ref 5–10.5)
POTASSIUM SERPL-SCNC: 5 MMOL/L (ref 3.5–5.1)
PROT SERPL-MCNC: 6.3 G/DL (ref 6.4–8.2)
RBC # BLD AUTO: 4.09 M/UL (ref 4.2–5.9)
SODIUM SERPL-SCNC: 142 MMOL/L (ref 136–145)
TRIGL SERPL-MCNC: 75 MG/DL (ref 0–150)
TSH SERPL DL<=0.005 MIU/L-ACNC: 2.47 UIU/ML (ref 0.27–4.2)
VLDLC SERPL CALC-MCNC: 15 MG/DL
WBC # BLD AUTO: 10.2 K/UL (ref 4–11)

## 2024-09-12 PROCEDURE — 80053 COMPREHEN METABOLIC PANEL: CPT

## 2024-09-12 PROCEDURE — 82248 BILIRUBIN DIRECT: CPT

## 2024-09-12 PROCEDURE — 80061 LIPID PANEL: CPT

## 2024-09-12 PROCEDURE — 84443 ASSAY THYROID STIM HORMONE: CPT

## 2024-09-12 PROCEDURE — 83735 ASSAY OF MAGNESIUM: CPT

## 2024-09-12 PROCEDURE — 83036 HEMOGLOBIN GLYCOSYLATED A1C: CPT

## 2024-09-12 PROCEDURE — 85027 COMPLETE CBC AUTOMATED: CPT

## 2024-09-12 PROCEDURE — 36415 COLL VENOUS BLD VENIPUNCTURE: CPT

## 2024-09-12 PROCEDURE — 82306 VITAMIN D 25 HYDROXY: CPT

## 2024-09-13 LAB
EST. AVERAGE GLUCOSE BLD GHB EST-MCNC: 148.5 MG/DL
HBA1C MFR BLD: 6.8 %

## 2024-09-16 ENCOUNTER — OFFICE VISIT (OUTPATIENT)
Dept: DERMATOLOGY | Age: 89
End: 2024-09-16
Payer: MEDICARE

## 2024-09-16 DIAGNOSIS — L82.1 SK (SEBORRHEIC KERATOSIS): ICD-10-CM

## 2024-09-16 DIAGNOSIS — L57.0 AK (ACTINIC KERATOSIS): ICD-10-CM

## 2024-09-16 DIAGNOSIS — D48.5 NEOPLASM OF UNCERTAIN BEHAVIOR OF SKIN: Primary | ICD-10-CM

## 2024-09-16 DIAGNOSIS — L85.3 XEROSIS CUTIS: ICD-10-CM

## 2024-09-16 PROCEDURE — 99212 OFFICE O/P EST SF 10 MIN: CPT | Performed by: DERMATOLOGY

## 2024-09-16 PROCEDURE — 11102 TANGNTL BX SKIN SINGLE LES: CPT | Performed by: DERMATOLOGY

## 2024-09-16 PROCEDURE — G8417 CALC BMI ABV UP PARAM F/U: HCPCS | Performed by: DERMATOLOGY

## 2024-09-16 PROCEDURE — 17003 DESTRUCT PREMALG LES 2-14: CPT | Performed by: DERMATOLOGY

## 2024-09-16 PROCEDURE — 11103 TANGNTL BX SKIN EA SEP/ADDL: CPT | Performed by: DERMATOLOGY

## 2024-09-16 PROCEDURE — 1123F ACP DISCUSS/DSCN MKR DOCD: CPT | Performed by: DERMATOLOGY

## 2024-09-16 PROCEDURE — 1036F TOBACCO NON-USER: CPT | Performed by: DERMATOLOGY

## 2024-09-16 PROCEDURE — 17000 DESTRUCT PREMALG LESION: CPT | Performed by: DERMATOLOGY

## 2024-09-16 PROCEDURE — G8427 DOCREV CUR MEDS BY ELIG CLIN: HCPCS | Performed by: DERMATOLOGY

## 2024-09-17 ENCOUNTER — OFFICE VISIT (OUTPATIENT)
Dept: PULMONOLOGY | Age: 89
End: 2024-09-17
Payer: MEDICARE

## 2024-09-17 VITALS
HEIGHT: 71 IN | WEIGHT: 194 LBS | HEART RATE: 73 BPM | OXYGEN SATURATION: 97 % | BODY MASS INDEX: 27.16 KG/M2 | SYSTOLIC BLOOD PRESSURE: 118 MMHG | DIASTOLIC BLOOD PRESSURE: 60 MMHG

## 2024-09-17 DIAGNOSIS — D50.0 IRON DEFICIENCY ANEMIA DUE TO CHRONIC BLOOD LOSS: ICD-10-CM

## 2024-09-17 DIAGNOSIS — I25.10 CORONARY ARTERY DISEASE INVOLVING NATIVE HEART WITHOUT ANGINA PECTORIS, UNSPECIFIED VESSEL OR LESION TYPE: ICD-10-CM

## 2024-09-17 DIAGNOSIS — Z85.820 HISTORY OF MELANOMA: ICD-10-CM

## 2024-09-17 DIAGNOSIS — R06.09 DOE (DYSPNEA ON EXERTION): Primary | ICD-10-CM

## 2024-09-17 PROCEDURE — 99205 OFFICE O/P NEW HI 60 MIN: CPT | Performed by: INTERNAL MEDICINE

## 2024-09-17 PROCEDURE — G8427 DOCREV CUR MEDS BY ELIG CLIN: HCPCS | Performed by: INTERNAL MEDICINE

## 2024-09-17 PROCEDURE — G8417 CALC BMI ABV UP PARAM F/U: HCPCS | Performed by: INTERNAL MEDICINE

## 2024-09-17 RX ORDER — INSULIN GLARGINE 100 [IU]/ML
12 INJECTION, SOLUTION SUBCUTANEOUS NIGHTLY
COMMUNITY

## 2024-09-17 RX ORDER — ALBUTEROL SULFATE 90 UG/1
2 INHALANT RESPIRATORY (INHALATION) 4 TIMES DAILY PRN
Qty: 18 G | Refills: 5 | Status: SHIPPED | OUTPATIENT
Start: 2024-09-17

## 2024-09-17 ASSESSMENT — ENCOUNTER SYMPTOMS
SINUS PRESSURE: 0
SORE THROAT: 0
VOICE CHANGE: 0
RHINORRHEA: 0
BACK PAIN: 0
COLOR CHANGE: 0
DIARRHEA: 0
COUGH: 0
BLOOD IN STOOL: 0
ABDOMINAL PAIN: 0
STRIDOR: 0
CONSTIPATION: 0
VOMITING: 0
APNEA: 0
SHORTNESS OF BREATH: 1
CHOKING: 0
CHEST TIGHTNESS: 0
WHEEZING: 0
ABDOMINAL DISTENTION: 0

## 2024-09-30 ENCOUNTER — HOSPITAL ENCOUNTER (OUTPATIENT)
Dept: PULMONOLOGY | Age: 89
Discharge: HOME | End: 2024-09-30
Attending: INTERNAL MEDICINE
Payer: MEDICARE

## 2024-09-30 ENCOUNTER — HOSPITAL ENCOUNTER (OUTPATIENT)
Dept: CT IMAGING | Age: 89
Discharge: HOME | End: 2024-09-30
Attending: INTERNAL MEDICINE
Payer: MEDICARE

## 2024-09-30 DIAGNOSIS — R06.09 DOE (DYSPNEA ON EXERTION): ICD-10-CM

## 2024-09-30 LAB
DLCO %PRED: 83 %
DLCO PRED: NORMAL
DLCO/VA %PRED: NORMAL
DLCO/VA PRED: NORMAL
DLCO/VA: NORMAL
DLCO: NORMAL
EXPIRATORY TIME-POST: NORMAL
EXPIRATORY TIME: NORMAL
FEF 25-75 %CHNG: NORMAL
FEF 25-75 POST %PRED: NORMAL
FEF 25-75% %PRED-PRE: NORMAL
FEF 25-75% PRED: NORMAL
FEF 25-75-POST: NORMAL
FEF 25-75-PRE: NORMAL
FEV1 %PRED-POST: NORMAL
FEV1 %PRED-PRE: 86 %
FEV1 PRED: NORMAL
FEV1-POST: NORMAL
FEV1-PRE: NORMAL
FEV1/FVC %PRED-POST: NORMAL
FEV1/FVC %PRED-PRE: NORMAL
FEV1/FVC PRED: NORMAL
FEV1/FVC-POST: NORMAL
FEV1/FVC-PRE: 64 %
FVC %PRED-POST: NORMAL
FVC %PRED-PRE: NORMAL
FVC PRED: NORMAL
FVC-POST: NORMAL
FVC-PRE: NORMAL
GAW %PRED: NORMAL
GAW PRED: NORMAL
GAW: NORMAL
IC PRE %PRED: NORMAL
IC PRED: NORMAL
IC: NORMAL
MEP: NORMAL
MIP: NORMAL
MVV %PRED-PRE: NORMAL
MVV PRED: NORMAL
MVV-PRE: NORMAL
PEF %PRED-POST: NORMAL
PEF %PRED-PRE: NORMAL
PEF PRED: NORMAL
PEF%CHNG: NORMAL
PEF-POST: NORMAL
PEF-PRE: NORMAL
RAW %PRED: NORMAL
RAW PRED: NORMAL
RAW: NORMAL
RV PRE %PRED: NORMAL
RV PRED: NORMAL
RV: NORMAL
SVC %PRED: NORMAL
SVC PRED: NORMAL
SVC: NORMAL
TLC PRE %PRED: 121 %
TLC PRED: NORMAL
TLC: NORMAL
VA %PRED: NORMAL
VA PRED: NORMAL
VA: NORMAL
VTG %PRED: NORMAL
VTG PRED: NORMAL
VTG: NORMAL

## 2024-09-30 PROCEDURE — 94760 N-INVAS EAR/PLS OXIMETRY 1: CPT

## 2024-09-30 PROCEDURE — 71250 CT THORAX DX C-: CPT

## 2024-09-30 PROCEDURE — 94726 PLETHYSMOGRAPHY LUNG VOLUMES: CPT

## 2024-09-30 PROCEDURE — 94729 DIFFUSING CAPACITY: CPT

## 2024-09-30 PROCEDURE — 94010 BREATHING CAPACITY TEST: CPT

## 2024-09-30 RX ORDER — ALBUTEROL SULFATE 90 UG/1
4 INHALANT RESPIRATORY (INHALATION) ONCE
Status: CANCELLED | OUTPATIENT
Start: 2024-09-30

## 2024-09-30 ASSESSMENT — PULMONARY FUNCTION TESTS
FEV1_PERCENT_PREDICTED_PRE: 86
FEV1/FVC_PRE: 64

## 2024-10-01 PROCEDURE — 94010 BREATHING CAPACITY TEST: CPT | Performed by: STUDENT IN AN ORGANIZED HEALTH CARE EDUCATION/TRAINING PROGRAM

## 2024-10-01 PROCEDURE — 94726 PLETHYSMOGRAPHY LUNG VOLUMES: CPT | Performed by: STUDENT IN AN ORGANIZED HEALTH CARE EDUCATION/TRAINING PROGRAM

## 2024-10-01 PROCEDURE — 94729 DIFFUSING CAPACITY: CPT | Performed by: STUDENT IN AN ORGANIZED HEALTH CARE EDUCATION/TRAINING PROGRAM

## 2024-10-01 NOTE — PROCEDURES
Pulmonary Function Testing      Patient name:  Troy Venegas      Unit #:   [id number]   Date of test:  2024  Date of interpretation:   10/1/2024    Mr. Troy Venegas is a 88 y.o. year-old.  The spirometry data was acceptable and reproducible per the ATS criteria.     Spirometry:  Pre-bronchodilator FVC: 3.63 L, 93 %  Pre-bronchodilator FEV1: 2.34 L, 86 %  FEV1/FVC: 64 %    Lung volumes:  T.89 L, 121 %  RV: 4.14 L, 144 %    Diffusion capacity:  DLCO: 13.5 ml/mmHg/min, 83 %    Flow Volume loop:  Mild expiratory coving     Interpretation:    Spirometry shows obstructive ventilatory defect with a mild reduction in FEV1  Lung volumes shows air trapping and hyper-inflation   Diffusion capacity is normal.     Jef Shaffer MD   Pulmonary and Critical Care Medicine  [facility]

## 2024-10-03 ENCOUNTER — HOSPITAL ENCOUNTER (EMERGENCY)
Age: 89
Discharge: HOME | End: 2024-10-03
Attending: EMERGENCY MEDICINE
Payer: MEDICARE

## 2024-10-03 ENCOUNTER — APPOINTMENT (OUTPATIENT)
Dept: CT IMAGING | Age: 89
End: 2024-10-03
Payer: MEDICARE

## 2024-10-03 VITALS
SYSTOLIC BLOOD PRESSURE: 129 MMHG | TEMPERATURE: 97.6 F | BODY MASS INDEX: 27.16 KG/M2 | DIASTOLIC BLOOD PRESSURE: 57 MMHG | OXYGEN SATURATION: 95 % | HEART RATE: 60 BPM | RESPIRATION RATE: 18 BRPM | HEIGHT: 71 IN | WEIGHT: 194 LBS

## 2024-10-03 DIAGNOSIS — R31.0 GROSS HEMATURIA: Primary | ICD-10-CM

## 2024-10-03 DIAGNOSIS — R33.9 URINARY RETENTION: ICD-10-CM

## 2024-10-03 DIAGNOSIS — R93.5 ABNORMAL CT OF THE ABDOMEN: ICD-10-CM

## 2024-10-03 LAB
ALBUMIN SERPL-MCNC: 3.9 G/DL (ref 3.4–5)
ALBUMIN/GLOB SERPL: 1.6 {RATIO} (ref 1.1–2.2)
ALP SERPL-CCNC: 82 U/L (ref 40–129)
ALT SERPL-CCNC: 6 U/L (ref 10–40)
ANION GAP SERPL CALCULATED.3IONS-SCNC: 10 MMOL/L (ref 3–16)
APTT BLD: 26.8 SEC (ref 22.1–36.4)
AST SERPL-CCNC: 16 U/L (ref 15–37)
BASOPHILS # BLD: 0.1 K/UL (ref 0–0.2)
BASOPHILS NFR BLD: 1 %
BILIRUB SERPL-MCNC: <0.2 MG/DL (ref 0–1)
BUN SERPL-MCNC: 26 MG/DL (ref 7–20)
CALCIUM SERPL-MCNC: 9.8 MG/DL (ref 8.3–10.6)
CHLORIDE SERPL-SCNC: 104 MMOL/L (ref 99–110)
CO2 SERPL-SCNC: 26 MMOL/L (ref 21–32)
CREAT SERPL-MCNC: 1.2 MG/DL (ref 0.8–1.3)
DEPRECATED RDW RBC AUTO: 13.6 % (ref 12.4–15.4)
EOSINOPHIL # BLD: 0.2 K/UL (ref 0–0.6)
EOSINOPHIL NFR BLD: 1.5 %
GFR SERPLBLD CREATININE-BSD FMLA CKD-EPI: 59 ML/MIN/{1.73_M2}
GLUCOSE SERPL-MCNC: 109 MG/DL (ref 70–99)
HCT VFR BLD AUTO: 35.4 % (ref 40.5–52.5)
HGB BLD-MCNC: 11.4 G/DL (ref 13.5–17.5)
INR PPP: 1.1 (ref 0.85–1.15)
LYMPHOCYTES # BLD: 0.9 K/UL (ref 1–5.1)
LYMPHOCYTES NFR BLD: 8.7 %
MCH RBC QN AUTO: 27.9 PG (ref 26–34)
MCHC RBC AUTO-ENTMCNC: 32.3 G/DL (ref 31–36)
MCV RBC AUTO: 86.4 FL (ref 80–100)
MONOCYTES # BLD: 0.9 K/UL (ref 0–1.3)
MONOCYTES NFR BLD: 8.9 %
NEUTROPHILS # BLD: 8.3 K/UL (ref 1.7–7.7)
NEUTROPHILS NFR BLD: 79.9 %
PLATELET # BLD AUTO: 279 K/UL (ref 135–450)
PMV BLD AUTO: 7.7 FL (ref 5–10.5)
POTASSIUM SERPL-SCNC: 4.4 MMOL/L (ref 3.5–5.1)
PROT SERPL-MCNC: 6.4 G/DL (ref 6.4–8.2)
PROTHROMBIN TIME: 14.4 SEC (ref 11.9–14.9)
RBC # BLD AUTO: 4.09 M/UL (ref 4.2–5.9)
SODIUM SERPL-SCNC: 140 MMOL/L (ref 136–145)
WBC # BLD AUTO: 10.3 K/UL (ref 4–11)

## 2024-10-03 PROCEDURE — 87186 SC STD MICRODIL/AGAR DIL: CPT

## 2024-10-03 PROCEDURE — 85025 COMPLETE CBC W/AUTO DIFF WBC: CPT

## 2024-10-03 PROCEDURE — 6370000000 HC RX 637 (ALT 250 FOR IP): Performed by: EMERGENCY MEDICINE

## 2024-10-03 PROCEDURE — 85730 THROMBOPLASTIN TIME PARTIAL: CPT

## 2024-10-03 PROCEDURE — 80053 COMPREHEN METABOLIC PANEL: CPT

## 2024-10-03 PROCEDURE — 74176 CT ABD & PELVIS W/O CONTRAST: CPT

## 2024-10-03 PROCEDURE — 87086 URINE CULTURE/COLONY COUNT: CPT

## 2024-10-03 PROCEDURE — 87077 CULTURE AEROBIC IDENTIFY: CPT

## 2024-10-03 PROCEDURE — 99284 EMERGENCY DEPT VISIT MOD MDM: CPT

## 2024-10-03 PROCEDURE — 85610 PROTHROMBIN TIME: CPT

## 2024-10-03 RX ORDER — NITROFURANTOIN 25; 75 MG/1; MG/1
100 CAPSULE ORAL ONCE
Status: COMPLETED | OUTPATIENT
Start: 2024-10-03 | End: 2024-10-03

## 2024-10-03 RX ORDER — NITROFURANTOIN 25; 75 MG/1; MG/1
100 CAPSULE ORAL 2 TIMES DAILY
Qty: 10 CAPSULE | Refills: 0 | Status: SHIPPED | OUTPATIENT
Start: 2024-10-03 | End: 2024-10-08

## 2024-10-03 RX ORDER — HYDROCODONE BITARTRATE AND ACETAMINOPHEN 5; 325 MG/1; MG/1
1 TABLET ORAL ONCE
Status: COMPLETED | OUTPATIENT
Start: 2024-10-03 | End: 2024-10-03

## 2024-10-03 RX ADMIN — NITROFURANTOIN MONOHYDRATE/MACROCRYSTALS 100 MG: 75; 25 CAPSULE ORAL at 09:39

## 2024-10-03 RX ADMIN — HYDROCODONE BITARTRATE AND ACETAMINOPHEN 1 TABLET: 5; 325 TABLET ORAL at 06:50

## 2024-10-03 ASSESSMENT — PAIN SCALES - GENERAL: PAINLEVEL_OUTOF10: 4

## 2024-10-03 ASSESSMENT — PAIN DESCRIPTION - LOCATION: LOCATION: ABDOMEN

## 2024-10-03 ASSESSMENT — PAIN - FUNCTIONAL ASSESSMENT: PAIN_FUNCTIONAL_ASSESSMENT: 0-10

## 2024-10-03 NOTE — ED NOTES
Patient resting in bed with wife at bedside. Patient updated on plan of care, v/u. Denies any needs at this time, call light in reach.

## 2024-10-03 NOTE — ED PROVIDER NOTES
[facility] Emergency Department      Pt Name: Troy Venegas  MRN: [id number]  Birthdate 1935  Date of evaluation: 10/3/2024  Provider: NAOMY TUCKER MD  CHIEF COMPLAINT  Chief Complaint   Patient presents with    Hematuria     Pt came in from home, pt reports blood in urine with clots that started on Monday night, pt reports this mroning they felt like it was clogged up, pt reports they believe they peeded out the clot before triage. Pt reports hx of bladder cancer. Pt reports taking plavix     HPI  Troy Venegas is a 88 y.o. male who presents because of difficulty urinating.  He has a history of bladder cancer and had BCG bladder treatments earlier this year.  He has extensive cardiac history as well and is anticoagulated with Plavix.  He had a watchman's procedure earlier this year and needs to continue the blood thinner.  His treatments for his bladder have been postponed because of his cardiac problems.  He started noticing blood in his urine last week.  He had a urine test done on Friday and was told he had bacteria in his urine.  He took a course of amoxicillin.  He noticed that he could not urinate last night at 10 PM.  He was getting more more discomfort but was able to pass urine when he got to the ER today after he passed a large clot with bloody urine.  He does have some residual low back pain and abdominal pain but it is decreased.  Denies any fever or chills.    REVIEW OF SYSTEMS:  No fever, no sob, regular BMs Pertinent positives and negatives as per the HPI.  All other pertinent review of systems reviewed and negative.  Nursing notes reviewed.    PAST MEDICAL HISTORY  Past Medical History:   Diagnosis Date    Arrhythmia     Arthritis     hands shoulders neck, R hip    Atrial fibrillation (HCC)     coumadin    Atrial tachycardia (HCC)     Bladder cancer (HCC) 10/2023    CAD (coronary artery disease)     Cancer (HCC)     skin    Diabetes mellitus (HCC)     type 2    Diverticulitis

## 2024-10-03 NOTE — ED NOTES
Patient states pain is 4/10 on 0-10 pain scale. Patient denies need or want for further pain meds. Patient and wife updated on awaiting call back from Urology, denies any needs at this time.

## 2024-10-04 ENCOUNTER — TELEPHONE (OUTPATIENT)
Dept: INTERNAL MEDICINE CLINIC | Age: 89
End: 2024-10-04

## 2024-10-04 ENCOUNTER — HOSPITAL ENCOUNTER (EMERGENCY)
Age: 89
Discharge: HOME | End: 2024-10-04
Attending: STUDENT IN AN ORGANIZED HEALTH CARE EDUCATION/TRAINING PROGRAM
Payer: MEDICARE

## 2024-10-04 VITALS
SYSTOLIC BLOOD PRESSURE: 154 MMHG | HEART RATE: 73 BPM | OXYGEN SATURATION: 95 % | BODY MASS INDEX: 25.9 KG/M2 | DIASTOLIC BLOOD PRESSURE: 66 MMHG | RESPIRATION RATE: 19 BRPM | HEIGHT: 71 IN | WEIGHT: 185 LBS | TEMPERATURE: 97.5 F

## 2024-10-04 DIAGNOSIS — Z46.6 ENCOUNTER FOR FOLEY CATHETER REPLACEMENT: Primary | ICD-10-CM

## 2024-10-04 DIAGNOSIS — Z85.51 HISTORY OF BLADDER CANCER: ICD-10-CM

## 2024-10-04 PROCEDURE — 6370000000 HC RX 637 (ALT 250 FOR IP): Performed by: STUDENT IN AN ORGANIZED HEALTH CARE EDUCATION/TRAINING PROGRAM

## 2024-10-04 PROCEDURE — 99283 EMERGENCY DEPT VISIT LOW MDM: CPT

## 2024-10-04 RX ORDER — HYDROCODONE BITARTRATE AND ACETAMINOPHEN 5; 325 MG/1; MG/1
1 TABLET ORAL EVERY 12 HOURS PRN
Qty: 6 TABLET | Refills: 0 | Status: SHIPPED | OUTPATIENT
Start: 2024-10-04 | End: 2024-10-07

## 2024-10-04 RX ORDER — HYDROCODONE BITARTRATE AND ACETAMINOPHEN 7.5; 325 MG/1; MG/1
1 TABLET ORAL ONCE
Status: COMPLETED | OUTPATIENT
Start: 2024-10-04 | End: 2024-10-04

## 2024-10-04 RX ADMIN — HYDROCODONE BITARTRATE AND ACETAMINOPHEN 1 TABLET: 7.5; 325 TABLET ORAL at 05:14

## 2024-10-04 ASSESSMENT — PAIN SCALES - GENERAL: PAINLEVEL_OUTOF10: 9

## 2024-10-04 ASSESSMENT — LIFESTYLE VARIABLES
HOW MANY STANDARD DRINKS CONTAINING ALCOHOL DO YOU HAVE ON A TYPICAL DAY: PATIENT DOES NOT DRINK
HOW OFTEN DO YOU HAVE A DRINK CONTAINING ALCOHOL: NEVER

## 2024-10-04 ASSESSMENT — PAIN - FUNCTIONAL ASSESSMENT: PAIN_FUNCTIONAL_ASSESSMENT: 0-10

## 2024-10-04 NOTE — ED PROVIDER NOTES
EMERGENCY DEPARTMENT PROVIDER NOTE         PATIENT IDENTIFICATION     Name:   Troy Venegas  MRN:   [id number]  YOB: 1935  Date of Evaluation:   10/4/2024  Provider:   Tommy White DO  PCP:   Zayda Engel MD        CHIEF COMPLAINT       Urinary Retention and Hematuria (Pt presents to the ED with c/o urinary retention and hematuria.  Pt reports being here last night and was able to pass the clots and urinate but is not able to now. States the last time he was able to void was midnight. )        HISTORY OF PRESENT ILLNESS     Troy Venegas is a(n) 88 y.o. male with past medical history as below including bladder cancer with residual hematuria and a fib on coumadin  who arrives via private vehicle for urinary tension.  He states that some is able to urinate was about 24 hours ago.  From history of recurrent symptoms secondary to bladder cancer resulting in hematuria with clot causing obstructive process.  Has an appointment with urology on 10/7/2024.  No other symptoms.    I personally reviewed the following nurse documentation:  Past Medical History:     Past Medical History:   Diagnosis Date    Arrhythmia     Arthritis     hands shoulders neck, R hip    Atrial fibrillation (HCC)     coumadin    Atrial tachycardia (HCC)     Bladder cancer (HCC) 10/2023    CAD (coronary artery disease)     Cancer (HCC)     skin    Diabetes mellitus (HCC)     type 2    Diverticulitis     Enlarged prostate     History of blood transfusion     Hyperlipidemia     Hypertension     Neuropathy     Bilateral feet and lower legs    Pacemaker 04/20/2020    Pneumonia     ABOUT 5 YEARS AGO    Vasodepressor syncope        Home Medications:     Discharge Medication List as of 10/4/2024  5:01 AM        CONTINUE these medications which have NOT CHANGED    Details   nitrofurantoin, macrocrystal-monohydrate, (MACROBID) 100 MG capsule Take 1 capsule by mouth 2 times daily for 5 days, Disp-10 capsule, R-0Normal

## 2024-10-04 NOTE — TELEPHONE ENCOUNTER
----- Message from Ramin RAMOS sent at 10/4/2024  9:38 AM EDT -----  Regarding: ECC Appointment Request  ECC Appointment Request    Patient needs appointment for ECC Appointment Type: ED Follow-Up.    Patient Requested Dates(s): Wednesday the 9th of October   Patient Requested Time: Morning   Provider Name:Zayda Engel MD    Reason for Appointment Request: Established Patient - Available appointments did not meet patient need  --------------------------------------------------------------------------------------------------------------------------    Relationship to Patient: Spouse/Partner Wife     Call Back Information: OK to leave message on voicemail  Preferred Call Back Number: Phone [phone]

## 2024-10-04 NOTE — DISCHARGE INSTRUCTIONS
It is mandatory that you follow up with your primary care provider and urology as scheduled to ensure resolution/improvement of your symptoms.    MANDATORY return to the emergency department within 12-24 hours unless you are better.  If you feel worse or have any other concerns, return sooner.    If you experience any of the red flag signs/symptoms that we discussed, please return to the emergency department or call 911 immediately.    Please take medications as we discussed.

## 2024-10-04 NOTE — ED TRIAGE NOTES
Pt presents to the ED with c/o urinary retention and hematuria.  Pt reports being here last night and was able to pass the clots and urinate but is not able to now. States the last time he was able to void was midnight.

## 2024-10-05 LAB
BACTERIA UR CULT: ABNORMAL
ORGANISM: ABNORMAL

## 2024-10-07 NOTE — TELEPHONE ENCOUNTER
Pt spouse was told to schedule an appointment with Dr Engel for an ED follow up. Appt scheduled on 10/14 at 1pm

## 2024-10-11 ENCOUNTER — TELEPHONE (OUTPATIENT)
Dept: CARDIOLOGY CLINIC | Age: 89
End: 2024-10-11

## 2024-10-11 NOTE — TELEPHONE ENCOUNTER
His 6 months post watchman is November 20th. He should continue ASA/plavix until that time, then he can stop the plavix. He can be cleared by NPTS or [facility] as he routinely sees them    ELISHA Duenas-CNP

## 2024-10-11 NOTE — TELEPHONE ENCOUNTER
Pt called to speak NPSR/MXA re: getting release from the 6mo period from having his watchman put in.  Pt is needing to get his bladder surgery for his cancer as he is constantly bleeding.   Please advise.  Thank you        CARDIAC CLEARANCE     What type of procedure are you having?  Cystectomy  (Bladder)  Which physician is performing your procedure?  Dr. Rosalino Riddle    When is your procedure scheduled for?  Pending     Where are you having this procedure?  [facility]    Are you taking Blood Thinners?  Yes   If so what? (Name/dose/frequesncy)  Plavix 75mg, Aspirin 81 mg     Does the surgeon want you to stop your blood thinner?  If so for how long?  Yes- 7 days prior     Phone Number and Contact Name for Physicians office:  [phone]  Fax number to send information:  [phone]

## 2024-10-14 ENCOUNTER — OFFICE VISIT (OUTPATIENT)
Dept: INTERNAL MEDICINE CLINIC | Age: 89
End: 2024-10-14

## 2024-10-14 VITALS
DIASTOLIC BLOOD PRESSURE: 68 MMHG | SYSTOLIC BLOOD PRESSURE: 128 MMHG | HEIGHT: 71 IN | WEIGHT: 185 LBS | BODY MASS INDEX: 25.9 KG/M2

## 2024-10-14 DIAGNOSIS — I48.0 PAROXYSMAL ATRIAL FIBRILLATION (HCC): ICD-10-CM

## 2024-10-14 DIAGNOSIS — R31.9 HEMATURIA, UNSPECIFIED TYPE: Primary | ICD-10-CM

## 2024-10-14 DIAGNOSIS — E11.42 TYPE 2 DIABETES MELLITUS WITH DIABETIC POLYNEUROPATHY, WITH LONG-TERM CURRENT USE OF INSULIN (HCC): ICD-10-CM

## 2024-10-14 DIAGNOSIS — Z79.4 TYPE 2 DIABETES MELLITUS WITH DIABETIC POLYNEUROPATHY, WITH LONG-TERM CURRENT USE OF INSULIN (HCC): ICD-10-CM

## 2024-10-14 DIAGNOSIS — G62.9 NEUROPATHY: ICD-10-CM

## 2024-10-14 RX ORDER — LANCETS
EACH MISCELLANEOUS
Qty: 100 EACH | Refills: 3 | Status: SHIPPED | OUTPATIENT
Start: 2024-10-14

## 2024-10-14 RX ORDER — PREGABALIN 75 MG/1
CAPSULE ORAL
Qty: 360 CAPSULE | Refills: 1 | Status: SHIPPED | OUTPATIENT
Start: 2024-10-14 | End: 2025-04-12

## 2024-10-14 NOTE — ASSESSMENT & PLAN NOTE
Patient underwent Watchman procedure.  - Continue cardiology follow-up  - Continue aspirin  - Continue Lipitor  - Continue Plavix  - Continue sotalol

## 2024-10-14 NOTE — ASSESSMENT & PLAN NOTE
This problem has had a recent worsening.  He is currently not having hematuria.  However he was recently seen in the emergency room for hematuria requiring Manning catheter.  Manning still in place.  CT scan concerning, patient needs follow-up cystoscopy however cannot proceed without cardiology clearance which has been difficult to obtain.  - Encouraged patient to reach out to cardiology for clearance, explained situation  - Continue Manning  - Continue finasteride  - Continue Flomax

## 2024-10-14 NOTE — PATIENT INSTRUCTIONS
Call cardiology- reiterate that your PCP sees their conversation about clearance. It was incorrectly discussing stents from Dec 2023 with no mention of the watchman. Please let them know this is important.     COVID, Flu  RSV  Tdap (tetanus diptheria pertussis)

## 2024-10-14 NOTE — PROGRESS NOTES
Years of education: 12    Highest education level: Not on file   Occupational History    Not on file   Tobacco Use    Smoking status: Former     Current packs/day: 0.00     Types: Cigarettes     Quit date: 1974     Years since quittin.8    Smokeless tobacco: Never    Tobacco comments:     QUIT SMOKING    Vaping Use    Vaping status: Never Used   Substance and Sexual Activity    Alcohol use: Yes     Alcohol/week: 2.0 standard drinks of alcohol     Types: 2 Glasses of wine per week     Comment: rarely    Drug use: No    Sexual activity: Not Currently     Partners: Female     Comment:     Other Topics Concern    Not on file   Social History Narrative    Not on file     Social Determinants of Health     Financial Resource Strain: Low Risk  (3/8/2024)    Overall Financial Resource Strain (CARDIA)     Difficulty of Paying Living Expenses: Not hard at all   Food Insecurity: No Food Insecurity (3/8/2024)    Hunger Vital Sign     Worried About Running Out of Food in the Last Year: Never true     Ran Out of Food in the Last Year: Never true   Transportation Needs: No Transportation Needs (3/8/2024)    PRAPARE - Transportation     Lack of Transportation (Medical): No     Lack of Transportation (Non-Medical): No   Physical Activity: Sufficiently Active (3/8/2024)    Exercise Vital Sign     Days of Exercise per Week: 3 days     Minutes of Exercise per Session: 50 min   Stress: Not on file   Social Connections: Not on file   Intimate Partner Violence: Unknown (2024)    [facility]    Interpersonal Safety     Feel physically or emotionally unsafe where currently live: Not on file     Harm by anyone: Not on file     Emotionally Harmed: Not on file   Housing Stability: Low Risk  (3/8/2024)    Housing Stability Vital Sign     Unable to Pay for Housing in the Last Year: No     Number of Places Lived in the Last Year: 1

## 2024-10-14 NOTE — ASSESSMENT & PLAN NOTE
This is a chronic problem.  Symptoms are very bothersome for the patient.  - Continue pregabalin  - Continue alpha lipoic acid 600 mg daily  - Patient has appointment coming up with neurology to discuss this problem

## 2024-10-15 NOTE — TELEPHONE ENCOUNTER
Spoke to pt regarding message per NPSR. Pt would like us to contact MAGALIE Barnhart to let him know about the message per NPSR.     Spoke to Edouard to relay message to MAGALIE Barnhart phone number [phone] to to let him know about the message per NPSR.

## 2024-10-15 NOTE — TELEPHONE ENCOUNTER
Reviewed with DCE. Patient is low risk from an interventional standpoint. Clearance needs to come from EP due to medication recommendations.

## 2024-10-15 NOTE — TELEPHONE ENCOUNTER
He cannot stop ASA/plavix until atleast November 20th due to his watchman. We can discuss clearing him at my visit, unless one of the NPs/providers that he has seen before will clear him

## 2024-10-18 ENCOUNTER — APPOINTMENT (OUTPATIENT)
Dept: CT IMAGING | Age: 89
End: 2024-10-18
Payer: MEDICARE

## 2024-10-18 ENCOUNTER — HOSPITAL ENCOUNTER (EMERGENCY)
Age: 89
Discharge: HOME | End: 2024-10-18
Attending: EMERGENCY MEDICINE
Payer: MEDICARE

## 2024-10-18 VITALS
BODY MASS INDEX: 26.82 KG/M2 | SYSTOLIC BLOOD PRESSURE: 162 MMHG | DIASTOLIC BLOOD PRESSURE: 84 MMHG | WEIGHT: 198 LBS | HEART RATE: 72 BPM | RESPIRATION RATE: 16 BRPM | TEMPERATURE: 96.3 F | HEIGHT: 72 IN | OXYGEN SATURATION: 99 %

## 2024-10-18 DIAGNOSIS — R10.2 PERINEAL PAIN: ICD-10-CM

## 2024-10-18 DIAGNOSIS — T83.9XXA PROBLEM WITH FOLEY CATHETER, INITIAL ENCOUNTER (HCC): ICD-10-CM

## 2024-10-18 DIAGNOSIS — C67.9 MALIGNANT NEOPLASM OF URINARY BLADDER, UNSPECIFIED SITE (HCC): Primary | ICD-10-CM

## 2024-10-18 DIAGNOSIS — R91.1 LUNG NODULE SEEN ON IMAGING STUDY: ICD-10-CM

## 2024-10-18 LAB
ALBUMIN SERPL-MCNC: 3.7 G/DL (ref 3.4–5)
ALP SERPL-CCNC: 81 U/L (ref 40–129)
ALT SERPL-CCNC: <5 U/L (ref 10–40)
ANION GAP SERPL CALCULATED.3IONS-SCNC: 14 MMOL/L (ref 3–16)
AST SERPL-CCNC: 14 U/L (ref 15–37)
BASOPHILS # BLD: 0.1 K/UL (ref 0–0.2)
BASOPHILS NFR BLD: 0.4 %
BILIRUB DIRECT SERPL-MCNC: 0.2 MG/DL (ref 0–0.3)
BILIRUB INDIRECT SERPL-MCNC: 0.1 MG/DL (ref 0–1)
BILIRUB SERPL-MCNC: 0.3 MG/DL (ref 0–1)
BUN SERPL-MCNC: 25 MG/DL (ref 7–20)
CALCIUM SERPL-MCNC: 9.7 MG/DL (ref 8.3–10.6)
CHLORIDE SERPL-SCNC: 100 MMOL/L (ref 99–110)
CO2 SERPL-SCNC: 19 MMOL/L (ref 21–32)
CREAT SERPL-MCNC: 1.3 MG/DL (ref 0.8–1.3)
DEPRECATED RDW RBC AUTO: 13.8 % (ref 12.4–15.4)
EOSINOPHIL # BLD: 0 K/UL (ref 0–0.6)
EOSINOPHIL NFR BLD: 0.3 %
GFR SERPLBLD CREATININE-BSD FMLA CKD-EPI: 53 ML/MIN/{1.73_M2}
GLUCOSE SERPL-MCNC: 183 MG/DL (ref 70–99)
HCT VFR BLD AUTO: 32.9 % (ref 40.5–52.5)
HGB BLD-MCNC: 10.9 G/DL (ref 13.5–17.5)
INR PPP: 1.2 (ref 0.85–1.15)
LYMPHOCYTES # BLD: 0.8 K/UL (ref 1–5.1)
LYMPHOCYTES NFR BLD: 5.3 %
MCH RBC QN AUTO: 28.2 PG (ref 26–34)
MCHC RBC AUTO-ENTMCNC: 33.1 G/DL (ref 31–36)
MCV RBC AUTO: 85.1 FL (ref 80–100)
MONOCYTES # BLD: 0.7 K/UL (ref 0–1.3)
MONOCYTES NFR BLD: 4.7 %
NEUTROPHILS # BLD: 12.8 K/UL (ref 1.7–7.7)
NEUTROPHILS NFR BLD: 89.3 %
PLATELET # BLD AUTO: 346 K/UL (ref 135–450)
PMV BLD AUTO: 7.6 FL (ref 5–10.5)
POTASSIUM SERPL-SCNC: 4.7 MMOL/L (ref 3.5–5.1)
PROT SERPL-MCNC: 6.3 G/DL (ref 6.4–8.2)
PROTHROMBIN TIME: 15.4 SEC (ref 11.9–14.9)
RBC # BLD AUTO: 3.86 M/UL (ref 4.2–5.9)
SODIUM SERPL-SCNC: 133 MMOL/L (ref 136–145)
WBC # BLD AUTO: 14.3 K/UL (ref 4–11)

## 2024-10-18 PROCEDURE — 6360000002 HC RX W HCPCS: Performed by: EMERGENCY MEDICINE

## 2024-10-18 PROCEDURE — 99285 EMERGENCY DEPT VISIT HI MDM: CPT

## 2024-10-18 PROCEDURE — 80076 HEPATIC FUNCTION PANEL: CPT

## 2024-10-18 PROCEDURE — 96374 THER/PROPH/DIAG INJ IV PUSH: CPT

## 2024-10-18 PROCEDURE — 85610 PROTHROMBIN TIME: CPT

## 2024-10-18 PROCEDURE — 80048 BASIC METABOLIC PNL TOTAL CA: CPT

## 2024-10-18 PROCEDURE — 74177 CT ABD & PELVIS W/CONTRAST: CPT

## 2024-10-18 PROCEDURE — 96375 TX/PRO/DX INJ NEW DRUG ADDON: CPT

## 2024-10-18 PROCEDURE — 51702 INSERT TEMP BLADDER CATH: CPT

## 2024-10-18 PROCEDURE — 85025 COMPLETE CBC W/AUTO DIFF WBC: CPT

## 2024-10-18 PROCEDURE — 6360000004 HC RX CONTRAST MEDICATION: Performed by: EMERGENCY MEDICINE

## 2024-10-18 RX ORDER — CEPHALEXIN 500 MG/1
500 CAPSULE ORAL 2 TIMES DAILY
Qty: 14 CAPSULE | Refills: 0 | Status: SHIPPED | OUTPATIENT
Start: 2024-10-18 | End: 2024-10-25

## 2024-10-18 RX ORDER — OXYCODONE HYDROCHLORIDE 5 MG/1
5 TABLET ORAL EVERY 8 HOURS PRN
Qty: 9 TABLET | Refills: 0 | Status: SHIPPED | OUTPATIENT
Start: 2024-10-18 | End: 2024-10-21

## 2024-10-18 RX ORDER — IOPAMIDOL 755 MG/ML
75 INJECTION, SOLUTION INTRAVASCULAR
Status: COMPLETED | OUTPATIENT
Start: 2024-10-18 | End: 2024-10-18

## 2024-10-18 RX ORDER — ONDANSETRON 2 MG/ML
8 INJECTION INTRAMUSCULAR; INTRAVENOUS ONCE
Status: COMPLETED | OUTPATIENT
Start: 2024-10-18 | End: 2024-10-18

## 2024-10-18 RX ORDER — HYDROMORPHONE HYDROCHLORIDE 1 MG/ML
0.5 INJECTION, SOLUTION INTRAMUSCULAR; INTRAVENOUS; SUBCUTANEOUS EVERY 30 MIN PRN
Status: DISCONTINUED | OUTPATIENT
Start: 2024-10-18 | End: 2024-10-18 | Stop reason: HOSPADM

## 2024-10-18 RX ORDER — KETOROLAC TROMETHAMINE 15 MG/ML
15 INJECTION, SOLUTION INTRAMUSCULAR; INTRAVENOUS ONCE
Status: COMPLETED | OUTPATIENT
Start: 2024-10-18 | End: 2024-10-18

## 2024-10-18 RX ADMIN — KETOROLAC TROMETHAMINE 15 MG: 15 INJECTION, SOLUTION INTRAMUSCULAR; INTRAVENOUS at 13:02

## 2024-10-18 RX ADMIN — ONDANSETRON 8 MG: 2 INJECTION, SOLUTION INTRAMUSCULAR; INTRAVENOUS at 13:33

## 2024-10-18 RX ADMIN — HYDROMORPHONE HYDROCHLORIDE 0.5 MG: 1 INJECTION, SOLUTION INTRAMUSCULAR; INTRAVENOUS; SUBCUTANEOUS at 13:33

## 2024-10-18 RX ADMIN — IOPAMIDOL 75 ML: 755 INJECTION, SOLUTION INTRAVENOUS at 14:03

## 2024-10-18 ASSESSMENT — PAIN SCALES - GENERAL
PAINLEVEL_OUTOF10: 4
PAINLEVEL_OUTOF10: 10
PAINLEVEL_OUTOF10: 8
PAINLEVEL_OUTOF10: 9

## 2024-10-18 ASSESSMENT — PAIN - FUNCTIONAL ASSESSMENT
PAIN_FUNCTIONAL_ASSESSMENT: 0-10
PAIN_FUNCTIONAL_ASSESSMENT: 0-10

## 2024-10-18 ASSESSMENT — PAIN DESCRIPTION - LOCATION: LOCATION: GROIN

## 2024-10-18 NOTE — ED PROVIDER NOTES
place, and time.       DIAGNOSTIC RESULTS   RADIOLOGY:   Non-plain film images such as CT, Ultrasound and MRI are read by the radiologist.   Plain radiographic images are visualized and preliminarily interpreted by the ED Provider with the below findings:  -n/a  Interpretation per Radiologist below, if available at the time of this note:  CT ABDOMEN PELVIS W IV CONTRAST Additional Contrast? None   Final Result   1. Blood in the bladder which is known.   2. Manning catheter balloon appears to be within the prostatic urethra..   3. Retroperitoneal lymphadenopathy is similar in appearance..   4. Right lower lobe nodule is larger than the previous chest CT.           LABS:  Labs Reviewed   CBC WITH AUTO DIFFERENTIAL - Abnormal; Notable for the following components:       Result Value    WBC 14.3 (*)     RBC 3.86 (*)     Hemoglobin 10.9 (*)     Hematocrit 32.9 (*)     Neutrophils Absolute 12.8 (*)     Lymphocytes Absolute 0.8 (*)     All other components within normal limits   BASIC METABOLIC PANEL W/ REFLEX TO MG FOR LOW K - Abnormal; Notable for the following components:    Sodium 133 (*)     CO2 19 (*)     Glucose 183 (*)     BUN 25 (*)     Est, Glom Filt Rate 53 (*)     All other components within normal limits   HEPATIC FUNCTION PANEL - Abnormal; Notable for the following components:    Total Protein 6.3 (*)     ALT <5 (*)     AST 14 (*)     All other components within normal limits   PROTIME-INR - Abnormal; Notable for the following components:    Protime 15.4 (*)     INR 1.20 (*)     All other components within normal limits     When ordered only abnormal lab results are displayed. All other labs were within normal range or not returned as of this dictation.  PROCEDURES   Unless otherwise noted below, none  Procedures  CRITICAL CARE TIME (.cct)   Due to the immediate potential for life-threatening deterioration due to n/a, I personally spent 0 minutes providing critical care on this patient.   EMERGENCY DEPARTMENT  capsule by mouth 2 times daily for 7 days, Disp-14 capsule, R-0Normal      oxyCODONE (ROXICODONE) 5 MG immediate release tablet Take 1 tablet by mouth every 8 hours as needed for Pain (breakthrough pain) for up to 3 days. WARNING:  May cause drowsiness.  May impair ability to operate vehicles or machinery.  Do not use in combination with alcohol. Max Daily Amount: 15 mg, Disp-9 t ablet, R-0Normal                (Please note that portions of this note were completed with a voice recognition program.  Efforts were made to edit the dictations but occasionally words are mis-transcribed.)    Gay Metzger MD (electronically signed)  Attending Emergency Physician     Gay Metzger MD  10/18/24 [id number]

## 2024-10-18 NOTE — PROGRESS NOTES
Patient provided with discharge instructions, discussed in detail, new medications reviewed including use and side effects.  Instructions also provided about inflation and deflation of balloon on urinary catheter. 50mL syringe provided. Patient and wife verbalized understanding.  Prescriptions filled per outpatient pharmacy.   All questions answered, family at the bedside to transport home.  Patient walked independently with wife out of the ER.

## 2024-10-18 NOTE — ED NOTES
Groin Pain (Pt to ED with CC of groin and scrotal pain. Pt states that he had a catheter placed approximately 3 weeks ago.)    Introduced self to patient. Oriented to room and ED throughput process.  Safety measures with ED bed locked in lowest position and call light in reach.  Patient educated on all orders, including any medications.  Patient educated on chief complaint/symptoms. Patient encouraged to ask questions regarding care, medications or treatment plan.  Patient aware of how to reach staff with questions/concerns.

## 2024-10-18 NOTE — DISCHARGE INSTRUCTIONS
Your workup today did not show any overt signs of infection.  We have prescribed you Keflex after talking to urology as a \"wait-and-see\" prescription just in case you have any new symptoms or concerns.  You are at high risk for infection.    The CT scan did show that your Manning had slipped down into your prostate.  This can be very painful.  We did increase the amount of air in your Manning balloon to try to prevent this from happening again.    We spoke with Silver from urology, he would like the following:  - Wait-and-see prescription for Keflex  - Increased the amount of air in your Manning balloon (which was done here) to try to prevent the Manning from going back into your prostate  - Follow-up with him in clinic to have the Manning exchange at which point you were also talked about the procedure    We also gave you a referral to oncology since the pulmonary nodule that is in your right lower lobe appears to have increased in size when compared to the CT scan of the chest from October 3.    Return to ED for any new or worsening symptoms or concerns.

## 2024-10-18 NOTE — PROGRESS NOTES
Patient reports nausea. He also reports that the Toradol \"didn't touch\" his pain. Ordered Zofran and dilaudid given as ordered.

## 2024-10-18 NOTE — PROGRESS NOTES
Patient denies any pain at this time. Reports \"that medication did it. Made me feel better.\" Provider updated.

## 2024-10-21 PROBLEM — R07.89 CHEST PRESSURE: Status: RESOLVED | Noted: 2024-08-09 | Resolved: 2024-10-21

## 2024-10-21 PROBLEM — K56.609 SBO (SMALL BOWEL OBSTRUCTION) (HCC): Status: RESOLVED | Noted: 2024-01-31 | Resolved: 2024-10-21

## 2024-10-21 PROBLEM — I20.0 UNSTABLE ANGINA (HCC): Status: RESOLVED | Noted: 2023-12-30 | Resolved: 2024-10-21

## 2024-10-21 NOTE — PROGRESS NOTES
factors  Exercise program discussed: To improve overall cardiovascular health, the patient is instructed to increase cardiovascular related activities with a goal of 150 min/week of moderate level activity or 10,000 steps per day. Encouraged to perform as much activity as tolerated    I have addressed the patient's cardiac risk factors and adjusted pharmacologic treatment as needed. In addition, I have reinforced the need for patient directed risk factor modification.    I independently reviewed the device check interrogation and ECG    All questions and concerns were addressed with the patient. Alternatives to treatment were discussed.     Thank you for allowing to us to participate in the care of ELISHA Zeng-CNP  [facility] [place]   Office: [phone]

## 2024-10-22 NOTE — TELEPHONE ENCOUNTER
CARDIAC CLEARANCE    What type of procedure are you having?  Cystectomy  (Bladder)  Which physician is performing your procedure?  Dr. Rosalino Riddle     When is your procedure scheduled for?  12/03/2024  Where are you having this procedure?  [facility] w/ general anesthesia       Are you taking Blood Thinners?  Yes  If so what? (Name/dose/frequesncy)  Plavix 75mg, Aspirin 81 mg   Does the surgeon want you to stop your blood thinner?  YES   If so for how long?  Both ASA and Plavix 7 days prior      Phone Number and Contact Name for Physicians office:  Krysten [phone]  Fax number to send information:  [phone]

## 2024-10-22 NOTE — TELEPHONE ENCOUNTER
[facility] does not do cardiac clearance. Per Dr. Jung (earlier in this encounter), \"Patient is low risk from an interventional standpoint. Clearance needs to come from EP due to medication recommendations.\"    Pt saw [facility] in 4/2024, but since then has had a Watchman (5/2024). [facility] does not do Watchman.    Per NPSR note (earlier in this encounter) \"    He cannot stop ASA/plavix until atleast November 20th due to his watchman. We can discuss clearing him at my visit, unless one of the NPs/providers that he has seen before will clear him     Will defer clearance to EP at [facility]

## 2024-10-25 NOTE — PROGRESS NOTES
(HCC)     coumadin    Atrial tachycardia (HCC)     Bladder cancer (HCC) 10/2023    CAD (coronary artery disease)     Cancer (HCC)     skin    Diabetes mellitus (HCC)     type 2    Diverticulitis     Enlarged prostate     History of blood transfusion     Hyperlipidemia     Hypertension     Neuropathy     Bilateral feet and lower legs    Pacemaker 04/20/2020    Pneumonia     ABOUT 5 YEARS AGO    Vasodepressor syncope         Past Surgical History:   Procedure Laterality Date    ABLATION OF DYSRHYTHMIC FOCUS      AVNRT and Atrial tachycardia    CARDIAC PROCEDURE N/A 07/22/2024    Left heart cath / coronary angiography performed by Rosalino Jung MD at [facility] CARDIAC CATH LAB    CARDIAC PROCEDURE N/A 07/22/2024    Fractional flow reserve (FFR) performed by Rosalino Jung MD at [facility] CARDIAC CATH LAB    CARPAL TUNNEL RELEASE      CATARACT REMOVAL Bilateral     COLONOSCOPY      CORONARY ANGIOPLASTY WITH STENT PLACEMENT  2005    CYSTOSCOPY  09/26/2012    coagulation prostatic urethra    CYSTOSCOPY  10/08/2015    TURP    CYSTOSCOPY N/A 10/17/2023    CYSTOSCOPY WITH TRANSURETHRAL RESECTION OF BLADDER TUMOR performed by Rosalino Riddle MD at [facility] OR    CYSTOSCOPY N/A 12/19/2023    CYSTOSCOPY WITH BLADDER BIOPSY performed by Rosalino Riddle MD at [facility] OR    EP DEVICE PROCEDURE N/A 05/20/2024    JUANA WATCHMAN IMPLANT W ANESTHESIA - ASHA GUTIERREZ performed by Abisai De Santiago MD at [facility] CARDIAC CATH LAB    FINGER SURGERY      X7    FINGER TRIGGER RELEASE      LAPAROTOMY N/A 02/06/2024    EXPLORATORY LAPAROTOMY, LYSIS OF ADHESIONS performed by Christiano Tay MD at [facility] OR    MOHS SURGERY  08/19/2024    left angle of jaw    OTHER SURGICAL HISTORY Left 02/25/2014    WIDE LOCAL EXCISION LEFT ARM MELANOMA, WIDE LOCAL EXCISION OF    OTHER SURGICAL HISTORY      cardiac stents recently Dec 2023 had 5 stents placed    PACEMAKER PLACEMENT      SHOULDER SURGERY Bilateral     SKIN CANCER EXCISION      left ear -

## 2024-10-28 ENCOUNTER — OFFICE VISIT (OUTPATIENT)
Dept: PULMONOLOGY | Age: 89
End: 2024-10-28
Payer: MEDICARE

## 2024-10-28 ENCOUNTER — TELEPHONE (OUTPATIENT)
Dept: PULMONOLOGY | Age: 89
End: 2024-10-28

## 2024-10-28 VITALS
HEART RATE: 81 BPM | SYSTOLIC BLOOD PRESSURE: 112 MMHG | OXYGEN SATURATION: 96 % | HEIGHT: 72 IN | WEIGHT: 193.4 LBS | DIASTOLIC BLOOD PRESSURE: 58 MMHG | BODY MASS INDEX: 26.19 KG/M2

## 2024-10-28 DIAGNOSIS — I25.10 CORONARY ARTERY DISEASE INVOLVING NATIVE HEART WITHOUT ANGINA PECTORIS, UNSPECIFIED VESSEL OR LESION TYPE: ICD-10-CM

## 2024-10-28 DIAGNOSIS — J44.9 COPD, MILD (HCC): ICD-10-CM

## 2024-10-28 DIAGNOSIS — R91.8 MULTIPLE LUNG NODULES ON CT: Primary | ICD-10-CM

## 2024-10-28 DIAGNOSIS — R06.09 DOE (DYSPNEA ON EXERTION): ICD-10-CM

## 2024-10-28 PROCEDURE — 1159F MED LIST DOCD IN RCRD: CPT | Performed by: INTERNAL MEDICINE

## 2024-10-28 PROCEDURE — 1036F TOBACCO NON-USER: CPT | Performed by: INTERNAL MEDICINE

## 2024-10-28 PROCEDURE — G8484 FLU IMMUNIZE NO ADMIN: HCPCS | Performed by: INTERNAL MEDICINE

## 2024-10-28 PROCEDURE — 3023F SPIROM DOC REV: CPT | Performed by: INTERNAL MEDICINE

## 2024-10-28 PROCEDURE — 99215 OFFICE O/P EST HI 40 MIN: CPT | Performed by: INTERNAL MEDICINE

## 2024-10-28 PROCEDURE — G8417 CALC BMI ABV UP PARAM F/U: HCPCS | Performed by: INTERNAL MEDICINE

## 2024-10-28 PROCEDURE — G8427 DOCREV CUR MEDS BY ELIG CLIN: HCPCS | Performed by: INTERNAL MEDICINE

## 2024-10-28 PROCEDURE — 1123F ACP DISCUSS/DSCN MKR DOCD: CPT | Performed by: INTERNAL MEDICINE

## 2024-10-28 RX ORDER — OXYCODONE HYDROCHLORIDE 5 MG/1
5 TABLET ORAL
COMMUNITY
Start: 2024-10-18

## 2024-10-28 RX ORDER — OXYBUTYNIN CHLORIDE 5 MG/1
TABLET ORAL
COMMUNITY
Start: 2024-10-25

## 2024-10-28 ASSESSMENT — ENCOUNTER SYMPTOMS
ABDOMINAL DISTENTION: 0
COLOR CHANGE: 0
BACK PAIN: 0
COUGH: 0
SORE THROAT: 0
APNEA: 0
VOMITING: 0
SHORTNESS OF BREATH: 1
CHOKING: 0
ABDOMINAL PAIN: 0
DIARRHEA: 0
WHEEZING: 0
SINUS PRESSURE: 0
STRIDOR: 0
BLOOD IN STOOL: 0
CONSTIPATION: 0
RHINORRHEA: 0
VOICE CHANGE: 0
CHEST TIGHTNESS: 0

## 2024-10-28 NOTE — PROGRESS NOTES
Troy Venegas    YOB: 1935     Date of Service:  10/28/2024     Chief Complaint   Patient presents with    ONEAL     No improvement     Results     CT, PFT       HPI patient has been accompanied by his wife to our office today.  Recent ER visit on 10/3 for urinary retention/clots with hematuria.  Patient has noticed progressive worsening of his dyspnea with any form of activity and increased fatigue.  Denies any chest pain or leg edema.  Very concerned about his ongoing recurrent hematuria-states that he is scheduled for cystoscopy in December with urology.    No Known Allergies  Outpatient Medications Marked as Taking for the 10/28/24 encounter (Office Visit) with Sajan Rosario MD   Medication Sig Dispense Refill    oxyCODONE (ROXICODONE) 5 MG immediate release tablet 1 tablet.      Accu-Chek FastClix Lancets MISC Tests once a day 100 each 3    pregabalin (LYRICA) 75 MG capsule TAKE 2 CAPSULE EVERY       MORNING AND 2 CAPSULES AT  NIGHT 360 capsule 1    insulin glargine (BASAGLAR KWIKPEN) 100 UNIT/ML injection pen Inject 14 Units into the skin nightly      albuterol sulfate HFA (VENTOLIN HFA) 108 (90 Base) MCG/ACT inhaler Inhale 2 puffs into the lungs 4 times daily as needed for Wheezing 18 g 5    isosorbide mononitrate (IMDUR) 30 MG extended release tablet TAKE 1 TABLET BY MOUTH DAILY 90 tablet 3    ranolazine (RANEXA) 500 MG extended release tablet Take 1 tablet by mouth 2 times daily 60 tablet 3    Alpha-Lipoic Acid 600 MG TABS Take 1 tablet by mouth daily Take on an empty stomach. If desired effect is not reached, OK to increase dose to 600mg TWICE daily 90 tablet 1    nitroGLYCERIN (NITROSTAT) 0.4 MG SL tablet Place 1 tablet under the tongue every 5 minutes as needed for Chest pain 25 tablet 3    triamcinolone (KENALOG) 0.1 % cream Apply to itchy areas on the arms and legs twice daily for up to 2 weeks or until improved. 80 g 2    lansoprazole (PREVACID) 30 MG delayed release

## 2024-10-28 NOTE — TELEPHONE ENCOUNTER
Spoke with patient's wife.  PET scheduled 11/11/24 at 8am.  Patient to arrive at 7:30am.  Instructions provided to patient when he was in the office 10/28/24.

## 2024-11-07 ENCOUNTER — NURSE ONLY (OUTPATIENT)
Dept: CARDIOLOGY CLINIC | Age: 89
End: 2024-11-07

## 2024-11-07 ENCOUNTER — OFFICE VISIT (OUTPATIENT)
Dept: CARDIOLOGY CLINIC | Age: 89
End: 2024-11-07

## 2024-11-07 VITALS
SYSTOLIC BLOOD PRESSURE: 102 MMHG | WEIGHT: 194.4 LBS | BODY MASS INDEX: 26.37 KG/M2 | HEART RATE: 60 BPM | DIASTOLIC BLOOD PRESSURE: 64 MMHG

## 2024-11-07 DIAGNOSIS — I10 BENIGN ESSENTIAL HTN: ICD-10-CM

## 2024-11-07 DIAGNOSIS — I49.5 SICK SINUS SYNDROME (HCC): ICD-10-CM

## 2024-11-07 DIAGNOSIS — Z79.899 ENCOUNTER FOR MONITORING SOTALOL THERAPY: ICD-10-CM

## 2024-11-07 DIAGNOSIS — I47.19 ATRIAL TACHYCARDIA (HCC): ICD-10-CM

## 2024-11-07 DIAGNOSIS — Z51.81 ENCOUNTER FOR MONITORING SOTALOL THERAPY: ICD-10-CM

## 2024-11-07 DIAGNOSIS — I25.10 CORONARY ARTERY DISEASE DUE TO LIPID RICH PLAQUE: ICD-10-CM

## 2024-11-07 DIAGNOSIS — I47.19 AVNRT (AV NODAL RE-ENTRY TACHYCARDIA) (HCC): ICD-10-CM

## 2024-11-07 DIAGNOSIS — I48.0 PAROXYSMAL ATRIAL FIBRILLATION (HCC): ICD-10-CM

## 2024-11-07 DIAGNOSIS — Z95.818 PRESENCE OF WATCHMAN LEFT ATRIAL APPENDAGE CLOSURE DEVICE: ICD-10-CM

## 2024-11-07 DIAGNOSIS — I25.83 CORONARY ARTERY DISEASE DUE TO LIPID RICH PLAQUE: ICD-10-CM

## 2024-11-07 DIAGNOSIS — Z95.0 PACEMAKER: ICD-10-CM

## 2024-11-07 DIAGNOSIS — Z95.0 PACEMAKER: Primary | ICD-10-CM

## 2024-11-07 DIAGNOSIS — I48.0 PAROXYSMAL A-FIB (HCC): ICD-10-CM

## 2024-11-07 DIAGNOSIS — I49.5 SICK SINUS SYNDROME (HCC): Primary | ICD-10-CM

## 2024-11-07 RX ORDER — ACETAMINOPHEN 500 MG
500 TABLET ORAL EVERY 4 HOURS PRN
COMMUNITY

## 2024-11-08 ENCOUNTER — PROCEDURE VISIT (OUTPATIENT)
Dept: DERMATOLOGY | Age: 89
End: 2024-11-08
Payer: MEDICARE

## 2024-11-08 DIAGNOSIS — C44.519 BASAL CELL CARCINOMA (BCC) OF CLAVICULAR AREA: ICD-10-CM

## 2024-11-08 DIAGNOSIS — D04.61 SQUAMOUS CELL CARCINOMA IN SITU (SCCIS) OF SKIN OF RIGHT SHOULDER: Primary | ICD-10-CM

## 2024-11-08 PROCEDURE — 17260 DSTRJ MAL LES T/A/L 0.5 CM/<: CPT | Performed by: DERMATOLOGY

## 2024-11-08 PROCEDURE — 17261 DSTRJ MAL LES T/A/L .6-1.0CM: CPT | Performed by: DERMATOLOGY

## 2024-11-08 NOTE — PROGRESS NOTES
tablet by mouth daily Take on an empty stomach. If desired effect is not reached, OK to increase dose to 600mg TWICE daily 90 tablet 1    nitroGLYCERIN (NITROSTAT) 0.4 MG SL tablet Place 1 tablet under the tongue every 5 minutes as needed for Chest pain 25 tablet 3    triamcinolone (KENALOG) 0.1 % cream Apply to itchy areas on the arms and legs twice daily for up to 2 weeks or until improved. 80 g 2    lansoprazole (PREVACID) 30 MG delayed release capsule TAKE 1 CAPSULE DAILY 90 capsule 1    sotalol (BETAPACE) 80 MG tablet TAKE 1 TABLET BY MOUTH TWICE A  tablet 3    nateglinide (STARLIX) 120 MG tablet Take 1 tablet by mouth 2 times daily 180 tablet 1    valACYclovir (VALTREX) 500 MG tablet TAKE 1 TABLET BY MOUTH TWICE DAILY AT ONSET OF SYMPTOMS FOR 3 DAYS FOR RECURRENCE ON BUTTOCKS 24 tablet 0    atorvastatin (LIPITOR) 40 MG tablet Take 1 tablet by mouth nightly 90 tablet 3    senna (SENOKOT) 8.6 MG tablet Take 1 tablet by mouth daily      finasteride (PROSCAR) 5 MG tablet Take 1 tablet by mouth daily 90 tablet 3    tamsulosin (FLOMAX) 0.4 MG capsule Take 1 capsule by mouth daily      clopidogrel (PLAVIX) 75 MG tablet Take 1 tablet by mouth daily 90 tablet 3    aspirin 81 MG chewable tablet Take 1 tablet by mouth daily 30 tablet 1    Insulin Pen Needle (B-D UF III MINI PEN NEEDLES) 31G X 5 MM MISC Use as directed 200 each 2    carboxymethylcellulose (REFRESH PLUS) 0.5 % SOLN ophthalmic solution Apply 2 drops to eye 3 times daily as needed      fluorouracil (EFUDEX) 5 % cream Apply twice daily to affected area on the right cheek for 2 weeks. 40 g 0         Physical Examination       Well appearing.     1.  Right lateral shoulder with a 1 cm round pink patch.      2.  Right clavicular region with a 4 mm round pink macule.         Assessment and Plan     1. 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

## 2024-11-11 ENCOUNTER — HOSPITAL ENCOUNTER (OUTPATIENT)
Dept: PET IMAGING | Age: 89
Discharge: HOME | End: 2024-11-11
Payer: MEDICARE

## 2024-11-11 ENCOUNTER — TELEPHONE (OUTPATIENT)
Dept: INTERNAL MEDICINE CLINIC | Age: 89
End: 2024-11-11

## 2024-11-11 DIAGNOSIS — R91.8 MULTIPLE LUNG NODULES ON CT: ICD-10-CM

## 2024-11-11 PROCEDURE — 3430000000 HC RX DIAGNOSTIC RADIOPHARMACEUTICAL: Performed by: INTERNAL MEDICINE

## 2024-11-11 PROCEDURE — 78815 PET IMAGE W/CT SKULL-THIGH: CPT

## 2024-11-11 PROCEDURE — A9609 HC RX DIAGNOSTIC RADIOPHARMACEUTICAL: HCPCS | Performed by: INTERNAL MEDICINE

## 2024-11-11 RX ORDER — FLUDEOXYGLUCOSE F 18 200 MCI/ML
14.94 INJECTION, SOLUTION INTRAVENOUS
Status: COMPLETED | OUTPATIENT
Start: 2024-11-11 | End: 2024-11-11

## 2024-11-11 RX ADMIN — FLUDEOXYGLUCOSE F 18 14.94 MILLICURIE: 200 INJECTION, SOLUTION INTRAVENOUS at 08:18

## 2024-11-11 NOTE — TELEPHONE ENCOUNTER
Pt would like to discuss the pain he is currently experiencing with DR. Engel in his bladder and lower back.     [phone]

## 2024-11-11 NOTE — TELEPHONE ENCOUNTER
Spoke to pt stated he is having pain in bladder and in lower back that is uncontrolled. He has had it for months but in the last two weeks it has gotten worse. In the last week he had 3 nights where he could not sleep. Little blood in urine when urinating. Does have bladder cancer.     What would you like him to do?

## 2024-11-11 NOTE — TELEPHONE ENCOUNTER
Spoke to pt he stated what he is looking for is pain meds to get him to surgery in 3 weeks. He has oxy 5mg

## 2024-11-12 NOTE — TELEPHONE ENCOUNTER
If he is having pain his bladder urology and manage that pain please advise that he discuss this with them and ask them to treat his urological pain

## 2024-11-14 RX ORDER — LACTULOSE 10 G/15ML
10 SOLUTION ORAL 2 TIMES DAILY
Qty: 300 ML | Refills: 0 | Status: SHIPPED | OUTPATIENT
Start: 2024-11-14

## 2024-11-18 ENCOUNTER — OFFICE VISIT (OUTPATIENT)
Dept: NEUROLOGY | Age: 89
End: 2024-11-18
Payer: MEDICARE

## 2024-11-18 VITALS
TEMPERATURE: 97.3 F | SYSTOLIC BLOOD PRESSURE: 109 MMHG | HEART RATE: 63 BPM | DIASTOLIC BLOOD PRESSURE: 58 MMHG | WEIGHT: 195 LBS | OXYGEN SATURATION: 96 % | HEIGHT: 72 IN | BODY MASS INDEX: 26.41 KG/M2

## 2024-11-18 DIAGNOSIS — E11.42 TYPE 2 DIABETES MELLITUS WITH DIABETIC POLYNEUROPATHY, WITH LONG-TERM CURRENT USE OF INSULIN (HCC): ICD-10-CM

## 2024-11-18 DIAGNOSIS — G62.9 POLYNEUROPATHY: Primary | ICD-10-CM

## 2024-11-18 DIAGNOSIS — G62.9 POLYNEUROPATHY: ICD-10-CM

## 2024-11-18 DIAGNOSIS — Z79.4 TYPE 2 DIABETES MELLITUS WITH DIABETIC POLYNEUROPATHY, WITH LONG-TERM CURRENT USE OF INSULIN (HCC): ICD-10-CM

## 2024-11-18 DIAGNOSIS — I10 PRIMARY HYPERTENSION: ICD-10-CM

## 2024-11-18 LAB
FOLATE SERPL-MCNC: 6.29 NG/ML (ref 4.78–24.2)
VIT B12 SERPL-MCNC: 240 PG/ML (ref 211–911)

## 2024-11-18 PROCEDURE — 99204 OFFICE O/P NEW MOD 45 MIN: CPT | Performed by: PSYCHIATRY & NEUROLOGY

## 2024-11-18 PROCEDURE — 1123F ACP DISCUSS/DSCN MKR DOCD: CPT | Performed by: PSYCHIATRY & NEUROLOGY

## 2024-11-18 PROCEDURE — 1036F TOBACCO NON-USER: CPT | Performed by: PSYCHIATRY & NEUROLOGY

## 2024-11-18 PROCEDURE — 1159F MED LIST DOCD IN RCRD: CPT | Performed by: PSYCHIATRY & NEUROLOGY

## 2024-11-18 PROCEDURE — G8427 DOCREV CUR MEDS BY ELIG CLIN: HCPCS | Performed by: PSYCHIATRY & NEUROLOGY

## 2024-11-18 PROCEDURE — 1160F RVW MEDS BY RX/DR IN RCRD: CPT | Performed by: PSYCHIATRY & NEUROLOGY

## 2024-11-18 PROCEDURE — G8417 CALC BMI ABV UP PARAM F/U: HCPCS | Performed by: PSYCHIATRY & NEUROLOGY

## 2024-11-18 PROCEDURE — 3044F HG A1C LEVEL LT 7.0%: CPT | Performed by: PSYCHIATRY & NEUROLOGY

## 2024-11-18 PROCEDURE — G8484 FLU IMMUNIZE NO ADMIN: HCPCS | Performed by: PSYCHIATRY & NEUROLOGY

## 2024-11-18 RX ORDER — DULOXETIN HYDROCHLORIDE 30 MG/1
30 CAPSULE, DELAYED RELEASE ORAL DAILY
Qty: 30 CAPSULE | Refills: 2 | Status: SHIPPED | OUTPATIENT
Start: 2024-11-18 | End: 2024-11-20

## 2024-11-18 RX ORDER — LEVOFLOXACIN 500 MG/1
500 TABLET, FILM COATED ORAL DAILY
COMMUNITY
Start: 2024-11-13

## 2024-11-18 NOTE — PATIENT INSTRUCTIONS

## 2024-11-18 NOTE — PROGRESS NOTES
IMMUNOFIXATION SERUM PROFILE    DULoxetine (CYMBALTA) 30 MG extended release capsule    Basic Metabolic Panel      2. Type 2 diabetes mellitus with diabetic polyneuropathy, with long-term current use of insulin (HCC)  DULoxetine (CYMBALTA) 30 MG extended release capsule    Basic Metabolic Panel      3. 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  Will exclude other causes  Diabetes with neuropathy, not controlled  Hypertension    Plan:  Check other reversible cause for neuropathy  Continue Lyrica at the same dose 150 mg x 2.  Watch creatinine since last creatinine 1.3  Start Cymbalta 30 mg daily  Side effect addressed  Diabetic control  Continue PT and OT  Continue Lipitor and monitor A1c and LDL  Insulin sliding scale  Risk of falling addressed  3-month follow-up

## 2024-11-20 ENCOUNTER — NURSE ONLY (OUTPATIENT)
Dept: CARDIOLOGY CLINIC | Age: 89
End: 2024-11-20

## 2024-11-20 ENCOUNTER — OFFICE VISIT (OUTPATIENT)
Dept: CARDIOLOGY CLINIC | Age: 89
End: 2024-11-20

## 2024-11-20 VITALS
WEIGHT: 195.4 LBS | BODY MASS INDEX: 26.47 KG/M2 | OXYGEN SATURATION: 98 % | SYSTOLIC BLOOD PRESSURE: 100 MMHG | DIASTOLIC BLOOD PRESSURE: 62 MMHG | HEIGHT: 72 IN | HEART RATE: 63 BPM

## 2024-11-20 DIAGNOSIS — Z95.0 PACEMAKER: ICD-10-CM

## 2024-11-20 DIAGNOSIS — I49.5 SICK SINUS SYNDROME (HCC): Primary | ICD-10-CM

## 2024-11-20 DIAGNOSIS — I25.10 CORONARY ARTERY DISEASE DUE TO LIPID RICH PLAQUE: ICD-10-CM

## 2024-11-20 DIAGNOSIS — Z95.0 PACEMAKER: Primary | ICD-10-CM

## 2024-11-20 DIAGNOSIS — I10 PRIMARY HYPERTENSION: ICD-10-CM

## 2024-11-20 DIAGNOSIS — I48.0 PAROXYSMAL ATRIAL FIBRILLATION (HCC): ICD-10-CM

## 2024-11-20 DIAGNOSIS — I44.2 CHB (COMPLETE HEART BLOCK) (HCC): ICD-10-CM

## 2024-11-20 DIAGNOSIS — I49.5 SICK SINUS SYNDROME (HCC): ICD-10-CM

## 2024-11-20 DIAGNOSIS — I25.83 CORONARY ARTERY DISEASE DUE TO LIPID RICH PLAQUE: ICD-10-CM

## 2024-11-22 LAB
ALBUMIN SERPL ELPH-MCNC: 2.5 G/DL (ref 3.1–4.9)
ALPHA1 GLOB SERPL ELPH-MCNC: 0.5 G/DL (ref 0.2–0.4)
ALPHA2 GLOB SERPL ELPH-MCNC: 1.1 G/DL (ref 0.4–1.1)
B-GLOBULIN SERPL ELPH-MCNC: 1 G/DL (ref 0.9–1.6)
GAMMA GLOB SERPL ELPH-MCNC: 0.7 G/DL (ref 0.6–1.8)
PROT SERPL-MCNC: 5.8 G/DL (ref 6.4–8.2)
SPE/IFE INTERPRETATION: NORMAL

## 2024-11-22 NOTE — PROGRESS NOTES
DATE 12/3/2024___      PLACE _x__ [address]                          TIME 1100___                                             ___ [address]                           Arrival _0930__                                                                                                                                                                                                        Surgeons office to give arrival time _____                                                                                                 If you have not received time contact your surgeon.                                                                                                                              Surgery dates,times and arrival times are subject to change.Please check with your surgeon.  Bring a photo I.D.,insurance card and form of payment if you have a co pay.  Plan for someone 18 years or older to stay on site while you are her and to take you home after surgery.You are not permitted to drive yourself home. You cannot leave via Uber, Lyft, Taxi or Medical Transport by yourself. You must have someone with you. It is strongly suggested that someone stay with you the first 24 hours after anesthesia. Your surgery will be cancelled if you do not have a ride home. A parent or legal guardian guardian must stay with a child until the child is discharged. Please do not bring other children.  A History/Physical is required to be completed and dated within 30 days of your surgery. To be done by PCP __ Surgeon _x__or Hospitalist ___  You may be required to get labs __ EKG __ or a chest Xray ___ prior to surgery. To be done by ____  Nothing to eat or drink after midnight the evening prior to surgery.  If your surgeon requires a bowel prep, follow their instructions.  You may brush your teeth.  Bring a complete list of your medications including vitamins and supplement with the name,dose and frequency.  Take the following

## 2024-11-26 ENCOUNTER — OFFICE VISIT (OUTPATIENT)
Dept: PULMONOLOGY | Age: 89
End: 2024-11-26
Payer: MEDICARE

## 2024-11-26 VITALS
HEART RATE: 86 BPM | DIASTOLIC BLOOD PRESSURE: 52 MMHG | WEIGHT: 196.2 LBS | BODY MASS INDEX: 26.57 KG/M2 | HEIGHT: 72 IN | OXYGEN SATURATION: 94 % | SYSTOLIC BLOOD PRESSURE: 112 MMHG

## 2024-11-26 DIAGNOSIS — Z85.51 H/O PRIMARY MALIGNANT NEOPLASM OF URINARY BLADDER: ICD-10-CM

## 2024-11-26 DIAGNOSIS — R59.0 RETROPERITONEAL LYMPHADENOPATHY: ICD-10-CM

## 2024-11-26 DIAGNOSIS — R91.8 MULTIPLE LUNG NODULES ON CT: Primary | ICD-10-CM

## 2024-11-26 DIAGNOSIS — Z85.820 H/O MELANOMA EXCISION: ICD-10-CM

## 2024-11-26 DIAGNOSIS — Z98.890 H/O MELANOMA EXCISION: ICD-10-CM

## 2024-11-26 PROCEDURE — 1159F MED LIST DOCD IN RCRD: CPT | Performed by: INTERNAL MEDICINE

## 2024-11-26 PROCEDURE — G8484 FLU IMMUNIZE NO ADMIN: HCPCS | Performed by: INTERNAL MEDICINE

## 2024-11-26 PROCEDURE — 1036F TOBACCO NON-USER: CPT | Performed by: INTERNAL MEDICINE

## 2024-11-26 PROCEDURE — G8427 DOCREV CUR MEDS BY ELIG CLIN: HCPCS | Performed by: INTERNAL MEDICINE

## 2024-11-26 PROCEDURE — G8417 CALC BMI ABV UP PARAM F/U: HCPCS | Performed by: INTERNAL MEDICINE

## 2024-11-26 PROCEDURE — 1123F ACP DISCUSS/DSCN MKR DOCD: CPT | Performed by: INTERNAL MEDICINE

## 2024-11-26 PROCEDURE — 99215 OFFICE O/P EST HI 40 MIN: CPT | Performed by: INTERNAL MEDICINE

## 2024-11-26 RX ORDER — LACTULOSE 10 G/15ML
10 SOLUTION ORAL 2 TIMES DAILY
Qty: 300 ML | Refills: 1 | Status: SHIPPED | OUTPATIENT
Start: 2024-11-26

## 2024-11-26 RX ORDER — DULOXETIN HYDROCHLORIDE 30 MG/1
30 CAPSULE, DELAYED RELEASE ORAL DAILY
COMMUNITY

## 2024-11-26 ASSESSMENT — ENCOUNTER SYMPTOMS
BLOOD IN STOOL: 0
RHINORRHEA: 0
VOMITING: 0
CONSTIPATION: 0
DIARRHEA: 0
ABDOMINAL PAIN: 1
CHEST TIGHTNESS: 0
VOICE CHANGE: 0
APNEA: 0
ABDOMINAL DISTENTION: 0
COUGH: 0
SHORTNESS OF BREATH: 1
SORE THROAT: 0
STRIDOR: 0
CHOKING: 0
WHEEZING: 0
COLOR CHANGE: 0
BACK PAIN: 1
SINUS PRESSURE: 0

## 2024-11-26 NOTE — PROGRESS NOTES
Troy Venegas    YOB: 1935     Date of Service:  11/26/2024     Chief Complaint   Patient presents with    Pulmonary Nodule    Results     PET        HPI patient accompanied by his wife to our office today.  Unfortunately, he states that he has a lot of discomfort/pain in his bladder region and back.  He is also significantly constipated-was recently started on lactulose by me with incomplete resolution of symptoms.  He also appears significantly fatigued with poor exercise tolerance.  Patient has upcoming cystoscopy on 12/3.    Patient is here to discuss the results of PET CT scan done on 11/11.    No Known Allergies  Outpatient Medications Marked as Taking for the 11/26/24 encounter (Office Visit) with Sajan Rosario MD   Medication Sig Dispense Refill    lactulose (CHRONULAC) 10 GM/15ML solution Take 15 mLs by mouth 2 times daily 300 mL 1    levoFLOXacin (LEVAQUIN) 500 MG tablet Take 1 tablet by mouth daily      lactulose (CHRONULAC) 10 GM/15ML solution Take 15 mLs by mouth 2 times daily 300 mL 0    acetaminophen (TYLENOL) 500 MG tablet Take 1 tablet by mouth every 4 hours as needed for Pain Taking 8 a day.      oxyCODONE (ROXICODONE) 5 MG immediate release tablet 0.5 tablets.      pregabalin (LYRICA) 75 MG capsule TAKE 2 CAPSULE EVERY       MORNING AND 2 CAPSULES AT  NIGHT 360 capsule 1    insulin glargine (BASAGLAR KWIKPEN) 100 UNIT/ML injection pen Inject 14 Units into the skin nightly      albuterol sulfate HFA (VENTOLIN HFA) 108 (90 Base) MCG/ACT inhaler Inhale 2 puffs into the lungs 4 times daily as needed for Wheezing 18 g 5    Alpha-Lipoic Acid 600 MG TABS Take 1 tablet by mouth daily Take on an empty stomach. If desired effect is not reached, OK to increase dose to 600mg TWICE daily 90 tablet 1    nitroGLYCERIN (NITROSTAT) 0.4 MG SL tablet Place 1 tablet under the tongue every 5 minutes as needed for Chest pain 25 tablet 3    triamcinolone (KENALOG) 0.1 % cream Apply to

## 2024-12-03 ENCOUNTER — ANESTHESIA (OUTPATIENT)
Dept: OPERATING ROOM | Age: 89
End: 2024-12-03
Payer: MEDICARE

## 2024-12-03 ENCOUNTER — ANESTHESIA EVENT (OUTPATIENT)
Dept: OPERATING ROOM | Age: 89
End: 2024-12-03
Payer: MEDICARE

## 2024-12-03 ENCOUNTER — TELEPHONE (OUTPATIENT)
Dept: PULMONOLOGY | Age: 89
End: 2024-12-03

## 2024-12-03 ENCOUNTER — HOSPITAL ENCOUNTER (OUTPATIENT)
Age: 89
Setting detail: OUTPATIENT SURGERY
Discharge: HOME | End: 2024-12-03
Attending: UROLOGY | Admitting: UROLOGY
Payer: MEDICARE

## 2024-12-03 VITALS
WEIGHT: 195 LBS | HEART RATE: 63 BPM | SYSTOLIC BLOOD PRESSURE: 137 MMHG | BODY MASS INDEX: 26.45 KG/M2 | DIASTOLIC BLOOD PRESSURE: 56 MMHG | TEMPERATURE: 98.4 F | RESPIRATION RATE: 14 BRPM | OXYGEN SATURATION: 96 %

## 2024-12-03 DIAGNOSIS — C67.8 MALIGNANT NEOPLASM OF OVERLAPPING SITES OF BLADDER (HCC): ICD-10-CM

## 2024-12-03 LAB
ANION GAP SERPL CALCULATED.3IONS-SCNC: 12 MMOL/L (ref 3–16)
BUN SERPL-MCNC: 20 MG/DL (ref 7–20)
CALCIUM SERPL-MCNC: 10.4 MG/DL (ref 8.3–10.6)
CHLORIDE SERPL-SCNC: 100 MMOL/L (ref 99–110)
CO2 SERPL-SCNC: 25 MMOL/L (ref 21–32)
CREAT SERPL-MCNC: 1.3 MG/DL (ref 0.8–1.3)
GFR SERPLBLD CREATININE-BSD FMLA CKD-EPI: 53 ML/MIN/{1.73_M2}
GLUCOSE SERPL-MCNC: 98 MG/DL (ref 70–99)
POTASSIUM SERPL-SCNC: 4.3 MMOL/L (ref 3.5–5.1)
SODIUM SERPL-SCNC: 137 MMOL/L (ref 136–145)

## 2024-12-03 PROCEDURE — 88342 IMHCHEM/IMCYTCHM 1ST ANTB: CPT

## 2024-12-03 PROCEDURE — 7100000011 HC PHASE II RECOVERY - ADDTL 15 MIN: Performed by: UROLOGY

## 2024-12-03 PROCEDURE — 3700000000 HC ANESTHESIA ATTENDED CARE: Performed by: UROLOGY

## 2024-12-03 PROCEDURE — 6370000000 HC RX 637 (ALT 250 FOR IP): Performed by: ANESTHESIOLOGY

## 2024-12-03 PROCEDURE — 88341 IMHCHEM/IMCYTCHM EA ADD ANTB: CPT

## 2024-12-03 PROCEDURE — 36415 COLL VENOUS BLD VENIPUNCTURE: CPT

## 2024-12-03 PROCEDURE — 88307 TISSUE EXAM BY PATHOLOGIST: CPT

## 2024-12-03 PROCEDURE — 7100000000 HC PACU RECOVERY - FIRST 15 MIN: Performed by: UROLOGY

## 2024-12-03 PROCEDURE — 6360000002 HC RX W HCPCS

## 2024-12-03 PROCEDURE — 6360000002 HC RX W HCPCS: Performed by: UROLOGY

## 2024-12-03 PROCEDURE — 2580000003 HC RX 258: Performed by: UROLOGY

## 2024-12-03 PROCEDURE — 2709999900 HC NON-CHARGEABLE SUPPLY: Performed by: UROLOGY

## 2024-12-03 PROCEDURE — 2500000003 HC RX 250 WO HCPCS: Performed by: NURSE ANESTHETIST, CERTIFIED REGISTERED

## 2024-12-03 PROCEDURE — 7100000010 HC PHASE II RECOVERY - FIRST 15 MIN: Performed by: UROLOGY

## 2024-12-03 PROCEDURE — 6360000002 HC RX W HCPCS: Performed by: ANESTHESIOLOGY

## 2024-12-03 PROCEDURE — 2720000010 HC SURG SUPPLY STERILE: Performed by: UROLOGY

## 2024-12-03 PROCEDURE — 3600000014 HC SURGERY LEVEL 4 ADDTL 15MIN: Performed by: UROLOGY

## 2024-12-03 PROCEDURE — 3700000001 HC ADD 15 MINUTES (ANESTHESIA): Performed by: UROLOGY

## 2024-12-03 PROCEDURE — 7100000001 HC PACU RECOVERY - ADDTL 15 MIN: Performed by: UROLOGY

## 2024-12-03 PROCEDURE — 80048 BASIC METABOLIC PNL TOTAL CA: CPT

## 2024-12-03 PROCEDURE — 3600000004 HC SURGERY LEVEL 4 BASE: Performed by: UROLOGY

## 2024-12-03 PROCEDURE — 6360000002 HC RX W HCPCS: Performed by: NURSE ANESTHETIST, CERTIFIED REGISTERED

## 2024-12-03 RX ORDER — NALOXONE HYDROCHLORIDE 0.4 MG/ML
INJECTION, SOLUTION INTRAMUSCULAR; INTRAVENOUS; SUBCUTANEOUS PRN
Status: DISCONTINUED | OUTPATIENT
Start: 2024-12-03 | End: 2024-12-03 | Stop reason: HOSPADM

## 2024-12-03 RX ORDER — ROCURONIUM BROMIDE 10 MG/ML
INJECTION, SOLUTION INTRAVENOUS
Status: DISCONTINUED | OUTPATIENT
Start: 2024-12-03 | End: 2024-12-03 | Stop reason: SDUPTHER

## 2024-12-03 RX ORDER — ONDANSETRON 2 MG/ML
INJECTION INTRAMUSCULAR; INTRAVENOUS
Status: DISCONTINUED | OUTPATIENT
Start: 2024-12-03 | End: 2024-12-03 | Stop reason: SDUPTHER

## 2024-12-03 RX ORDER — LIDOCAINE HYDROCHLORIDE 10 MG/ML
0.5 INJECTION, SOLUTION EPIDURAL; INFILTRATION; INTRACAUDAL; PERINEURAL ONCE
Status: DISCONTINUED | OUTPATIENT
Start: 2024-12-03 | End: 2024-12-03 | Stop reason: HOSPADM

## 2024-12-03 RX ORDER — HYDROMORPHONE HYDROCHLORIDE 2 MG/ML
0.5 INJECTION, SOLUTION INTRAMUSCULAR; INTRAVENOUS; SUBCUTANEOUS EVERY 5 MIN PRN
Status: DISCONTINUED | OUTPATIENT
Start: 2024-12-03 | End: 2024-12-03 | Stop reason: HOSPADM

## 2024-12-03 RX ORDER — DEXAMETHASONE SODIUM PHOSPHATE 4 MG/ML
INJECTION, SOLUTION INTRA-ARTICULAR; INTRALESIONAL; INTRAMUSCULAR; INTRAVENOUS; SOFT TISSUE
Status: DISCONTINUED | OUTPATIENT
Start: 2024-12-03 | End: 2024-12-03 | Stop reason: SDUPTHER

## 2024-12-03 RX ORDER — SODIUM CHLORIDE 0.9 % (FLUSH) 0.9 %
5-40 SYRINGE (ML) INJECTION PRN
Status: DISCONTINUED | OUTPATIENT
Start: 2024-12-03 | End: 2024-12-03 | Stop reason: HOSPADM

## 2024-12-03 RX ORDER — MAGNESIUM HYDROXIDE 1200 MG/15ML
LIQUID ORAL CONTINUOUS PRN
Status: COMPLETED | OUTPATIENT
Start: 2024-12-03 | End: 2024-12-03

## 2024-12-03 RX ORDER — SODIUM CHLORIDE 9 MG/ML
INJECTION, SOLUTION INTRAVENOUS CONTINUOUS
Status: DISCONTINUED | OUTPATIENT
Start: 2024-12-03 | End: 2024-12-03 | Stop reason: HOSPADM

## 2024-12-03 RX ORDER — ONDANSETRON 2 MG/ML
4 INJECTION INTRAMUSCULAR; INTRAVENOUS
Status: DISCONTINUED | OUTPATIENT
Start: 2024-12-03 | End: 2024-12-03 | Stop reason: HOSPADM

## 2024-12-03 RX ORDER — FENTANYL CITRATE 50 UG/ML
INJECTION, SOLUTION INTRAMUSCULAR; INTRAVENOUS
Status: DISCONTINUED | OUTPATIENT
Start: 2024-12-03 | End: 2024-12-03 | Stop reason: SDUPTHER

## 2024-12-03 RX ORDER — SODIUM CHLORIDE 9 MG/ML
INJECTION, SOLUTION INTRAVENOUS PRN
Status: DISCONTINUED | OUTPATIENT
Start: 2024-12-03 | End: 2024-12-03 | Stop reason: HOSPADM

## 2024-12-03 RX ORDER — LIDOCAINE HYDROCHLORIDE 20 MG/ML
INJECTION, SOLUTION EPIDURAL; INFILTRATION; INTRACAUDAL; PERINEURAL
Status: DISCONTINUED | OUTPATIENT
Start: 2024-12-03 | End: 2024-12-03 | Stop reason: SDUPTHER

## 2024-12-03 RX ORDER — OXYCODONE AND ACETAMINOPHEN 5; 325 MG/1; MG/1
1 TABLET ORAL EVERY 4 HOURS PRN
Status: DISCONTINUED | OUTPATIENT
Start: 2024-12-03 | End: 2024-12-03 | Stop reason: HOSPADM

## 2024-12-03 RX ORDER — PHENYLEPHRINE HCL IN 0.9% NACL 1 MG/10 ML
SYRINGE (ML) INTRAVENOUS
Status: DISCONTINUED | OUTPATIENT
Start: 2024-12-03 | End: 2024-12-03 | Stop reason: SDUPTHER

## 2024-12-03 RX ORDER — SODIUM CHLORIDE 0.9 % (FLUSH) 0.9 %
5-40 SYRINGE (ML) INJECTION EVERY 12 HOURS SCHEDULED
Status: DISCONTINUED | OUTPATIENT
Start: 2024-12-03 | End: 2024-12-03 | Stop reason: HOSPADM

## 2024-12-03 RX ORDER — HYDROMORPHONE HYDROCHLORIDE 2 MG/ML
INJECTION, SOLUTION INTRAMUSCULAR; INTRAVENOUS; SUBCUTANEOUS
Status: COMPLETED
Start: 2024-12-03 | End: 2024-12-03

## 2024-12-03 RX ORDER — PROPOFOL 10 MG/ML
INJECTION, EMULSION INTRAVENOUS
Status: DISCONTINUED | OUTPATIENT
Start: 2024-12-03 | End: 2024-12-03 | Stop reason: SDUPTHER

## 2024-12-03 RX ORDER — OXYCODONE AND ACETAMINOPHEN 5; 325 MG/1; MG/1
1 TABLET ORAL EVERY 6 HOURS PRN
Qty: 20 TABLET | Refills: 0 | Status: SHIPPED | OUTPATIENT
Start: 2024-12-03 | End: 2024-12-08

## 2024-12-03 RX ADMIN — SUGAMMADEX 180 MG: 100 INJECTION, SOLUTION INTRAVENOUS at 11:56

## 2024-12-03 RX ADMIN — CEFAZOLIN 2000 MG: 2 INJECTION, POWDER, FOR SOLUTION INTRAMUSCULAR; INTRAVENOUS at 11:18

## 2024-12-03 RX ADMIN — ROCURONIUM BROMIDE 50 MG: 10 INJECTION, SOLUTION INTRAVENOUS at 11:20

## 2024-12-03 RX ADMIN — DEXAMETHASONE SODIUM PHOSPHATE 4 MG: 4 INJECTION, SOLUTION INTRAMUSCULAR; INTRAVENOUS at 11:31

## 2024-12-03 RX ADMIN — HYDROMORPHONE HYDROCHLORIDE 0.5 MG: 2 INJECTION, SOLUTION INTRAMUSCULAR; INTRAVENOUS; SUBCUTANEOUS at 12:34

## 2024-12-03 RX ADMIN — FENTANYL CITRATE 50 MCG: 50 INJECTION, SOLUTION INTRAMUSCULAR; INTRAVENOUS at 11:17

## 2024-12-03 RX ADMIN — HYDROMORPHONE HYDROCHLORIDE 0.5 MG: 2 INJECTION, SOLUTION INTRAMUSCULAR; INTRAVENOUS; SUBCUTANEOUS at 12:44

## 2024-12-03 RX ADMIN — LIDOCAINE HYDROCHLORIDE 100 MG: 20 INJECTION, SOLUTION EPIDURAL; INFILTRATION; INTRACAUDAL; PERINEURAL at 11:20

## 2024-12-03 RX ADMIN — OXYCODONE HYDROCHLORIDE AND ACETAMINOPHEN 1 TABLET: 5; 325 TABLET ORAL at 14:41

## 2024-12-03 RX ADMIN — SODIUM CHLORIDE: 9 INJECTION, SOLUTION INTRAVENOUS at 11:17

## 2024-12-03 RX ADMIN — FENTANYL CITRATE 50 MCG: 50 INJECTION, SOLUTION INTRAMUSCULAR; INTRAVENOUS at 11:59

## 2024-12-03 RX ADMIN — ONDANSETRON 4 MG: 2 INJECTION INTRAMUSCULAR; INTRAVENOUS at 11:31

## 2024-12-03 RX ADMIN — PROPOFOL 100 MG: 10 INJECTION, EMULSION INTRAVENOUS at 11:20

## 2024-12-03 RX ADMIN — Medication 100 MCG: at 11:29

## 2024-12-03 ASSESSMENT — ENCOUNTER SYMPTOMS: SHORTNESS OF BREATH: 1

## 2024-12-03 ASSESSMENT — PAIN - FUNCTIONAL ASSESSMENT
PAIN_FUNCTIONAL_ASSESSMENT: NONE - DENIES PAIN
PAIN_FUNCTIONAL_ASSESSMENT: PREVENTS OR INTERFERES SOME ACTIVE ACTIVITIES AND ADLS

## 2024-12-03 ASSESSMENT — PAIN DESCRIPTION - LOCATION
LOCATION: ABDOMEN;PELVIS
LOCATION: PELVIS
LOCATION: ABDOMEN;PELVIS

## 2024-12-03 ASSESSMENT — PAIN SCALES - GENERAL
PAINLEVEL_OUTOF10: 4
PAINLEVEL_OUTOF10: 8
PAINLEVEL_OUTOF10: 8
PAINLEVEL_OUTOF10: 9

## 2024-12-03 ASSESSMENT — PAIN DESCRIPTION - ORIENTATION
ORIENTATION: LOWER;INNER
ORIENTATION: INNER
ORIENTATION: INNER;LOWER

## 2024-12-03 ASSESSMENT — PAIN DESCRIPTION - DESCRIPTORS
DESCRIPTORS: DISCOMFORT

## 2024-12-03 NOTE — TELEPHONE ENCOUNTER
Patients wife called and wanted  to look over results from procedure done today.    PH: [phone]

## 2024-12-03 NOTE — ANESTHESIA POSTPROCEDURE EVALUATION
Department of Anesthesiology  Postprocedure Note    Patient: Troy Venegas  MRN: [id number]  YOB: 1935  Date of evaluation: 12/3/2024    Procedure Summary       Date: 12/03/24 Room / Location: [address]    Anesthesia Start: 1117 Anesthesia Stop: 1205    Procedure: CYSTOSCOPY TRANSURETHRAL RESECTION OF BLADDER TUMOR (Bladder) Diagnosis:       Malignant neoplasm of overlapping sites of bladder (HCC)      (Malignant neoplasm of overlapping sites of bladder (HCC) [C67.8])    Surgeons: Rosalino Riddle MD Responsible Provider: Dipak Espinoza MD    Anesthesia Type: general, MAC ASA Status: 4            Anesthesia Type: No value filed.    Osman Phase I: Osman Score: 10    Osman Phase II:      Anesthesia Post Evaluation    Patient location during evaluation: PACU  Patient participation: complete - patient participated  Level of consciousness: awake  Airway patency: patent  Nausea & Vomiting: no vomiting and no nausea  Cardiovascular status: hemodynamically stable  Respiratory status: acceptable  Hydration status: stable  Multimodal analgesia pain management approach  Pain management: adequate    No notable events documented.

## 2024-12-03 NOTE — TELEPHONE ENCOUNTER
Spoke with patient's wife.  She was instructed to call office once cystoscopy was completed so Dr. Villareal can review the results.

## 2024-12-03 NOTE — ANESTHESIA PRE PROCEDURE
blood products discussed with patient whom consented to blood products.    Plan discussed with CRNA.    Attending anesthesiologist reviewed and agrees with Preprocedure content                Dipak Espinoza MD   12/3/2024

## 2024-12-03 NOTE — PROGRESS NOTES
Pt arrived from OR to PACU with VSS, O2 100% on 4L nasal cannula. Surgical site internal with no external strings or incisions. Manning catheter inplace 22fr with 30cc balloon, continuous bladder irrigation running and draining clear pink tinged fluid. Pt awakens to voice. Will cont to monitor.

## 2024-12-03 NOTE — H&P
Urology History and Physical  Inpatient Setting - [facility]    Provider: Rosalino Riddle MD  Patient ID:  Admission Date: 12/3/2024 Name: Troy Venegas  OR Date: n/a MRN: [id number]   Patient Location: OR/NONE : 1935  Attending: Rosalino Riddle MD Date of Service: 12/3/2024  PCP: Zayda Engel MD     Diagnoses:  Bladder cancer  Gross hematuria    Assessment/Plan:  Continue to the OR as planned for cystoscopy with bladder biopsy/TURBT    All the patients questions were answered in detail. He understands the plan as listed above.                                                                                                                                               _____________________________________________________________    CC: Pre-op    HPI: Troy Venegas is a 88 y.o. male.  The history and physical exam was reviewed. The patient was seen and examined in pre-op. He had a chance to ask questions which were answered. There has been no interval changes. Plan to continue to the OR    Past Medical History:   He has a past medical history of Arrhythmia, Arthritis, Atrial fibrillation (HCC), Atrial tachycardia (HCC), Bladder cancer (HCC) (10/2023), CAD (coronary artery disease), Cancer (HCC), Diabetes mellitus (HCC), Diverticulitis, Enlarged prostate, History of blood transfusion, Hyperlipidemia, Hypertension, Neuropathy, Pacemaker (2020), Pneumonia, and Vasodepressor syncope.    Past Surgical History:  He has a past surgical history that includes shoulder surgery (Bilateral); Finger surgery; Coronary angioplasty with stent (); Cataract removal (Bilateral); ablation of dysrhythmic focus; Cystoscopy (2012); TURP (2013); other surgical history (Left, 2014); Carpal tunnel release; Finger trigger release; Cystocopy (10/08/2015); Spinal fusion (N/A); Colonoscopy; pacemaker placement; Cystoscopy (N/A, 10/17/2023); Cystoscopy (N/A, 2023); Skin

## 2024-12-03 NOTE — DISCHARGE INSTRUCTIONS
Follow up with Dr. Riddle with any questions, concerns, results, and an appointment after your procedure.     It is perfectly normal and expected to have pink urine and to feel the need to urinate often while having a catheter in place.  Keep the bag below the level of the bladder and do not let it get so full that urine backs up into the tubing.  The urine will drain from your bladder constantly as long as the bag is below the level of the bladder.  You cannot control the flow of urine.  You may shower with a alevs catheter.  Just towel dry the bag and tubing.  Use soap and water to clean the site of insertion into your body.      UROLOGY DISCHARGE INSTRUCTIONS    Follow your surgeons instructions.  Your urine may look pink or red. You may also feel like you have to urinate often.  Call your surgeon if your temperature is higher than 101 degrees, your urine is grossly bloody, or has large clots, or is not draining into the bag (the tubing is clogged).  Drink plenty of fluids unless your physician advises against it.  You will be discharged home with a catheter in your bladder; follow instructions on care.   Take medications as directed.Take pain medication with food. Do not drive or operate machinery while taking narcotics.  Call your surgeon for any problems or questions        SEDATION DISCHARGE INSTRUCTIONS    12/3/2024    Wear your seatbelt home.  You are under the influence of drugs. Do not drink alcohol, drive, operate machinery, or make any important decisions or sign any legal documents for 24 hours  A responsible adult needs to be with you for 24 hours.  You may experience lightheadedness, dizziness, nausea, heightened emotions and/or sleepiness following surgery.  Rest at home today- increase activity as tolerated.  Progress slowly to a regular diet and drink plenty of fluids unless your physician has instructed you otherwise.  If nausea becomes a problem, call your physician.  Coughing, sore throat,

## 2024-12-03 NOTE — OP NOTE
procedure.  The trigone was also unable to be visualized.  At this point the cystoscope was removed and resectoscope advanced into position. Using cutting current resection was done cautiously as we were unaware of the location of the ureteral orifice ease.  Additionally this seems to be an obviously invasive malignancy so the purpose of the procedure transition away from treatment in favor of diagnosis.  Hemostasis was ensured by filling under low bladder pressure. All tumor chips were removed. The bladder was left full and the scope was removed. A Manning was placed with return of light pink urine.  A bimanual examination revealed a boggy enlarged prostate which was firm.  This was palpable on bimanual examination.  There was blood on the finger from the digital rectal examination.    At the end of the procedure all counts were correct. The patient tolerated the procedure well and was transported to the PACU in stable condition.    Findings: Tumor in the area of the trigone bladder neck and prostate.  Differential includes prostate cancer versus bladder cancer versus colorectal.  Last known PSA was in 2022 and was less than 1.  I currently do not know his last colonoscopy.  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    Estimated Blood Loss: 10 mL                 Drains: 22 fr three-way Manning catheter          Specimens: bladder tumor    Complications: none apparent           Disposition:  PACU - hemodynamically stable.            Rosalino Riddle MD  12/3/2024

## 2024-12-09 RX ORDER — LANSOPRAZOLE 30 MG/1
30 CAPSULE, DELAYED RELEASE ORAL DAILY
Qty: 90 CAPSULE | Refills: 1 | Status: SHIPPED | OUTPATIENT
Start: 2024-12-09

## 2024-12-09 RX ORDER — NATEGLINIDE 120 MG/1
120 TABLET ORAL 2 TIMES DAILY
Qty: 180 TABLET | Refills: 1 | Status: SHIPPED | OUTPATIENT
Start: 2024-12-09

## 2024-12-09 RX ORDER — INSULIN GLARGINE 100 [IU]/ML
14 INJECTION, SOLUTION SUBCUTANEOUS NIGHTLY
Qty: 15 ADJUSTABLE DOSE PRE-FILLED PEN SYRINGE | Refills: 1 | Status: SHIPPED | OUTPATIENT
Start: 2024-12-09

## (undated) DEVICE — SPONGE LAP W18XL18IN WHT COT 4 PLY FLD STRUNG RADPQ DISP ST 2 PER PACK

## (undated) DEVICE — WOUND RETRACTOR AND PROTECTOR: Brand: ALEXIS O WOUND PROTECTOR-RETRACTOR

## (undated) DEVICE — CATHETER 5FR JL3.5 CORDIS 100CM

## (undated) DEVICE — WET SKIN PREP TRAY: Brand: MEDLINE INDUSTRIES, INC.

## (undated) DEVICE — BAG DRNGE 4000ML CONT IRRIG ROUNDED TEARDROP SHP DISP

## (undated) DEVICE — 1 X VERSACROSS CONNECT TRANSSEPTAL DILATOR (INCLUDING 1 X J-TIP MECHANICAL GUIDEWIRE); 1 X VERSACROSS RF WIRE (INCLUDING 1 X CONNECTOR CABLE (SINGLE USE)); 1 X DISPERSIVE ELECTRODE: Brand: VERSACROSS CONNECT LAAC ACCESS SOLUTION

## (undated) DEVICE — SHEET,DRAPE,53X77,STERILE: Brand: MEDLINE

## (undated) DEVICE — CATHETER 6FR CORDIS PIG 145 110CM

## (undated) DEVICE — MERCY FAIRFIELD TURNOVER KIT: Brand: MEDLINE INDUSTRIES, INC.

## (undated) DEVICE — COVER LT HNDL BLU PLAS

## (undated) DEVICE — SYRINGE MED 10ML SLIP TIP BLNT FILL AND LUERLOCK DISP

## (undated) DEVICE — CYSTO: Brand: MEDLINE INDUSTRIES, INC.

## (undated) DEVICE — 260 CM J TIP WIRE .035

## (undated) DEVICE — CATHETER F BLLN 5CC 16FR 2 W HYDRGEL COAT LESS TRAUM LUB

## (undated) DEVICE — CHECK-FLO PERFORMER INTRODUCER: Brand: PERFORMER

## (undated) DEVICE — CORD LAPAROSCOPIC MONOPOLAR

## (undated) DEVICE — PAD, DEFIB, ADULT, RADIOTRANS, PHYSIO: Brand: MEDLINE

## (undated) DEVICE — CYSTO/BLADDER IRRIGATION SET, REGULATING CLAMP

## (undated) DEVICE — CORDIS XB3.5 GUIDING CATHETER

## (undated) DEVICE — CYSTO PACK: Brand: MEDLINE INDUSTRIES, INC.

## (undated) DEVICE — GLOVE ORANGE PI 7   MSG9070

## (undated) DEVICE — Device: Brand: NOMOLINE™ LH ADULT NASAL CO2 CANNULA WITH O2 4M

## (undated) DEVICE — SUTURE VCRL SZ 0 L36IN ABSRB UD CT-1 L36MM 1/2 CIR TAPR PNT VCP946H

## (undated) DEVICE — BAG  LEG  EX-TUBING  18IN  MEDIUM  20OZ

## (undated) DEVICE — Device: Brand: MEDEX

## (undated) DEVICE — BLADE CLIPPER GEN PURP NS

## (undated) DEVICE — ELECTRODE PT RET AD L9FT HI MOIST COND ADH HYDRGEL CORDED

## (undated) DEVICE — RESTRAINT EXT ANK WRST LT BLU FOAM 2 STRP SIDE BCKL HK AND

## (undated) DEVICE — SOLUTION IRRIG 1000ML STRL H2O USP PLAS POUR BTL

## (undated) DEVICE — ADHESIVE SKIN CLOSURE WND 8.661X1.5 IN 22 CM LIQUIBAND SECUR

## (undated) DEVICE — TR BAND RADIAL ARTERY COMPRESSION DEVICE: Brand: TR BAND

## (undated) DEVICE — KIT AT-X65 ANGIOTOUCH HAND CONTROLLER

## (undated) DEVICE — SOLUTION IRRIG 2000ML 0.9% SOD CHL USP UROMATIC PLAS CONT

## (undated) DEVICE — SUTURE ABSORBABLE MONOFILAMENT 0 CTX 60 IN VIO PDS + PDP990G

## (undated) DEVICE — SEALER ENDOSCP NANO COAT OPN DIV CRV L JAW LIGASURE IMPACT

## (undated) DEVICE — GUIDEWIRE ENDOSCP L150CM DIA0.035IN TIP 3CM PTFE NIT

## (undated) DEVICE — BAG DRAINAGE NS

## (undated) DEVICE — PERCLOSE™ PROSTYLE™ SUTURE-MEDIATED CLOSURE AND REPAIR SYSTEM: Brand: PERCLOSE™ PROSTYLE™

## (undated) DEVICE — LIGHT HANDLE COVER: Brand: UNBRANDED

## (undated) DEVICE — MAJOR SET UP PK

## (undated) DEVICE — STRIP,CLOSURE,WOUND,MEDI-STRIP,1/2X4: Brand: MEDLINE

## (undated) DEVICE — CATH LAB PACK: Brand: MEDLINE INDUSTRIES, INC.

## (undated) DEVICE — PRESSURE GUIDEWIRE: Brand: COMET™ II

## (undated) DEVICE — DRAPE,ANGIO,BRACH,STERILE,38X44: Brand: MEDLINE

## (undated) DEVICE — CATHETER URETH 18FR BLLN 5CC SIL HYDRGEL 2 W F SPEC CARS M

## (undated) DEVICE — PROBE COVER KIT: Brand: MEDLINE INDUSTRIES, INC.

## (undated) DEVICE — BLANKET WRM W29.9XL79.1IN UP BODY FORC AIR MISTRAL-AIR

## (undated) DEVICE — BLADE ES L6IN ELASTOMERIC COAT INSUL DURABLE BEND UPTO

## (undated) DEVICE — SOLUTION IRRIG 1000ML 0.9% SOD CHL USP POUR PLAS BTL

## (undated) DEVICE — CUTTING LOOP, BIPOLAR, 24/26 FR.: Brand: N.A.

## (undated) DEVICE — CATHETER DIAG L100CM DIA5.2FR 0.038IN POLYUR COR JR4 STD

## (undated) DEVICE — PACEMAKER PACK: Brand: MEDLINE INDUSTRIES, INC.

## (undated) DEVICE — SIZE: 600 ML: Brand: DRAINAGE BAG

## (undated) DEVICE — GLIDESHEATH SLENDER ACCESS KIT: Brand: GLIDESHEATH SLENDER

## (undated) DEVICE — APPLICATOR MEDICATED 26 CC SOLUTION HI LT ORNG CHLORAPREP

## (undated) DEVICE — SUTURE VCRL SZ 3-0 L18IN ABSRB UD L26MM SH 1/2 CIR J864D

## (undated) DEVICE — GOWN SIRUS NONREIN XL W/TWL: Brand: MEDLINE INDUSTRIES, INC.

## (undated) DEVICE — DRAPE,LAP,CHOLE,W/TROUGHS,STERILE: Brand: MEDLINE

## (undated) DEVICE — SUTURE PERMAHAND SZ 3-0 L18IN NONABSORBABLE BLK L26MM SH C013D

## (undated) DEVICE — SUTURE ABSORBABLE BRAIDED 3-0 12X18 IN COAT UD VICRYL + VCP110G

## (undated) DEVICE — MASIMOSET LNCS ADTX SPO2 ADULT PULSE OXIMETER ADHESIVE SENSOR: Brand: MASIMOSET® LNCS® ADTX SPO2 ADULT PULSE OXIMETER ADHESIVE SENSOR

## (undated) DEVICE — PENCIL SMK EVAC TELSCP 3 M TBNG

## (undated) DEVICE — STERILE POLYISOPRENE POWDER-FREE SURGICAL GLOVES: Brand: PROTEXIS

## (undated) DEVICE — PINNACLE INTRODUCER SHEATH: Brand: PINNACLE

## (undated) DEVICE — CATHETER URETH 22FR BLLN 30CC 3 W F SPEC INF CTRL BARDX

## (undated) DEVICE — SOLUTION IRRIGATION STRL H2O 1000 ML UROMATIC CONTAINER

## (undated) DEVICE — 1LYRTR 16FR10ML 100%SILI SNAP: Brand: MEDLINE INDUSTRIES, INC.

## (undated) DEVICE — SUTURE VCRL + SZ 0 L27IN ABSRB UD L36MM CT-1 1/2 CIR VCPP41D

## (undated) DEVICE — ACCESS SHEATH WITH DILATOR: Brand: WATCHMAN FXD CURVE™ ACCESS SYSTEM